# Patient Record
Sex: FEMALE | Race: WHITE | HISPANIC OR LATINO | Employment: UNEMPLOYED | URBAN - METROPOLITAN AREA
[De-identification: names, ages, dates, MRNs, and addresses within clinical notes are randomized per-mention and may not be internally consistent; named-entity substitution may affect disease eponyms.]

---

## 2017-01-31 ENCOUNTER — GENERIC CONVERSION - ENCOUNTER (OUTPATIENT)
Dept: OTHER | Facility: OTHER | Age: 47
End: 2017-01-31

## 2017-05-01 ENCOUNTER — GENERIC CONVERSION - ENCOUNTER (OUTPATIENT)
Dept: OTHER | Facility: OTHER | Age: 47
End: 2017-05-01

## 2017-10-31 ENCOUNTER — APPOINTMENT (OUTPATIENT)
Dept: RADIOLOGY | Facility: CLINIC | Age: 47
End: 2017-10-31
Payer: COMMERCIAL

## 2017-10-31 ENCOUNTER — ALLSCRIPTS OFFICE VISIT (OUTPATIENT)
Dept: OTHER | Facility: OTHER | Age: 47
End: 2017-10-31

## 2017-10-31 ENCOUNTER — GENERIC CONVERSION - ENCOUNTER (OUTPATIENT)
Dept: OTHER | Facility: OTHER | Age: 47
End: 2017-10-31

## 2017-10-31 ENCOUNTER — APPOINTMENT (OUTPATIENT)
Dept: LAB | Facility: CLINIC | Age: 47
End: 2017-10-31
Payer: COMMERCIAL

## 2017-10-31 DIAGNOSIS — R10.9 ABDOMINAL PAIN: ICD-10-CM

## 2017-10-31 DIAGNOSIS — R11.2 NAUSEA WITH VOMITING: ICD-10-CM

## 2017-10-31 DIAGNOSIS — R14.0 ABDOMINAL DISTENSION (GASEOUS): ICD-10-CM

## 2017-10-31 LAB
ALBUMIN SERPL BCP-MCNC: 3.6 G/DL (ref 3.5–5)
ALP SERPL-CCNC: 46 U/L (ref 46–116)
ALT SERPL W P-5'-P-CCNC: 17 U/L (ref 12–78)
ANION GAP SERPL CALCULATED.3IONS-SCNC: 5 MMOL/L (ref 4–13)
AST SERPL W P-5'-P-CCNC: 14 U/L (ref 5–45)
BASOPHILS # BLD AUTO: 0.04 THOUSANDS/ΜL (ref 0–0.1)
BASOPHILS NFR BLD AUTO: 1 % (ref 0–1)
BILIRUB SERPL-MCNC: 0.88 MG/DL (ref 0.2–1)
BUN SERPL-MCNC: 12 MG/DL (ref 5–25)
CALCIUM SERPL-MCNC: 8.4 MG/DL (ref 8.3–10.1)
CHLORIDE SERPL-SCNC: 105 MMOL/L (ref 100–108)
CO2 SERPL-SCNC: 29 MMOL/L (ref 21–32)
CREAT SERPL-MCNC: 0.75 MG/DL (ref 0.6–1.3)
EOSINOPHIL # BLD AUTO: 0.09 THOUSAND/ΜL (ref 0–0.61)
EOSINOPHIL NFR BLD AUTO: 1 % (ref 0–6)
ERYTHROCYTE [DISTWIDTH] IN BLOOD BY AUTOMATED COUNT: 12.7 % (ref 11.6–15.1)
ERYTHROCYTE [SEDIMENTATION RATE] IN BLOOD: 6 MM/HOUR (ref 0–20)
GFR SERPL CREATININE-BSD FRML MDRD: 96 ML/MIN/1.73SQ M
GLUCOSE P FAST SERPL-MCNC: 84 MG/DL (ref 65–99)
HCT VFR BLD AUTO: 39.1 % (ref 34.8–46.1)
HGB BLD-MCNC: 13 G/DL (ref 11.5–15.4)
LYMPHOCYTES # BLD AUTO: 1.56 THOUSANDS/ΜL (ref 0.6–4.47)
LYMPHOCYTES NFR BLD AUTO: 25 % (ref 14–44)
MCH RBC QN AUTO: 29.6 PG (ref 26.8–34.3)
MCHC RBC AUTO-ENTMCNC: 33.2 G/DL (ref 31.4–37.4)
MCV RBC AUTO: 89 FL (ref 82–98)
MONOCYTES # BLD AUTO: 0.38 THOUSAND/ΜL (ref 0.17–1.22)
MONOCYTES NFR BLD AUTO: 6 % (ref 4–12)
NEUTROPHILS # BLD AUTO: 4.2 THOUSANDS/ΜL (ref 1.85–7.62)
NEUTS SEG NFR BLD AUTO: 67 % (ref 43–75)
NRBC BLD AUTO-RTO: 0 /100 WBCS
PLATELET # BLD AUTO: 234 THOUSANDS/UL (ref 149–390)
PMV BLD AUTO: 9.9 FL (ref 8.9–12.7)
POTASSIUM SERPL-SCNC: 4.1 MMOL/L (ref 3.5–5.3)
PROT SERPL-MCNC: 7.5 G/DL (ref 6.4–8.2)
RBC # BLD AUTO: 4.39 MILLION/UL (ref 3.81–5.12)
SODIUM SERPL-SCNC: 139 MMOL/L (ref 136–145)
WBC # BLD AUTO: 6.29 THOUSAND/UL (ref 4.31–10.16)

## 2017-10-31 PROCEDURE — 85025 COMPLETE CBC W/AUTO DIFF WBC: CPT

## 2017-10-31 PROCEDURE — 80053 COMPREHEN METABOLIC PANEL: CPT

## 2017-10-31 PROCEDURE — 85652 RBC SED RATE AUTOMATED: CPT

## 2017-10-31 PROCEDURE — 36415 COLL VENOUS BLD VENIPUNCTURE: CPT

## 2017-10-31 PROCEDURE — 74000 HB X-RAY EXAM OF ABDOMEN (SINGLE ANTEROPOSTERIOR VIEW): CPT

## 2017-11-01 ENCOUNTER — ALLSCRIPTS OFFICE VISIT (OUTPATIENT)
Dept: OTHER | Facility: OTHER | Age: 47
End: 2017-11-01

## 2017-11-01 NOTE — PROGRESS NOTES
Assessment  1  Abdominal bloating (787 3) (R14 0)   2  Abdominal pain (789 00) (R10 9)   3  Nausea and vomiting (787 01) (R11 2)    Plan  Abdominal bloating, Abdominal pain    · (1) CBC/PLT/DIFF; Status:Active; Requested for:31Oct2017; Abdominal bloating, Abdominal pain, Nausea and vomiting    · (1) COMPREHENSIVE METABOLIC PANEL; Status:Active; Requested for:31Oct2017;    · (1) SED RATE; Status:Active; Requested for:31Oct2017;    · * XR ABDOMEN 1 VIEW KUB; Status:Active; Requested VIM:36DVH4072;    · 1 - Mo MALDONADO, Triny Perez (Gastroenterology) Co-Management  *  Status: Active   Requested for: 21VYT8212  Care Summary provided  : Yes  Flu vaccine need    · Fluzone Quadrivalent Intramuscular Suspension; INJECT 0 5  ML  Intramuscular; To Be Done: 51NJO6467    Discussion/Summary    BAd pain/bloating  Marcine Grow Marcine Grow N/V- Check BW including CBC, CMP, ESR  Abdominal xray  Referral to Dr Ray Dominique  Increase water  Avoid bananas which she has been eating a lot of  Eat other fruits and veggies  Chief Complaint  abd pain and bloating, n/v      History of Present Illness  HPI: For the last 2 weeks pt has been having abd bloating and pain  Pain is in the LLQ mostly, but also epigastric  The last 3 days she has had n/v when she tries to eat something  No f/c/s   took ibuprofen 800mg BID for 3 days about 2 weeks ago  She says she did take it with food  black stools or bloody stools  She has a Hx of a gastric sleeve surgery  normally has a BM every 2-3 days  She had a BM on Sat that was soft (she took almost a whole bottle of Miralax to have this BM), and hasn't had one since  No straining or pushing  She can't take Benefiber or the like due to it causing bloating  has a HX of abd issues and used to see Dr Ray Dominique years ago   Abdominal Pain (Non-Malignant) (Brief): The patient is being seen for an initial evaluation of non-malignancy related abdominal pain   Symptoms:  abdominal pain,-- constipation,-- nausea-- and-- vomiting, but-- no anorexia,-- no diarrhea-- and-- no dysphagia  Review of Systems    Constitutional: recent 17 lb weight gain, but-- no fever,-- not feeling poorly,-- no chills-- and-- not feeling tired  ENT: no ear ache, no loss of hearing, no nosebleeds or nasal discharge, no sore throat or hoarseness  Cardiovascular: no complaints of slow or fast heart rate, no chest pain, no palpitations, no leg claudication or lower extremity edema  Respiratory: no complaints of shortness of breath, no wheezing, no dyspnea on exertion, no orthopnea or PND  Gastrointestinal: abdominal pain,-- nausea,-- vomiting,-- constipation-- and-- diarrhea, but-- no blood in stools  ROS reviewed  Active Problems  1  Abdominal bloating (787 3) (R14 0)   2  Abdominal pain (789 00) (R10 9)   3  Asthma (493 90) (J45 909)   4  Chronic rhinitis (472 0) (J31 0)   5  Fall, accidental (E888 9) [facility])   6  Flu vaccine need (V04 81) (Z23)   7  Fractured coccyx (805 6) (S32 2XXA)   8  Fungal dermatitis (111 9) (B36 9)   9  Leukopenia (288 50) (D72 819)   10  Low back pain (724 2) (M54 5)   11  Malabsorption (579 9) (K90 9)   12  Nausea and vomiting (787 01) (R11 2)   13  Stress incontinence in female (625 6) (N39 3)   14  Vitamin D deficiency (268 9) (E55 9)    Past Medical History  Active Problems And Past Medical History Reviewed: The active problems and past medical history were reviewed and updated today  Surgical History  Surgical History Reviewed: The surgical history was reviewed and updated today  Social History   · Minimum alcohol consumption   · Never A Smoker   · No drug use  The social history was reviewed and updated today  Family History  Family History Reviewed: The family history was reviewed and updated today  Current Meds   1  Fluticasone Propionate 50 MCG/ACT Nasal Suspension; USE 2 SPRAYS IN EACH   NOSTRIL ONCE DAILY; Therapy: 67PSI4418 to (Last Rx:26Jan2015) Ordered   2   Ibuprofen 800 MG Oral Tablet; TAKE 1 TABLET 3 TIMES DAILY AFTER MEALS; Therapy: 78BTM6008 to (Evaluate:57Uld5944) Recorded   3  Lamisil CREA; Therapy: (Recorded:02Mar2016) to Recorded   4  Microgestin FE 1 5/30 1 5-30 MG-MCG Oral Tablet; TAKE 1 TABLET DAILY AS DIRECTED; Therapy: 12Sep2016 to (Evaluate:10Oct2016); Last Rx:12Sep2016 Ordered   5  Ventolin  (90 Base) MCG/ACT Inhalation Aerosol Solution; TAKE 2 PUFFS EVERY   4 TO 6 HOURS AS NEEDED FOR SOB; Therapy: 10YAQ8438 to (Last Rx:33Yso9583) Ordered    The medication list was reviewed and updated today  Allergies  1  Penicillins   2  Shellfish-derived Products  3  Seasonal    Vitals   Recorded: 05NIS8183 08:59AM Recorded: 41AQT2920 08:46AM   Temperature 98 1 F    Heart Rate  66   Systolic  88   Diastolic  58   Height  5 ft 5 in   Weight  163 lb 4 oz   BMI Calculated  27 17   BSA Calculated  1 81   O2 Saturation  98     Physical Exam    Constitutional   General appearance: No acute distress, well appearing and well nourished  Eyes   Conjunctiva and lids: No swelling, erythema or discharge  Pupils and irises: Equal, round and reactive to light  Ears, Nose, Mouth, and Throat   External inspection of ears and nose: Normal     Otoscopic examination: Tympanic membranes translucent with normal light reflex  Canals patent without erythema  Nasal mucosa, septum, and turbinates: Normal without edema or erythema  Oropharynx: Normal with no erythema, edema, exudate or lesions  Pulmonary   Respiratory effort: No increased work of breathing or signs of respiratory distress  Auscultation of lungs: Clear to auscultation  Cardiovascular   Auscultation of heart: Normal rate and rhythm, normal S1 and S2, without murmurs  Examination of extremities for edema and/or varicosities: Normal     Abdomen   Abdomen: Abnormal   The abdomen was flat  Bowel sounds were normal  There was mild tenderness in the left lower quadrant  The abdomen was not firm   No rebound tenderness  No guarding  Liver and spleen: No hepatomegaly or splenomegaly  Lymphatic   Palpation of lymph nodes in neck: No lymphadenopathy  Results/Data  PHQ-2 Adult Depression Screening 31Oct2017 08:42AM User, Ahs     Test Name Result Flag Reference   PHQ-2 Adult Depression Score 0     Over the last two weeks, how often have you been bothered by any of the following problems?   Little interest or pleasure in doing things: Not at all - 0  Feeling down, depressed, or hopeless: Not at all - 0   PHQ-2 Adult Depression Screening Negative         Future Appointments    Date/Time Provider Specialty Site   11/01/2017 04:00 PM Sanchez Jhaveri MD Gastroenterology Adult [address] [facility]   Electronically signed by : Basia Kruger, [facility]; Oct 31 2017  9:23AM EST                       (Author)    Electronically signed by : Efren Gilbert DO; Oct 31 2017  9:27AM EST                       (Author)

## 2017-11-02 ENCOUNTER — GENERIC CONVERSION - ENCOUNTER (OUTPATIENT)
Dept: OTHER | Facility: OTHER | Age: 47
End: 2017-11-02
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
## 2018-01-10 NOTE — RESULT NOTES
Verified Results  (1) CBC/PLT/DIFF 31Oct2017 09:18AM [facility] Order Number: [id number]     Test Name Result Flag Reference   WBC COUNT 6 29 Thousand/uL  4 31-10 16   RBC COUNT 4 39 Million/uL  3 81-5 12   HEMOGLOBIN 13 0 g/dL  11 5-15 4   HEMATOCRIT 39 1 %  34 8-46  1   MCV 89 fL  82-98   MCH 29 6 pg  26 8-34 3   MCHC 33 2 g/dL  31 4-37 4   RDW 12 7 %  11 6-15 1   MPV 9 9 fL  8 9-12 7   PLATELET COUNT 729 Thousands/uL  149-390   nRBC AUTOMATED 0 /100 WBCs     NEUTROPHILS RELATIVE PERCENT 67 %  43-75   LYMPHOCYTES RELATIVE PERCENT 25 %  14-44   MONOCYTES RELATIVE PERCENT 6 %  4-12   EOSINOPHILS RELATIVE PERCENT 1 %  0-6   BASOPHILS RELATIVE PERCENT 1 %  0-1   NEUTROPHILS ABSOLUTE COUNT 4 20 Thousands/? ??L  1 85-7 62   LYMPHOCYTES ABSOLUTE COUNT 1 56 Thousands/? ??L  0 60-4 47   MONOCYTES ABSOLUTE COUNT 0 38 Thousand/? ??L  0 17-1 22   EOSINOPHILS ABSOLUTE COUNT 0 09 Thousand/? ??L  0 00-0 61   BASOPHILS ABSOLUTE COUNT 0 04 Thousands/? ??L  0 00-0 10     (1) COMPREHENSIVE METABOLIC PANEL 28CVD1238 29:14EC [facility] Order Number: [id number]     Test Name Result Flag Reference   SODIUM 139 mmol/L  136-145   POTASSIUM 4 1 mmol/L  3 5-5 3   CHLORIDE 105 mmol/L  100-108   CARBON DIOXIDE 29 mmol/L  21-32   ANION GAP (CALC) 5 mmol/L  4-13   BLOOD UREA NITROGEN 12 mg/dL  5-25   CREATININE 0 75 mg/dL  0 60-1 30   Standardized to IDMS reference method   CALCIUM 8 4 mg/dL  8 3-10 1   BILI, TOTAL 0 88 mg/dL  0 20-1 00   ALK PHOSPHATAS 46 U/L     ALT (SGPT) 17 U/L  12-78   Specimen collection should occur prior to Sulfasalazine and/or Sulfapyridine administration due to the potential for falsely depressed results  AST(SGOT) 14 U/L  5-45   Specimen collection should occur prior to Sulfasalazine administration due to the potential for falsely depressed results     ALBUMIN 3 6 g/dL  3 5-5 0   TOTAL PROTEIN 7 5 g/dL  6 4-8 2   eGFR 96 ml/min/1 73sq gaby     [employer] Disease Education Program recommendations are as follows:  GFR calculation is accurate only with a steady state creatinine  Chronic Kidney disease less than 60 ml/min/1 73 sq  meters  Kidney failure less than 15 ml/min/1 73 sq  meters  GLUCOSE FASTING 84 mg/dL  65-99   Specimen collection should occur prior to Sulfasalazine administration due to the potential for falsely depressed results  Specimen collection should occur prior to Sulfapyridine administration due to the potential for falsely elevated results       (1) SED RATE 30LMX6097 09:18AM Harriett DORSEY Order Number: [id number]     Test Name Result Flag Reference   SED RATE 6 mm/hour  0-20     Health Maintenance Flow Sheet 40Xzx2322 09:40AM      Test Name Result Flag Reference   [facility] 10/28/2015

## 2018-01-11 NOTE — RESULT NOTES
Verified Results  (1) COMPREHENSIVE METABOLIC PANEL 76QES5259 86:17QC Jermaine Swain   TW Order Number: [id number]     Test Name Result Flag Reference   GLUCOSE,RANDM 80 mg/dL     If the patient is fasting, the ADA then defines impaired fasting glucose as > 100 mg/dL and diabetes as > or equal to 123 mg/dL  SODIUM 137 mmol/L  136-145   POTASSIUM 3 9 mmol/L  3 5-5 3   CHLORIDE 106 mmol/L  100-108   CARBON DIOXIDE 25 mmol/L  21-32   ANION GAP (CALC) 6 mmol/L  4-13   BLOOD UREA NITROGEN 16 mg/dL  5-25   CREATININE 0 74 mg/dL  0 60-1 30   Standardized to IDMS reference method   CALCIUM 8 3 mg/dL  8 3-10 1   BILI, TOTAL 1 04 mg/dL H 0 20-1 00   ALK PHOSPHATAS 33 U/L L    ALT (SGPT) 18 U/L  12-78   AST(SGOT) 11 U/L  5-45   ALBUMIN 3 6 g/dL  3 5-5 0   TOTAL PROTEIN 7 4 g/dL  6 4-8 2   eGFR Non-African American      >60 0 ml/min/1 73sq m   - Patient Instructions: This is a fasting blood test  Water,black tea or black  coffee only after 9:00pm the night before test Drink 2 glasses of water the morning of test - Patient Instructions: This bloodwork is non-fasting  Please drink two glasses of   water morning of bloodwork  National Kidney Disease Education Program recommendations are as follows:  GFR calculation is accurate only with a steady state creatinine  Chronic Kidney disease less than 60 ml/min/1 73 sq  meters  Kidney failure less than 15 ml/min/1 73 sq  meters  (1) LIPID PANEL, FASTING 65Bot0043 08:39AM Fede Sylvester   TW Order Number: [id number]     Test Name Result Flag Reference   CHOLESTEROL 147 mg/dL     HDL,DIRECT 47 mg/dL  40-60   Specimen collection should occur prior to Metamizole administration due to the potential for falsely depressed results  LDL CHOLESTEROL CALCULATED 77 mg/dL  0-100   - Patient Instructions:  This is a fasting blood test  Water,black tea or black  coffee only after 9:00pm the night before test   Drink 2 glasses of water the morning of test     - Patient Instructions: This is a fasting blood test  Water,black tea or black  coffee only after 9:00pm the night before test Drink 2 glasses of water the morning of test - Patient Instructions: This bloodwork is non-fasting  Please drink two glasses of   water morning of bloodwork  Triglyceride:         Normal              <150 mg/dl       Borderline High    150-199 mg/dl       High               200-499 mg/dl       Very High          >499 mg/dl  Cholesterol:         Desirable        <200 mg/dl      Borderline High  200-239 mg/dl      High             >239 mg/dl  HDL Cholesterol:        High    >59 mg/dL      Low     <41 mg/dL  LDL CALCULATED:    This screening LDL is a calculated result  It does not have the accuracy of the Direct Measured LDL in the monitoring of patients with hyperlipidemia and/or statin therapy  Direct Measure LDL (YIF179) must be ordered separately in these patients  TRIGLYCERIDES 113 mg/dL  <=150   Specimen collection should occur prior to N-Acetylcysteine or Metamizole administration due to the potential for falsely depressed results  (1) TSH 12Ouz9107 08:39AM [place] Lashawn Brizuela    Order Number: [id number]     Test Name Result Flag Reference   TSH 2 670 uIU/mL  0 358-3 740   - Patient Instructions: This bloodwork is non-fasting  Please drink two glasses of water morning of bloodwork  - Patient Instructions: This is a fasting blood test  Water,black tea or black  coffee only after 9:00pm the night before test Drink 2 glasses of water the morning of test - Patient Instructions: This bloodwork is non-fasting  Please drink two glasses of   water morning of bloodwork  Patients undergoing fluorescein dye angiography may retain small amounts of fluorescein in the body for 48-72 hours post procedure  Samples containing fluorescein can produce falsely depressed TSH values   If the patient had this procedure,a specimen should be resubmitted post fluorescein clearance  The recommended reference ranges for TSH during pregnancy are as follows:  First trimester 0 1 to 2 5 uIU/mL  Second trimester  0 2 to 3 0 uIU/mL  Third trimester 0 3 to 3 0 uIU/m     (1) CBC/PLT/DIFF 26Uda0337 08:39AM Felecia Primbee   TW Order Number: [id number]     Test Name Result Flag Reference   WBC COUNT 3 10 Thousand/uL L 4 31-10 16   RBC COUNT 4 07 Million/uL  3 81-5 12   HEMOGLOBIN 11 8 g/dL  11 5-15 4   HEMATOCRIT 35 9 %  34 8-46  1   MCV 88 fL  82-98   MCH 29 0 pg  26 8-34 3   MCHC 32 9 g/dL  31 4-37 4   RDW 12 6 %  11 6-15 1   MPV 11 0 fL  8 9-12 7   PLATELET COUNT 289 Thousands/uL  149-390   nRBC AUTOMATED 0 /100 WBCs     NEUTROPHILS RELATIVE PERCENT 44 %  43-75   LYMPHOCYTES RELATIVE PERCENT 45 % H 14-44   MONOCYTES RELATIVE PERCENT 7 %  4-12   EOSINOPHILS RELATIVE PERCENT 3 %  0-6   BASOPHILS RELATIVE PERCENT 1 %  0-1   NEUTROPHILS ABSOLUTE COUNT 1 36 Thousands/?L L 1 85-7 62   LYMPHOCYTES ABSOLUTE COUNT 1 38 Thousands/?L  0 60-4 47   MONOCYTES ABSOLUTE COUNT 0 22 Thousand/?L  0 17-1 22   EOSINOPHILS ABSOLUTE COUNT 0 10 Thousand/?L  0 00-0 61   BASOPHILS ABSOLUTE COUNT 0 03 Thousands/?L  0 00-0 10   - Patient Instructions: This bloodwork is non-fasting  Please drink two glasses of water morning of bloodwork  - Patient Instructions: This bloodwork is non-fasting  Please drink two glasses of water morning of bloodwork

## 2018-01-11 NOTE — RESULT NOTES
Verified Results  * XR SACRUM AND COCCYX [id number] 08:51AM [place] [facility] Order Number: [id number]     Test Name Result Flag Reference   XR SACRUM AND COCCYX (Report)     SACRUM AND COCCYX     INDICATION: Sacral/coccygeal pain  COMPARISON: Plain film dated 2/15/2016 and 5/9/2016  VIEWS: 3; 3 images      FINDINGS:     There remains slight osseous irregularity at the 1st coccygeal segment which could reflect the sequela of remote fracture  No evidence for an acute fracture  Sacral arcuate lines are maintained  The SI joints appear symmetric  Osteitis pubis  IMPRESSION:     Stable appearance of the sacrum and coccyx  Suggestion of deformity from remote coccygeal fracture         Workstation performed: [id number]     Signed by:   Marysol Fowler MD   9/27/16

## 2018-01-12 NOTE — RESULT NOTES
Verified Results  * XR ABDOMEN 1 VIEW KUB [id number] 12:27PM Sweetser Suzette Lesch    Order Number: [id number]     Test Name Result Flag Reference   XR ABDOMEN 1 VIEW KUB (Report)     ABDOMEN     INDICATION: History of indication     COMPARISON: None  VIEWS: AP supine     IMAGES: 1     FINDINGS:     There is a nonobstructive bowel gas pattern  No discernible free air on this supine study  Upright or left lateral decubitus imaging is more sensitive to detect subtle free air in the appropriate setting  No pathologic calcifications or soft tissue masses  Visualized lung bases are clear  Visualized osseous structures are unremarkable for the patient's age  IMPRESSION:     Unremarkable examination         Workstation performed: [id number]     Signed by:   Daiana Mcfarlane MD   11/2/17

## 2018-01-14 VITALS
SYSTOLIC BLOOD PRESSURE: 88 MMHG | HEIGHT: 65 IN | HEART RATE: 66 BPM | OXYGEN SATURATION: 98 % | WEIGHT: 163.25 LBS | TEMPERATURE: 98.1 F | BODY MASS INDEX: 27.2 KG/M2 | DIASTOLIC BLOOD PRESSURE: 58 MMHG

## 2018-01-14 VITALS
WEIGHT: 163.25 LBS | SYSTOLIC BLOOD PRESSURE: 102 MMHG | HEART RATE: 68 BPM | DIASTOLIC BLOOD PRESSURE: 60 MMHG | HEIGHT: 65 IN | BODY MASS INDEX: 27.2 KG/M2

## 2018-01-15 ENCOUNTER — ALLSCRIPTS OFFICE VISIT (OUTPATIENT)
Dept: OTHER | Facility: OTHER | Age: 48
End: 2018-01-15

## 2018-01-15 NOTE — MISCELLANEOUS
Message  Spoke to patient regarding her x-ray which shows a fracture of the coccyx  She is aware there is no real treatment for this fracture  She has an appt with her orthopedist tomorrow and she will inform them and will request the records be sent        Signatures   Electronically signed by : Tho Montesinos [facility]; Feb 15 2016  4:31PM EST                       (Author)

## 2018-01-22 VITALS
HEIGHT: 65 IN | HEART RATE: 72 BPM | OXYGEN SATURATION: 96 % | WEIGHT: 146 LBS | SYSTOLIC BLOOD PRESSURE: 92 MMHG | BODY MASS INDEX: 24.32 KG/M2 | DIASTOLIC BLOOD PRESSURE: 64 MMHG

## 2018-01-22 VITALS
TEMPERATURE: 98.3 F | WEIGHT: 167 LBS | DIASTOLIC BLOOD PRESSURE: 74 MMHG | HEART RATE: 76 BPM | OXYGEN SATURATION: 93 % | SYSTOLIC BLOOD PRESSURE: 106 MMHG | BODY MASS INDEX: 27.82 KG/M2 | HEIGHT: 65 IN

## 2018-02-12 RX ORDER — PRENATAL VIT 91/IRON/FOLIC/DHA 28-975-200
COMBINATION PACKAGE (EA) ORAL
Status: ON HOLD | COMMUNITY
End: 2018-04-30

## 2018-02-12 RX ORDER — TOBRAMYCIN 3 MG/ML
2 SOLUTION/ DROPS OPHTHALMIC 4 TIMES DAILY
COMMUNITY
Start: 2018-01-15 | End: 2018-02-13 | Stop reason: ALTCHOICE

## 2018-02-12 RX ORDER — FLUTICASONE PROPIONATE 50 MCG
2 SPRAY, SUSPENSION (ML) NASAL DAILY
COMMUNITY
Start: 2015-01-26 | End: 2019-03-27 | Stop reason: SDUPTHER

## 2018-02-12 RX ORDER — ALBUTEROL SULFATE 90 UG/1
AEROSOL, METERED RESPIRATORY (INHALATION)
COMMUNITY
Start: 2016-05-10 | End: 2019-03-27 | Stop reason: SDUPTHER

## 2018-02-12 RX ORDER — NORETHINDRONE ACETATE AND ETHINYL ESTRADIOL 1.5-30(21)
1 KIT ORAL DAILY
COMMUNITY
Start: 2016-09-12 | End: 2018-02-13 | Stop reason: ALTCHOICE

## 2018-02-12 RX ORDER — IBUPROFEN 800 MG/1
1 TABLET ORAL EVERY 8 HOURS
COMMUNITY
Start: 2017-10-31 | End: 2018-02-13 | Stop reason: ALTCHOICE

## 2018-02-13 ENCOUNTER — OFFICE VISIT (OUTPATIENT)
Dept: INTERNAL MEDICINE CLINIC | Facility: CLINIC | Age: 48
End: 2018-02-13
Payer: COMMERCIAL

## 2018-02-13 VITALS
DIASTOLIC BLOOD PRESSURE: 64 MMHG | OXYGEN SATURATION: 98 % | HEIGHT: 64 IN | BODY MASS INDEX: 29.6 KG/M2 | HEART RATE: 87 BPM | WEIGHT: 173.4 LBS | SYSTOLIC BLOOD PRESSURE: 92 MMHG

## 2018-02-13 DIAGNOSIS — Z00.00 HEALTH MAINTENANCE EXAMINATION: Primary | ICD-10-CM

## 2018-02-13 DIAGNOSIS — I95.0 IDIOPATHIC HYPOTENSION: Primary | ICD-10-CM

## 2018-02-13 PROCEDURE — 99396 PREV VISIT EST AGE 40-64: CPT | Performed by: INTERNAL MEDICINE

## 2018-02-13 RX ORDER — NORETHINDRONE ACETATE AND ETHINYL ESTRADIOL 1.5-30(21)
1 KIT ORAL DAILY
COMMUNITY
End: 2021-09-14 | Stop reason: ALTCHOICE

## 2018-02-13 NOTE — PROGRESS NOTES
Assessment/Plan:    No problem-specific Assessment & Plan notes found for this encounter  Diagnoses and all orders for this visit:    Health maintenance examination    Other orders  -     fluticasone (FLONASE) 50 mcg/act nasal spray; 2 sprays into each nostril daily  -     Discontinue: ibuprofen (MOTRIN) 800 mg tablet; Take 1 tablet by mouth every 8 (eight) hours  -     terbinafine (LAMISIL AT) 1 % cream; Apply topically  -     Linaclotide (LINZESS) 145 MCG CAPS; Take 1 capsule by mouth daily  -     Discontinue: norethindrone-ethinyl estradiol-iron (MICROGESTIN FE 1 5/30) 1 5-30 MG-MCG tablet; Take 1 tablet by mouth daily  -     Discontinue: tobramycin (TOBREX) 0 3 % SOLN; Apply 2 drops to eye 4 (four) times a day  -     albuterol (VENTOLIN HFA) 90 mcg/act inhaler; Inhale  -     norethindrone-ethinyl estradiol-iron (MICROGESTIN FE1 5/30) 1 5-30 MG-MCG tablet; Take 1 tablet by mouth daily          Subjective:      Patient ID: Felisha Urrutia is a 53 y o  female  Follow-up visit of a 81-year-old  Other than some allergy symptoms for which she uses Flonase, she feels well  Formerly she had persistent asthma but now she has not been symptomatic for over a year  No recent needed all for inhaler other vascular maintenance    The patient had gastric sleeve surgery 3 years ago  Her maximum weight was 210  A minimum weight was 137, in the spring of 2017  Currently 170     Has reflux symptoms  Normal EGD in 2014  Continues to follow with Gastroenterology  complaint of some right shoulder pain associated with limitation of range of motion to about 90° abduction  Chronic low back pain  Uses a cane for ambulatory support  Applying for disability          The following portions of the patient's history were reviewed and updated as appropriate: allergies, current medications, past family history, past medical history, past social history, past surgical history and problem list     Review of Systems Constitutional: Negative for activity change, appetite change and fever  HENT: Positive for postnasal drip and rhinorrhea  Negative for sinus pain, sinus pressure, sore throat and tinnitus  Eyes: Negative for pain and visual disturbance  Respiratory: Negative for cough, shortness of breath and wheezing  Cardiovascular: Negative for chest pain and palpitations  Gastrointestinal: Positive for constipation  Negative for abdominal pain, blood in stool, diarrhea, nausea and vomiting  Endocrine: Negative for cold intolerance and heat intolerance  Genitourinary: Negative for dysuria, frequency, vaginal bleeding and vaginal discharge  Musculoskeletal: Positive for arthralgias and back pain  Skin: Negative for color change and wound  Allergic/Immunologic: Negative for immunocompromised state  Neurological: Negative for dizziness, seizures, syncope and headaches  Hematological: Negative for adenopathy  Does not bruise/bleed easily  Psychiatric/Behavioral: Negative for confusion and suicidal ideas  Objective:    Vitals:    02/13/18 1148   BP: (!) 86/62   Pulse: 87   SpO2: 98%        Physical Exam   Constitutional: She is oriented to person, place, and time  She appears well-developed and well-nourished  No distress  Appears stated age     HENT:   Head: Normocephalic and atraumatic  Eyes: Conjunctivae are normal  Pupils are equal, round, and reactive to light  Neck: Normal range of motion  No thyromegaly present  Cardiovascular: Normal rate, regular rhythm and normal heart sounds  No murmur heard  Pulmonary/Chest: Effort normal  No respiratory distress  She has no wheezes  She has no rales  Abdominal: Soft  There is no tenderness  Musculoskeletal: Normal range of motion  She exhibits no deformity  Neurological: She is alert and oriented to person, place, and time  Skin: Skin is warm and dry  Psychiatric: She has a normal mood and affect   Her behavior is normal  Judgment and thought content normal

## 2018-02-13 NOTE — PROGRESS NOTES
Follow-up visit of a 72-year-old  Chronic seasonal allergies treated with Flonase  Has the sniffles right now   Mild intermittent asthma  She has not needed a rescue or maintenance inhaler for over a year   History of gastric sleeve surgery which was done in 2015  Maximum weight was 210 lb  Minimum weight was 137 lb May of 2017  Currently 170  Other than allergy symptoms, she feels well  Mammograms up-to-date  EGD was done in 2014 for reflux symptoms and was normal   Still gets symptoms and follows with Gi        No cigarettes  Minimal alcohol  No drugs

## 2018-02-14 ENCOUNTER — APPOINTMENT (OUTPATIENT)
Dept: LAB | Facility: CLINIC | Age: 48
End: 2018-02-14
Payer: COMMERCIAL

## 2018-02-14 DIAGNOSIS — I95.0 IDIOPATHIC HYPOTENSION: ICD-10-CM

## 2018-02-14 LAB
ALBUMIN SERPL BCP-MCNC: 3.9 G/DL (ref 3.5–5)
ALP SERPL-CCNC: 49 U/L (ref 46–116)
ALT SERPL W P-5'-P-CCNC: 21 U/L (ref 12–78)
ANION GAP SERPL CALCULATED.3IONS-SCNC: 10 MMOL/L (ref 4–13)
AST SERPL W P-5'-P-CCNC: 19 U/L (ref 5–45)
BACTERIA UR QL AUTO: ABNORMAL /HPF
BASOPHILS # BLD AUTO: 0.03 THOUSANDS/ΜL (ref 0–0.1)
BASOPHILS NFR BLD AUTO: 1 % (ref 0–1)
BILIRUB SERPL-MCNC: 1.83 MG/DL (ref 0.2–1)
BILIRUB UR QL STRIP: NEGATIVE
BUN SERPL-MCNC: 13 MG/DL (ref 5–25)
CALCIUM SERPL-MCNC: 8.5 MG/DL (ref 8.3–10.1)
CHLORIDE SERPL-SCNC: 104 MMOL/L (ref 100–108)
CHOLEST SERPL-MCNC: 176 MG/DL (ref 50–200)
CLARITY UR: CLEAR
CO2 SERPL-SCNC: 24 MMOL/L (ref 21–32)
COLOR UR: YELLOW
CORTIS AM PEAK SERPL-MCNC: 23.4 UG/DL (ref 4.2–22.4)
CREAT SERPL-MCNC: 0.78 MG/DL (ref 0.6–1.3)
EOSINOPHIL # BLD AUTO: 0.09 THOUSAND/ΜL (ref 0–0.61)
EOSINOPHIL NFR BLD AUTO: 2 % (ref 0–6)
ERYTHROCYTE [DISTWIDTH] IN BLOOD BY AUTOMATED COUNT: 12.3 % (ref 11.6–15.1)
GFR SERPL CREATININE-BSD FRML MDRD: 91 ML/MIN/1.73SQ M
GLUCOSE P FAST SERPL-MCNC: 84 MG/DL (ref 65–99)
GLUCOSE UR STRIP-MCNC: NEGATIVE MG/DL
HCT VFR BLD AUTO: 38.6 % (ref 34.8–46.1)
HDLC SERPL-MCNC: 48 MG/DL (ref 40–60)
HGB BLD-MCNC: 12.5 G/DL (ref 11.5–15.4)
HGB UR QL STRIP.AUTO: NEGATIVE
KETONES UR STRIP-MCNC: NEGATIVE MG/DL
LDLC SERPL CALC-MCNC: 109 MG/DL (ref 0–100)
LEUKOCYTE ESTERASE UR QL STRIP: ABNORMAL
LYMPHOCYTES # BLD AUTO: 1.56 THOUSANDS/ΜL (ref 0.6–4.47)
LYMPHOCYTES NFR BLD AUTO: 29 % (ref 14–44)
MCH RBC QN AUTO: 28.2 PG (ref 26.8–34.3)
MCHC RBC AUTO-ENTMCNC: 32.4 G/DL (ref 31.4–37.4)
MCV RBC AUTO: 87 FL (ref 82–98)
MONOCYTES # BLD AUTO: 0.29 THOUSAND/ΜL (ref 0.17–1.22)
MONOCYTES NFR BLD AUTO: 5 % (ref 4–12)
NEUTROPHILS # BLD AUTO: 3.5 THOUSANDS/ΜL (ref 1.85–7.62)
NEUTS SEG NFR BLD AUTO: 63 % (ref 43–75)
NITRITE UR QL STRIP: NEGATIVE
NON-SQ EPI CELLS URNS QL MICRO: ABNORMAL /HPF
NRBC BLD AUTO-RTO: 0 /100 WBCS
PH UR STRIP.AUTO: 7 [PH] (ref 4.5–8)
PLATELET # BLD AUTO: 237 THOUSANDS/UL (ref 149–390)
PMV BLD AUTO: 10.6 FL (ref 8.9–12.7)
POTASSIUM SERPL-SCNC: 4 MMOL/L (ref 3.5–5.3)
PROT SERPL-MCNC: 7.8 G/DL (ref 6.4–8.2)
PROT UR STRIP-MCNC: NEGATIVE MG/DL
RBC # BLD AUTO: 4.43 MILLION/UL (ref 3.81–5.12)
RBC #/AREA URNS AUTO: ABNORMAL /HPF
SODIUM SERPL-SCNC: 138 MMOL/L (ref 136–145)
SP GR UR STRIP.AUTO: 1.01 (ref 1–1.03)
TRIGL SERPL-MCNC: 96 MG/DL
TSH SERPL DL<=0.05 MIU/L-ACNC: 1.53 UIU/ML (ref 0.36–3.74)
UROBILINOGEN UR QL STRIP.AUTO: 1 E.U./DL
WBC # BLD AUTO: 5.48 THOUSAND/UL (ref 4.31–10.16)
WBC #/AREA URNS AUTO: ABNORMAL /HPF

## 2018-02-14 PROCEDURE — 85025 COMPLETE CBC W/AUTO DIFF WBC: CPT

## 2018-02-14 PROCEDURE — 80061 LIPID PANEL: CPT

## 2018-02-14 PROCEDURE — 80053 COMPREHEN METABOLIC PANEL: CPT

## 2018-02-14 PROCEDURE — 81001 URINALYSIS AUTO W/SCOPE: CPT | Performed by: INTERNAL MEDICINE

## 2018-02-14 PROCEDURE — 36415 COLL VENOUS BLD VENIPUNCTURE: CPT

## 2018-02-14 PROCEDURE — 82533 TOTAL CORTISOL: CPT

## 2018-02-14 PROCEDURE — 84443 ASSAY THYROID STIM HORMONE: CPT

## 2018-02-16 ENCOUNTER — TELEPHONE (OUTPATIENT)
Dept: INTERNAL MEDICINE CLINIC | Facility: CLINIC | Age: 48
End: 2018-02-16

## 2018-02-16 DIAGNOSIS — M25.519 ACUTE SHOULDER PAIN, UNSPECIFIED LATERALITY: Primary | ICD-10-CM

## 2018-02-16 PROBLEM — M25.511 BILATERAL SHOULDER PAIN: Status: ACTIVE | Noted: 2018-02-16

## 2018-02-16 PROBLEM — M25.512 BILATERAL SHOULDER PAIN: Status: ACTIVE | Noted: 2018-02-16

## 2018-04-18 ENCOUNTER — OFFICE VISIT (OUTPATIENT)
Dept: INTERNAL MEDICINE CLINIC | Facility: CLINIC | Age: 48
End: 2018-04-18
Payer: COMMERCIAL

## 2018-04-18 VITALS
BODY MASS INDEX: 30.56 KG/M2 | OXYGEN SATURATION: 97 % | DIASTOLIC BLOOD PRESSURE: 64 MMHG | HEART RATE: 68 BPM | WEIGHT: 179 LBS | SYSTOLIC BLOOD PRESSURE: 98 MMHG | HEIGHT: 64 IN | RESPIRATION RATE: 18 BRPM

## 2018-04-18 DIAGNOSIS — R10.13 EPIGASTRIC PAIN: Primary | ICD-10-CM

## 2018-04-18 DIAGNOSIS — M54.6 ACUTE MIDLINE THORACIC BACK PAIN: ICD-10-CM

## 2018-04-18 PROCEDURE — 99213 OFFICE O/P EST LOW 20 MIN: CPT | Performed by: PHYSICIAN ASSISTANT

## 2018-04-18 NOTE — PROGRESS NOTES
Assessment/Plan:    ABD pain that radiates to back-  Pt advised to go straight to ER  She said she was going to go last night, and decided to come here instead  She will [facility] the [facility], [facility]    No problem-specific Assessment & Plan notes found for this encounter  Diagnoses and all orders for this visit:    Epigastric pain    Acute midline thoracic back pain          Subjective:      Patient ID: Sharin Lombard is a 53 y o  female  Patient comes in complaining of a constant/ waxing and waning abdominal pain that started about 5 days ago  She says is located right in the pit of her stomach, she points to the center/epigastric area  She says the pain goes through to her back and is worse with taking deep breaths and certain positions  She has had some loose stools and saw a small amount of blood in her stool as well  She said is not associated with eating anything in particular or an empty stomach  No nausea or vomiting  No fever, chills or sweats  She also notes that she has noticed lately when she goes up flight of stairs she feels lightheaded  No chest pain, no headaches or vision changes      Abdominal Pain   This is a new problem  The current episode started in the past 7 days  The onset quality is gradual  The problem occurs constantly  The pain is located in the epigastric region  The abdominal pain radiates to the back  Associated symptoms include belching, diarrhea and hematochezia  Pertinent negatives include no constipation, nausea or vomiting  The pain is aggravated by deep breathing and certain positions  The pain is relieved by certain positions  Treatments tried: excedrin  The treatment provided no relief         The following portions of the patient's history were reviewed and updated as appropriate: allergies, current medications, past family history, past medical history, past social history, past surgical history and problem list     Review of Systems   Respiratory: Negative for cough, chest tightness and shortness of breath  Cardiovascular: Negative for chest pain and palpitations  Gastrointestinal: Positive for abdominal pain, blood in stool, diarrhea and hematochezia  Negative for constipation, nausea and vomiting  Musculoskeletal: Positive for back pain  Objective:      BP 98/64   Pulse 68   Resp 18   Ht 5' 4" (1 626 m)   Wt 81 2 kg (179 lb) Comment: per patient  SpO2 97%   BMI 30 73 kg/m²          Physical Exam   Constitutional: She appears well-developed and well-nourished  Cardiovascular: Normal rate, regular rhythm and normal heart sounds  Pulmonary/Chest: Effort normal and breath sounds normal  No respiratory distress  She has no wheezes  Abdominal: Soft  Bowel sounds are normal  There is tenderness  Musculoskeletal: She exhibits tenderness     center of back across from location of pain in epigastric area

## 2018-04-19 ENCOUNTER — TELEPHONE (OUTPATIENT)
Dept: GASTROENTEROLOGY | Facility: CLINIC | Age: 48
End: 2018-04-19

## 2018-04-19 NOTE — TELEPHONE ENCOUNTER
Agree, she can try pepcid or prilosec OTC in the meantime for her upper abdominal pain but Kirstin will likely prescribe a PPI tomorrow at the visit

## 2018-04-19 NOTE — TELEPHONE ENCOUNTER
Spoke with patient  H/O abdominal pain, bloating, N/V, GERD    Patient states she has been experiencing 10/10 upper mid abdominal pain which radiates to her back for 6 days, and an increase in pain after using medical marijuana pills last night  [facility] done (WO dye), pt  States no significant findings and was sent home to follow-up with GI  Patient is also experiencing diarrhea 1-3times daily muddy for 4 days  She experiences nausea without vomiting  Denies blood in stool, SOB, Vomitting  Advised patient to follow BRAT diet and if pain is severe go to ED  Patient would like to wait for OV tomorrow at 10am with guillermo  Any other suggestions?

## 2018-04-20 ENCOUNTER — OFFICE VISIT (OUTPATIENT)
Dept: GASTROENTEROLOGY | Facility: CLINIC | Age: 48
End: 2018-04-20
Payer: COMMERCIAL

## 2018-04-20 VITALS
SYSTOLIC BLOOD PRESSURE: 98 MMHG | WEIGHT: 179.5 LBS | HEIGHT: 64 IN | HEART RATE: 70 BPM | BODY MASS INDEX: 30.64 KG/M2 | DIASTOLIC BLOOD PRESSURE: 62 MMHG

## 2018-04-20 DIAGNOSIS — K58.1 IRRITABLE BOWEL SYNDROME WITH CONSTIPATION: ICD-10-CM

## 2018-04-20 DIAGNOSIS — K21.9 GASTROESOPHAGEAL REFLUX DISEASE WITHOUT ESOPHAGITIS: ICD-10-CM

## 2018-04-20 DIAGNOSIS — R10.9 ABDOMINAL PAIN, UNSPECIFIED ABDOMINAL LOCATION: Primary | ICD-10-CM

## 2018-04-20 PROCEDURE — 99214 OFFICE O/P EST MOD 30 MIN: CPT | Performed by: PHYSICIAN ASSISTANT

## 2018-04-20 NOTE — PROGRESS NOTES
Addendum: Error to last colonoscopy and EGD  Patient's last colonoscopy was in 2012, which was normal  Due for colonoscopy at age 49  Last EGD in 2014 was normal  This was prior to sleeve gastrectomy

## 2018-04-20 NOTE — PROGRESS NOTES
[facility]  Dustin Finn 53 y o  female MRN: [id number]       CC: Post ER visit for abdominal pain    HPI: Reed Barboza is a 53year old female with history of IBS-C, status post gastric sleeve and GERD who presents today for abdominal pain that began about a week ago  She describes it as an intermittent stabbing sensation  She is unsure if it correlates with eating  She does have the pain before going to bed at night  It is located in the epigastric area, but radiates underneath her ribs bilaterally  Patient was unsure at first if this was related to her chronic back pain for radiculopthy, which flared 3 weeks ago  She denies pyrosis or regurgitation  She denies worsening of bowel habits as she has history of IBS-C stable on Miralax  Because the pain worsened this week, she was instructed by her PCP to be seen in the emergency room for possible pancreatitis  Records not available from [facility]  She relays her CT of the abdomen without contrast was negative  Her blood works was also negative per patient  Today, the pain is mild  She is not on acid suppression medication daily  When asked about NSAID use, she does take Aleeve about 3 times weekly for back pain  She is scheduled for injection on Monday with pain management  Her last EGD was in 2016 at [facility] prior to sleeve gastrectomy  A hiatal hernia was noted, as well as erythema at the duodenal bulb  Colonoscopy performed at that time revealed internal hemorrhoids and diverticulosis  She is due for repeat in 2019 due to poor prep  Review of Systems:    CONSTITUTIONAL: Denies any fever, chills, or rigors  Positive 20 pound weight gain since last year  HEENT: No earache or tinnitus  Denies hearing loss or visual disturbances  CARDIOVASCULAR: No chest pain or palpitations  RESPIRATORY: Denies any cough, hemoptysis, shortness of breath or dyspnea on exertion  GASTROINTESTINAL: As noted in the History of Present Illness  GENITOURINARY: No problems with urination  Denies any hematuria or dysuria  NEUROLOGIC: No dizziness or vertigo, denies headaches  MUSCULOSKELETAL: Denies any muscle or joint pain  SKIN: Denies skin rashes or itching  ENDOCRINE: Denies excessive thirst  Denies intolerance to heat or cold  PSYCHOSOCIAL: Denies depression or anxiety  Denies any recent memory loss  Current Outpatient Prescriptions   Medication Sig Dispense Refill    albuterol (VENTOLIN HFA) 90 mcg/act inhaler Inhale      fluticasone (FLONASE) 50 mcg/act nasal spray 2 sprays into each nostril daily      norethindrone-ethinyl estradiol-iron (MICROGESTIN FE1 5/30) 1 5-30 MG-MCG tablet Take 1 tablet by mouth daily      Linaclotide (LINZESS) 145 MCG CAPS Take 1 capsule by mouth daily      terbinafine (LAMISIL AT) 1 % cream Apply topically       No current facility-administered medications for this visit        Past Medical History:   Diagnosis Date    Asthma     seasonal    Back pain     Obesity     Otalgia      Past Surgical History:   Procedure Laterality Date    CHOLECYSTECTOMY      GASTRECTOMY SLEEVE LAPAROSCOPIC      KNEE ARTHROSCOPY W/ MENISCAL REPAIR Left     NEUROPLASTY / TRANSPOSITION MEDIAN NERVE AT CARPAL TUNNEL BILATERAL       Social History     Social History    Marital status: /Civil Union     Spouse name: N/A    Number of children: N/A    Years of education: N/A     Social History Main Topics    Smoking status: Never Smoker    Smokeless tobacco: Never Used    Alcohol use Yes      Comment: minimum     Drug use: Yes     Types: Marijuana      Comment: medical silver haze vape and cy relax pill    Sexual activity: Yes     Partners: Male     Other Topics Concern    None     Social History Narrative    None     Family History   Problem Relation Age of Onset    Diabetes Mother     Kidney disease Mother      end stage   Timoteo Hale Diabetes Father     Other Father      gangrene    Hypertension Father    Timoteo Hale Nephrolithiasis Family             PHYSICAL EXAM:    Vitals:    04/20/18 1003   BP: 98/62   Pulse: 70   Weight: 81 4 kg (179 lb 8 oz)   Height: 5' 4" (1 626 m)     General Appearance:   Alert and oriented x 3  Cooperative, and in no respiratory distress   HEENT:   Normocephalic, atraumatic, anicteric      Neck:  Supple, symmetrical, trachea midline   Lungs:   Clear to auscultation bilaterally   Heart[de-identified]   Regular rate and rhythm; no murmur, rub, or gallop  Abdomen:   Soft, epigastric tenderness without guarding, non-distended; normal bowel sounds; no masses, no organomegaly    Genitalia:   Deferred    Rectal:   Deferred    Extremities:  No cyanosis, clubbing or edema    Pulses:  2+ and symmetric all extremities    Skin:  Skin color, texture, turgor normal, no rashes or lesions    Lymph nodes:  No palpable cervical or supraclavicular lymphadenopathy        Lab Results:     Results from last 6 Months  Lab Units 02/14/18  0856   WBC Thousand/uL 5 48   HEMOGLOBIN g/dL 12 5   HEMATOCRIT % 38 6   PLATELETS Thousands/uL 237   NEUTROS PCT % 63   LYMPHS PCT % 29   MONOS PCT % 5   EOS PCT % 2       Results from last 6 Months  Lab Units 02/14/18  0856   SODIUM mmol/L 138   POTASSIUM mmol/L 4 0   CHLORIDE mmol/L 104   CO2 mmol/L 24   BUN mg/dL 13   CREATININE mg/dL 0 78   CALCIUM mg/dL 8 5   TOTAL PROTEIN g/dL 7 8   BILIRUBIN TOTAL mg/dL 1 83*   ALK PHOS U/L 49   ALT U/L 21   AST U/L 19               ASSESSMENT and PLAN:      1) Upper abdominal pain - Patient has not been on acid suppression therapy  She is taking NSAIDs on a weekly basis  - Will plan for EGD to investigate for PUD related to NSAID use +/- H pylori, esophagitis, gastritis  - Prilosec 20 mg BID for now, further recs after EGD  - Given GERD diet to avoid ulcerogenic foods   - Avoid NSAIDs    2) IBS-C - Stable on Miralax  No lower abdominal symptoms  No melena or hematochezia  - Colon recall in 2019        Follow up in 8 weeks

## 2018-04-29 ENCOUNTER — ANESTHESIA EVENT (OUTPATIENT)
Dept: PERIOP | Facility: HOSPITAL | Age: 48
End: 2018-04-29
Payer: COMMERCIAL

## 2018-04-30 ENCOUNTER — HOSPITAL ENCOUNTER (OUTPATIENT)
Facility: HOSPITAL | Age: 48
Setting detail: OUTPATIENT SURGERY
Discharge: HOME | End: 2018-04-30
Attending: INTERNAL MEDICINE | Admitting: INTERNAL MEDICINE
Payer: COMMERCIAL

## 2018-04-30 ENCOUNTER — ANESTHESIA (OUTPATIENT)
Dept: PERIOP | Facility: HOSPITAL | Age: 48
End: 2018-04-30
Payer: COMMERCIAL

## 2018-04-30 VITALS
SYSTOLIC BLOOD PRESSURE: 94 MMHG | HEIGHT: 65 IN | OXYGEN SATURATION: 99 % | TEMPERATURE: 97.6 F | WEIGHT: 185 LBS | BODY MASS INDEX: 30.82 KG/M2 | RESPIRATION RATE: 26 BRPM | DIASTOLIC BLOOD PRESSURE: 58 MMHG | HEART RATE: 57 BPM

## 2018-04-30 DIAGNOSIS — R10.13 EPIGASTRIC PAIN: Primary | ICD-10-CM

## 2018-04-30 LAB — EXT PREGNANCY TEST URINE: NEGATIVE

## 2018-04-30 PROCEDURE — 43235 EGD DIAGNOSTIC BRUSH WASH: CPT | Performed by: INTERNAL MEDICINE

## 2018-04-30 PROCEDURE — 81025 URINE PREGNANCY TEST: CPT | Performed by: ANESTHESIOLOGY

## 2018-04-30 RX ORDER — DICYCLOMINE HCL 20 MG
20 TABLET ORAL 3 TIMES DAILY PRN
Qty: 60 TABLET | Refills: 0 | Status: SHIPPED | OUTPATIENT
Start: 2018-04-30 | End: 2019-10-29 | Stop reason: ALTCHOICE

## 2018-04-30 RX ORDER — LIDOCAINE HYDROCHLORIDE 10 MG/ML
INJECTION, SOLUTION EPIDURAL; INFILTRATION; INTRACAUDAL; PERINEURAL AS NEEDED
Status: DISCONTINUED | OUTPATIENT
Start: 2018-04-30 | End: 2018-04-30 | Stop reason: SURG

## 2018-04-30 RX ORDER — SODIUM CHLORIDE, SODIUM LACTATE, POTASSIUM CHLORIDE, CALCIUM CHLORIDE 600; 310; 30; 20 MG/100ML; MG/100ML; MG/100ML; MG/100ML
125 INJECTION, SOLUTION INTRAVENOUS CONTINUOUS
Status: DISCONTINUED | OUTPATIENT
Start: 2018-04-30 | End: 2018-04-30 | Stop reason: HOSPADM

## 2018-04-30 RX ORDER — PROPOFOL 10 MG/ML
INJECTION, EMULSION INTRAVENOUS AS NEEDED
Status: DISCONTINUED | OUTPATIENT
Start: 2018-04-30 | End: 2018-04-30 | Stop reason: SURG

## 2018-04-30 RX ADMIN — PROPOFOL 100 MG: 10 INJECTION, EMULSION INTRAVENOUS at 12:59

## 2018-04-30 RX ADMIN — SODIUM CHLORIDE, SODIUM LACTATE, POTASSIUM CHLORIDE, AND CALCIUM CHLORIDE 125 ML/HR: .6; .31; .03; .02 INJECTION, SOLUTION INTRAVENOUS at 12:03

## 2018-04-30 RX ADMIN — LIDOCAINE HYDROCHLORIDE 50 MG: 10 INJECTION, SOLUTION EPIDURAL; INFILTRATION; INTRACAUDAL; PERINEURAL at 12:58

## 2018-04-30 RX ADMIN — PROPOFOL 100 MG: 10 INJECTION, EMULSION INTRAVENOUS at 12:58

## 2018-04-30 NOTE — ANESTHESIA POSTPROCEDURE EVALUATION
Post-Op Assessment Note      CV Status:  Stable    Mental Status:  Alert    Hydration Status:  Stable    PONV Controlled:  None    Airway Patency:  Patent    Post Op Vitals Reviewed: Yes          Staff: CRNA           BP   91/54   Temp  97 7   Pulse  74   Resp   16   SpO2   96

## 2018-04-30 NOTE — DISCHARGE INSTRUCTIONS
Upper Endoscopy   WHAT YOU NEED TO KNOW:   An upper endoscopy is also called an upper gastrointestinal (GI) endoscopy, or an esophagogastroduodenoscopy (EGD)  You may feel bloated, gassy, or have some abdominal discomfort after your procedure  Your throat may be sore for 24 to 36 hours  You may burp or pass gas from air that is still inside your body  DISCHARGE INSTRUCTIONS:   Call 911 if:   · You have sudden chest pain or trouble breathing  Seek care immediately if:   · You feel dizzy or faint  · You have trouble swallowing  · You have severe throat pain  · Your bowel movements are very dark or black  · Your abdomen is hard and firm and you have severe pain  · You vomit blood  Contact your healthcare provider if:   · You feel full or bloated and cannot burp or pass gas  · You have not had a bowel movement for 3 days after your procedure  · You have neck pain  · You have a fever or chills  · You have nausea or are vomiting  · You have a rash or hives  · You have questions or concerns about your endoscopy  Relieve a sore throat:  Suck on throat lozenges or crushed ice  Gargle with a small amount of warm salt water  Mix 1 teaspoon of salt and 1 cup of warm water to make salt water  Relieve gas and discomfort from bloating:  Lie on your right side with a heating pad on your abdomen  Take short walks to help pass gas  Eat small meals until bloating is relieved  Rest after your procedure:  Do not drive or make important decisions until the day after your procedure  Return to your normal activity as directed  You can usually return to work the day after your procedure  Follow up with your healthcare provider as directed:  Write down your questions so you remember to ask them during your visits  © 2017 [employer]  Information is for End User's use only and may not be sold, redistributed or otherwise used for commercial purposes   All illustrations and images included in [employer] are the copyrighted property of A D A M , Inc  or [employer]  The above information is an  only  It is not intended as medical advice for individual conditions or treatments  Talk to your doctor, nurse or pharmacist before following any medical regimen to see if it is safe and effective for you

## 2018-04-30 NOTE — OP NOTE
**** GI/ENDOSCOPY REPORT ****     PATIENT NAME: Shelley Parent - VISIT ID:  Patient ID: [id number] YOB: 1970     INTRODUCTION: Esophagogastroduodenoscopy - A 53 female patient presents   for an outpatient Esophagogastroduodenoscopy at [address] [place] [address]  INDICATIONS: Abdominal pain  CONSENT: The benefits, risks, and alternatives to the procedure were   discussed and informed consent was obtained from the patient  PREPARATION:  EKG, pulse, pulse oximetry and blood pressure were monitored   throughout the procedure  ASA Classification: Class 2 - Patient has mild   to moderate systemic disturbance that may or may not be related to the   disorder requiring surgery  The patient was kept NPO for eight hours prior   to the procedure  MEDICATIONS: MAC anesthesia  PROCEDURE:  The endoscope was passed without difficulty through the mouth   under direct visualization and advanced to the 2nd portion of the   duodenum  The scope was withdrawn and the mucosa was carefully examined  FINDINGS:   Esophagus: LA Class A, erosive, reflux-induced esophagitis was   found in the esophagus  Stomach: The antrum, body of the stomach, cardia,   and fundus appeared to be normal  The sleeve anastamosis was noted and   looked normal   Duodenum: The duodenal bulb and 2nd portion of the   duodenum appeared to be normal      COMPLICATIONS: There were no complications  IMPRESSIONS: Esophagitis seen  Normal antrum, body of the stomach, cardia,   and fundus  The sleeve anastamosis was noted and looked normal  Normal   duodenal bulb and 2nd portion of the duodenum  RECOMMENDATIONS: Anti-reflux measures: Raise the head of the bed 4 to 6   inches  Avoid smoking  Avoid excess coffee, tea or other caffeinated   beverages  Avoid garments that fit tightly through the abdomen  Avoid   eating before bed  Follow-up on the results of the biopsy specimens in 1   week   PPI BID for 8 weeks then QAM  Dicyclomine as needed for abdominal   pain  ESTIMATED BLOOD LOSS: None  PATHOLOGY SPECIMENS: No     PROCEDURE CODES: 76054 - EGD flexible; incl brushing or washing     ICD-9 Codes: 789 00 Abdominal pain, unspecified site     ICD-10 Codes: R10 Abdominal and pelvic pain K20 9 Esophagitis, unspecified     PERFORMED BY: KAILEY Quinones  on 04/30/2018  Version 1, electronically signed by KAILEY Grayd  on   04/30/2018 at 13:06

## 2018-04-30 NOTE — ANESTHESIA PREPROCEDURE EVALUATION
Review of Systems/Medical History  Patient summary reviewed        Cardiovascular  Negative cardio ROS    Pulmonary  Asthma: well controlled/ stable ,        GI/Hepatic    GERD , Bariatric surgery,        Negative  ROS        Endo/Other  Negative endo/other ROS      GYN  Negative gynecology ROS          Hematology  Negative hematology ROS      Musculoskeletal  Negative musculoskeletal ROS        Neurology  Negative neurology ROS      Psychology   Negative psychology ROS              Physical Exam    Airway    Mallampati score: II  TM Distance: >3 FB  Neck ROM: full     Dental       Cardiovascular  Comment: Negative ROS, Cardiovascular exam normal    Pulmonary  Pulmonary exam normal     Other Findings        Anesthesia Plan  ASA Score- 2     Anesthesia Type- IV sedation with anesthesia with ASA Monitors  Additional Monitors:   Airway Plan:         Plan Factors-    Induction- intravenous  Postoperative Plan-     Informed Consent- Anesthetic plan and risks discussed with patient  I personally reviewed this patient with the CRNA  Discussed and agreed on the Anesthesia Plan with the CRNA  Myriam Schwartz

## 2018-06-08 ENCOUNTER — OFFICE VISIT (OUTPATIENT)
Dept: GASTROENTEROLOGY | Facility: CLINIC | Age: 48
End: 2018-06-08
Payer: COMMERCIAL

## 2018-06-08 ENCOUNTER — OFFICE VISIT (OUTPATIENT)
Dept: INTERNAL MEDICINE CLINIC | Facility: CLINIC | Age: 48
End: 2018-06-08
Payer: COMMERCIAL

## 2018-06-08 VITALS
DIASTOLIC BLOOD PRESSURE: 62 MMHG | BODY MASS INDEX: 31.06 KG/M2 | HEIGHT: 65 IN | SYSTOLIC BLOOD PRESSURE: 102 MMHG | WEIGHT: 186.4 LBS | HEART RATE: 79 BPM | OXYGEN SATURATION: 97 %

## 2018-06-08 VITALS
HEART RATE: 66 BPM | DIASTOLIC BLOOD PRESSURE: 66 MMHG | BODY MASS INDEX: 30.97 KG/M2 | SYSTOLIC BLOOD PRESSURE: 94 MMHG | WEIGHT: 186.13 LBS

## 2018-06-08 DIAGNOSIS — K21.00 GASTROESOPHAGEAL REFLUX DISEASE WITH ESOPHAGITIS: Primary | ICD-10-CM

## 2018-06-08 DIAGNOSIS — K58.1 IRRITABLE BOWEL SYNDROME WITH CONSTIPATION: Primary | ICD-10-CM

## 2018-06-08 DIAGNOSIS — R10.13 EPIGASTRIC PAIN: ICD-10-CM

## 2018-06-08 DIAGNOSIS — K58.1 IRRITABLE BOWEL SYNDROME WITH CONSTIPATION: ICD-10-CM

## 2018-06-08 DIAGNOSIS — R10.11 RIGHT UPPER QUADRANT ABDOMINAL PAIN: ICD-10-CM

## 2018-06-08 DIAGNOSIS — E66.09 CLASS 1 OBESITY DUE TO EXCESS CALORIES WITH SERIOUS COMORBIDITY AND BODY MASS INDEX (BMI) OF 31.0 TO 31.9 IN ADULT: ICD-10-CM

## 2018-06-08 PROBLEM — J45.909 ASTHMA: Status: ACTIVE | Noted: 2018-06-08

## 2018-06-08 PROBLEM — E66.9 OBESITY: Status: ACTIVE | Noted: 2018-06-08

## 2018-06-08 PROCEDURE — 3008F BODY MASS INDEX DOCD: CPT | Performed by: INTERNAL MEDICINE

## 2018-06-08 PROCEDURE — 99213 OFFICE O/P EST LOW 20 MIN: CPT | Performed by: INTERNAL MEDICINE

## 2018-06-08 PROCEDURE — 99214 OFFICE O/P EST MOD 30 MIN: CPT | Performed by: PHYSICIAN ASSISTANT

## 2018-06-08 PROCEDURE — 1036F TOBACCO NON-USER: CPT | Performed by: INTERNAL MEDICINE

## 2018-06-08 NOTE — PROGRESS NOTES
Assessment/Plan:       Diagnoses and all orders for this visit:    Gastroesophageal reflux disease with esophagitis    Irritable bowel syndrome with constipation    Epigastric pain    Class 1 obesity due to excess calories with serious comorbidity and body mass index (BMI) of 31 0 to 31 9 in adult              Subjective:      Patient ID: Te Resendiz is a 53 y o  female  Follow-up visit of a 6-year-old      Formerly she had persistent asthma but now she has not been symptomatic for over a year  No recent needed all for inhaler other vascular maintenance    The patient had gastric sleeve surgery 3 years ago  Her maximum weight was 210  A minimum weight was 137, in the spring of 2017  Currently 170     Has reflux symptoms  EGD done in April 2018 documented esophagitis, this is in contrast to an EGD done in 2014 which was normal   Continues to follow with gastroenterology  complaint of some right shoulder pain associated with limitation of range of motion to about 90° abduction  Chronic low back pain  Uses a cane for ambulatory support  Applying for disability  The following portions of the patient's history were reviewed and updated as appropriate:   She has a past medical history of Asthma; Back pain; and Obesity  ,   does not have any pertinent problems on file  ,   has a past surgical history that includes Cholecystectomy; GASTRECTOMY SLEEVE LAPAROSCOPIC; Knee arthroscopy w/ meniscal repair (Left); Neuroplasty / transposition median nerve at carpal tunnel bilateral; and pr esophagogastroduodenoscopy transoral diagnostic (N/A, 4/30/2018)  ,  family history includes Diabetes in her father and mother; Hypertension in her father; Kidney disease in her mother; Nephrolithiasis in her family; Other in her father  ,   reports that she has never smoked  She has never used smokeless tobacco  She reports that she drinks alcohol  She reports that she uses drugs, including Marijuana  ,  is allergic to penicillins; pollen extract; and shellfish-derived products     Current Outpatient Prescriptions   Medication Sig Dispense Refill    albuterol (VENTOLIN HFA) 90 mcg/act inhaler Inhale      dicyclomine (BENTYL) 20 mg tablet Take 1 tablet (20 mg total) by mouth 3 (three) times a day as needed (abdominal pain) 60 tablet 0    fluticasone (FLONASE) 50 mcg/act nasal spray 2 sprays into each nostril daily      NON FORMULARY       norethindrone-ethinyl estradiol-iron (MICROGESTIN FE1 5/30) 1 5-30 MG-MCG tablet Take 1 tablet by mouth daily       No current facility-administered medications for this visit  Review of Systems   Constitutional: Negative for activity change, appetite change and fever  HENT: Positive for postnasal drip and rhinorrhea  Negative for sinus pain, sinus pressure, sore throat and tinnitus  Eyes: Negative for pain and visual disturbance  Respiratory: Negative for cough, shortness of breath and wheezing  Cardiovascular: Negative for chest pain and palpitations  Gastrointestinal: Positive for abdominal pain and constipation  Negative for blood in stool, diarrhea, nausea, rectal pain and vomiting  Endocrine: Negative for cold intolerance and heat intolerance  Genitourinary: Negative for dysuria, frequency, vaginal bleeding and vaginal discharge  Musculoskeletal: Positive for arthralgias and back pain  Skin: Negative for color change and wound  Allergic/Immunologic: Negative for immunocompromised state  Neurological: Negative for dizziness, seizures, syncope and headaches  Hematological: Negative for adenopathy  Does not bruise/bleed easily  Psychiatric/Behavioral: Negative for confusion and suicidal ideas  Objective:  Vitals:    06/08/18 0811   BP: 102/62   Pulse: 79   SpO2: 97%      Physical Exam   Constitutional: She is oriented to person, place, and time  She appears well-developed and well-nourished  No distress     Significantly overweight patient who appears to be stated age  HENT:   Head: Normocephalic and atraumatic  Eyes: Conjunctivae are normal  Pupils are equal, round, and reactive to light  Neck: Normal range of motion  No thyromegaly present  Cardiovascular: Normal rate, regular rhythm and normal heart sounds  No murmur heard  Pulmonary/Chest: Effort normal  No respiratory distress  She has no wheezes  She has no rales  Abdominal: Soft  There is no tenderness  Musculoskeletal: Normal range of motion  She exhibits no deformity  Neurological: She is alert and oriented to person, place, and time  Skin: Skin is warm and dry  Psychiatric: She has a normal mood and affect   Her behavior is normal  Judgment and thought content normal

## 2018-06-08 NOTE — PROGRESS NOTES
[facility]  Nelsy Jack 53 y o  female MRN: [id number]       CC: Follow-up RUQ pain    HPI: Jane Benedict is a 53year old female with history of sleeve gastrectomy, IBS-C, lumbar radiculopathy who is also status post GB removal many years ago  She is here to follow-up post EGD for upper abdominal pain that is intermittent and stabbing in nature  Because patient was reporting acute symptoms and NSAID use, PPI was restarted and EGD was recommended  EGD revealed LA Class A esophagitis, otherwise normal exam  Her sleeve anastomosis appeared normal without ulceration or stricture  She denies pyrosis, regurgitation or bloating  Her bowel habits are normal with Miralax  Bentyl did not improve her pain  She also stopped her omeprazole  Patient believes her pain may be related to 40 lb weight gain in the past year while on medical marijuana  Since our last encounter, patient reports she now realizes it is exacerbated by movement  She is due to see pain management in July  Last colonoscopy was in 2012  She is due at age 49  Review of Systems:    CONSTITUTIONAL: Denies any fever, chills, or rigors  Good appetite, and no recent weight loss  HEENT: No earache or tinnitus  Denies hearing loss or visual disturbances  CARDIOVASCULAR: No chest pain or palpitations  RESPIRATORY: Denies any cough, hemoptysis, shortness of breath or dyspnea on exertion  GASTROINTESTINAL: As noted in the History of Present Illness  GENITOURINARY: No problems with urination  Denies any hematuria or dysuria  NEUROLOGIC: No dizziness or vertigo, denies headaches  MUSCULOSKELETAL: Denies any muscle or joint pain  SKIN: Denies skin rashes or itching  ENDOCRINE: Denies excessive thirst  Denies intolerance to heat or cold  PSYCHOSOCIAL: Denies depression or anxiety  Denies any recent memory loss         Current Outpatient Prescriptions   Medication Sig Dispense Refill    albuterol (VENTOLIN HFA) 90 mcg/act inhaler Inhale      dicyclomine (BENTYL) 20 mg tablet Take 1 tablet (20 mg total) by mouth 3 (three) times a day as needed (abdominal pain) 60 tablet 0    fluticasone (FLONASE) 50 mcg/act nasal spray 2 sprays into each nostril daily      NON FORMULARY       norethindrone-ethinyl estradiol-iron (MICROGESTIN FE1 5/30) 1 5-30 MG-MCG tablet Take 1 tablet by mouth daily       No current facility-administered medications for this visit  Past Medical History:   Diagnosis Date    Asthma     seasonal    Back pain     Obesity      Past Surgical History:   Procedure Laterality Date    CHOLECYSTECTOMY      GASTRECTOMY SLEEVE LAPAROSCOPIC      KNEE ARTHROSCOPY W/ MENISCAL REPAIR Left     NEUROPLASTY / TRANSPOSITION MEDIAN NERVE AT CARPAL TUNNEL BILATERAL      [place] ESOPHAGOGASTRODUODENOSCOPY TRANSORAL DIAGNOSTIC N/A 4/30/2018    Procedure: ESOPHAGOGASTRODUODENOSCOPY (EGD); Surgeon: Bette Alva MD;  Location: [facility]; Service: Gastroenterology     Social History     Social History    Marital status: /Civil Union     Spouse name: N/A    Number of children: N/A    Years of education: N/A     Social History Main Topics    Smoking status: Never Smoker    Smokeless tobacco: Never Used    Alcohol use Yes      Comment: minimum     Drug use: Yes     Types: Marijuana      Comment: medical silver haze vape and cy relax pill    Sexual activity: Yes     Partners: Male     Other Topics Concern    None     Social History Narrative    None     Family History   Problem Relation Age of Onset    Diabetes Mother     Kidney disease Mother      end stage   Jacy Will Diabetes Father     Other Father      gangrene    Hypertension Father     Nephrolithiasis Family             PHYSICAL EXAM:    Vitals:    06/08/18 0930   BP: 94/66   Pulse: 66   Weight: 84 4 kg (186 lb 2 oz)     General Appearance:   Alert and oriented x 3   Cooperative, and in no respiratory distress   HEENT:   Normocephalic, atraumatic, anicteric    Neck:  Supple, symmetrical, trachea midline   Lungs:   Clear to auscultation bilaterally    Heart[de-identified]   Regular rate and rhythm   Abdomen:   Soft, non-tender, non-distended; normal bowel sounds; no masses, no organomegaly    Genitalia:   Deferred    Rectal:   Deferred    Extremities:  No cyanosis, clubbing or edema    Pulses:  2+ and symmetric all extremities    Skin:  Skin color, texture, turgor normal, no rashes or lesions    Lymph nodes:  No palpable cervical or supraclavicular lymphadenopathy        Lab Results:     Results from last 6 Months  Lab Units 02/14/18  0856   WBC Thousand/uL 5 48   HEMOGLOBIN g/dL 12 5   HEMATOCRIT % 38 6   PLATELETS Thousands/uL 237   NEUTROS PCT % 63   LYMPHS PCT % 29   MONOS PCT % 5   EOS PCT % 2       Results from last 6 Months  Lab Units 02/14/18  0856   SODIUM mmol/L 138   POTASSIUM mmol/L 4 0   CHLORIDE mmol/L 104   CO2 mmol/L 24   BUN mg/dL 13   CREATININE mg/dL 0 78   CALCIUM mg/dL 8 5   TOTAL PROTEIN g/dL 7 8   BILIRUBIN TOTAL mg/dL 1 83*   ALK PHOS U/L 49   ALT U/L 21   AST U/L 19                 ASSESSMENT and PLAN:      1) RUQ pain - Pain is localized to this area  Associated with movement  EGD ruled out PUD or damage to her anastomosis  Unlikely adhesion or scar tissue given acute onset of symptoms    - She will follow-up with pain management in July for treatment; patient may benefit from short course of muscle relaxer, massage or trigger point injection   - She will follow-up with us after her pain management appointment in July is her symptoms are not resolved       2) IBS-C - Stable on Miralax  Colonoscopy at age 49  Follow up PRN

## 2018-06-11 NOTE — PATIENT INSTRUCTIONS
Continued reflux symptoms  Needs to consult with GI again  Otherwise stable  Follow up with me for next birthday

## 2018-12-14 ENCOUNTER — OFFICE VISIT (OUTPATIENT)
Dept: INTERNAL MEDICINE CLINIC | Facility: CLINIC | Age: 48
End: 2018-12-14
Payer: COMMERCIAL

## 2018-12-14 VITALS
SYSTOLIC BLOOD PRESSURE: 110 MMHG | OXYGEN SATURATION: 99 % | HEIGHT: 64 IN | BODY MASS INDEX: 33.46 KG/M2 | HEART RATE: 78 BPM | WEIGHT: 196 LBS | DIASTOLIC BLOOD PRESSURE: 78 MMHG

## 2018-12-14 DIAGNOSIS — E78.5 HYPERLIPIDEMIA, UNSPECIFIED HYPERLIPIDEMIA TYPE: ICD-10-CM

## 2018-12-14 DIAGNOSIS — R09.89 ABSENT FOOT PULSE: ICD-10-CM

## 2018-12-14 DIAGNOSIS — R25.2 NOCTURNAL FOOT CRAMPS: Primary | ICD-10-CM

## 2018-12-14 DIAGNOSIS — Z13.0 SCREENING FOR DEFICIENCY ANEMIA: ICD-10-CM

## 2018-12-14 PROCEDURE — 99214 OFFICE O/P EST MOD 30 MIN: CPT | Performed by: PHYSICIAN ASSISTANT

## 2018-12-14 PROCEDURE — 1036F TOBACCO NON-USER: CPT | Performed by: PHYSICIAN ASSISTANT

## 2018-12-14 PROCEDURE — 3008F BODY MASS INDEX DOCD: CPT | Performed by: PHYSICIAN ASSISTANT

## 2018-12-14 RX ORDER — CETIRIZINE HYDROCHLORIDE 10 MG/1
10 TABLET, CHEWABLE ORAL DAILY
COMMUNITY
End: 2019-03-27 | Stop reason: SDUPTHER

## 2018-12-14 RX ORDER — ACETAMINOPHEN, ASPIRIN AND CAFFEINE 250; 250; 65 MG/1; MG/1; MG/1
1 TABLET, FILM COATED ORAL EVERY 6 HOURS PRN
COMMUNITY

## 2018-12-14 NOTE — PROGRESS NOTES
Assessment/Plan:    Foot cramps/pain-check metabolic panel, CK  Try tonic water/quinine  Given poor pedal pulses, we will check an arterial Doppler of the legs    Numbness/tingling in feet-this is likely related to the patient's previous history of lumbar radiculopathy since this is not a new symptom  Check some blood work with fasting sugar  Screening blood work orders are given to the patient  Follow-up in a few weeks for recheck  No problem-specific Assessment & Plan notes found for this encounter  Diagnoses and all orders for this visit:    Nocturnal foot cramps  -     Comprehensive metabolic panel; Future  -     CK; Future    Absent foot pulse  -     VAS lower limb arterial duplex, complete bilateral; Future    Hyperlipidemia, unspecified hyperlipidemia type  -     Lipid panel; Future    Screening for deficiency anemia  -     CBC and differential; Future    Other orders  -     cetirizine (ZyrTEC) 10 MG chewable tablet; Chew 10 mg daily  -     aspirin-acetaminophen-caffeine (EXCEDRIN MIGRAINE) 571-491-53 MG per tablet; Take 1 tablet by mouth every 6 (six) hours as needed for headaches          Subjective:      Patient ID: Grazyna De La Cruz is a 53 y o  female  Patient comes in complaining of cramping in her feet  She says that mostly at night but sometimes during the day she gets cramps in both of her feet  She said she is used to having charley horses in her calves from time to time, but she noticed the cramping a few months ago in both of her feet  She said it has been getting worse lately  She also reports some numbness and tingling in both of her feet as well  She does have a problem with lumbar radiculopathy and the symptom is not new for her  She says she drinks plenty of fluid, at least 4-5 812 oz bottles of water a day    She also reports eating a banana every day     She is doing an exercise regimen, MMA/boxing and using an elliptical   She is also doing physical therapy for her knee and pain in her left leg which she states sometimes she gets her feet cramps during these exercises  She sees an orthopedist at [place] [address]  She also sees pain management, Dr Zaki Heath for her lumbar radiculopathy  She reports that sometimes her feet feel very cold at night and she needs to wear socks  She denies any change in skin color of either of her feet  The following portions of the patient's history were reviewed and updated as appropriate: allergies, current medications, past medical history, past social history and problem list     Review of Systems   Constitutional: Negative for chills, fatigue and fever  Respiratory: Negative for cough and shortness of breath  Cardiovascular: Negative for chest pain and palpitations  Gastrointestinal: Negative for abdominal pain, diarrhea and nausea  Musculoskeletal: Positive for myalgias  CLEO horses in her calves, cramping in both of her feet   Skin: Negative for color change  Neurological: Positive for numbness  Objective:      /78   Pulse 78   Ht 5' 3 75" (1 619 m)   Wt 88 9 kg (196 lb)   SpO2 99%   BMI 33 91 kg/m²          Physical Exam   Constitutional: She appears well-developed and well-nourished  HENT:   Head: Normocephalic and atraumatic  Cardiovascular: Normal rate, regular rhythm and normal heart sounds  Exam reveals decreased pulses  Pulses:       Dorsalis pedis pulses are 2+ on the right side, and 0 on the left side  Posterior tibial pulses are 2+ on the right side, and 0 on the left side  Pulmonary/Chest: Effort normal and breath sounds normal  No respiratory distress  Abdominal: Soft  Bowel sounds are normal  There is no tenderness  Skin: Skin is warm  No cyanosis  Nails show no clubbing     Skin color of both feet are normal,

## 2018-12-15 ENCOUNTER — APPOINTMENT (OUTPATIENT)
Dept: LAB | Facility: CLINIC | Age: 48
End: 2018-12-15
Payer: COMMERCIAL

## 2018-12-15 DIAGNOSIS — M79.10 MYALGIA: ICD-10-CM

## 2018-12-15 DIAGNOSIS — M79.10 MYALGIA: Primary | ICD-10-CM

## 2018-12-15 DIAGNOSIS — Z13.0 SCREENING FOR DEFICIENCY ANEMIA: ICD-10-CM

## 2018-12-15 DIAGNOSIS — Z82.69 FAMILY HISTORY OF SYSTEMIC LUPUS ERYTHEMATOSUS: ICD-10-CM

## 2018-12-15 DIAGNOSIS — E78.5 HYPERLIPIDEMIA, UNSPECIFIED HYPERLIPIDEMIA TYPE: ICD-10-CM

## 2018-12-15 DIAGNOSIS — R25.2 NOCTURNAL FOOT CRAMPS: ICD-10-CM

## 2018-12-15 LAB
ALBUMIN SERPL BCP-MCNC: 3.6 G/DL (ref 3.5–5)
ALP SERPL-CCNC: 53 U/L (ref 46–116)
ALT SERPL W P-5'-P-CCNC: 44 U/L (ref 12–78)
ANION GAP SERPL CALCULATED.3IONS-SCNC: 6 MMOL/L (ref 4–13)
AST SERPL W P-5'-P-CCNC: 31 U/L (ref 5–45)
BASOPHILS # BLD AUTO: 0.05 THOUSANDS/ΜL (ref 0–0.1)
BASOPHILS NFR BLD AUTO: 1 % (ref 0–1)
BILIRUB SERPL-MCNC: 0.79 MG/DL (ref 0.2–1)
BUN SERPL-MCNC: 15 MG/DL (ref 5–25)
CALCIUM SERPL-MCNC: 9.3 MG/DL (ref 8.3–10.1)
CHLORIDE SERPL-SCNC: 108 MMOL/L (ref 100–108)
CHOLEST SERPL-MCNC: 141 MG/DL (ref 50–200)
CK MB SERPL-MCNC: 1.5 NG/ML (ref 0–5)
CK MB SERPL-MCNC: <1 % (ref 0–2.5)
CK SERPL-CCNC: 421 U/L (ref 26–192)
CO2 SERPL-SCNC: 25 MMOL/L (ref 21–32)
CREAT SERPL-MCNC: 0.93 MG/DL (ref 0.6–1.3)
CRP SERPL QL: <3 MG/L
EOSINOPHIL # BLD AUTO: 0.08 THOUSAND/ΜL (ref 0–0.61)
EOSINOPHIL NFR BLD AUTO: 2 % (ref 0–6)
ERYTHROCYTE [DISTWIDTH] IN BLOOD BY AUTOMATED COUNT: 13.4 % (ref 11.6–15.1)
ERYTHROCYTE [SEDIMENTATION RATE] IN BLOOD: 7 MM/HOUR (ref 0–20)
GFR SERPL CREATININE-BSD FRML MDRD: 73 ML/MIN/1.73SQ M
GLUCOSE P FAST SERPL-MCNC: 79 MG/DL (ref 65–99)
HCT VFR BLD AUTO: 41.8 % (ref 34.8–46.1)
HDLC SERPL-MCNC: 46 MG/DL (ref 40–60)
HGB BLD-MCNC: 12.9 G/DL (ref 11.5–15.4)
IMM GRANULOCYTES # BLD AUTO: 0.02 THOUSAND/UL (ref 0–0.2)
IMM GRANULOCYTES NFR BLD AUTO: 1 % (ref 0–2)
LDLC SERPL CALC-MCNC: 71 MG/DL (ref 0–100)
LYMPHOCYTES # BLD AUTO: 1.31 THOUSANDS/ΜL (ref 0.6–4.47)
LYMPHOCYTES NFR BLD AUTO: 33 % (ref 14–44)
MCH RBC QN AUTO: 28.4 PG (ref 26.8–34.3)
MCHC RBC AUTO-ENTMCNC: 30.9 G/DL (ref 31.4–37.4)
MCV RBC AUTO: 92 FL (ref 82–98)
MONOCYTES # BLD AUTO: 0.27 THOUSAND/ΜL (ref 0.17–1.22)
MONOCYTES NFR BLD AUTO: 7 % (ref 4–12)
NEUTROPHILS # BLD AUTO: 2.29 THOUSANDS/ΜL (ref 1.85–7.62)
NEUTS SEG NFR BLD AUTO: 56 % (ref 43–75)
NONHDLC SERPL-MCNC: 95 MG/DL
NRBC BLD AUTO-RTO: 0 /100 WBCS
PLATELET # BLD AUTO: 263 THOUSANDS/UL (ref 149–390)
PMV BLD AUTO: 10.7 FL (ref 8.9–12.7)
POTASSIUM SERPL-SCNC: 4.2 MMOL/L (ref 3.5–5.3)
PROT SERPL-MCNC: 7.6 G/DL (ref 6.4–8.2)
RBC # BLD AUTO: 4.54 MILLION/UL (ref 3.81–5.12)
SODIUM SERPL-SCNC: 139 MMOL/L (ref 136–145)
TRIGL SERPL-MCNC: 122 MG/DL
WBC # BLD AUTO: 4.02 THOUSAND/UL (ref 4.31–10.16)

## 2018-12-15 PROCEDURE — 86200 CCP ANTIBODY: CPT

## 2018-12-15 PROCEDURE — 80053 COMPREHEN METABOLIC PANEL: CPT

## 2018-12-15 PROCEDURE — 82550 ASSAY OF CK (CPK): CPT

## 2018-12-15 PROCEDURE — 85025 COMPLETE CBC W/AUTO DIFF WBC: CPT

## 2018-12-15 PROCEDURE — 86038 ANTINUCLEAR ANTIBODIES: CPT

## 2018-12-15 PROCEDURE — 86431 RHEUMATOID FACTOR QUANT: CPT

## 2018-12-15 PROCEDURE — 80061 LIPID PANEL: CPT

## 2018-12-15 PROCEDURE — 82553 CREATINE MB FRACTION: CPT

## 2018-12-15 PROCEDURE — 85652 RBC SED RATE AUTOMATED: CPT

## 2018-12-15 PROCEDURE — 86430 RHEUMATOID FACTOR TEST QUAL: CPT

## 2018-12-15 PROCEDURE — 36415 COLL VENOUS BLD VENIPUNCTURE: CPT

## 2018-12-15 PROCEDURE — 86140 C-REACTIVE PROTEIN: CPT

## 2018-12-17 ENCOUNTER — TELEPHONE (OUTPATIENT)
Dept: INTERNAL MEDICINE CLINIC | Facility: CLINIC | Age: 48
End: 2018-12-17

## 2018-12-17 LAB
CRYOGLOB RF SER-ACNC: ABNORMAL [IU]/ML
RHEUMATOID FACT SER QL LA: POSITIVE
RYE IGE QN: NEGATIVE

## 2018-12-19 ENCOUNTER — OFFICE VISIT (OUTPATIENT)
Dept: INTERNAL MEDICINE CLINIC | Facility: CLINIC | Age: 48
End: 2018-12-19
Payer: COMMERCIAL

## 2018-12-19 ENCOUNTER — APPOINTMENT (OUTPATIENT)
Dept: LAB | Facility: CLINIC | Age: 48
End: 2018-12-19
Payer: COMMERCIAL

## 2018-12-19 VITALS
BODY MASS INDEX: 33.57 KG/M2 | SYSTOLIC BLOOD PRESSURE: 100 MMHG | WEIGHT: 196.6 LBS | RESPIRATION RATE: 12 BRPM | HEART RATE: 89 BPM | HEIGHT: 64 IN | DIASTOLIC BLOOD PRESSURE: 78 MMHG

## 2018-12-19 DIAGNOSIS — R74.8 ELEVATED CK: Primary | ICD-10-CM

## 2018-12-19 DIAGNOSIS — M25.50 MULTIPLE JOINT PAIN: ICD-10-CM

## 2018-12-19 DIAGNOSIS — R74.8 ELEVATED CK: ICD-10-CM

## 2018-12-19 LAB
CCP IGA+IGG SERPL IA-ACNC: 10 UNITS (ref 0–19)
CK MB SERPL-MCNC: 1.8 NG/ML (ref 0–5)
CK MB SERPL-MCNC: <1 % (ref 0–2.5)
CK SERPL-CCNC: 260 U/L (ref 26–192)

## 2018-12-19 PROCEDURE — 3008F BODY MASS INDEX DOCD: CPT | Performed by: PHYSICIAN ASSISTANT

## 2018-12-19 PROCEDURE — 1036F TOBACCO NON-USER: CPT | Performed by: PHYSICIAN ASSISTANT

## 2018-12-19 PROCEDURE — 99213 OFFICE O/P EST LOW 20 MIN: CPT | Performed by: PHYSICIAN ASSISTANT

## 2018-12-19 PROCEDURE — 82550 ASSAY OF CK (CPK): CPT

## 2018-12-19 PROCEDURE — 36415 COLL VENOUS BLD VENIPUNCTURE: CPT

## 2018-12-19 PROCEDURE — 82085 ASSAY OF ALDOLASE: CPT

## 2018-12-19 PROCEDURE — 82553 CREATINE MB FRACTION: CPT

## 2018-12-20 LAB — ALDOLASE SERPL-CCNC: 5.5 U/L (ref 3.3–10.3)

## 2018-12-21 ENCOUNTER — TELEPHONE (OUTPATIENT)
Dept: INTERNAL MEDICINE CLINIC | Facility: CLINIC | Age: 48
End: 2018-12-21

## 2018-12-21 NOTE — TELEPHONE ENCOUNTER
The CK went down from 400 to 200  This is good  Continue to drink plenty of fluids   The aldolase is normal

## 2018-12-21 NOTE — TELEPHONE ENCOUNTER
Patient called- she was in to see Anamaria Alonzo the other day and Dr Juan Antonio Ventura came and told her he would call her Friday with the results of her labs- she's just calling to remind him        #[phone]

## 2019-01-16 ENCOUNTER — TELEPHONE (OUTPATIENT)
Dept: INTERNAL MEDICINE CLINIC | Facility: CLINIC | Age: 49
End: 2019-01-16

## 2019-01-16 ENCOUNTER — HOSPITAL ENCOUNTER (OUTPATIENT)
Dept: NON INVASIVE DIAGNOSTICS | Facility: CLINIC | Age: 49
Discharge: HOME | End: 2019-01-16
Payer: COMMERCIAL

## 2019-01-16 DIAGNOSIS — R09.89 ABSENT FOOT PULSE: ICD-10-CM

## 2019-01-16 PROCEDURE — 93925 LOWER EXTREMITY STUDY: CPT

## 2019-01-16 PROCEDURE — 93922 UPR/L XTREMITY ART 2 LEVELS: CPT | Performed by: SURGERY

## 2019-01-16 PROCEDURE — 93925 LOWER EXTREMITY STUDY: CPT | Performed by: SURGERY

## 2019-01-16 PROCEDURE — 93923 UPR/LXTR ART STDY 3+ LVLS: CPT

## 2019-01-16 NOTE — TELEPHONE ENCOUNTER
----- [facility] 1[facility]16/2019  5:57 PM EST -----  Please call the patient, the arterial sonogram of her legs is normal

## 2019-03-27 ENCOUNTER — OFFICE VISIT (OUTPATIENT)
Dept: INTERNAL MEDICINE CLINIC | Facility: CLINIC | Age: 49
End: 2019-03-27
Payer: COMMERCIAL

## 2019-03-27 VITALS
DIASTOLIC BLOOD PRESSURE: 78 MMHG | WEIGHT: 197.8 LBS | BODY MASS INDEX: 35.05 KG/M2 | SYSTOLIC BLOOD PRESSURE: 98 MMHG | HEIGHT: 63 IN | HEART RATE: 83 BPM | OXYGEN SATURATION: 97 % | TEMPERATURE: 98.6 F

## 2019-03-27 DIAGNOSIS — J45.20 MILD INTERMITTENT ASTHMA WITHOUT COMPLICATION: ICD-10-CM

## 2019-03-27 DIAGNOSIS — J30.9 ALLERGIC RHINITIS, UNSPECIFIED SEASONALITY, UNSPECIFIED TRIGGER: ICD-10-CM

## 2019-03-27 DIAGNOSIS — G43.809 OTHER MIGRAINE WITHOUT STATUS MIGRAINOSUS, NOT INTRACTABLE: Primary | ICD-10-CM

## 2019-03-27 PROCEDURE — 1036F TOBACCO NON-USER: CPT | Performed by: PHYSICIAN ASSISTANT

## 2019-03-27 PROCEDURE — 99214 OFFICE O/P EST MOD 30 MIN: CPT | Performed by: PHYSICIAN ASSISTANT

## 2019-03-27 PROCEDURE — 3008F BODY MASS INDEX DOCD: CPT | Performed by: PHYSICIAN ASSISTANT

## 2019-03-27 RX ORDER — ALBUTEROL SULFATE 90 UG/1
2 AEROSOL, METERED RESPIRATORY (INHALATION) EVERY 6 HOURS PRN
Qty: 1 INHALER | Refills: 3 | Status: SHIPPED | OUTPATIENT
Start: 2019-03-27 | End: 2021-10-28

## 2019-03-27 RX ORDER — SUMATRIPTAN 25 MG/1
25 TABLET, FILM COATED ORAL ONCE AS NEEDED
Qty: 30 TABLET | Refills: 0 | Status: SHIPPED | OUTPATIENT
Start: 2019-03-27 | End: 2019-10-29 | Stop reason: ALTCHOICE

## 2019-03-27 RX ORDER — CETIRIZINE HYDROCHLORIDE 10 MG/1
10 TABLET, CHEWABLE ORAL DAILY
Qty: 30 TABLET | Refills: 5 | Status: SHIPPED | OUTPATIENT
Start: 2019-03-27 | End: 2019-10-29 | Stop reason: SDUPTHER

## 2019-03-27 RX ORDER — FLUTICASONE PROPIONATE 50 MCG
2 SPRAY, SUSPENSION (ML) NASAL DAILY
Qty: 1 BOTTLE | Refills: 5 | Status: SHIPPED | OUTPATIENT
Start: 2019-03-27 | End: 2019-10-29 | Stop reason: SDUPTHER

## 2019-03-27 NOTE — PATIENT INSTRUCTIONS
Imitrex--- maximum daily dose is 200mg/24 hours  If after the first dose, you want to take 2 tablets, you can    Just don't exceed 200mg in 24 hours

## 2019-03-27 NOTE — PROGRESS NOTES
Assessment/Plan:    Headaches with some characteristics consistent with migraines  We will give the patient Imitrex and have her try this medication  If she does not have any relief, she will notify the office at which time we will refer her to Neurology and/or order a CT scan  No problem-specific Assessment & Plan notes found for this encounter  Diagnoses and all orders for this visit:    Other migraine without status migrainosus, not intractable  -     SUMAtriptan (IMITREX) 25 mg tablet; Take 1 tablet (25 mg total) by mouth once as needed for migraine for up to 1 dose May repeat dose after 2 hours    Mild intermittent asthma without complication  -     albuterol (VENTOLIN HFA) 90 mcg/act inhaler; Inhale 2 puffs every 6 (six) hours as needed for wheezing    Allergic rhinitis, unspecified seasonality, unspecified trigger  -     cetirizine (ZyrTEC) 10 MG chewable tablet; Chew 1 tablet (10 mg total) daily  -     fluticasone (FLONASE) 50 mcg/act nasal spray; 2 sprays into each nostril daily          Subjective:      Patient ID: Grazyna De La Cruz is a 51 y o  female  Patient comes in describing headaches that she has been getting for the last 3-4 months  She describes getting them about 2-3 times a week and she usually wakes up with them  They start in the back of her head and radiate up over to the top  No recent upper respiratory symptoms such as sinus pressure, congestion or nasal discharge  More recently her headaches have been associated with nausea  No vision changes or vomiting  She does have some slight light sensitivity  There is no pattern to the headache and they are not associated with her menstrual cycle or missing meals  They are somewhat random  Patient sees pain management and has an appointment coming up and plans to mention it to her doctor  She also reports some cousins with a history of aneurysms      The patient will take Excedrin and it seems to relieve the headache but she still will get another headache during the week, about 2-3 headaches a week      The following portions of the patient's history were reviewed and updated as appropriate: allergies, current medications, past medical history, past social history and problem list     Review of Systems   Constitutional: Negative for chills, fatigue and fever  HENT: Positive for postnasal drip  Negative for congestion, ear pain, rhinorrhea, sinus pressure, sinus pain and sore throat  Eyes: Negative for visual disturbance  Respiratory: Negative for cough and shortness of breath  Cardiovascular: Negative for chest pain and palpitations  Gastrointestinal: Positive for nausea  Negative for abdominal pain, diarrhea and vomiting  Neurological: Positive for headaches  Negative for dizziness and light-headedness  Objective:      BP 98/78 (BP Location: Left arm, Patient Position: Sitting)   Pulse 83   Temp 98 6 °F (37 °C)   Ht 5' 3" (1 6 m)   Wt 89 7 kg (197 lb 12 8 oz)   SpO2 97%   BMI 35 04 kg/m²          Physical Exam   Constitutional: She appears well-developed and well-nourished  HENT:   Head: Normocephalic and atraumatic  Right Ear: Tympanic membrane and ear canal normal    Left Ear: Tympanic membrane and ear canal normal    Mouth/Throat: Oropharynx is clear and moist  No oropharyngeal exudate  Eyes: Pupils are equal, round, and reactive to light  EOM are normal    Neck: Normal range of motion  Cardiovascular: Normal rate, regular rhythm and normal heart sounds  Pulmonary/Chest: Effort normal and breath sounds normal  No respiratory distress  Abdominal: Soft  Bowel sounds are normal  There is no tenderness  Lymphadenopathy:     She has no cervical adenopathy  Neurological: She is alert  No cranial nerve deficit  Vitals reviewed

## 2019-04-17 ENCOUNTER — APPOINTMENT (OUTPATIENT)
Dept: RADIOLOGY | Facility: CLINIC | Age: 49
End: 2019-04-17
Payer: COMMERCIAL

## 2019-04-17 ENCOUNTER — TRANSCRIBE ORDERS (OUTPATIENT)
Dept: RADIOLOGY | Facility: CLINIC | Age: 49
End: 2019-04-17

## 2019-04-17 DIAGNOSIS — M17.0 OSTEOARTHRITIS OF BOTH KNEES, UNSPECIFIED OSTEOARTHRITIS TYPE: ICD-10-CM

## 2019-04-17 DIAGNOSIS — M17.0 OSTEOARTHRITIS OF BOTH KNEES, UNSPECIFIED OSTEOARTHRITIS TYPE: Primary | ICD-10-CM

## 2019-04-17 PROCEDURE — 73560 X-RAY EXAM OF KNEE 1 OR 2: CPT

## 2019-05-24 NOTE — PROGRESS NOTES
Assessment   1  Conjunctivitis (372 30) (H10 9)    Plan   Conjunctivitis    · Tobramycin 0 3 % Ophthalmic Solution; INSTILL 2 DROP 4 times daily FOR 1    WEEK    Discussion/Summary      Left conjunctivitis-- tobramycin eyedrops, wash hands thoroughly and frequently, wash items that have come into contact with your face and continue to do so until You no longer had any eye discharge  Chief Complaint   left eye redness      History of Present Illness   HPI: patient states that few days ago she had excess crusting in her left eye  She washed away with warm water but then every day after that for the last few days she has woken up with sticking and discharge from the eye  This morning she woke up and the eye was red  It is not particularly itchy  There is no fevers, chills or sweats  No other URI symptoms  Conjunctivitis (Brief): The patient is being seen for an initial evaluation of conjunctivitis  Symptoms:  eye discharge,-- lid crusting,-- eye irritation-- and-- eye redness, but-- no eye itching,-- no eye pain,-- no blurred vision-- and-- no photophobia  Associated symptoms:  no runny nose,-- no cough,-- no fever,-- no lid lump-- and-- no lid swelling  Review of Systems        Constitutional: no fever-- and-- no chills  ENT: no ear ache, no loss of hearing, no nosebleeds or nasal discharge, no sore throat or hoarseness  Cardiovascular: no complaints of slow or fast heart rate, no chest pain, no palpitations, no leg claudication or lower extremity edema  Respiratory: no complaints of shortness of breath, no wheezing, no dyspnea on exertion, no orthopnea or PND  Gastrointestinal: no complaints of abdominal pain, no constipation, no nausea or diarrhea, no vomiting, no bloody stools  ROS reviewed  Active Problems   1  Abdominal bloating (787 3) (R14 0)   2  Abdominal pain (789 00) (R10 9)   3  Asthma (493 90) (J45 909)   4  Chronic rhinitis (472 0) (J31 0)   5   Conjunctivitis Brought in by parents complaining of weakness for a few days, patient appearing weak and lethargic. not eating/drinking (372 30) (H10 9)   6  Fall, accidental (E888 9) (W19 XXXA)   7  Flu vaccine need (V04 81) (Z23)   8  Fractured coccyx (805 6) (S32 2XXA)   9  Fungal dermatitis (111 9) (B36 9)   10  GERD without esophagitis (530 81) (K21 9)   11  Leukopenia (288 50) (D72 819)   12  Low back pain (724 2) (M54 5)   13  Malabsorption (579 9) (K90 9)   14  Nausea and vomiting (787 01) (R11 2)   15  Stress incontinence in female (625 6) (N39 3)   16  Vitamin D deficiency (268 9) (E55 9)    Past Medical History   Active Problems And Past Medical History Reviewed: The active problems and past medical history were reviewed and updated today  Surgical History   Surgical History Reviewed: The surgical history was reviewed and updated today  Social History    · Minimum alcohol consumption   · Never A Smoker   · No drug use  The social history was reviewed and updated today  Family History   Family History Reviewed: The family history was reviewed and updated today  Current Meds    1  Fluticasone Propionate 50 MCG/ACT Nasal Suspension; USE 2 SPRAYS IN EACH     NOSTRIL ONCE DAILY; Therapy: [id number] to (Last Rx:26Jan2015) Ordered   2  Ibuprofen 800 MG Oral Tablet; TAKE 1 TABLET 3 TIMES DAILY AFTER MEALS; Therapy: 53LRB2751 to (Evaluate:95Ewi7211) Recorded   3  Lamisil CREA; Therapy: (Recorded:02Mar2016) to Recorded   4  Linzess 145 MCG Oral Capsule; TAKE ONE CAPSULE BY MOUTH EVERY DAY; Therapy: 76OFB2452 to (Evaluate:00Wcs7528)  Requested for: [id number]; Last     Rx:01Nov2017 Ordered   5  Microgestin FE 1 5/30 1 5-30 MG-MCG Oral Tablet; TAKE 1 TABLET DAILY AS DIRECTED; Therapy: 69Ney5229 to (Evaluate:10Oct2016); Last Rx:[id number] Ordered   6  Ventolin  (90 Base) MCG/ACT Inhalation Aerosol Solution; TAKE 2 PUFFS EVERY     4 TO 6 HOURS AS NEEDED FOR SOB; Therapy: 04UAQ1480 to (Last Rx:10May2016) Ordered     The medication list was reviewed and updated today  Allergies   1  Penicillins   2  Shellfish-derived Products  3  Seasonal    Vitals    Recorded: [id number] 04:34PM   Temperature 98 3 F   Heart Rate 76   Systolic 539   Diastolic 74   Height 5 ft 5 in   Weight 167 lb    BMI Calculated 27 79   BSA Calculated 1 83   O2 Saturation 93     Physical Exam        Constitutional      General appearance: No acute distress, well appearing and well nourished  Eyes      Conjunctiva and lids: Abnormal   Conjunctiva Findings: left hyperemia, but-- no hyperemia on the right,-- no purulent discharge on the right-- and-- no purulent discharge on the left  Ears, Nose, Mouth, and Throat      External inspection of ears and nose: Normal        Otoscopic examination: Tympanic membranes translucent with normal light reflex  Canals patent without erythema  Nasal mucosa, septum, and turbinates: Normal without edema or erythema  Oropharynx: Normal with no erythema, edema, exudate or lesions  Pulmonary      Auscultation of lungs: Clear to auscultation  Cardiovascular      Auscultation of heart: Normal rate and rhythm, normal S1 and S2, without murmurs  Examination of extremities for edema and/or varicosities: Normal        Abdomen      Abdomen: Non-tender, no masses            Signatures    Electronically signed by : OLIVIA Churchill; Carlos 15 2018  5:05PM EST                       (Author)     Electronically signed by : Inga Baxter DO; Carlos 15 2018  5:20PM EST                       (Author)

## 2019-10-29 ENCOUNTER — OFFICE VISIT (OUTPATIENT)
Dept: INTERNAL MEDICINE CLINIC | Facility: CLINIC | Age: 49
End: 2019-10-29
Payer: COMMERCIAL

## 2019-10-29 VITALS
BODY MASS INDEX: 35.05 KG/M2 | HEART RATE: 76 BPM | DIASTOLIC BLOOD PRESSURE: 66 MMHG | HEIGHT: 63 IN | SYSTOLIC BLOOD PRESSURE: 100 MMHG | WEIGHT: 197.8 LBS | TEMPERATURE: 98.1 F

## 2019-10-29 DIAGNOSIS — K21.00 GASTROESOPHAGEAL REFLUX DISEASE WITH ESOPHAGITIS: Primary | ICD-10-CM

## 2019-10-29 DIAGNOSIS — E55.9 VITAMIN D DEFICIENCY: ICD-10-CM

## 2019-10-29 DIAGNOSIS — M25.541 ARTHRALGIA OF BOTH HANDS: ICD-10-CM

## 2019-10-29 DIAGNOSIS — R25.2 LEG CRAMPING: ICD-10-CM

## 2019-10-29 DIAGNOSIS — J30.9 ALLERGIC RHINITIS, UNSPECIFIED SEASONALITY, UNSPECIFIED TRIGGER: ICD-10-CM

## 2019-10-29 DIAGNOSIS — Z98.84 S/P LAPAROSCOPIC SLEEVE GASTRECTOMY: ICD-10-CM

## 2019-10-29 DIAGNOSIS — J45.20 MILD INTERMITTENT ASTHMA WITHOUT COMPLICATION: ICD-10-CM

## 2019-10-29 DIAGNOSIS — K58.1 IRRITABLE BOWEL SYNDROME WITH CONSTIPATION: ICD-10-CM

## 2019-10-29 DIAGNOSIS — N39.3 STRESS INCONTINENCE IN FEMALE: ICD-10-CM

## 2019-10-29 DIAGNOSIS — Z00.00 HEALTH MAINTENANCE EXAMINATION: ICD-10-CM

## 2019-10-29 DIAGNOSIS — M25.542 ARTHRALGIA OF BOTH HANDS: ICD-10-CM

## 2019-10-29 DIAGNOSIS — J31.0 CHRONIC RHINITIS: ICD-10-CM

## 2019-10-29 PROCEDURE — 99214 OFFICE O/P EST MOD 30 MIN: CPT | Performed by: NURSE PRACTITIONER

## 2019-10-29 RX ORDER — CETIRIZINE HYDROCHLORIDE 10 MG/1
10 TABLET, CHEWABLE ORAL DAILY
Qty: 30 TABLET | Refills: 5 | Status: SHIPPED | OUTPATIENT
Start: 2019-10-29 | End: 2020-12-09 | Stop reason: SDUPTHER

## 2019-10-29 RX ORDER — FLUTICASONE PROPIONATE 50 MCG
2 SPRAY, SUSPENSION (ML) NASAL DAILY
Qty: 1 BOTTLE | Refills: 5 | Status: SHIPPED | OUTPATIENT
Start: 2019-10-29 | End: 2020-12-09 | Stop reason: SDUPTHER

## 2019-10-29 NOTE — PROGRESS NOTES
INTERNAL MEDICINE FOLLOW-UP OFFICE VISIT  [facility] [address] [place] [address]    NAME: Bert Ramirez  AGE: 51 y o  SEX: female    DATE OF ENCOUNTER: 10/29/2019   Assessment and Plan:     Problem List Items Addressed This Visit        Digestive    Gastroesophageal reflux disease with esophagitis - Primary       Patient was taking Nexium for her GERD symptoms which has since been recalled  She is now taking Pepcid with relief of her GERD symptoms  RESOLVED: Irritable bowel syndrome with constipation       Respiratory    Asthma       Patient using Ventolin inhaler with control of asthma symptoms  Chronic rhinitis       Patient taking Zyrtec and using Flonase for  chronic rhinitis symptoms  Other    Health maintenance examination    Relevant Orders    Hemoglobin A1C    TSH, 3rd generation with Free T4 reflex    Lipid Panel with Direct LDL reflex    Comprehensive metabolic panel    CBC    S/P laparoscopic sleeve gastrectomy       Patient had a gastric sleeve performed in 2015 at [facility]   Initial weight prior to surgery was 218 lb  Lowest weight recorded was 135 lb  Today in the office the patient weighs 197 lb  Information given to to patient regarding diet  Relevant Orders    Hemoglobin A1C    TSH, 3rd generation with Free T4 reflex    Lipid Panel with Direct LDL reflex    Comprehensive metabolic panel    CBC    Vitamin D 25 hydroxy    Ambulatory referral to Weight Management    Vitamin D deficiency       Patient with a past history of vitamin-D deficiency  Vitamin-D level ordered due to patient's multiple complaints of fatigue, leg cramping and joint pain           Relevant Orders    Vitamin D 25 hydroxy    RESOLVED: Stress incontinence in female      Other Visit Diagnoses     Arthralgia of both hands        Relevant Orders    RF Screen w/ Reflex to Titer    CK (with reflex to MB)    Vitamin B12    Leg cramping        Relevant Orders RF Screen w/ Reflex to Titer    CK (with reflex to MB)    Vitamin B12    Allergic rhinitis, unspecified seasonality, unspecified trigger        Relevant Medications    cetirizine (ZyrTEC) 10 MG chewable tablet    fluticasone (FLONASE) 50 mcg/act nasal spray          Return in about 1 year (around 10/29/2020) for yearly physical exam      Counseling:     · Medication Side Effects - Adverse side effects of medications were reviewed with the patient/guardian today: Yes  · Counseling was given regarding: Intructions for management and Importance of tx compliance  · Barriers to treatment include: No identified barriers      Chief Complaint:     Chief Complaint   Patient presents with    Physical Exam        History of Present Illness:       Fe Paige presents to the office today for yearly exam   She is a 61-year-old female with a history of gastric sleeve surgery, GERD, vitamin-D deficiency, myalgias, arthralgias, asthma and back pain  The patient states she had a gastric sleeve performed in 2015 at which time her weight was 218 lb  She states that her lowest weight post surgery was 135 and today in the office her weight was 197  There has been no change according to the patient in her diet or exercise regimen to account for a 60 lb weight gain  The patient does have a history of back pain and was taking pain medications in the past   She states she no longer takes the pain medication but has been using medical marijuana via elevating pain at night to alleviate her symptoms  Patient goes to the gym 5 times a week but states she is limited on what she can do due to her back issues  She states that her surgery was performed at [facility] and they do not have any type of follow-up program for patient to have had gastric sleeve surgery  A referral was placed to [facility] weight loss management for consultation so that the patient can obtain information regarding healthy diet and exercise  The patient has a continuing complaint of joint and muscle and back pain  A complete workup was performed last year including ESR, rheumatoid factor and CK  At that time the patient was having an increase in leg cramping as well as joint muscle pain  The patient states she has had minimal improvement at that time and request further workup  Additional lab work was ordered at today's visit  Additionally the patient has a history of migraine headaches and is receiving Botox injections for this  She states she still occasionally gets headaches and is overdue for appointment  The patient is up-to-date on mammograms and her last Pap smear was performed in 2017  She will be due for repeat Pap in 2020  Patient declined both the flu and tetanus vaccine today in the office  The patient was advised that we will contact her with the results of her blood work and make her aware of any additional testing or referrals that may be needed  The following portions of the patient's history were reviewed and updated as appropriate: allergies, current medications, past family history, past medical history, past social history, past surgical history and problem list      Review of Systems:     Review of Systems   Constitutional: Positive for fatigue and unexpected weight change (weight gain after gastric sleeve 2015)  HENT: Positive for postnasal drip  Negative for congestion, rhinorrhea, sinus pressure, sinus pain and sore throat  Eyes: Negative  Respiratory: Negative  Cardiovascular: Negative  Gastrointestinal: Negative  Endocrine: Positive for polyuria  Genitourinary: Positive for menstrual problem  Musculoskeletal: Positive for back pain and joint swelling  Skin: Negative  Neurological: Positive for headaches (migraine headaches)  Psychiatric/Behavioral: Negative           Problem List:     Patient Active Problem List   Diagnosis    Health maintenance examination    Bilateral shoulder pain    Abdominal pain    Gastroesophageal reflux disease with esophagitis    Asthma    Obesity    Chronic rhinitis    S/P laparoscopic sleeve gastrectomy    Vitamin D deficiency        Objective:     /66   Pulse 76   Temp 98 1 °F (36 7 °C)   Ht 5' 3" (1 6 m)   Wt 89 7 kg (197 lb 12 8 oz)   BMI 35 04 kg/m²     Physical Exam   Constitutional: She is oriented to person, place, and time  She appears well-developed and well-nourished  No distress  HENT:   Head: Normocephalic and atraumatic  Mouth/Throat: No oropharyngeal exudate  Eyes: Pupils are equal, round, and reactive to light  Conjunctivae and EOM are normal  Right eye exhibits no discharge  Left eye exhibits no discharge  Neck: Normal range of motion  No tracheal deviation present  No thyromegaly present  Cardiovascular: Normal rate, regular rhythm, normal heart sounds and intact distal pulses  No murmur heard  Pulmonary/Chest: Effort normal and breath sounds normal  No respiratory distress  Abdominal: Soft  Bowel sounds are normal    Musculoskeletal: She exhibits tenderness (tenderness in bilateral hand joints)  She exhibits no edema  Lymphadenopathy:     She has no cervical adenopathy  Neurological: She is alert and oriented to person, place, and time  Skin: Skin is warm and dry  Psychiatric: She has a normal mood and affect   Her behavior is normal  Judgment and thought content normal        Pertinent Laboratory/Diagnostic Studies:     Current Medications:     Current Outpatient Medications   Medication Sig Dispense Refill    albuterol (VENTOLIN HFA) 90 mcg/act inhaler Inhale 2 puffs every 6 (six) hours as needed for wheezing 1 Inhaler 3    aspirin-acetaminophen-caffeine (EXCEDRIN MIGRAINE) 250-250-65 MG per tablet Take 1 tablet by mouth every 6 (six) hours as needed for headaches      cetirizine (ZyrTEC) 10 MG chewable tablet Chew 1 tablet (10 mg total) daily 30 tablet 5    fluticasone (FLONASE) 50 mcg/act nasal spray 2 sprays into each nostril daily 1 Bottle 5    KETOTIFEN FUMARATE OP Apply 1 drop to eye 2 (two) times a day      NON FORMULARY       norethindrone-ethinyl estradiol-iron (MICROGESTIN FE1 5/30) 1 5-30 MG-MCG tablet Take 1 tablet by mouth daily       No current facility-administered medications for this visit  BMI Counseling: Body mass index is 35 04 kg/m²  The BMI is above normal  Nutrition recommendations include reducing portion sizes, consuming healthier snacks, moderation in carbohydrate intake, increasing intake of lean protein and reducing intake of saturated fat and trans fat  Exercise recommendations include exercising 3-5 times per week  Patient referred to weight management due to patient being obese  Patient Instructions           Good fat  Good fats come mainly from vegetables, nuts, seeds, and fish  They differ from saturated fats by having fewer hydrogen atoms bonded to their carbon chains  Healthy fats are liquid at room temperature, not solid  There are two broad categories of beneficial fats: monounsaturated and polyunsaturated fats  Monounsaturated fats  When you dip your bread in olive oil at an [place], you're getting mostly monounsaturated fat  Monounsaturated fats have a single carbon-to-carbon double bond  The result is that it has two fewer hydrogen atoms than a saturated fat and a bend at the double bond  This structure keeps monounsaturated fats liquid at room temperature  Good sources of monounsaturated fats are olive oil, peanut oil, canola oil, avocados, and most nuts, as well as high-oleic safflower and sunflower oils  The discovery that monounsaturated fat could be healthful came from the [place] Study during the 1960s  It revealed that people in [place] and other parts of the [facility] region enjoyed a low rate of heart disease despite a high-fat diet   The main fat in their diet, though, was not the saturated animal fat common in countries with higher rates of heart disease  It was olive oil, which contains mainly monounsaturated fat  This finding produced a surge of interest in olive oil and the "Mediterranean diet," a style of eating regarded as a healthful choice today  Although there's no recommended daily intake of monounsaturated fats, the [place] [employer] recommends using them as much as possible along with polyunsaturated fats to replace saturated and trans fats  Polyunsaturated fats  When you pour liquid cooking oil into a pan, there's a good chance you're using polyunsaturated fat  Corn oil, sunflower oil, and safflower oil are common examples  Polyunsaturated fats are essential fats  That means they're required for normal body functions but your body can't make them  So you must get them from food  Polyunsaturated fats are used to build cell membranes and the covering of nerves  They are needed for blood clotting, muscle movement, and inflammation  A polyunsaturated fat has two or more double bonds in its carbon chain  There are two main types of polyunsaturated fats: omega-3 fatty acids and omega-6 fatty acids  The numbers refer to the distance between the beginning of the carbon chain and the first double bond  Both types offer health benefits  Eating polyunsaturated fats in place of saturated fats or highly refined carbohydrates reduces harmful LDL cholesterol and improves the cholesterol profile  It also lowers triglycerides  Good sources of omega-3 fatty acids include fatty fish such as salmon, mackerel, and sardines, flaxseeds, walnuts, canola oil, and unhydrogenated soybean oil  Omega-3 fatty acids may help prevent and even treat heart disease and stroke  In addition to reducing blood pressure, raising HDL, and lowering triglycerides, polyunsaturated fats may help prevent lethal heart rhythms from arising   Evidence also suggests they may help reduce the need for corticosteroid medications in people with rheumatoid arthritis  Studies linking omega-3s to a wide range of other health improvements, including reducing risk of dementia, are inconclusive, and some of them have major flaws, according to a systematic review of the evidence by the Agency for Healthcare Research and Quality  Omega-6 fatty acids have also been linked to protection against heart disease  Foods rich in linoleic acid and other omega-6 fatty acids include vegetable oils such as safflower, soybean, sunflower, walnut, and corn oils  DASH Eating Plan   WHAT YOU NEED TO KNOW:   The DASH (Dietary Approaches to Stop Hypertension) Eating Plan is designed to help prevent or lower high blood pressure  It can also help to lower LDL (bad) cholesterol and decrease your risk of heart disease  The plan is low in sodium, sugar, unhealthy fats, and total fat  It is high in potassium, calcium, magnesium, and fiber  These nutrients are added when you eat more fruits, vegetables, and whole grains  DISCHARGE INSTRUCTIONS:   Your sodium limit each day: Your dietitian will tell you how much sodium is safe for you to have each day  People with high blood pressure should have no more than 1,500 to 2,300 mg of sodium in a day  A teaspoon (tsp) of salt has 2,300 mg of sodium  This may seem like a difficult goal, but small changes to the foods you eat can make a big difference  Your healthcare provider or dietitian can help you create a meal plan that follows your sodium limit  How to limit sodium:   · Read food labels  Food labels can help you choose foods that are low in sodium  The amount of sodium is listed in milligrams (mg)  The % Daily Value (DV) column tells you how much of your daily needs are met by 1 serving of the food for each nutrient listed  Choose foods that have less than 5% of the DV of sodium  These foods are considered low in sodium  Foods that have 20% or more of the DV of sodium are considered high in sodium   Avoid foods that have more than 300 mg of sodium in each serving  Choose foods that say low-sodium, reduced-sodium, or no salt added on the food label  · Avoid salt  Do not salt food at the table, and add very little salt to foods during cooking  Use herbs and spices, such as onions, garlic, and salt-free seasonings to add flavor to foods  Try lemon or lime juice or vinegar to give foods a tart flavor  Use hot peppers or a small amount of hot pepper sauce to add a spicy flavor to foods  · Ask about salt substitutes  Ask your healthcare provider if you may use salt substitutes  Some salt substitutes have ingredients that can be harmful if you have certain health conditions  · Choose foods carefully at restaurants  Meals from restaurants, especially fast food restaurants, are often high in sodium  Some restaurants have nutrition information that tells you the amount of sodium in their foods  Ask to have your food prepared with less, or no salt  What you need to know about fats:   · Include healthy fats  Examples are unsaturated fats and omega-3 fatty acids  Unsaturated fats are found in soybean, canola, olive, or sunflower oil, and liquid and soft tub margarines  Omega-3 fatty acids are found in fatty fish, such as salmon, tuna, mackerel, and sardines  It is also found in flaxseed oil and ground flaxseed  · Avoid unhealthy fats  Do not eat unhealthy fats, such as saturated fats and trans fats  Saturated fats are found in foods that contain fat from animals  Examples are fatty meats, whole milk, butter, cream, and other dairy foods  It is also found in shortening, stick margarine, palm oil, and coconut oil  Trans fats are found in fried foods, crackers, chips, and baked goods made with margarine or shortening  Foods to include: With the DASH eating plan, you need to eat a certain number of servings from each food group  This will help you get enough of certain nutrients and limit others   The amount of servings you should eat depends on how many calories you need  Your dietitian can tell you how many calories you need  The number of servings listed next to the food groups below are for people who need about 2,000 calories each day    · Grains:  6 to 8 servings (3 of these servings should be whole-grain foods)    ¨ 1 slice of whole-grain bread     ¨ 1 ounce of dry cereal    ¨ ½ cup of cooked cereal, pasta, or brown rice    · Vegetables and fruits:  4 to 5 servings of fruits and 4 to 5 servings of vegetables    ¨ 1 medium fruit    ¨ ½ cup of frozen, canned (no added salt), or chopped fresh vegetables     ¨ ½ cup of fresh, frozen, dried, or canned fruit (canned in light syrup or fruit juice)    ¨ ½ cup of vegetable or fruit juice    · Dairy:  2 to 3 servings    ¨ 1 cup of nonfat (skim) or 1% milk    ¨ 1½ ounces of fat-free or low-fat cheese    ¨ 6 ounces of nonfat or low-fat yogurt    · Lean meat, poultry, and fish:  6 ounces or less    [employer] (chicken, turkey) with no skin    ¨ Fish (especially fatty fish, such as salmon, fresh tuna, or mackerel)    ¨ Lean beef and pork (loin, round, extra lean hamburger)    ¨ Egg whites and egg substitutes    · Nuts, seeds, and legumes:  4 to 5 servings each week    ¨ ½ cup of cooked beans and peas    ¨ 1½ ounces of unsalted nuts    ¨ 2 tablespoons of peanut butter or seeds    · Sweets and added sugars:  5 or less each week    ¨ 1 tablespoon of sugar, jelly, or jam    ¨ ½ cup of sorbet or gelatin    ¨ 1 cup of lemonade    · Fats:  2 to 3 servings each week    ¨ 1 teaspoon of soft margarine or vegetable oil    ¨ 1 tablespoon of mayonnaise    ¨ 2 tablespoons of salad dressing  Foods to avoid:   · Grains:      [employer], such as doughnuts, pastries, cookies, and biscuits (high in fat and sugar)    ¨ Mixes for cornbread and biscuits, packaged foods, such as bread stuffing, rice and pasta mixes, macaroni and cheese, and instant cereals (high in sodium)    · Fruits and vegetables:      ¨ Regular, canned vegetables (high in sodium)    ¨ Sauerkraut, pickled vegetables, and other foods prepared in brine (high in sodium)    ¨ Fried vegetables or vegetables in butter or high-fat sauces    ¨ Fruit in cream or butter sauce (high in fat)    · Dairy:      ¨ Whole milk, 2% milk, and cream (high in fat)    ¨ Regular cheese and processed cheese (high in fat and sodium)    · Meats and protein foods:      ¨ Smoked or cured meat, such as corned beef, hernandez, ham, hot dogs, and sausage (high in fat and sodium)    ¨ Canned beans and canned meats or spreads, such as potted meats, sardines, anchovies, and imitation seafood (high in sodium)    ¨ Deli or lunch meats, such as bologna, ham, turkey, and roast beef (high in sodium)    ¨ High-fat meat (T-bone steak, regular hamburger, and ribs)    ¨ Whole eggs and egg yolks (high in fat)    · Other:      ¨ Seasonings made with salt, such as garlic salt, celery salt, onion salt, seasoned salt, meat tenderizers, and monosodium glutamate (MSG)    ¨ Miso soup and canned or dried soup mixes (high in sodium)    ¨ Regular soy sauce, barbecue sauce, teriyaki sauce, steak sauce, Worcestershire sauce, and most flavored vinegars (high in sodium)    ¨ Regular condiments, such as mustard, ketchup, and salad dressings (high in sodium)    ¨ Gravy and sauces, such as Mac[place] or cheese sauces (high in sodium and fat)    ¨ Drinks high in sugar, such as soda or fruit drinks    [employer] foods, such as salted chips, popcorn, pretzels, pork rinds, salted crackers, and salted nuts    ¨ Frozen foods, such as dinners, entrees, vegetables with sauces, and breaded meats (high in sodium)  Other guidelines to follow:   · Maintain a healthy weight  Your risk for heart disease is higher if you are overweight  Your healthcare provider may suggest that you lose weight if you are overweight  You can lose weight by eating fewer calories and foods that have added sugars and fat  The DASH meal plan can help you do this  Decrease calories by eating smaller portions at each meal and fewer snacks  Ask your healthcare provider for more information about how to lose weight  · Exercise regularly  Regular exercise can help you reach or maintain a healthy weight  Regular exercise can also help decrease your blood pressure and improve your cholesterol levels  Get 30 minutes or more of moderate exercise each day of the week  To lose weight, get at least 60 minutes of exercise  Talk to your healthcare provider about the best exercise program for you  · Limit alcohol  Women should limit alcohol to 1 drink a day  Men should limit alcohol to 2 drinks a day  A drink of alcohol is 12 ounces of beer, 5 ounces of wine, or 1½ ounces of liquor  © 2017 [facility] Information is for End User's use only and may not be sold, redistributed or otherwise used for commercial purposes  All illustrations and images included in CareNotes® are the copyrighted property of A D A M , Inc  or Roc Fitzpatrick  The above information is an  only  It is not intended as medical advice for individual conditions or treatments  Talk to your doctor, nurse or pharmacist before following any medical regimen to see if it is safe and effective for you            MAYCO Urrutia  [facility]

## 2019-10-29 NOTE — PATIENT INSTRUCTIONS
Good fat  Good fats come mainly from vegetables, nuts, seeds, and fish  They differ from saturated fats by having fewer hydrogen atoms bonded to their carbon chains  Healthy fats are liquid at room temperature, not solid  There are two broad categories of beneficial fats: monounsaturated and polyunsaturated fats  Monounsaturated fats  When you dip your bread in olive oil at an [place], you're getting mostly monounsaturated fat  Monounsaturated fats have a single carbon-to-carbon double bond  The result is that it has two fewer hydrogen atoms than a saturated fat and a bend at the double bond  This structure keeps monounsaturated fats liquid at room temperature  Good sources of monounsaturated fats are olive oil, peanut oil, canola oil, avocados, and most nuts, as well as high-oleic safflower and sunflower oils  The discovery that monounsaturated fat could be healthful came from the [place] Study during the 1960s  It revealed that people in [place] and other parts of the [place] enjoyed a low rate of heart disease despite a high-fat diet  The main fat in their diet, though, was not the saturated animal fat common in countries with higher rates of heart disease  It was olive oil, which contains mainly monounsaturated fat  This finding produced a surge of interest in olive oil and the "Mediterranean diet," a style of eating regarded as a healthful choice today  Although there's no recommended daily intake of monounsaturated fats, the [place] [employer] [facility] recommends using them as much as possible along with polyunsaturated fats to replace saturated and trans fats  Polyunsaturated fats  When you pour liquid cooking oil into a pan, there's a good chance you're using polyunsaturated fat  Corn oil, sunflower oil, and safflower oil are common examples  Polyunsaturated fats are essential fats   That means they're required for normal body functions but your body can't make them  So you must get them from food  Polyunsaturated fats are used to build cell membranes and the covering of nerves  They are needed for blood clotting, muscle movement, and inflammation  A polyunsaturated fat has two or more double bonds in its carbon chain  There are two main types of polyunsaturated fats: omega-3 fatty acids and omega-6 fatty acids  The numbers refer to the distance between the beginning of the carbon chain and the first double bond  Both types offer health benefits  Eating polyunsaturated fats in place of saturated fats or highly refined carbohydrates reduces harmful LDL cholesterol and improves the cholesterol profile  It also lowers triglycerides  Good sources of omega-3 fatty acids include fatty fish such as salmon, mackerel, and sardines, flaxseeds, walnuts, canola oil, and unhydrogenated soybean oil  Omega-3 fatty acids may help prevent and even treat heart disease and stroke  In addition to reducing blood pressure, raising HDL, and lowering triglycerides, polyunsaturated fats may help prevent lethal heart rhythms from arising  Evidence also suggests they may help reduce the need for corticosteroid medications in people with rheumatoid arthritis  Studies linking omega-3s to a wide range of other health improvements, including reducing risk of dementia, are inconclusive, and some of them have major flaws, according to a systematic review of the evidence by the Agency for Healthcare Research and Quality  Omega-6 fatty acids have also been linked to protection against heart disease  Foods rich in linoleic acid and other omega-6 fatty acids include vegetable oils such as safflower, soybean, sunflower, walnut, and corn oils  DASH Eating Plan   WHAT YOU NEED TO KNOW:   The DASH (Dietary Approaches to Stop Hypertension) Eating Plan is designed to help prevent or lower high blood pressure  It can also help to lower LDL (bad) cholesterol and decrease your risk of heart disease  The plan is low in sodium, sugar, unhealthy fats, and total fat  It is high in potassium, calcium, magnesium, and fiber  These nutrients are added when you eat more fruits, vegetables, and whole grains  DISCHARGE INSTRUCTIONS:   Your sodium limit each day: Your dietitian will tell you how much sodium is safe for you to have each day  People with high blood pressure should have no more than 1,500 to 2,300 mg of sodium in a day  A teaspoon (tsp) of salt has 2,300 mg of sodium  This may seem like a difficult goal, but small changes to the foods you eat can make a big difference  Your healthcare provider or dietitian can help you create a meal plan that follows your sodium limit  How to limit sodium:   · Read food labels  Food labels can help you choose foods that are low in sodium  The amount of sodium is listed in milligrams (mg)  The % Daily Value (DV) column tells you how much of your daily needs are met by 1 serving of the food for each nutrient listed  Choose foods that have less than 5% of the DV of sodium  These foods are considered low in sodium  Foods that have 20% or more of the DV of sodium are considered high in sodium  Avoid foods that have more than 300 mg of sodium in each serving  Choose foods that say low-sodium, reduced-sodium, or no salt added on the food label  · Avoid salt  Do not salt food at the table, and add very little salt to foods during cooking  Use herbs and spices, such as onions, garlic, and salt-free seasonings to add flavor to foods  Try lemon or lime juice or vinegar to give foods a tart flavor  Use hot peppers or a small amount of hot pepper sauce to add a spicy flavor to foods  · Ask about salt substitutes  Ask your healthcare provider if you may use salt substitutes  Some salt substitutes have ingredients that can be harmful if you have certain health conditions  · Choose foods carefully at restaurants    Meals from restaurants, especially fast food restaurants, are often high in sodium  Some restaurants have nutrition information that tells you the amount of sodium in their foods  Ask to have your food prepared with less, or no salt  What you need to know about fats:   · Include healthy fats  Examples are unsaturated fats and omega-3 fatty acids  Unsaturated fats are found in soybean, canola, olive, or sunflower oil, and liquid and soft tub margarines  Omega-3 fatty acids are found in fatty fish, such as salmon, tuna, mackerel, and sardines  It is also found in flaxseed oil and ground flaxseed  · Avoid unhealthy fats  Do not eat unhealthy fats, such as saturated fats and trans fats  Saturated fats are found in foods that contain fat from animals  Examples are fatty meats, whole milk, butter, cream, and other dairy foods  It is also found in shortening, stick margarine, palm oil, and coconut oil  Trans fats are found in fried foods, crackers, chips, and baked goods made with margarine or shortening  Foods to include: With the DASH eating plan, you need to eat a certain number of servings from each food group  This will help you get enough of certain nutrients and limit others  The amount of servings you should eat depends on how many calories you need  Your dietitian can tell you how many calories you need  The number of servings listed next to the food groups below are for people who need about 2,000 calories each day    · Grains:  6 to 8 servings (3 of these servings should be whole-grain foods)    ¨ 1 slice of whole-grain bread     ¨ 1 ounce of dry cereal    ¨ ½ cup of cooked cereal, pasta, or brown rice    · Vegetables and fruits:  4 to 5 servings of fruits and 4 to 5 servings of vegetables    ¨ 1 medium fruit    ¨ ½ cup of frozen, canned (no added salt), or chopped fresh vegetables     ¨ ½ cup of fresh, frozen, dried, or canned fruit (canned in light syrup or fruit juice)    ¨ ½ cup of vegetable or fruit juice    · Dairy:  2 to 3 servings    ¨ 1 cup of nonfat (skim) or 1% milk    ¨ 1½ ounces of fat-free or low-fat cheese    ¨ 6 ounces of nonfat or low-fat yogurt    · Lean meat, poultry, and fish:  6 ounces or less    [employer] (chicken, turkey) with no skin    ¨ Fish (especially fatty fish, such as salmon, fresh tuna, or mackerel)    ¨ Lean beef and pork (loin, round, extra lean hamburger)    ¨ Egg whites and egg substitutes    · Nuts, seeds, and legumes:  4 to 5 servings each week    ¨ ½ cup of cooked beans and peas    ¨ 1½ ounces of unsalted nuts    ¨ 2 tablespoons of peanut butter or seeds    · Sweets and added sugars:  5 or less each week    ¨ 1 tablespoon of sugar, jelly, or jam    ¨ ½ cup of sorbet or gelatin    ¨ 1 cup of lemonade    · Fats:  2 to 3 servings each week    ¨ 1 teaspoon of soft margarine or vegetable oil    ¨ 1 tablespoon of mayonnaise    ¨ 2 tablespoons of salad dressing  Foods to avoid:   · Grains:      [employer], such as doughnuts, pastries, cookies, and biscuits (high in fat and sugar)    ¨ Mixes for cornbread and biscuits, packaged foods, such as bread stuffing, rice and pasta mixes, macaroni and cheese, and instant cereals (high in sodium)    · Fruits and vegetables:      ¨ Regular, canned vegetables (high in sodium)    ¨ Sauerkraut, pickled vegetables, and other foods prepared in brine (high in sodium)    ¨ Fried vegetables or vegetables in butter or high-fat sauces    ¨ Fruit in cream or butter sauce (high in fat)    · Dairy:      ¨ Whole milk, 2% milk, and cream (high in fat)    ¨ Regular cheese and processed cheese (high in fat and sodium)    · Meats and protein foods:      ¨ Smoked or cured meat, such as corned beef, hernandez, ham, hot dogs, and sausage (high in fat and sodium)    ¨ Canned beans and canned meats or spreads, such as potted meats, sardines, anchovies, and imitation seafood (high in sodium)    ¨ Deli or lunch meats, such as bologna, ham, turkey, and roast beef (high in sodium)    ¨ High-fat meat (T-bone steak, regular hamburger, and ribs)    ¨ Whole eggs and egg yolks (high in fat)    · Other:      ¨ Seasonings made with salt, such as garlic salt, celery salt, onion salt, seasoned salt, meat tenderizers, and monosodium glutamate (MSG)    ¨ Miso soup and canned or dried soup mixes (high in sodium)    ¨ Regular soy sauce, barbecue sauce, teriyaki sauce, steak sauce, Worcestershire sauce, and most flavored vinegars (high in sodium)    ¨ Regular condiments, such as mustard, ketchup, and salad dressings (high in sodium)    ¨ Gravy and sauces, such as Maciel or cheese sauces (high in sodium and fat)    ¨ Drinks high in sugar, such as soda or fruit drinks    [employer] foods, such as salted chips, popcorn, pretzels, pork rinds, salted crackers, and salted nuts    ¨ Frozen foods, such as dinners, entrees, vegetables with sauces, and breaded meats (high in sodium)  Other guidelines to follow:   · Maintain a healthy weight  Your risk for heart disease is higher if you are overweight  Your healthcare provider may suggest that you lose weight if you are overweight  You can lose weight by eating fewer calories and foods that have added sugars and fat  The DASH meal plan can help you do this  Decrease calories by eating smaller portions at each meal and fewer snacks  Ask your healthcare provider for more information about how to lose weight  · Exercise regularly  Regular exercise can help you reach or maintain a healthy weight  Regular exercise can also help decrease your blood pressure and improve your cholesterol levels  Get 30 minutes or more of moderate exercise each day of the week  To lose weight, get at least 60 minutes of exercise  Talk to your healthcare provider about the best exercise program for you  · Limit alcohol  Women should limit alcohol to 1 drink a day  Men should limit alcohol to 2 drinks a day  A drink of alcohol is 12 ounces of beer, 5 ounces of wine, or 1½ ounces of liquor    © 2017 [facility] is for End User's use only and may not be sold, redistributed or otherwise used for commercial purposes  All illustrations and images included in CareNotes® are the copyrighted property of A D A M , Inc  or Roc Fitzpatrick  The above information is an  only  It is not intended as medical advice for individual conditions or treatments  Talk to your doctor, nurse or pharmacist before following any medical regimen to see if it is safe and effective for you

## 2019-10-29 NOTE — ASSESSMENT & PLAN NOTE
Patient was taking Nexium for her GERD symptoms which has since been recalled  She is now taking Pepcid with relief of her GERD symptoms

## 2019-10-29 NOTE — ASSESSMENT & PLAN NOTE
Patient had a gastric sleeve performed in 2015 at [facility]   Initial weight prior to surgery was 218 lb  Lowest weight recorded was 135 lb  Today in the office the patient weighs 197 lb  Information given to to patient regarding diet

## 2019-10-29 NOTE — ASSESSMENT & PLAN NOTE
Patient with a past history of vitamin-D deficiency  Vitamin-D level ordered due to patient's multiple complaints of fatigue, leg cramping and joint pain

## 2019-10-30 ENCOUNTER — APPOINTMENT (OUTPATIENT)
Dept: LAB | Facility: CLINIC | Age: 49
End: 2019-10-30
Payer: COMMERCIAL

## 2019-10-30 DIAGNOSIS — M25.542 ARTHRALGIA OF BOTH HANDS: ICD-10-CM

## 2019-10-30 DIAGNOSIS — Z98.84 S/P LAPAROSCOPIC SLEEVE GASTRECTOMY: ICD-10-CM

## 2019-10-30 DIAGNOSIS — E55.9 VITAMIN D DEFICIENCY: ICD-10-CM

## 2019-10-30 DIAGNOSIS — R25.2 LEG CRAMPING: ICD-10-CM

## 2019-10-30 DIAGNOSIS — M25.541 ARTHRALGIA OF BOTH HANDS: ICD-10-CM

## 2019-10-30 DIAGNOSIS — Z00.00 HEALTH MAINTENANCE EXAMINATION: ICD-10-CM

## 2019-10-30 LAB
25(OH)D3 SERPL-MCNC: 17.4 NG/ML (ref 30–100)
ALBUMIN SERPL BCP-MCNC: 3.5 G/DL (ref 3.5–5)
ALP SERPL-CCNC: 53 U/L (ref 46–116)
ALT SERPL W P-5'-P-CCNC: 20 U/L (ref 12–78)
ANION GAP SERPL CALCULATED.3IONS-SCNC: 6 MMOL/L (ref 4–13)
AST SERPL W P-5'-P-CCNC: 14 U/L (ref 5–45)
BILIRUB SERPL-MCNC: 0.88 MG/DL (ref 0.2–1)
BUN SERPL-MCNC: 15 MG/DL (ref 5–25)
CALCIUM SERPL-MCNC: 8.2 MG/DL (ref 8.3–10.1)
CHLORIDE SERPL-SCNC: 108 MMOL/L (ref 100–108)
CHOLEST SERPL-MCNC: 142 MG/DL (ref 50–200)
CK SERPL-CCNC: 117 U/L (ref 26–192)
CO2 SERPL-SCNC: 24 MMOL/L (ref 21–32)
CREAT SERPL-MCNC: 0.81 MG/DL (ref 0.6–1.3)
ERYTHROCYTE [DISTWIDTH] IN BLOOD BY AUTOMATED COUNT: 12.7 % (ref 11.6–15.1)
EST. AVERAGE GLUCOSE BLD GHB EST-MCNC: 103 MG/DL
GFR SERPL CREATININE-BSD FRML MDRD: 86 ML/MIN/1.73SQ M
GLUCOSE P FAST SERPL-MCNC: 88 MG/DL (ref 65–99)
HBA1C MFR BLD: 5.2 % (ref 4.2–6.3)
HCT VFR BLD AUTO: 38 % (ref 34.8–46.1)
HDLC SERPL-MCNC: 39 MG/DL
HGB BLD-MCNC: 12 G/DL (ref 11.5–15.4)
LDLC SERPL CALC-MCNC: 72 MG/DL (ref 0–100)
MCH RBC QN AUTO: 28.4 PG (ref 26.8–34.3)
MCHC RBC AUTO-ENTMCNC: 31.6 G/DL (ref 31.4–37.4)
MCV RBC AUTO: 90 FL (ref 82–98)
PLATELET # BLD AUTO: 200 THOUSANDS/UL (ref 149–390)
PMV BLD AUTO: 10.5 FL (ref 8.9–12.7)
POTASSIUM SERPL-SCNC: 4 MMOL/L (ref 3.5–5.3)
PROT SERPL-MCNC: 7.2 G/DL (ref 6.4–8.2)
RBC # BLD AUTO: 4.23 MILLION/UL (ref 3.81–5.12)
SODIUM SERPL-SCNC: 138 MMOL/L (ref 136–145)
TRIGL SERPL-MCNC: 157 MG/DL
TSH SERPL DL<=0.05 MIU/L-ACNC: 3.02 UIU/ML (ref 0.36–3.74)
VIT B12 SERPL-MCNC: 185 PG/ML (ref 100–900)
WBC # BLD AUTO: 4.28 THOUSAND/UL (ref 4.31–10.16)

## 2019-10-30 PROCEDURE — 82607 VITAMIN B-12: CPT

## 2019-10-30 PROCEDURE — 36415 COLL VENOUS BLD VENIPUNCTURE: CPT

## 2019-10-30 PROCEDURE — 80061 LIPID PANEL: CPT

## 2019-10-30 PROCEDURE — 83036 HEMOGLOBIN GLYCOSYLATED A1C: CPT

## 2019-10-30 PROCEDURE — 85027 COMPLETE CBC AUTOMATED: CPT

## 2019-10-30 PROCEDURE — 82306 VITAMIN D 25 HYDROXY: CPT

## 2019-10-30 PROCEDURE — 84443 ASSAY THYROID STIM HORMONE: CPT

## 2019-10-30 PROCEDURE — 80053 COMPREHEN METABOLIC PANEL: CPT

## 2019-10-30 PROCEDURE — 86430 RHEUMATOID FACTOR TEST QUAL: CPT

## 2019-10-30 PROCEDURE — 82550 ASSAY OF CK (CPK): CPT

## 2019-10-31 DIAGNOSIS — E55.9 VITAMIN D DEFICIENCY: Primary | ICD-10-CM

## 2019-10-31 LAB — RHEUMATOID FACT SER QL LA: NEGATIVE

## 2019-10-31 RX ORDER — MELATONIN
1000 DAILY
COMMUNITY
End: 2019-10-31

## 2019-10-31 RX ORDER — MELATONIN
1000 DAILY
Qty: 90 TABLET | Refills: 2 | Status: SHIPPED | OUTPATIENT
Start: 2019-10-31 | End: 2021-02-25

## 2019-10-31 NOTE — TELEPHONE ENCOUNTER
----- Message from Mirella Collins, [address]  sent at 10/31/2019  3:16 PM EDT -----  Please call the patient and make her aware that her vitamin-D level is 17 4  She should start taking vitamin-D 1000 units daily

## 2019-11-04 ENCOUNTER — OFFICE VISIT (OUTPATIENT)
Dept: INTERNAL MEDICINE CLINIC | Facility: CLINIC | Age: 49
End: 2019-11-04
Payer: COMMERCIAL

## 2019-11-04 VITALS
BODY MASS INDEX: 35.44 KG/M2 | WEIGHT: 200 LBS | DIASTOLIC BLOOD PRESSURE: 70 MMHG | HEART RATE: 86 BPM | SYSTOLIC BLOOD PRESSURE: 110 MMHG | OXYGEN SATURATION: 98 % | HEIGHT: 63 IN

## 2019-11-04 DIAGNOSIS — M19.90 ARTHRITIS: ICD-10-CM

## 2019-11-04 DIAGNOSIS — E55.9 VITAMIN D DEFICIENCY: Primary | ICD-10-CM

## 2019-11-04 DIAGNOSIS — Z23 NEED FOR VACCINATION WITH COMBINED DIPHTHERIA-TETANUS-PERTUSSIS (DTAP): Primary | ICD-10-CM

## 2019-11-04 PROBLEM — J45.909 ASTHMA: Status: RESOLVED | Noted: 2018-06-08 | Resolved: 2019-11-04

## 2019-11-04 PROCEDURE — 99213 OFFICE O/P EST LOW 20 MIN: CPT | Performed by: INTERNAL MEDICINE

## 2019-11-04 PROCEDURE — 3008F BODY MASS INDEX DOCD: CPT | Performed by: INTERNAL MEDICINE

## 2019-11-04 PROCEDURE — 90471 IMMUNIZATION ADMIN: CPT | Performed by: INTERNAL MEDICINE

## 2019-11-04 PROCEDURE — 90715 TDAP VACCINE 7 YRS/> IM: CPT | Performed by: INTERNAL MEDICINE

## 2019-11-04 NOTE — PROGRESS NOTES
Assessment/Plan:       Diagnoses and all orders for this visit:    Vitamin D deficiency  -     DXA bone density spine hip and pelvis; Future    Arthritis                Subjective:      Patient ID: Hernan Romano is a 51 y o  female  Follow-up visit of a 51year-old      Formerly she had persistent asthma but now she has not been symptomatic for over a year  No recent needed all for inhaler other vascular maintenance    The patient had gastric sleeve surgery 5 years ago  Her maximum weight was 210  A minimum weight was 137, in the spring of 2017  However she is gaining the weight back and is up to 200    Has reflux symptoms  EGD done in April 2018 documented esophagitis, this is in contrast to an EGD done in 2014 which was normal   Continues to follow with gastroenterology  complaint of some right shoulder pain associated with limitation of range of motion to about 90° abduction  Chronic low back pain  Uses a cane for ambulatory support  Applying for disability  The following portions of the patient's history were reviewed and updated as appropriate:   She has a past medical history of Asthma, Back pain, and Obesity  ,  does not have any pertinent problems on file  ,   has a past surgical history that includes Cholecystectomy; GASTRECTOMY SLEEVE LAPAROSCOPIC; Knee arthroscopy w/ meniscal repair (Left); Neuroplasty / transposition median nerve at carpal tunnel bilateral; and pr esophagogastroduodenoscopy transoral diagnostic (N/A, 4/30/2018)  ,  family history includes Diabetes in her father and mother; Hypertension in her father; Kidney disease in her mother; Nephrolithiasis in her family; Other in her father  ,   reports that she has never smoked  She has never used smokeless tobacco  She reports that she drinks alcohol  She reports that she has current or past drug history  ,  is allergic to penicillins; pollen extract; and shellfish-derived products     Current Outpatient Medications Medication Sig Dispense Refill    albuterol (VENTOLIN HFA) 90 mcg/act inhaler Inhale 2 puffs every 6 (six) hours as needed for wheezing 1 Inhaler 3    aspirin-acetaminophen-caffeine (EXCEDRIN MIGRAINE) 250-250-65 MG per tablet Take 1 tablet by mouth every 6 (six) hours as needed for headaches      cetirizine (ZyrTEC) 10 MG chewable tablet Chew 1 tablet (10 mg total) daily 30 tablet 5    cholecalciferol (VITAMIN D3) 1,000 units tablet Take 1 tablet (1,000 Units total) by mouth daily 90 tablet 2    fluticasone (FLONASE) 50 mcg/act nasal spray 2 sprays into each nostril daily 1 Bottle 5    KETOTIFEN FUMARATE OP Apply 1 drop to eye 2 (two) times a day      NON FORMULARY       norethindrone-ethinyl estradiol-iron (MICROGESTIN FE1 5/30) 1 5-30 MG-MCG tablet Take 1 tablet by mouth daily       No current facility-administered medications for this visit  Review of Systems   Constitutional: Positive for fatigue and unexpected weight change (weight gain after gastric sleeve 2015)  HENT: Positive for postnasal drip  Negative for congestion, rhinorrhea, sinus pressure, sinus pain and sore throat  Eyes: Negative  Respiratory: Negative  Cardiovascular: Negative  Gastrointestinal: Negative  Endocrine: Positive for polyuria  Genitourinary: Positive for menstrual problem  Musculoskeletal: Positive for back pain and joint swelling  Skin: Negative  Neurological: Positive for headaches (migraine headaches)  Psychiatric/Behavioral: Negative  Objective:  Vitals:    11/04/19 1132   BP: 110/70   Pulse: 86   SpO2: 98%      Physical Exam   Constitutional: She is oriented to person, place, and time  An overweight female patient who appears to be stated age   Cardiovascular: Normal rate  Pulmonary/Chest: Effort normal    Musculoskeletal: She exhibits deformity      Very very early  [place] nodes noted in particular right pinky finger   Neurological: She is alert and oriented to person, place, and time  Skin: Skin is warm and dry  Patient Instructions   A patient with a history of sleeve gastrectomy with consistent weight gain who is A1c is nevertheless remain normal  At this point her only physical complaint is joint pains and she points to her fingers  Her fingers have Jessica's and hematin nose but there are no nichole rheumatoid deformities; a slightly elevated rheumatoid factor year ago was not reproducible  I think she has osteoarthritis and recommend symptomatic care with Aleve Advil etc   She does have vitamin-D deficiency and vitamin-D was quite low  Recommend supplementation with 1000 units twice daily or twice at the same time  I think it is a good idea to do a bone density test given this    The surgeon was in the [facility] system and she ought to make a follow-up visit with that group to review what might be done for the persistent weight gain; she has gained back almost all the weight she lost after the sleeve  We can follow up over the phone with the results and see me back here again in January

## 2019-11-04 NOTE — PATIENT INSTRUCTIONS
A patient with a history of sleeve gastrectomy with consistent weight gain who is A1c is nevertheless remain normal  At this point her only physical complaint is joint pains and she points to her fingers  Her fingers have Jessica's and hematin nose but there are no nichole rheumatoid deformities; a slightly elevated rheumatoid factor year ago was not reproducible  I think she has osteoarthritis and recommend symptomatic care with Aleve Advil etc   She does have vitamin-D deficiency and vitamin-D was quite low  Recommend supplementation with 1000 units twice daily or twice at the same time  I think it is a good idea to do a bone density test given this    The surgeon was in the [facility] system and she ought to make a follow-up visit with that group to review what might be done for the persistent weight gain; she has gained back almost all the weight she lost after the sleeve  We can follow up over the phone with the results and see me back here again in January

## 2019-11-15 ENCOUNTER — OFFICE VISIT (OUTPATIENT)
Dept: INTERNAL MEDICINE CLINIC | Facility: CLINIC | Age: 49
End: 2019-11-15
Payer: COMMERCIAL

## 2019-11-15 VITALS
HEIGHT: 63 IN | OXYGEN SATURATION: 98 % | SYSTOLIC BLOOD PRESSURE: 120 MMHG | TEMPERATURE: 98 F | WEIGHT: 200 LBS | DIASTOLIC BLOOD PRESSURE: 70 MMHG | HEART RATE: 78 BPM | BODY MASS INDEX: 35.44 KG/M2

## 2019-11-15 DIAGNOSIS — H92.02 LEFT EAR PAIN: ICD-10-CM

## 2019-11-15 DIAGNOSIS — H92.02 LEFT EAR PAIN: Primary | ICD-10-CM

## 2019-11-15 PROCEDURE — 1036F TOBACCO NON-USER: CPT | Performed by: INTERNAL MEDICINE

## 2019-11-15 PROCEDURE — 99213 OFFICE O/P EST LOW 20 MIN: CPT | Performed by: INTERNAL MEDICINE

## 2019-11-15 RX ORDER — LEVOFLOXACIN 500 MG/1
500 TABLET, FILM COATED ORAL EVERY 24 HOURS
Qty: 7 TABLET | Refills: 0 | Status: SHIPPED | OUTPATIENT
Start: 2019-11-15 | End: 2019-11-22

## 2019-11-15 RX ORDER — LEVOFLOXACIN 500 MG/1
500 TABLET, FILM COATED ORAL EVERY 24 HOURS
Qty: 7 TABLET | Refills: 0 | Status: SHIPPED | OUTPATIENT
Start: 2019-11-15 | End: 2019-11-15 | Stop reason: SDUPTHER

## 2019-11-15 NOTE — PROGRESS NOTES
Assessment/Plan:       Diagnoses and all orders for this visit:    Left ear pain  -     levofloxacin (LEVAQUIN) 500 mg tablet; Take 1 tablet (500 mg total) by mouth every 24 hours for 7 days  -     neomycin-polymyxin-hydrocortisone (CORTISPORIN) otic solution; Administer 3 drops into the left ear every 6 (six) hours                Subjective:      Patient ID: Angel Smith is a 51 y o  female  Left ear pain x1 day   Associated sensation of facial congestion and frontal headache  No fever or chills or sweats  No sore throat  The following portions of the patient's history were reviewed and updated as appropriate:   She has a past medical history of Asthma, Back pain, and Obesity  ,  does not have any pertinent problems on file  ,   has a past surgical history that includes Cholecystectomy; GASTRECTOMY SLEEVE LAPAROSCOPIC; Knee arthroscopy w/ meniscal repair (Left); Neuroplasty / transposition median nerve at carpal tunnel bilateral; and pr esophagogastroduodenoscopy transoral diagnostic (N/A, 4/30/2018)  ,  family history includes Diabetes in her father and mother; Hypertension in her father; Kidney disease in her mother; Nephrolithiasis in her family; Other in her father  ,   reports that she has never smoked  She has never used smokeless tobacco  She reports that she drinks alcohol  She reports that she has current or past drug history  ,  is allergic to penicillins; pollen extract; and shellfish-derived products     Current Outpatient Medications   Medication Sig Dispense Refill    albuterol (VENTOLIN HFA) 90 mcg/act inhaler Inhale 2 puffs every 6 (six) hours as needed for wheezing 1 Inhaler 3    aspirin-acetaminophen-caffeine (EXCEDRIN MIGRAINE) 250-250-65 MG per tablet Take 1 tablet by mouth every 6 (six) hours as needed for headaches      cetirizine (ZyrTEC) 10 MG chewable tablet Chew 1 tablet (10 mg total) daily 30 tablet 5    cholecalciferol (VITAMIN D3) 1,000 units tablet Take 1 tablet (1,000 Units total) by mouth daily 90 tablet 2    fluticasone (FLONASE) 50 mcg/act nasal spray 2 sprays into each nostril daily 1 Bottle 5    KETOTIFEN FUMARATE OP Apply 1 drop to eye 2 (two) times a day      NON FORMULARY       norethindrone-ethinyl estradiol-iron (MICROGESTIN FE1 5/30) 1 5-30 MG-MCG tablet Take 1 tablet by mouth daily      levofloxacin (LEVAQUIN) 500 mg tablet Take 1 tablet (500 mg total) by mouth every 24 hours for 7 days 7 tablet 0    neomycin-polymyxin-hydrocortisone (CORTISPORIN) otic solution Administer 3 drops into the left ear every 6 (six) hours 10 mL 1     No current facility-administered medications for this visit  Review of Systems   Constitutional: Negative for chills and fever  HENT: Positive for ear pain, postnasal drip, sinus pressure and sinus pain  Negative for sore throat  Respiratory: Negative for cough, shortness of breath and wheezing  Cardiovascular: Negative for chest pain  Objective:  Vitals:    11/15/19 1045   BP: 120/70   Pulse: 78   Temp: 98 °F (36 7 °C)   SpO2: 98%      Physical Exam   Constitutional: She appears well-developed and well-nourished  No distress  HENT:   Right Ear: Tympanic membrane and ear canal normal    Left Ear: Tympanic membrane normal  There is swelling  Edema and slight rubor of the skin of the left external ear canal    Cardiovascular: Normal rate  Pulmonary/Chest: Effort normal and breath sounds normal  She has no rales  Patient Instructions     Symptoms and signs suggesting left external otitis  Associated facial pressure suggesting sinusitis  Treatment program will be Levaquin 500 mg daily x7 days plus Cortisporin ear drops  Call if no relief

## 2019-11-15 NOTE — PATIENT INSTRUCTIONS
Symptoms and signs suggesting left external otitis  Associated facial pressure suggesting sinusitis  Treatment program will be Levaquin 500 mg daily x7 days plus Cortisporin ear drops  Call if no relief

## 2019-11-15 NOTE — TELEPHONE ENCOUNTER
PT  SAW   AKERS  TODAY   SENT  HER  RX  TO  WRONG  PHARM    SHE  WANTED IT  TO GO   [facility] 1  AND  EAR  DROPS     ANY  QUESTIONS  CALL  PT         [phone]

## 2019-12-12 ENCOUNTER — HOSPITAL ENCOUNTER (OUTPATIENT)
Dept: MAMMOGRAPHY | Facility: CLINIC | Age: 49
Discharge: HOME | End: 2019-12-12
Payer: COMMERCIAL

## 2019-12-12 DIAGNOSIS — E55.9 VITAMIN D DEFICIENCY: ICD-10-CM

## 2019-12-12 PROCEDURE — 77080 DXA BONE DENSITY AXIAL: CPT

## 2020-01-07 ENCOUNTER — OFFICE VISIT (OUTPATIENT)
Dept: INTERNAL MEDICINE CLINIC | Facility: CLINIC | Age: 50
End: 2020-01-07
Payer: COMMERCIAL

## 2020-01-07 VITALS
HEIGHT: 63 IN | BODY MASS INDEX: 35.61 KG/M2 | WEIGHT: 201 LBS | HEART RATE: 89 BPM | SYSTOLIC BLOOD PRESSURE: 120 MMHG | DIASTOLIC BLOOD PRESSURE: 70 MMHG | OXYGEN SATURATION: 98 %

## 2020-01-07 DIAGNOSIS — Z11.4 ENCOUNTER FOR SCREENING FOR HIV: ICD-10-CM

## 2020-01-07 DIAGNOSIS — M25.542 ARTHRALGIA OF BOTH HANDS: Primary | ICD-10-CM

## 2020-01-07 DIAGNOSIS — M25.541 ARTHRALGIA OF BOTH HANDS: Primary | ICD-10-CM

## 2020-01-07 PROCEDURE — 99213 OFFICE O/P EST LOW 20 MIN: CPT | Performed by: INTERNAL MEDICINE

## 2020-01-07 PROCEDURE — 3008F BODY MASS INDEX DOCD: CPT | Performed by: INTERNAL MEDICINE

## 2020-01-07 PROCEDURE — 1036F TOBACCO NON-USER: CPT | Performed by: INTERNAL MEDICINE

## 2020-01-07 RX ORDER — HYDROXYCHLOROQUINE SULFATE 200 MG/1
200 TABLET, FILM COATED ORAL 2 TIMES DAILY WITH MEALS
Qty: 120 TABLET | Refills: 1 | Status: SHIPPED | OUTPATIENT
Start: 2020-01-07 | End: 2020-03-03 | Stop reason: SDUPTHER

## 2020-01-07 NOTE — PROGRESS NOTES
BMI Counseling: Body mass index is 35 61 kg/m²  The BMI is above normal  Patient referred to weight management due to patient being overweight

## 2020-01-07 NOTE — PROGRESS NOTES
Assessment/Plan:       Diagnoses and all orders for this visit:    Arthralgia of both hands  -     Basic metabolic panel; Future  -     Sedimentation rate, automated; Future  -     Lyme Antibody Profile with reflex to WB; Future  -     hydroxychloroquine (PLAQUENIL) 200 mg tablet; Take 1 tablet (200 mg total) by mouth 2 (two) times a day with meals                Subjective:      Patient ID: Maximino Badillo is a 53 y o  female  Follow-up visit of a 53year-old   her major subjective complaint is pain in both hands  The pain involves the small joints  Is becoming more severe and interfering with her activities  She uses Tylenol; occasionally she will take Advil but the relief is not great  The appearance is that of osteoarthritis and some inflammatory markers were look that and were negative  Formerly she had persistent asthma but now she has not been symptomatic for over a year  No recent needed all for inhaler other vascular maintenance    The patient had gastric sleeve surgery 5 years ago  Her maximum weight was 210  A minimum weight was 137, in the spring of 2017  However she is gaining the weight back and is up to 200    Has reflux symptoms  EGD done in April 2018 documented esophagitis, this is in contrast to an EGD done in 2014 which was normal   Continues to follow with gastroenterology  This is a recent to avoid NSAIDs      complaint of some right shoulder pain associated with limitation of range of motion to about 90° abduction  Chronic low back pain  Uses a cane for ambulatory support  Applying for disability  The following portions of the patient's history were reviewed and updated as appropriate:   She has a past medical history of Asthma, Back pain, and Obesity  ,  does not have any pertinent problems on file  ,   has a past surgical history that includes Cholecystectomy; GASTRECTOMY SLEEVE LAPAROSCOPIC; Knee arthroscopy w/ meniscal repair (Left);  Dorothe All / transposition median nerve at carpal tunnel bilateral; and pr esophagogastroduodenoscopy transoral diagnostic (N/A, 4/30/2018)  ,  family history includes Diabetes in her father and mother; Hypertension in her father; Kidney disease in her mother; Nephrolithiasis in her family; Other in her father  ,   reports that she has never smoked  She has never used smokeless tobacco  She reports that she drinks alcohol  She reports that she has current or past drug history  ,  is allergic to penicillins; pollen extract; and shellfish-derived products     Current Outpatient Medications   Medication Sig Dispense Refill    albuterol (VENTOLIN HFA) 90 mcg/act inhaler Inhale 2 puffs every 6 (six) hours as needed for wheezing 1 Inhaler 3    aspirin-acetaminophen-caffeine (EXCEDRIN MIGRAINE) 250-250-65 MG per tablet Take 1 tablet by mouth every 6 (six) hours as needed for headaches      cetirizine (ZyrTEC) 10 MG chewable tablet Chew 1 tablet (10 mg total) daily 30 tablet 5    cholecalciferol (VITAMIN D3) 1,000 units tablet Take 1 tablet (1,000 Units total) by mouth daily 90 tablet 2    fluticasone (FLONASE) 50 mcg/act nasal spray 2 sprays into each nostril daily 1 Bottle 5    KETOTIFEN FUMARATE OP Apply 1 drop to eye 2 (two) times a day      neomycin-polymyxin-hydrocortisone (CORTISPORIN) otic solution Administer 3 drops into the left ear every 6 (six) hours 10 mL 1    NON FORMULARY       norethindrone-ethinyl estradiol-iron (MICROGESTIN FE1 5/30) 1 5-30 MG-MCG tablet Take 1 tablet by mouth daily      hydroxychloroquine (PLAQUENIL) 200 mg tablet Take 1 tablet (200 mg total) by mouth 2 (two) times a day with meals 120 tablet 1     No current facility-administered medications for this visit  Review of Systems   Constitutional: Positive for fatigue and unexpected weight change (weight gain after gastric sleeve 2015)  HENT: Positive for postnasal drip   Negative for congestion, rhinorrhea, sinus pressure, sinus pain and sore throat  Eyes: Negative  Respiratory: Negative  Cardiovascular: Negative  Gastrointestinal: Negative  Endocrine: Positive for polyuria  Genitourinary: Positive for menstrual problem  Musculoskeletal: Positive for back pain and joint swelling  Skin: Negative  Neurological: Positive for headaches (migraine headaches)  Psychiatric/Behavioral: Negative  Objective:  Vitals:    01/07/20 1334   BP: 120/70   Pulse: 89   SpO2: 98%      Physical Exam   Constitutional: She is oriented to person, place, and time  Alert female patient who appears to be the stated age   Cardiovascular: Normal rate  Pulmonary/Chest: Effort normal    Musculoskeletal: She exhibits tenderness and deformity  Very modest osteoarthrosis deformities of the small joints of both fingers; right is a bit worse than left  The most prominent finding is that of a lateral [place] node of the right 3rd distal interphalangeal joint   Neurological: She is alert and oriented to person, place, and time  Psychiatric: She has a normal mood and affect  Patient Instructions   A patient with worsening arthritis of the small joints of fingers  It looks like osteoarthritis  Both the patient and I reluctant to however use high-dose NSAIDs for a long period of time   I think it is worth a therapeutic trial of Plaquenil for at least 6 weeks  Patient has been informed that she will need to see an eye doctor if she stays on this drug but we will start out with 6 weeks  Vitamin-D deficiency is noted; the patient should use vitamin-D over-the-counter 1000 units twice a day or 2 daily; does not matter which pattern      Revisit in about 6 weeks

## 2020-01-07 NOTE — PATIENT INSTRUCTIONS
A patient with worsening arthritis of the small joints of fingers  It looks like osteoarthritis  Both the patient and I reluctant to however use high-dose NSAIDs for a long period of time   I think it is worth a therapeutic trial of Plaquenil for at least 6 weeks  Patient has been informed that she will need to see an eye doctor if she stays on this drug but we will start out with 6 weeks  Vitamin-D deficiency is noted; the patient should use vitamin-D over-the-counter 1000 units twice a day or 2 daily; does not matter which pattern      Revisit in about 6 weeks

## 2020-01-13 ENCOUNTER — APPOINTMENT (OUTPATIENT)
Dept: LAB | Facility: CLINIC | Age: 50
End: 2020-01-13
Payer: COMMERCIAL

## 2020-01-13 DIAGNOSIS — M25.541 ARTHRALGIA OF BOTH HANDS: ICD-10-CM

## 2020-01-13 DIAGNOSIS — M25.542 ARTHRALGIA OF BOTH HANDS: ICD-10-CM

## 2020-01-13 DIAGNOSIS — Z11.4 ENCOUNTER FOR SCREENING FOR HIV: ICD-10-CM

## 2020-01-13 LAB
ANION GAP SERPL CALCULATED.3IONS-SCNC: 1 MMOL/L (ref 4–13)
BUN SERPL-MCNC: 15 MG/DL (ref 5–25)
CALCIUM SERPL-MCNC: 8.7 MG/DL (ref 8.3–10.1)
CHLORIDE SERPL-SCNC: 106 MMOL/L (ref 100–108)
CO2 SERPL-SCNC: 28 MMOL/L (ref 21–32)
CREAT SERPL-MCNC: 0.76 MG/DL (ref 0.6–1.3)
ERYTHROCYTE [SEDIMENTATION RATE] IN BLOOD: 11 MM/HOUR (ref 0–20)
GFR SERPL CREATININE-BSD FRML MDRD: 92 ML/MIN/1.73SQ M
GLUCOSE P FAST SERPL-MCNC: 87 MG/DL (ref 65–99)
POTASSIUM SERPL-SCNC: 3.7 MMOL/L (ref 3.5–5.3)
SODIUM SERPL-SCNC: 135 MMOL/L (ref 136–145)

## 2020-01-13 PROCEDURE — 80048 BASIC METABOLIC PNL TOTAL CA: CPT

## 2020-01-13 PROCEDURE — 85652 RBC SED RATE AUTOMATED: CPT

## 2020-01-13 PROCEDURE — 87389 HIV-1 AG W/HIV-1&-2 AB AG IA: CPT

## 2020-01-13 PROCEDURE — 36415 COLL VENOUS BLD VENIPUNCTURE: CPT

## 2020-01-13 PROCEDURE — 86618 LYME DISEASE ANTIBODY: CPT

## 2020-01-14 LAB
B BURGDOR IGG+IGM SER-ACNC: <0.91 ISR (ref 0–0.9)
HIV 1+2 AB+HIV1 P24 AG SERPL QL IA: NORMAL

## 2020-02-14 ENCOUNTER — TELEPHONE (OUTPATIENT)
Dept: INTERNAL MEDICINE CLINIC | Facility: CLINIC | Age: 50
End: 2020-02-14

## 2020-02-14 NOTE — TELEPHONE ENCOUNTER
800 milligrams 3 times a day    Take with food and also take over-the-counter Pepcid 20 milligrams daily to reduce the risk of stomach ulcer

## 2020-02-14 NOTE — TELEPHONE ENCOUNTER
She has a very bad tooth ache and needs to know how many mg's of  ibuprofen she can take in a day    She can't see dentist tilll Monday or Tuesday    She asked the dentist and was told to call her

## 2020-03-03 DIAGNOSIS — M25.542 ARTHRALGIA OF BOTH HANDS: ICD-10-CM

## 2020-03-03 DIAGNOSIS — M25.541 ARTHRALGIA OF BOTH HANDS: ICD-10-CM

## 2020-03-03 NOTE — TELEPHONE ENCOUNTER
Canceled her appt for 3/4 with Dr Sandeep Dia  Her pharmacy never received the rx for hydroxychloroquine 200 mg  Can we resend this to Prisc[facility] Cristina She will reschedule her appt after she has been on the med for 6 weeks

## 2020-03-04 RX ORDER — HYDROXYCHLOROQUINE SULFATE 200 MG/1
200 TABLET, FILM COATED ORAL 2 TIMES DAILY WITH MEALS
Qty: 120 TABLET | Refills: 1 | Status: SHIPPED | OUTPATIENT
Start: 2020-03-04 | End: 2020-06-05 | Stop reason: CLARIF

## 2020-06-04 ENCOUNTER — TELEPHONE (OUTPATIENT)
Dept: INTERNAL MEDICINE CLINIC | Facility: CLINIC | Age: 50
End: 2020-06-04

## 2020-06-05 ENCOUNTER — OFFICE VISIT (OUTPATIENT)
Dept: INTERNAL MEDICINE CLINIC | Facility: CLINIC | Age: 50
End: 2020-06-05
Payer: COMMERCIAL

## 2020-06-05 ENCOUNTER — APPOINTMENT (OUTPATIENT)
Dept: LAB | Facility: CLINIC | Age: 50
End: 2020-06-05
Payer: COMMERCIAL

## 2020-06-05 VITALS
TEMPERATURE: 98.8 F | OXYGEN SATURATION: 98 % | DIASTOLIC BLOOD PRESSURE: 72 MMHG | SYSTOLIC BLOOD PRESSURE: 112 MMHG | BODY MASS INDEX: 34.83 KG/M2 | HEART RATE: 65 BPM | WEIGHT: 204 LBS | HEIGHT: 64 IN

## 2020-06-05 DIAGNOSIS — E66.09 CLASS 1 OBESITY DUE TO EXCESS CALORIES WITH SERIOUS COMORBIDITY AND BODY MASS INDEX (BMI) OF 31.0 TO 31.9 IN ADULT: ICD-10-CM

## 2020-06-05 DIAGNOSIS — R00.2 PALPITATION: ICD-10-CM

## 2020-06-05 DIAGNOSIS — M25.512 BILATERAL SHOULDER PAIN, UNSPECIFIED CHRONICITY: ICD-10-CM

## 2020-06-05 DIAGNOSIS — M25.511 BILATERAL SHOULDER PAIN, UNSPECIFIED CHRONICITY: ICD-10-CM

## 2020-06-05 DIAGNOSIS — R10.11 RIGHT UPPER QUADRANT ABDOMINAL PAIN: ICD-10-CM

## 2020-06-05 DIAGNOSIS — K21.00 GASTROESOPHAGEAL REFLUX DISEASE WITH ESOPHAGITIS: ICD-10-CM

## 2020-06-05 DIAGNOSIS — R00.2 PALPITATION: Primary | ICD-10-CM

## 2020-06-05 LAB
CRP SERPL QL: <3 MG/L
TSH SERPL DL<=0.05 MIU/L-ACNC: 4.87 UIU/ML (ref 0.36–3.74)

## 2020-06-05 PROCEDURE — 84443 ASSAY THYROID STIM HORMONE: CPT

## 2020-06-05 PROCEDURE — 1036F TOBACCO NON-USER: CPT | Performed by: PHYSICIAN ASSISTANT

## 2020-06-05 PROCEDURE — 99214 OFFICE O/P EST MOD 30 MIN: CPT | Performed by: PHYSICIAN ASSISTANT

## 2020-06-05 PROCEDURE — 3008F BODY MASS INDEX DOCD: CPT | Performed by: PHYSICIAN ASSISTANT

## 2020-06-05 PROCEDURE — 36415 COLL VENOUS BLD VENIPUNCTURE: CPT

## 2020-06-05 PROCEDURE — 86140 C-REACTIVE PROTEIN: CPT

## 2020-06-08 ENCOUNTER — TELEPHONE (OUTPATIENT)
Dept: INTERNAL MEDICINE CLINIC | Facility: CLINIC | Age: 50
End: 2020-06-08

## 2020-06-08 ENCOUNTER — APPOINTMENT (OUTPATIENT)
Dept: LAB | Facility: CLINIC | Age: 50
End: 2020-06-08
Payer: COMMERCIAL

## 2020-06-08 DIAGNOSIS — M25.512 BILATERAL SHOULDER PAIN, UNSPECIFIED CHRONICITY: ICD-10-CM

## 2020-06-08 DIAGNOSIS — M25.511 BILATERAL SHOULDER PAIN, UNSPECIFIED CHRONICITY: ICD-10-CM

## 2020-06-08 DIAGNOSIS — R00.2 PALPITATION: ICD-10-CM

## 2020-06-08 PROCEDURE — 83497 ASSAY OF 5-HIAA: CPT

## 2020-06-12 ENCOUNTER — HOSPITAL ENCOUNTER (OUTPATIENT)
Dept: NON INVASIVE DIAGNOSTICS | Facility: CLINIC | Age: 50
Discharge: HOME | End: 2020-06-12
Payer: COMMERCIAL

## 2020-06-12 DIAGNOSIS — R00.2 PALPITATION: ICD-10-CM

## 2020-06-12 DIAGNOSIS — M25.511 BILATERAL SHOULDER PAIN, UNSPECIFIED CHRONICITY: ICD-10-CM

## 2020-06-12 DIAGNOSIS — M25.512 BILATERAL SHOULDER PAIN, UNSPECIFIED CHRONICITY: ICD-10-CM

## 2020-06-12 LAB
5OH-INDOLEACETATE 24H UR-MCNC: 1.5 MG/L
5OH-INDOLEACETATE 24H UR-MRATE: 3.3 MG/24 HR (ref 0–14.9)

## 2020-06-12 PROCEDURE — 93225 XTRNL ECG REC<48 HRS REC: CPT

## 2020-06-12 PROCEDURE — 93226 XTRNL ECG REC<48 HR SCAN A/R: CPT

## 2020-06-16 PROCEDURE — 93227 XTRNL ECG REC<48 HR R&I: CPT | Performed by: INTERNAL MEDICINE

## 2020-07-21 ENCOUNTER — APPOINTMENT (OUTPATIENT)
Dept: LAB | Facility: CLINIC | Age: 50
End: 2020-07-21
Payer: COMMERCIAL

## 2020-07-21 ENCOUNTER — TRANSCRIBE ORDERS (OUTPATIENT)
Dept: LAB | Facility: CLINIC | Age: 50
End: 2020-07-21

## 2020-07-21 DIAGNOSIS — M15.0 PRIMARY GENERALIZED (OSTEO)ARTHRITIS: Primary | ICD-10-CM

## 2020-07-21 DIAGNOSIS — M15.0 PRIMARY GENERALIZED (OSTEO)ARTHRITIS: ICD-10-CM

## 2020-07-21 LAB
C3 SERPL-MCNC: 120 MG/DL (ref 90–180)
C4 SERPL-MCNC: 26 MG/DL (ref 10–40)

## 2020-07-21 PROCEDURE — 86225 DNA ANTIBODY NATIVE: CPT

## 2020-07-21 PROCEDURE — 86235 NUCLEAR ANTIGEN ANTIBODY: CPT

## 2020-07-21 PROCEDURE — 36415 COLL VENOUS BLD VENIPUNCTURE: CPT | Performed by: INTERNAL MEDICINE

## 2020-07-21 PROCEDURE — 81374 HLA I TYPING 1 ANTIGEN LR: CPT | Performed by: INTERNAL MEDICINE

## 2020-07-21 PROCEDURE — 86200 CCP ANTIBODY: CPT

## 2020-07-21 PROCEDURE — 86038 ANTINUCLEAR ANTIBODIES: CPT

## 2020-07-21 PROCEDURE — 86430 RHEUMATOID FACTOR TEST QUAL: CPT

## 2020-07-21 PROCEDURE — 86160 COMPLEMENT ANTIGEN: CPT

## 2020-07-22 LAB
CCP IGA+IGG SERPL IA-ACNC: 4 UNITS (ref 0–19)
DSDNA AB SER-ACNC: 1 IU/ML (ref 0–9)
ENA RNP AB SER-ACNC: <0.2 AI (ref 0–0.9)
ENA SM AB SER-ACNC: <0.2 AI (ref 0–0.9)
ENA SS-A AB SER-ACNC: <0.2 AI (ref 0–0.9)
ENA SS-B AB SER-ACNC: <0.2 AI (ref 0–0.9)
RHEUMATOID FACT SER QL LA: NEGATIVE
RYE IGE QN: NEGATIVE

## 2020-07-27 LAB — HLA-B27 QL NAA+PROBE: NEGATIVE

## 2020-08-19 ENCOUNTER — OFFICE VISIT (OUTPATIENT)
Dept: INTERNAL MEDICINE CLINIC | Facility: CLINIC | Age: 50
End: 2020-08-19
Payer: COMMERCIAL

## 2020-08-19 ENCOUNTER — APPOINTMENT (OUTPATIENT)
Dept: LAB | Facility: CLINIC | Age: 50
End: 2020-08-19
Payer: COMMERCIAL

## 2020-08-19 VITALS
WEIGHT: 197.6 LBS | HEIGHT: 64 IN | SYSTOLIC BLOOD PRESSURE: 104 MMHG | HEART RATE: 74 BPM | BODY MASS INDEX: 33.73 KG/M2 | TEMPERATURE: 97.5 F | DIASTOLIC BLOOD PRESSURE: 72 MMHG | RESPIRATION RATE: 14 BRPM

## 2020-08-19 DIAGNOSIS — L03.316 CELLULITIS OF UMBILICUS: Primary | ICD-10-CM

## 2020-08-19 DIAGNOSIS — K21.00 GASTROESOPHAGEAL REFLUX DISEASE WITH ESOPHAGITIS: ICD-10-CM

## 2020-08-19 DIAGNOSIS — L03.316 CELLULITIS OF UMBILICUS: ICD-10-CM

## 2020-08-19 DIAGNOSIS — E66.09 CLASS 1 OBESITY DUE TO EXCESS CALORIES WITH SERIOUS COMORBIDITY AND BODY MASS INDEX (BMI) OF 31.0 TO 31.9 IN ADULT: ICD-10-CM

## 2020-08-19 PROCEDURE — 87070 CULTURE OTHR SPECIMN AEROBIC: CPT

## 2020-08-19 PROCEDURE — 99214 OFFICE O/P EST MOD 30 MIN: CPT | Performed by: PHYSICIAN ASSISTANT

## 2020-08-19 PROCEDURE — 1036F TOBACCO NON-USER: CPT | Performed by: PHYSICIAN ASSISTANT

## 2020-08-19 PROCEDURE — 87205 SMEAR GRAM STAIN: CPT

## 2020-08-19 PROCEDURE — 3008F BODY MASS INDEX DOCD: CPT | Performed by: PHYSICIAN ASSISTANT

## 2020-08-19 RX ORDER — DOXYCYCLINE HYCLATE 100 MG/1
100 CAPSULE ORAL EVERY 12 HOURS SCHEDULED
Qty: 20 CAPSULE | Refills: 0 | Status: SHIPPED | OUTPATIENT
Start: 2020-08-19 | End: 2020-08-29

## 2020-08-19 RX ORDER — CLOTRIMAZOLE AND BETAMETHASONE DIPROPIONATE 10; .64 MG/G; MG/G
CREAM TOPICAL 2 TIMES DAILY
Qty: 30 G | Refills: 0 | Status: SHIPPED | OUTPATIENT
Start: 2020-08-19 | End: 2021-02-25

## 2020-08-19 NOTE — PROGRESS NOTES
Assessment/Plan: treating for cellulitis and fungal infection of the belly button  A culture was sent physical exam unremarkable otherwise       Diagnoses and all orders for this visit:    Cellulitis of umbilicus  -     doxycycline hyclate (VIBRAMYCIN) 100 mg capsule; Take 1 capsule (100 mg total) by mouth every 12 (twelve) hours for 10 days  -     clotrimazole-betamethasone (LOTRISONE) 1-0 05 % cream; Apply topically 2 (two) times a day  -     Wound culture and Gram stain; Future    Class 1 obesity due to excess calories with serious comorbidity and body mass index (BMI) of 31 0 to 31 9 in adult    Gastroesophageal reflux disease with esophagitis        No problem-specific Assessment & Plan notes found for this encounter  Subjective:      Patient ID: Steffi Navarro is a 53 y o  female  She has noted irritation redness and some sharp burning pain deep in her umbilicus for the past 4 days  Occasionally itching no drainage  It constantly feels irritated inside her navel  No abdominal pain no abdominal masses  She does a lot of exercising and sweating  She feels very well otherwise  History of mild asthma well controlled with meds  The following portions of the patient's history were reviewed and updated as appropriate:   She has a past medical history of Asthma, Back pain, and Obesity  ,  does not have any pertinent problems on file  ,   has a past surgical history that includes Cholecystectomy; GASTRECTOMY SLEEVE LAPAROSCOPIC; Knee arthroscopy w/ meniscal repair (Left); Neuroplasty / transposition median nerve at carpal tunnel bilateral; and pr esophagogastroduodenoscopy transoral diagnostic (N/A, 4/30/2018)  ,  family history includes Diabetes in her father and mother; Hypertension in her father; Kidney disease in her mother; Nephrolithiasis in her family; Other in her father  ,   reports that she has never smoked  She has never used smokeless tobacco  She reports current alcohol use   She reports previous drug use ,  is allergic to penicillins; pollen extract; and shellfish-derived products     Current Outpatient Medications   Medication Sig Dispense Refill    albuterol (VENTOLIN HFA) 90 mcg/act inhaler Inhale 2 puffs every 6 (six) hours as needed for wheezing 1 Inhaler 3    aspirin-acetaminophen-caffeine (EXCEDRIN MIGRAINE) 250-250-65 MG per tablet Take 1 tablet by mouth every 6 (six) hours as needed for headaches      cetirizine (ZyrTEC) 10 MG chewable tablet Chew 1 tablet (10 mg total) daily 30 tablet 5    cholecalciferol (VITAMIN D3) 1,000 units tablet Take 1 tablet (1,000 Units total) by mouth daily 90 tablet 2    fluticasone (FLONASE) 50 mcg/act nasal spray 2 sprays into each nostril daily 1 Bottle 5    KETOTIFEN FUMARATE OP Apply 1 drop to eye 2 (two) times a day      NON FORMULARY       norethindrone-ethinyl estradiol-iron (MICROGESTIN FE1 5/30) 1 5-30 MG-MCG tablet Take 1 tablet by mouth daily      clotrimazole-betamethasone (LOTRISONE) 1-0 05 % cream Apply topically 2 (two) times a day 30 g 0    doxycycline hyclate (VIBRAMYCIN) 100 mg capsule Take 1 capsule (100 mg total) by mouth every 12 (twelve) hours for 10 days 20 capsule 0     No current facility-administered medications for this visit  Review of Systems   Constitutional: Negative for activity change, appetite change, chills, diaphoresis, fatigue, fever and unexpected weight change  HENT: Negative for congestion, dental problem, drooling, ear discharge, ear pain, facial swelling, hearing loss, nosebleeds, postnasal drip, rhinorrhea, sinus pressure, sinus pain, sneezing, sore throat, tinnitus, trouble swallowing and voice change  Eyes: Negative for photophobia, pain, discharge, redness, itching and visual disturbance  Respiratory: Negative for apnea, cough, choking, chest tightness, shortness of breath, wheezing and stridor  Cardiovascular: Negative for chest pain, palpitations and leg swelling  Gastrointestinal: Negative for abdominal distention, abdominal pain, anal bleeding, blood in stool, constipation, diarrhea, nausea, rectal pain and vomiting  Endocrine: Negative for cold intolerance, heat intolerance, polydipsia, polyphagia and polyuria  Genitourinary: Negative for decreased urine volume, difficulty urinating, dysuria, enuresis, flank pain, frequency, genital sores, hematuria and urgency  Musculoskeletal: Negative for arthralgias, back pain, gait problem, joint swelling, myalgias, neck pain and neck stiffness  Skin: Positive for rash  Negative for color change, pallor and wound  Neurological: Negative for dizziness, tremors, seizures, syncope, facial asymmetry, speech difficulty, weakness, light-headedness, numbness and headaches  Hematological: Negative for adenopathy  Does not bruise/bleed easily  Psychiatric/Behavioral: Negative for agitation, behavioral problems, confusion, decreased concentration, dysphoric mood, hallucinations, self-injury, sleep disturbance and suicidal ideas  The patient is not nervous/anxious and is not hyperactive  Objective:  Vitals:    08/19/20 0915   BP: 104/72   BP Location: Left arm   Patient Position: Sitting   Cuff Size: Standard   Pulse: 74   Resp: 14   Temp: 97 5 °F (36 4 °C)   TempSrc: Temporal   Weight: 89 6 kg (197 lb 9 6 oz)   Height: 5' 4" (1 626 m)     Body mass index is 33 92 kg/m²  Physical Exam  Vitals signs reviewed  Constitutional:       Appearance: She is well-developed  She is obese  HENT:      Head: Normocephalic  Right Ear: Tympanic membrane and external ear normal       Left Ear: Tympanic membrane and external ear normal       Nose: Nose normal       Mouth/Throat:      Mouth: Mucous membranes are moist       Pharynx: Oropharynx is clear  Eyes:      Conjunctiva/sclera: Conjunctivae normal       Pupils: Pupils are equal, round, and reactive to light     Neck:      Musculoskeletal: Normal range of motion and neck supple  Thyroid: No thyromegaly  Cardiovascular:      Rate and Rhythm: Normal rate and regular rhythm  Pulses: Normal pulses  Heart sounds: Normal heart sounds  Pulmonary:      Effort: Pulmonary effort is normal  No respiratory distress  Breath sounds: Normal breath sounds  Abdominal:      General: Abdomen is flat  Bowel sounds are normal       Palpations: Abdomen is soft  Musculoskeletal: Normal range of motion  Skin:     General: Skin is warm and dry  Comments:   Redness mild cellulitis and some drainage deep within the belly button seen with the otoscope   Neurological:      General: No focal deficit present  Mental Status: She is alert and oriented to person, place, and time  Deep Tendon Reflexes: Reflexes are normal and symmetric  Psychiatric:         Mood and Affect: Mood normal          Thought Content:  Thought content normal          Judgment: Judgment normal  WDL

## 2020-08-21 ENCOUNTER — TELEPHONE (OUTPATIENT)
Dept: INTERNAL MEDICINE CLINIC | Facility: CLINIC | Age: 50
End: 2020-08-21

## 2020-08-21 LAB
BACTERIA WND AEROBE CULT: NORMAL
GRAM STN SPEC: NORMAL

## 2020-12-09 DIAGNOSIS — J30.9 ALLERGIC RHINITIS, UNSPECIFIED SEASONALITY, UNSPECIFIED TRIGGER: ICD-10-CM

## 2020-12-09 RX ORDER — FLUTICASONE PROPIONATE 50 MCG
2 SPRAY, SUSPENSION (ML) NASAL DAILY
Qty: 1 BOTTLE | Refills: 1 | Status: SHIPPED | OUTPATIENT
Start: 2020-12-09 | End: 2021-10-28

## 2020-12-09 RX ORDER — CETIRIZINE HYDROCHLORIDE 10 MG/1
10 TABLET, CHEWABLE ORAL DAILY
Qty: 30 TABLET | Refills: 1 | Status: CANCELLED | OUTPATIENT
Start: 2020-12-09

## 2020-12-09 RX ORDER — CETIRIZINE HYDROCHLORIDE 10 MG/1
10 TABLET, CHEWABLE ORAL DAILY
Qty: 90 TABLET | Refills: 3 | Status: SHIPPED | OUTPATIENT
Start: 2020-12-09 | End: 2020-12-21 | Stop reason: SDUPTHER

## 2020-12-14 DIAGNOSIS — M19.90 ARTHRITIS: Primary | ICD-10-CM

## 2020-12-14 RX ORDER — CETIRIZINE HYDROCHLORIDE 10 MG/1
10 TABLET ORAL DAILY
COMMUNITY
End: 2020-12-21 | Stop reason: SDUPTHER

## 2020-12-15 RX ORDER — CETIRIZINE HYDROCHLORIDE 10 MG/1
10 TABLET ORAL DAILY
Qty: 90 TABLET | Refills: 3 | OUTPATIENT
Start: 2020-12-15

## 2020-12-17 RX ORDER — CETIRIZINE HYDROCHLORIDE 10 MG/1
10 TABLET ORAL DAILY
Qty: 30 TABLET | Refills: 0 | OUTPATIENT
Start: 2020-12-17 | End: 2021-01-16

## 2020-12-21 ENCOUNTER — APPOINTMENT (OUTPATIENT)
Dept: LAB | Facility: CLINIC | Age: 50
End: 2020-12-21
Payer: COMMERCIAL

## 2020-12-21 ENCOUNTER — OFFICE VISIT (OUTPATIENT)
Dept: INTERNAL MEDICINE CLINIC | Facility: CLINIC | Age: 50
End: 2020-12-21
Payer: COMMERCIAL

## 2020-12-21 VITALS
RESPIRATION RATE: 16 BRPM | DIASTOLIC BLOOD PRESSURE: 76 MMHG | SYSTOLIC BLOOD PRESSURE: 112 MMHG | HEIGHT: 64 IN | TEMPERATURE: 97.7 F | WEIGHT: 198.4 LBS | HEART RATE: 94 BPM | BODY MASS INDEX: 33.87 KG/M2

## 2020-12-21 DIAGNOSIS — R79.89 ELEVATED TSH: ICD-10-CM

## 2020-12-21 DIAGNOSIS — J31.0 CHRONIC RHINITIS: ICD-10-CM

## 2020-12-21 DIAGNOSIS — R07.89 OTHER CHEST PAIN: Primary | ICD-10-CM

## 2020-12-21 DIAGNOSIS — R07.89 OTHER CHEST PAIN: ICD-10-CM

## 2020-12-21 LAB
ALBUMIN SERPL BCP-MCNC: 3.7 G/DL (ref 3.5–5)
ALP SERPL-CCNC: 63 U/L (ref 46–116)
ALT SERPL W P-5'-P-CCNC: 22 U/L (ref 12–78)
ANION GAP SERPL CALCULATED.3IONS-SCNC: 8 MMOL/L (ref 4–13)
AST SERPL W P-5'-P-CCNC: 14 U/L (ref 5–45)
BASOPHILS # BLD AUTO: 0.04 THOUSANDS/ΜL (ref 0–0.1)
BASOPHILS NFR BLD AUTO: 1 % (ref 0–1)
BILIRUB SERPL-MCNC: 0.88 MG/DL (ref 0.2–1)
BUN SERPL-MCNC: 12 MG/DL (ref 5–25)
CALCIUM SERPL-MCNC: 8.9 MG/DL (ref 8.3–10.1)
CHLORIDE SERPL-SCNC: 108 MMOL/L (ref 100–108)
CO2 SERPL-SCNC: 25 MMOL/L (ref 21–32)
CREAT SERPL-MCNC: 0.93 MG/DL (ref 0.6–1.3)
EOSINOPHIL # BLD AUTO: 0.11 THOUSAND/ΜL (ref 0–0.61)
EOSINOPHIL NFR BLD AUTO: 2 % (ref 0–6)
ERYTHROCYTE [DISTWIDTH] IN BLOOD BY AUTOMATED COUNT: 12.4 % (ref 11.6–15.1)
GFR SERPL CREATININE-BSD FRML MDRD: 72 ML/MIN/1.73SQ M
GLUCOSE SERPL-MCNC: 153 MG/DL (ref 65–140)
HCT VFR BLD AUTO: 41.3 % (ref 34.8–46.1)
HGB BLD-MCNC: 12.9 G/DL (ref 11.5–15.4)
IMM GRANULOCYTES # BLD AUTO: 0.02 THOUSAND/UL (ref 0–0.2)
IMM GRANULOCYTES NFR BLD AUTO: 0 % (ref 0–2)
LYMPHOCYTES # BLD AUTO: 1.88 THOUSANDS/ΜL (ref 0.6–4.47)
LYMPHOCYTES NFR BLD AUTO: 33 % (ref 14–44)
MCH RBC QN AUTO: 28.2 PG (ref 26.8–34.3)
MCHC RBC AUTO-ENTMCNC: 31.2 G/DL (ref 31.4–37.4)
MCV RBC AUTO: 90 FL (ref 82–98)
MONOCYTES # BLD AUTO: 0.4 THOUSAND/ΜL (ref 0.17–1.22)
MONOCYTES NFR BLD AUTO: 7 % (ref 4–12)
NEUTROPHILS # BLD AUTO: 3.18 THOUSANDS/ΜL (ref 1.85–7.62)
NEUTS SEG NFR BLD AUTO: 57 % (ref 43–75)
NRBC BLD AUTO-RTO: 0 /100 WBCS
PLATELET # BLD AUTO: 247 THOUSANDS/UL (ref 149–390)
PMV BLD AUTO: 11 FL (ref 8.9–12.7)
POTASSIUM SERPL-SCNC: 3.7 MMOL/L (ref 3.5–5.3)
PROT SERPL-MCNC: 7.8 G/DL (ref 6.4–8.2)
RBC # BLD AUTO: 4.58 MILLION/UL (ref 3.81–5.12)
SODIUM SERPL-SCNC: 141 MMOL/L (ref 136–145)
TSH SERPL DL<=0.05 MIU/L-ACNC: 2.74 UIU/ML (ref 0.36–3.74)
WBC # BLD AUTO: 5.63 THOUSAND/UL (ref 4.31–10.16)

## 2020-12-21 PROCEDURE — 3008F BODY MASS INDEX DOCD: CPT | Performed by: NURSE PRACTITIONER

## 2020-12-21 PROCEDURE — 1036F TOBACCO NON-USER: CPT | Performed by: NURSE PRACTITIONER

## 2020-12-21 PROCEDURE — 80053 COMPREHEN METABOLIC PANEL: CPT

## 2020-12-21 PROCEDURE — 3725F SCREEN DEPRESSION PERFORMED: CPT | Performed by: NURSE PRACTITIONER

## 2020-12-21 PROCEDURE — 93000 ELECTROCARDIOGRAM COMPLETE: CPT | Performed by: NURSE PRACTITIONER

## 2020-12-21 PROCEDURE — 85025 COMPLETE CBC W/AUTO DIFF WBC: CPT

## 2020-12-21 PROCEDURE — 99214 OFFICE O/P EST MOD 30 MIN: CPT | Performed by: NURSE PRACTITIONER

## 2020-12-21 PROCEDURE — 84443 ASSAY THYROID STIM HORMONE: CPT

## 2020-12-21 PROCEDURE — 36415 COLL VENOUS BLD VENIPUNCTURE: CPT

## 2020-12-21 RX ORDER — CETIRIZINE HYDROCHLORIDE 10 MG/1
10 TABLET ORAL DAILY
Qty: 30 TABLET | Refills: 3 | Status: SHIPPED | OUTPATIENT
Start: 2020-12-21 | End: 2021-06-17 | Stop reason: SDUPTHER

## 2021-01-29 ENCOUNTER — TELEPHONE (OUTPATIENT)
Dept: INTERNAL MEDICINE CLINIC | Facility: CLINIC | Age: 51
End: 2021-01-29

## 2021-02-05 ENCOUNTER — TELEPHONE (OUTPATIENT)
Dept: INTERNAL MEDICINE CLINIC | Facility: CLINIC | Age: 51
End: 2021-02-05

## 2021-02-05 DIAGNOSIS — Z23 ENCOUNTER FOR IMMUNIZATION: Primary | ICD-10-CM

## 2021-02-05 RX ORDER — ZOSTER VACCINE RECOMBINANT, ADJUVANTED 50 MCG/0.5
0.5 KIT INTRAMUSCULAR ONCE
Qty: 1 EACH | Refills: 1 | Status: SHIPPED | OUTPATIENT
Start: 2021-02-05 | End: 2021-02-05

## 2021-02-05 NOTE — TELEPHONE ENCOUNTER
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

## 2021-02-25 ENCOUNTER — APPOINTMENT (OUTPATIENT)
Dept: LAB | Facility: CLINIC | Age: 51
End: 2021-02-25
Payer: COMMERCIAL

## 2021-02-25 ENCOUNTER — OFFICE VISIT (OUTPATIENT)
Dept: INTERNAL MEDICINE CLINIC | Facility: CLINIC | Age: 51
End: 2021-02-25
Payer: COMMERCIAL

## 2021-02-25 VITALS
SYSTOLIC BLOOD PRESSURE: 98 MMHG | TEMPERATURE: 98.8 F | HEIGHT: 64 IN | BODY MASS INDEX: 34.21 KG/M2 | WEIGHT: 200.4 LBS | HEART RATE: 82 BPM | OXYGEN SATURATION: 96 % | DIASTOLIC BLOOD PRESSURE: 62 MMHG

## 2021-02-25 DIAGNOSIS — J30.9 ALLERGIC RHINITIS, UNSPECIFIED SEASONALITY, UNSPECIFIED TRIGGER: ICD-10-CM

## 2021-02-25 DIAGNOSIS — E66.09 CLASS 1 OBESITY DUE TO EXCESS CALORIES WITH SERIOUS COMORBIDITY AND BODY MASS INDEX (BMI) OF 31.0 TO 31.9 IN ADULT: ICD-10-CM

## 2021-02-25 DIAGNOSIS — E66.09 CLASS 1 OBESITY DUE TO EXCESS CALORIES WITH SERIOUS COMORBIDITY AND BODY MASS INDEX (BMI) OF 31.0 TO 31.9 IN ADULT: Primary | ICD-10-CM

## 2021-02-25 LAB
BILIRUB UR QL STRIP: NEGATIVE
CHOLEST SERPL-MCNC: 163 MG/DL (ref 50–200)
CLARITY UR: CLEAR
COLOR UR: YELLOW
EST. AVERAGE GLUCOSE BLD GHB EST-MCNC: 103 MG/DL
GLUCOSE UR STRIP-MCNC: NEGATIVE MG/DL
HBA1C MFR BLD: 5.2 %
HDLC SERPL-MCNC: 39 MG/DL
HGB UR QL STRIP.AUTO: NEGATIVE
KETONES UR STRIP-MCNC: NEGATIVE MG/DL
LDLC SERPL CALC-MCNC: 98 MG/DL (ref 0–100)
LEUKOCYTE ESTERASE UR QL STRIP: NEGATIVE
NITRITE UR QL STRIP: NEGATIVE
NONHDLC SERPL-MCNC: 124 MG/DL
PH UR STRIP.AUTO: 6 [PH]
PROT UR STRIP-MCNC: NEGATIVE MG/DL
SP GR UR STRIP.AUTO: 1.01 (ref 1–1.03)
TRIGL SERPL-MCNC: 131 MG/DL
UROBILINOGEN UR QL STRIP.AUTO: 0.2 E.U./DL

## 2021-02-25 PROCEDURE — 80061 LIPID PANEL: CPT

## 2021-02-25 PROCEDURE — 3725F SCREEN DEPRESSION PERFORMED: CPT | Performed by: PHYSICIAN ASSISTANT

## 2021-02-25 PROCEDURE — 81003 URINALYSIS AUTO W/O SCOPE: CPT | Performed by: PHYSICIAN ASSISTANT

## 2021-02-25 PROCEDURE — 83036 HEMOGLOBIN GLYCOSYLATED A1C: CPT

## 2021-02-25 PROCEDURE — 99214 OFFICE O/P EST MOD 30 MIN: CPT | Performed by: PHYSICIAN ASSISTANT

## 2021-02-25 PROCEDURE — 36415 COLL VENOUS BLD VENIPUNCTURE: CPT

## 2021-02-25 NOTE — PROGRESS NOTES
Assessment/Plan: feeling well physical exam unremarkable  She is following up with weight management  Reviewed her labs further labs below to fill in the blanks       Diagnoses and all orders for this visit:    Class 1 obesity due to excess calories with serious comorbidity and body mass index (BMI) of 31 0 to 31 9 in adult  -     UA w Reflex to Microscopic w Reflex to Culture  -     Lipid panel; Future  -     Hemoglobin A1C; Future    Allergic rhinitis, unspecified seasonality, unspecified trigger  -     UA w Reflex to Microscopic w Reflex to Culture  -     Lipid panel; Future  -     Hemoglobin A1C; Future        No problem-specific Assessment & Plan notes found for this encounter  BMI Counseling: Body mass index is 34 13 kg/m²  The BMI is above normal  Nutrition recommendations include decreasing portion sizes  Exercise recommendations include exercising 3-5 times per week  Subjective:      Patient ID: Kate Andre is a 49 y o  female  She is here for periodic follow-up she is active ambulatory feeling well  Struggling to lose weight  Some knee pains in November which have improved  Had a inflammatory arthritis workup which was negative  She has had bariatric surgery and subsequent weight gain      The following portions of the patient's history were reviewed and updated as appropriate:   She has a past medical history of Asthma, Back pain, and Obesity  ,  does not have any pertinent problems on file  ,   has a past surgical history that includes Cholecystectomy; GASTRECTOMY SLEEVE LAPAROSCOPIC; Knee arthroscopy w/ meniscal repair (Left); Neuroplasty / transposition median nerve at carpal tunnel bilateral; and pr esophagogastroduodenoscopy transoral diagnostic (N/A, 4/30/2018)  ,  family history includes Diabetes in her father and mother; Hypertension in her father; Kidney disease in her mother; Nephrolithiasis in her family; Other in her father  ,   reports that she has never smoked   She has never used smokeless tobacco  She reports current alcohol use  She reports previous drug use ,  is allergic to other; penicillins; pollen extract; and shellfish-derived products     Current Outpatient Medications   Medication Sig Dispense Refill    albuterol (VENTOLIN HFA) 90 mcg/act inhaler Inhale 2 puffs every 6 (six) hours as needed for wheezing 1 Inhaler 3    aspirin-acetaminophen-caffeine (EXCEDRIN MIGRAINE) 250-250-65 MG per tablet Take 1 tablet by mouth every 6 (six) hours as needed for headaches      cetirizine (ZyrTEC) 10 mg tablet Take 1 tablet (10 mg total) by mouth daily 30 tablet 3    fluticasone (FLONASE) 50 mcg/act nasal spray 2 sprays into each nostril daily 1 Bottle 1    NON FORMULARY       norethindrone-ethinyl estradiol-iron (MICROGESTIN FE1 5/30) 1 5-30 MG-MCG tablet Take 1 tablet by mouth daily      KETOTIFEN FUMARATE OP Apply 1 drop to eye 2 (two) times a day       No current facility-administered medications for this visit  Review of Systems   Constitutional: Negative for chills and fever  HENT: Positive for congestion and rhinorrhea  Negative for ear pain and sore throat  Eyes: Negative for pain and visual disturbance  Respiratory: Negative for cough and shortness of breath  Cardiovascular: Negative for chest pain and palpitations  Gastrointestinal: Negative for abdominal pain and vomiting  Genitourinary: Negative for dysuria and hematuria  Musculoskeletal: Negative for arthralgias and back pain  Skin: Negative for color change and rash  Neurological: Negative for seizures and syncope  All other systems reviewed and are negative  Objective:  Vitals:    02/25/21 1343   BP: 98/62   Pulse: 82   Temp: 98 8 °F (37 1 °C)   TempSrc: Tympanic   SpO2: 96%   Weight: 90 9 kg (200 lb 6 4 oz)   Height: 5' 4 25" (1 632 m)     Body mass index is 34 13 kg/m²  Physical Exam  Vitals signs reviewed  Constitutional:       Appearance: She is well-developed  HENT:      Head: Normocephalic  Right Ear: Tympanic membrane and external ear normal       Left Ear: Tympanic membrane and external ear normal       Nose: Nose normal       Mouth/Throat:      Mouth: Mucous membranes are moist    Eyes:      Extraocular Movements: Extraocular movements intact  Conjunctiva/sclera: Conjunctivae normal       Pupils: Pupils are equal, round, and reactive to light  Neck:      Musculoskeletal: Normal range of motion and neck supple  Thyroid: No thyromegaly  Cardiovascular:      Rate and Rhythm: Normal rate and regular rhythm  Pulses: Normal pulses  Heart sounds: Normal heart sounds  No murmur  Pulmonary:      Effort: Pulmonary effort is normal  No respiratory distress  Breath sounds: Normal breath sounds  No wheezing or rales  Abdominal:      General: Abdomen is flat  Bowel sounds are normal       Palpations: Abdomen is soft  Musculoskeletal: Normal range of motion  General: No swelling  Skin:     General: Skin is warm and dry  Neurological:      General: No focal deficit present  Mental Status: She is alert and oriented to person, place, and time  Cranial Nerves: No cranial nerve deficit  Motor: No weakness  Gait: Gait normal       Deep Tendon Reflexes: Reflexes are normal and symmetric  Psychiatric:         Mood and Affect: Mood normal          Thought Content:  Thought content normal          Judgment: Judgment normal

## 2021-03-04 ENCOUNTER — TELEPHONE (OUTPATIENT)
Dept: BARIATRICS | Facility: CLINIC | Age: 51
End: 2021-03-04

## 2021-03-04 DIAGNOSIS — Z98.84 STATUS POST BARIATRIC SURGERY: Primary | ICD-10-CM

## 2021-03-04 NOTE — TELEPHONE ENCOUNTER
SPOKE WITH PT ,GAVE HER CENTRAL SCHEDULING PHONE # TO CALL AND SCHEDULE HER UGI  TOLD HER TO CALL US AFTER SHE HAS SCHEDULED HER UGI AND WE WILL SCHEDULE HER WITH DR MERA

## 2021-03-13 ENCOUNTER — NURSE TRIAGE (OUTPATIENT)
Dept: OTHER | Facility: OTHER | Age: 51
End: 2021-03-13

## 2021-03-13 DIAGNOSIS — Z20.828 SARS-ASSOCIATED CORONAVIRUS EXPOSURE: Primary | ICD-10-CM

## 2021-03-13 NOTE — TELEPHONE ENCOUNTER
Regardin/2 family members Covid test direct exposure   ----- Message from Ligia Landaverde sent at 3/13/2021  6:17 PM EST -----  1/2 family members " I have had direct exposure to a covid positive case and I would like to be tested as soon as possible  As of right now I've had what i'd describe as allergy symptoms just a runny nose and sneezing but I would feel better to be tested anyway"

## 2021-03-15 DIAGNOSIS — Z20.828 SARS-ASSOCIATED CORONAVIRUS EXPOSURE: ICD-10-CM

## 2021-03-15 PROCEDURE — U0003 INFECTIOUS AGENT DETECTION BY NUCLEIC ACID (DNA OR RNA); SEVERE ACUTE RESPIRATORY SYNDROME CORONAVIRUS 2 (SARS-COV-2) (CORONAVIRUS DISEASE [COVID-19]), AMPLIFIED PROBE TECHNIQUE, MAKING USE OF HIGH THROUGHPUT TECHNOLOGIES AS DESCRIBED BY CMS-2020-01-R: HCPCS | Performed by: INTERNAL MEDICINE

## 2021-03-15 PROCEDURE — U0005 INFEC AGEN DETEC AMPLI PROBE: HCPCS | Performed by: INTERNAL MEDICINE

## 2021-03-16 LAB — SARS-COV-2 RNA RESP QL NAA+PROBE: NEGATIVE

## 2021-03-17 ENCOUNTER — TELEMEDICINE (OUTPATIENT)
Dept: INTERNAL MEDICINE CLINIC | Facility: CLINIC | Age: 51
End: 2021-03-17
Payer: COMMERCIAL

## 2021-03-17 DIAGNOSIS — E66.09 CLASS 1 OBESITY DUE TO EXCESS CALORIES WITH SERIOUS COMORBIDITY AND BODY MASS INDEX (BMI) OF 31.0 TO 31.9 IN ADULT: Primary | ICD-10-CM

## 2021-03-17 DIAGNOSIS — Z20.822 EXPOSURE TO COVID-19 VIRUS: ICD-10-CM

## 2021-03-17 PROCEDURE — 99213 OFFICE O/P EST LOW 20 MIN: CPT | Performed by: PHYSICIAN ASSISTANT

## 2021-03-17 PROCEDURE — 1036F TOBACCO NON-USER: CPT | Performed by: PHYSICIAN ASSISTANT

## 2021-03-17 NOTE — PROGRESS NOTES
COVID-19 Virtual Visit     Assessment/Plan:    Problem List Items Addressed This Visit        Other    Obesity - Primary      Other Visit Diagnoses     Exposure to COVID-19 virus        Relevant Orders    Novel Coronavirus (Covid-19),PCR [facility] - Collected in Office         Disposition:     I recommended self-quarantine for 14 days and to call back for worsening symptoms or development of shortness of breath  I recommended the patient to come to our office to perform PCR testing for COVID-19  I have spent 15 minutes directly with the patient  Greater than 50% of this time was spent in counseling/coordination of care regarding: impressions  Encounter provider Jessie Leventhal, PA-C    Provider located at [address] [place] [address]    Recent Visits  No visits were found meeting these conditions  Showing recent visits within past 7 days and meeting all other requirements     Today's Visits  Date Type Provider Dept   03/17/21 Telemedicine Jessie Leventhal, [address] [facility]  today's visits and meeting all other requirements     Future Appointments  No visits were found meeting these conditions  Showing future appointments within next 150 days and meeting all other requirements      This virtual check-in was done via [employer] and patient was informed that this is not a secure, HIPAA-compliant platform  She agrees to proceed  Patient agrees to participate in a virtual check in via telephone or video visit instead of presenting to the office to address urgent/immediate medical needs  Patient is aware this is a billable service  After connecting through [facility], the patient was identified by name and date of birth  Martine Colon was informed that this was a telemedicine visit and that the exam was being conducted confidentially over secure lines  My office door was closed  No one else was in the room  Freida Rios acknowledged consent and understanding of privacy and security of the telemedicine visit  I informed the patient that I have reviewed her record in Epic and presented the opportunity for her to ask any questions regarding the visit today  The patient agreed to participate  Subjective:   Freida Rios is a 49 y o  female who is concerned about COVID-19  Patient is currently asymptomatic  Patient denies fever, chills, fatigue, malaise, congestion, rhinorrhea, sore throat, anosmia, loss of taste, cough, shortness of breath, chest tightness, abdominal pain, nausea, vomiting, diarrhea, myalgias and headaches  Date of exposure: 3/15/2021    Exposure:   Contact with a person who is under investigation (PUI) for or who is positive for COVID-19 within the last 14 days?: Yes    Hospitalized recently for fever and/or lower respiratory symptoms?: No      Currently a healthcare worker that is involved in direct patient care?: No      Works in a special setting where the risk of COVID-19 transmission may be high? (this may include long-term care, correctional and penitentiary facilities; homeless shelters; assisted-living facilities and group homes ): No      Resident in a special setting where the risk of COVID-19 transmission may be high? (this may include long-term care, correctional and penitentiary facilities; homeless shelters; assisted-living facilities and group homes ): No      Lab Results   Component Value Date    SARSCOV2 Negative 03/15/2021     Past Medical History:   Diagnosis Date    Asthma     seasonal    Back pain     Obesity      Past Surgical History:   Procedure Laterality Date    CHOLECYSTECTOMY      GASTRECTOMY SLEEVE LAPAROSCOPIC      KNEE ARTHROSCOPY W/ MENISCAL REPAIR Left     NEUROPLASTY / TRANSPOSITION MEDIAN NERVE AT CARPAL TUNNEL BILATERAL      [place] ESOPHAGOGASTRODUODENOSCOPY TRANSORAL DIAGNOSTIC N/A 4/30/2018    Procedure: ESOPHAGOGASTRODUODENOSCOPY (EGD);   Surgeon: Crystal Louise Migel Rios MD;  Location: [facility]; Service: Gastroenterology     Current Outpatient Medications   Medication Sig Dispense Refill    albuterol (VENTOLIN HFA) 90 mcg/act inhaler Inhale 2 puffs every 6 (six) hours as needed for wheezing 1 Inhaler 3    aspirin-acetaminophen-caffeine (EXCEDRIN MIGRAINE) 250-250-65 MG per tablet Take 1 tablet by mouth every 6 (six) hours as needed for headaches      cetirizine (ZyrTEC) 10 mg tablet Take 1 tablet (10 mg total) by mouth daily 30 tablet 3    fluticasone (FLONASE) 50 mcg/act nasal spray 2 sprays into each nostril daily 1 Bottle 1    KETOTIFEN FUMARATE OP Apply 1 drop to eye 2 (two) times a day      NON FORMULARY       norethindrone-ethinyl estradiol-iron (MICROGESTIN FE1 5/30) 1 5-30 MG-MCG tablet Take 1 tablet by mouth daily       No current facility-administered medications for this visit  Allergies   Allergen Reactions    Other Hives    Penicillins Other (See Comments)     Passed out    Pollen Extract     Shellfish-Derived Products Hives       Review of Systems   Constitutional: Negative for chills, fatigue and fever  HENT: Negative for congestion, rhinorrhea and sore throat  Respiratory: Negative for cough, chest tightness and shortness of breath  Gastrointestinal: Negative for abdominal pain, diarrhea, nausea and vomiting  Musculoskeletal: Negative for myalgias  Neurological: Negative for headaches  Objective: There were no vitals filed for this visit  Physical Exam  HENT:      Head: Normocephalic  Eyes:      Extraocular Movements: Extraocular movements intact  Neck:      Musculoskeletal: Normal range of motion  Pulmonary:      Effort: Pulmonary effort is normal  No respiratory distress  Neurological:      Mental Status: She is alert and oriented to person, place, and time  Psychiatric:         Mood and Affect: Mood normal          Thought Content:  Thought content normal          Judgment: Judgment normal  VIRTUAL VISIT DISCLAIMER    Van Stewart acknowledges that she has consented to an online visit or consultation  She understands that the online visit is based solely on information provided by her, and that, in the absence of a face-to-face physical evaluation by the physician, the diagnosis she receives is both limited and provisional in terms of accuracy and completeness  This is not intended to replace a full medical face-to-face evaluation by the physician  Van Stewart understands and accepts these terms

## 2021-03-18 ENCOUNTER — TELEPHONE (OUTPATIENT)
Dept: BARIATRICS | Facility: CLINIC | Age: 51
End: 2021-03-18

## 2021-03-18 DIAGNOSIS — Z98.84 S/P LAPAROSCOPIC SLEEVE GASTRECTOMY: Primary | ICD-10-CM

## 2021-03-18 NOTE — TELEPHONE ENCOUNTER
Disregard the below note  Pt  Called back and was able to successfully reschedule her UGI without needing a new order  The rep from yesterday was apparently mistaken  Pt had to cancel her UGI again because she had an indirect exposure to covid, and didn't want to risk a positive result  However, when she tried to reschedule, she was told that since she's had numerous cancellations a new order must be entered  Would you please re-enter the UGI order for her so she can try to schedule again

## 2021-03-19 PROCEDURE — U0003 INFECTIOUS AGENT DETECTION BY NUCLEIC ACID (DNA OR RNA); SEVERE ACUTE RESPIRATORY SYNDROME CORONAVIRUS 2 (SARS-COV-2) (CORONAVIRUS DISEASE [COVID-19]), AMPLIFIED PROBE TECHNIQUE, MAKING USE OF HIGH THROUGHPUT TECHNOLOGIES AS DESCRIBED BY CMS-2020-01-R: HCPCS | Performed by: PHYSICIAN ASSISTANT

## 2021-03-19 PROCEDURE — U0005 INFEC AGEN DETEC AMPLI PROBE: HCPCS | Performed by: PHYSICIAN ASSISTANT

## 2021-03-20 LAB — SARS-COV-2 RNA RESP QL NAA+PROBE: NEGATIVE

## 2021-03-22 ENCOUNTER — TELEPHONE (OUTPATIENT)
Dept: INTERNAL MEDICINE CLINIC | Facility: CLINIC | Age: 51
End: 2021-03-22

## 2021-03-22 NOTE — TELEPHONE ENCOUNTER
----- Message from Anjum Buckner PA-C sent at 3/22/2021  8:08 AM EDT -----  Please let her know her COVID is negative

## 2021-03-30 ENCOUNTER — HOSPITAL ENCOUNTER (OUTPATIENT)
Dept: RADIOLOGY | Facility: HOSPITAL | Age: 51
Discharge: HOME | End: 2021-03-30
Attending: SURGERY
Payer: COMMERCIAL

## 2021-03-30 DIAGNOSIS — Z98.84 STATUS POST BARIATRIC SURGERY: ICD-10-CM

## 2021-03-30 DIAGNOSIS — Z23 ENCOUNTER FOR IMMUNIZATION: ICD-10-CM

## 2021-03-30 PROCEDURE — 74240 X-RAY XM UPR GI TRC 1CNTRST: CPT

## 2021-03-31 ENCOUNTER — TELEPHONE (OUTPATIENT)
Dept: INTERNAL MEDICINE CLINIC | Facility: CLINIC | Age: 51
End: 2021-03-31

## 2021-03-31 ENCOUNTER — HOSPITAL ENCOUNTER (EMERGENCY)
Facility: HOSPITAL | Age: 51
Discharge: HOME | End: 2021-03-31
Attending: EMERGENCY MEDICINE
Payer: COMMERCIAL

## 2021-03-31 ENCOUNTER — TELEMEDICINE (OUTPATIENT)
Dept: INTERNAL MEDICINE CLINIC | Facility: CLINIC | Age: 51
End: 2021-03-31
Payer: COMMERCIAL

## 2021-03-31 ENCOUNTER — TELEPHONE (OUTPATIENT)
Dept: UROLOGY | Facility: MEDICAL CENTER | Age: 51
End: 2021-03-31

## 2021-03-31 ENCOUNTER — APPOINTMENT (EMERGENCY)
Dept: CT IMAGING | Facility: HOSPITAL | Age: 51
End: 2021-03-31
Payer: COMMERCIAL

## 2021-03-31 VITALS
HEART RATE: 78 BPM | TEMPERATURE: 98.3 F | RESPIRATION RATE: 18 BRPM | DIASTOLIC BLOOD PRESSURE: 56 MMHG | SYSTOLIC BLOOD PRESSURE: 102 MMHG | OXYGEN SATURATION: 98 %

## 2021-03-31 DIAGNOSIS — N20.0 NEPHROLITHIASIS: Primary | ICD-10-CM

## 2021-03-31 DIAGNOSIS — R10.11 RIGHT UPPER QUADRANT ABDOMINAL PAIN: ICD-10-CM

## 2021-03-31 LAB
ALBUMIN SERPL BCP-MCNC: 3.5 G/DL (ref 3.5–5)
ALP SERPL-CCNC: 56 U/L (ref 46–116)
ALT SERPL W P-5'-P-CCNC: 44 U/L (ref 12–78)
ANION GAP SERPL CALCULATED.3IONS-SCNC: 11 MMOL/L (ref 4–13)
AST SERPL W P-5'-P-CCNC: 31 U/L (ref 5–45)
BACTERIA UR QL AUTO: NORMAL /HPF
BASOPHILS # BLD AUTO: 0.01 THOUSANDS/ΜL (ref 0–0.1)
BASOPHILS NFR BLD AUTO: 0 % (ref 0–1)
BILIRUB SERPL-MCNC: 0.49 MG/DL (ref 0.2–1)
BILIRUB UR QL STRIP: NEGATIVE
BUN SERPL-MCNC: 9 MG/DL (ref 5–25)
CALCIUM SERPL-MCNC: 7.8 MG/DL (ref 8.3–10.1)
CHLORIDE SERPL-SCNC: 101 MMOL/L (ref 100–108)
CLARITY UR: CLEAR
CO2 SERPL-SCNC: 24 MMOL/L (ref 21–32)
COLOR UR: YELLOW
CREAT SERPL-MCNC: 0.97 MG/DL (ref 0.6–1.3)
EOSINOPHIL # BLD AUTO: 0 THOUSAND/ΜL (ref 0–0.61)
EOSINOPHIL NFR BLD AUTO: 0 % (ref 0–6)
ERYTHROCYTE [DISTWIDTH] IN BLOOD BY AUTOMATED COUNT: 12.8 % (ref 11.6–15.1)
GFR SERPL CREATININE-BSD FRML MDRD: 68 ML/MIN/1.73SQ M
GLUCOSE SERPL-MCNC: 97 MG/DL (ref 65–140)
GLUCOSE UR STRIP-MCNC: NEGATIVE MG/DL
HCG SERPL QL: NEGATIVE
HCT VFR BLD AUTO: 39.1 % (ref 34.8–46.1)
HGB BLD-MCNC: 12.9 G/DL (ref 11.5–15.4)
HGB UR QL STRIP.AUTO: ABNORMAL
IMM GRANULOCYTES # BLD AUTO: 0.01 THOUSAND/UL (ref 0–0.2)
IMM GRANULOCYTES NFR BLD AUTO: 0 % (ref 0–2)
KETONES UR STRIP-MCNC: ABNORMAL MG/DL
LACTATE SERPL-SCNC: 0.7 MMOL/L (ref 0.5–2)
LEUKOCYTE ESTERASE UR QL STRIP: NEGATIVE
LYMPHOCYTES # BLD AUTO: 0.78 THOUSANDS/ΜL (ref 0.6–4.47)
LYMPHOCYTES NFR BLD AUTO: 21 % (ref 14–44)
MCH RBC QN AUTO: 28.4 PG (ref 26.8–34.3)
MCHC RBC AUTO-ENTMCNC: 33 G/DL (ref 31.4–37.4)
MCV RBC AUTO: 86 FL (ref 82–98)
MONOCYTES # BLD AUTO: 0.19 THOUSAND/ΜL (ref 0.17–1.22)
MONOCYTES NFR BLD AUTO: 5 % (ref 4–12)
NEUTROPHILS # BLD AUTO: 2.8 THOUSANDS/ΜL (ref 1.85–7.62)
NEUTS SEG NFR BLD AUTO: 74 % (ref 43–75)
NITRITE UR QL STRIP: NEGATIVE
NON-SQ EPI CELLS URNS QL MICRO: NORMAL /HPF
NRBC BLD AUTO-RTO: 0 /100 WBCS
PH UR STRIP.AUTO: 6 [PH]
PLATELET # BLD AUTO: 127 THOUSANDS/UL (ref 149–390)
PMV BLD AUTO: 11.3 FL (ref 8.9–12.7)
POTASSIUM SERPL-SCNC: 3.7 MMOL/L (ref 3.5–5.3)
PROT SERPL-MCNC: 7.6 G/DL (ref 6.4–8.2)
PROT UR STRIP-MCNC: NEGATIVE MG/DL
RBC # BLD AUTO: 4.55 MILLION/UL (ref 3.81–5.12)
RBC #/AREA URNS AUTO: NORMAL /HPF
SODIUM SERPL-SCNC: 136 MMOL/L (ref 136–145)
SP GR UR STRIP.AUTO: 1.01 (ref 1–1.03)
UROBILINOGEN UR QL STRIP.AUTO: 0.2 E.U./DL
WBC # BLD AUTO: 3.79 THOUSAND/UL (ref 4.31–10.16)
WBC #/AREA URNS AUTO: NORMAL /HPF

## 2021-03-31 PROCEDURE — 84703 CHORIONIC GONADOTROPIN ASSAY: CPT | Performed by: PHYSICIAN ASSISTANT

## 2021-03-31 PROCEDURE — 85025 COMPLETE CBC W/AUTO DIFF WBC: CPT | Performed by: PHYSICIAN ASSISTANT

## 2021-03-31 PROCEDURE — 96374 THER/PROPH/DIAG INJ IV PUSH: CPT

## 2021-03-31 PROCEDURE — 96375 TX/PRO/DX INJ NEW DRUG ADDON: CPT

## 2021-03-31 PROCEDURE — 96361 HYDRATE IV INFUSION ADD-ON: CPT

## 2021-03-31 PROCEDURE — 99285 EMERGENCY DEPT VISIT HI MDM: CPT | Performed by: PHYSICIAN ASSISTANT

## 2021-03-31 PROCEDURE — 81001 URINALYSIS AUTO W/SCOPE: CPT | Performed by: PHYSICIAN ASSISTANT

## 2021-03-31 PROCEDURE — 99284 EMERGENCY DEPT VISIT MOD MDM: CPT

## 2021-03-31 PROCEDURE — 83605 ASSAY OF LACTIC ACID: CPT | Performed by: PHYSICIAN ASSISTANT

## 2021-03-31 PROCEDURE — 80053 COMPREHEN METABOLIC PANEL: CPT | Performed by: PHYSICIAN ASSISTANT

## 2021-03-31 PROCEDURE — 74177 CT ABD & PELVIS W/CONTRAST: CPT

## 2021-03-31 PROCEDURE — 36415 COLL VENOUS BLD VENIPUNCTURE: CPT | Performed by: PHYSICIAN ASSISTANT

## 2021-03-31 PROCEDURE — 99213 OFFICE O/P EST LOW 20 MIN: CPT | Performed by: PHYSICIAN ASSISTANT

## 2021-03-31 RX ORDER — KETOROLAC TROMETHAMINE 30 MG/ML
15 INJECTION, SOLUTION INTRAMUSCULAR; INTRAVENOUS ONCE
Status: COMPLETED | OUTPATIENT
Start: 2021-03-31 | End: 2021-03-31

## 2021-03-31 RX ORDER — TAMSULOSIN HYDROCHLORIDE 0.4 MG/1
0.4 CAPSULE ORAL ONCE
Status: COMPLETED | OUTPATIENT
Start: 2021-03-31 | End: 2021-03-31

## 2021-03-31 RX ORDER — TAMSULOSIN HYDROCHLORIDE 0.4 MG/1
0.4 CAPSULE ORAL
Qty: 8 CAPSULE | Refills: 0 | Status: SHIPPED | OUTPATIENT
Start: 2021-03-31 | End: 2021-04-02 | Stop reason: SDUPTHER

## 2021-03-31 RX ORDER — DIPHENHYDRAMINE HYDROCHLORIDE 50 MG/ML
25 INJECTION INTRAMUSCULAR; INTRAVENOUS ONCE
Status: COMPLETED | OUTPATIENT
Start: 2021-03-31 | End: 2021-03-31

## 2021-03-31 RX ORDER — OXYCODONE HYDROCHLORIDE AND ACETAMINOPHEN 5; 325 MG/1; MG/1
1 TABLET ORAL EVERY 4 HOURS PRN
Qty: 12 TABLET | Refills: 0 | Status: SHIPPED | OUTPATIENT
Start: 2021-03-31 | End: 2021-04-10

## 2021-03-31 RX ORDER — HYDROMORPHONE HCL/PF 1 MG/ML
1 SYRINGE (ML) INJECTION ONCE
Status: DISCONTINUED | OUTPATIENT
Start: 2021-03-31 | End: 2021-03-31 | Stop reason: HOSPADM

## 2021-03-31 RX ADMIN — SODIUM CHLORIDE 1000 ML: 0.9 INJECTION, SOLUTION INTRAVENOUS at 04:57

## 2021-03-31 RX ADMIN — DIPHENHYDRAMINE HYDROCHLORIDE 25 MG: 50 INJECTION, SOLUTION INTRAMUSCULAR; INTRAVENOUS at 03:14

## 2021-03-31 RX ADMIN — TAMSULOSIN HYDROCHLORIDE 0.4 MG: 0.4 CAPSULE ORAL at 04:45

## 2021-03-31 RX ADMIN — MORPHINE SULFATE 2 MG: 2 INJECTION, SOLUTION INTRAMUSCULAR; INTRAVENOUS at 04:56

## 2021-03-31 RX ADMIN — SODIUM CHLORIDE 1000 ML: 0.9 INJECTION, SOLUTION INTRAVENOUS at 02:18

## 2021-03-31 RX ADMIN — IOHEXOL 100 ML: 350 INJECTION, SOLUTION INTRAVENOUS at 03:41

## 2021-03-31 RX ADMIN — KETOROLAC TROMETHAMINE 15 MG: 30 INJECTION, SOLUTION INTRAMUSCULAR at 03:16

## 2021-03-31 NOTE — PROGRESS NOTES
Virtual Regular Visit      Assessment/Plan:Still in pain documented kidney stone on narcotics and Flomax waiting for to past reviewed her hospital records call Back depending on her symptom follow-up with Urology    Problem List Items Addressed This Visit        Genitourinary    Nephrolithiasis - Primary       Other    Abdominal pain               Reason for visit is   Chief Complaint   Patient presents with    Virtual Regular Visit        Encounter provider Sarita Garcia PA-C    Provider located at [address] [place] [address]      Recent Visits  No visits were found meeting these conditions  Showing recent visits within past 7 days and meeting all other requirements     Today's Visits  Date Type Provider Dept   03/31/21 Telephone Angelo Robertfranny, [address] today's visits and meeting all other requirements     Future Appointments  No visits were found meeting these conditions  Showing future appointments within next 150 days and meeting all other requirements        The patient was identified by name and date of birth  Rupert Richter was informed that this is a telemedicine visit and that the visit is being conducted through [facility] and patient was informed that this is not a secure, HIPAA-compliant platform  She agrees to proceed     My office door was closed  No one else was in the room  She acknowledged consent and understanding of privacy and security of the video platform  The patient has agreed to participate and understands they can discontinue the visit at any time  Patient is aware this is a billable service  Subjective  Rupert Richter is a 49 y o  female    Seen in the ER last night because of bilateral flank pain going on for a day  Found to have kidney stone as a source of her symptoms no sign of infection her urine was negative    No fever chills no nausea vomiting now home on Flomax and narcotic still some pain directed to have follow-up with Urology  Previously feeling well       Past Medical History:   Diagnosis Date    Asthma     seasonal    Back pain     Kidney stone     Obesity        Past Surgical History:   Procedure Laterality Date    CHOLECYSTECTOMY      GASTRECTOMY SLEEVE LAPAROSCOPIC      KNEE ARTHROSCOPY W/ MENISCAL REPAIR Left     NEUROPLASTY / TRANSPOSITION MEDIAN NERVE AT CARPAL TUNNEL BILATERAL      [place] ESOPHAGOGASTRODUODENOSCOPY TRANSORAL DIAGNOSTIC N/A 4/30/2018    Procedure: ESOPHAGOGASTRODUODENOSCOPY (EGD); Surgeon: Andrés Sanchez MD;  Location: [facility]; Service: Gastroenterology       Current Outpatient Medications   Medication Sig Dispense Refill    albuterol (VENTOLIN HFA) 90 mcg/act inhaler Inhale 2 puffs every 6 (six) hours as needed for wheezing 1 Inhaler 3    aspirin-acetaminophen-caffeine (EXCEDRIN MIGRAINE) 250-250-65 MG per tablet Take 1 tablet by mouth every 6 (six) hours as needed for headaches      cetirizine (ZyrTEC) 10 mg tablet Take 1 tablet (10 mg total) by mouth daily 30 tablet 3    fluticasone (FLONASE) 50 mcg/act nasal spray 2 sprays into each nostril daily 1 Bottle 1    KETOTIFEN FUMARATE OP Apply 1 drop to eye 2 (two) times a day      NON FORMULARY       norethindrone-ethinyl estradiol-iron (MICROGESTIN FE1 5/30) 1 5-30 MG-MCG tablet Take 1 tablet by mouth daily      oxyCODONE-acetaminophen (PERCOCET) 5-325 mg per tablet Take 1 tablet by mouth every 4 (four) hours as needed for moderate pain for up to 10 daysMax Daily Amount: 6 tablets 12 tablet 0    tamsulosin (FLOMAX) 0 4 mg Take 1 capsule (0 4 mg total) by mouth daily with dinner 8 capsule 0     No current facility-administered medications for this visit           Allergies   Allergen Reactions    Other Hives    Penicillins Other (See Comments)     Passed out    Pollen Extract Sneezing and Nasal Congestion    Shellfish-Derived Products - Food Allergy Hives Review of Systems    Video Exam    There were no vitals filed for this visit  Physical Exam  HENT:      Head: Normocephalic  Eyes:      Extraocular Movements: Extraocular movements intact  Neck:      Musculoskeletal: Normal range of motion  Pulmonary:      Effort: Pulmonary effort is normal  No respiratory distress  Neurological:      Mental Status: She is alert and oriented to person, place, and time  I spent  15 minutes directly with the patient during this visit      Oswald Vivar acknowledges that she has consented to an online visit or consultation  She understands that the online visit is based solely on information provided by her, and that, in the absence of a face-to-face physical evaluation by the physician, the diagnosis she receives is both limited and provisional in terms of accuracy and completeness  This is not intended to replace a full medical face-to-face evaluation by the physician  Luis Felipe Hurtado understands and accepts these terms

## 2021-03-31 NOTE — TELEPHONE ENCOUNTER
What is the reason for the patients appointment? Er Follow up kidney stones patient is in a lot of pain  Please advise  Do we accept the patient's insurance or is the patient Self-Pay?     [facility]    Has the patient had any previous urologist(s)? no      Have patient records been requested? no       Has the patient had any outside testing done?     No   What is the patients location preference for an office visit? [place]        Does the patient have a personal history of cancer?     No

## 2021-03-31 NOTE — ED PROVIDER NOTES
History  Chief Complaint   Patient presents with    Flank Pain     Patient reports L & R sided flank pain since last night at 3am  Patient reports nausea no vomiting  Patient states "I have chills that run up my spine "     Patient is a 79-year-old female presents emergency department complaints of bilateral flank pain, right greater than left that began yesterday  Patient states pain got progressively worse throughout the day  She states that she did feel episodes of feeling warm  She denies any nausea, vomiting, diarrhea, bloody stool  Prior to Admission Medications   Prescriptions Last Dose Informant Patient Reported? Taking? KETOTIFEN FUMARATE OP Not Taking at Unknown time Self Yes No   Sig: Apply 1 drop to eye 2 (two) times a day   NON FORMULARY  Self Yes Yes   albuterol (VENTOLIN HFA) 90 mcg/act inhaler  Self No Yes   Sig: Inhale 2 puffs every 6 (six) hours as needed for wheezing   aspirin-acetaminophen-caffeine (EXCEDRIN MIGRAINE) 250-250-65 MG per tablet  Self Yes Yes   Sig: Take 1 tablet by mouth every 6 (six) hours as needed for headaches   cetirizine (ZyrTEC) 10 mg tablet  Self No Yes   Sig: Take 1 tablet (10 mg total) by mouth daily   fluticasone (FLONASE) 50 mcg/act nasal spray  Self No Yes   Si sprays into each nostril daily   norethindrone-ethinyl estradiol-iron (MICROGESTIN FE1 ) 1 5-30 MG-MCG tablet  Self Yes Yes   Sig: Take 1 tablet by mouth daily      Facility-Administered Medications: None       Past Medical History:   Diagnosis Date    Asthma     seasonal    Back pain     Obesity        Past Surgical History:   Procedure Laterality Date    CHOLECYSTECTOMY      GASTRECTOMY SLEEVE LAPAROSCOPIC      KNEE ARTHROSCOPY W/ MENISCAL REPAIR Left     NEUROPLASTY / TRANSPOSITION MEDIAN NERVE AT CARPAL TUNNEL BILATERAL      AL ESOPHAGOGASTRODUODENOSCOPY TRANSORAL DIAGNOSTIC N/A 2018    Procedure: ESOPHAGOGASTRODUODENOSCOPY (EGD);   Surgeon: Staci Cunningham MD; Location: [facility]; Service: Gastroenterology       Family History   Problem Relation Age of Onset    Diabetes Mother     Kidney disease Mother         end stage   Akilah Mare Diabetes Father     Other Father         gangrene    Hypertension Father     Nephrolithiasis Family      I have reviewed and agree with the history as documented  E-Cigarette/Vaping    E-Cigarette Use Never User      E-Cigarette/Vaping Substances    Nicotine No     THC No     CBD No     Flavoring No     Other No     Unknown No      Social History     Tobacco Use    Smoking status: Never Smoker    Smokeless tobacco: Never Used   Substance Use Topics    Alcohol use: Yes     Frequency: Never     Drinks per session: 1 or 2     Binge frequency: Never     Comment: minimum     Drug use: Not Currently     Comment: medical silver haze vape and cy relax pill       Review of Systems   Constitutional: Negative for fever  Respiratory: Negative for shortness of breath  Cardiovascular: Negative for chest pain  Gastrointestinal: Positive for abdominal pain  Negative for blood in stool, nausea and vomiting  Genitourinary: Positive for flank pain  Negative for dysuria  Neurological: Negative for weakness  All other systems reviewed and are negative  Physical Exam  Physical Exam  Vitals signs reviewed  Constitutional:       Appearance: Normal appearance  HENT:      Head: Normocephalic and atraumatic  Right Ear: External ear normal       Left Ear: External ear normal       Nose: Nose normal       Mouth/Throat:      Mouth: Mucous membranes are moist    Eyes:      Extraocular Movements: Extraocular movements intact  Conjunctiva/sclera: Conjunctivae normal       Pupils: Pupils are equal, round, and reactive to light  Neck:      Musculoskeletal: Normal range of motion  Cardiovascular:      Rate and Rhythm: Normal rate and regular rhythm  Pulses: Normal pulses  Heart sounds: Normal heart sounds     Pulmonary: Effort: Pulmonary effort is normal       Breath sounds: Normal breath sounds  Abdominal:      General: Bowel sounds are normal       Palpations: Abdomen is soft  Tenderness: There is abdominal tenderness in the right lower quadrant  There is right CVA tenderness  There is no guarding  Skin:     General: Skin is warm  Capillary Refill: Capillary refill takes less than 2 seconds  Neurological:      Mental Status: She is alert  Psychiatric:         Mood and Affect: Mood normal          Behavior: Behavior normal          Thought Content:  Thought content normal          Judgment: Judgment normal          Vital Signs  ED Triage Vitals   Temperature Pulse Respirations Blood Pressure SpO2   03/31/21 0038 03/31/21 0038 03/31/21 0038 03/31/21 0038 03/31/21 0038   98 3 °F (36 8 °C) 90 18 131/72 97 %      Temp Source Heart Rate Source Patient Position - Orthostatic VS BP Location FiO2 (%)   03/31/21 0038 03/31/21 0038 03/31/21 0038 03/31/21 0038 --   Tympanic Monitor Lying Left arm       Pain Score       03/31/21 0316       Worst Possible Pain           Vitals:    03/31/21 0038 03/31/21 0300   BP: 131/72 96/54   Pulse: 90 72   Patient Position - Orthostatic VS: Lying Lying         Visual Acuity      ED Medications  Medications   sodium chloride 0 9 % bolus 1,000 mL (0 mL Intravenous Stopped 3/31/21 0418)   ketorolac (TORADOL) injection 15 mg (15 mg Intravenous Given 3/31/21 0316)   diphenhydrAMINE (BENADRYL) injection 25 mg (25 mg Intravenous Given 3/31/21 0314)   iohexol (OMNIPAQUE) 350 MG/ML injection (SINGLE-DOSE) 100 mL (100 mL Intravenous Given 3/31/21 0341)   tamsulosin (FLOMAX) capsule 0 4 mg (0 4 mg Oral Given 3/31/21 0445)   HYDROmorphone (DILAUDID) injection 1 mg (1 mg Intravenous Given 3/31/21 0445)       Diagnostic Studies  Results Reviewed     Procedure Component Value Units Date/Time    Urine Microscopic [[id number]]  (Normal) Collected: 03/31/21 0329    Lab Status: Final result Specimen: Urine, Clean Catch Updated: 03/31/21 0357     RBC, UA 1-2 /hpf      WBC, UA None Seen /hpf      Epithelial Cells Occasional /hpf      Bacteria, UA Occasional /hpf     UA w Reflex to Microscopic w Reflex to Culture [[id number]]  (Abnormal) Collected: 03/31/21 0329    Lab Status: Final result Specimen: Urine, Clean Catch Updated: 03/31/21 0337     Color, UA Yellow     Clarity, UA Clear     Specific Gravity, UA 1 010     pH, UA 6 0     Leukocytes, UA Negative     Nitrite, UA Negative     Protein, UA Negative mg/dl      Glucose, UA Negative mg/dl      Ketones, UA 40 (2+) mg/dl      Urobilinogen, UA 0 2 E U /dl      Bilirubin, UA Negative     Blood, UA Trace-Intact    CBC and differential [[id number]]  (Abnormal) Collected: 03/31/21 0218    Lab Status: Final result Specimen: Blood from Arm, Right Updated: 03/31/21 0304     WBC 3 79 Thousand/uL      RBC 4 55 Million/uL      Hemoglobin 12 9 g/dL      Hematocrit 39 1 %      MCV 86 fL      MCH 28 4 pg      MCHC 33 0 g/dL      RDW 12 8 %      MPV 11 3 fL      Platelets 314 Thousands/uL      nRBC 0 /100 WBCs      Neutrophils Relative 74 %      Immat GRANS % 0 %      Lymphocytes Relative 21 %      Monocytes Relative 5 %      Eosinophils Relative 0 %      Basophils Relative 0 %      Neutrophils Absolute 2 80 Thousands/µL      Immature Grans Absolute 0 01 Thousand/uL      Lymphocytes Absolute 0 78 Thousands/µL      Monocytes Absolute 0 19 Thousand/µL      Eosinophils Absolute 0 00 Thousand/µL      Basophils Absolute 0 01 Thousands/µL     Comprehensive metabolic panel [[id number]]  (Abnormal) Collected: 03/31/21 0218    Lab Status: Final result Specimen: Blood from Arm, Right Updated: 03/31/21 0258     Sodium 136 mmol/L      Potassium 3 7 mmol/L      Chloride 101 mmol/L      CO2 24 mmol/L      ANION GAP 11 mmol/L      BUN 9 mg/dL      Creatinine 0 97 mg/dL      Glucose 97 mg/dL      Calcium 7 8 mg/dL      AST 31 U/L      ALT 44 U/L      Alkaline Phosphatase 56 U/L      Total Protein 7 6 g/dL      Albumin 3 5 g/dL      Total Bilirubin 0 49 mg/dL      eGFR 68 ml/min/1 73sq m     Narrative:      [place] guidelines for Chronic Kidney Disease (CKD):     Stage 1 with normal or high GFR (GFR > 90 mL/min/1 73 square meters)    Stage 2 Mild CKD (GFR = 60-89 mL/min/1 73 square meters)    Stage 3A Moderate CKD (GFR = 45-59 mL/min/1 73 square meters)    Stage 3B Moderate CKD (GFR = 30-44 mL/min/1 73 square meters)    Stage 4 Severe CKD (GFR = 15-29 mL/min/1 73 square meters)    Stage 5 End Stage CKD (GFR <15 mL/min/1 73 square meters)  Note: GFR calculation is accurate only with a steady state creatinine    hCG, qualitative pregnancy [[id number]]  (Normal) Collected: 03/31/21 0218    Lab Status: Final result Specimen: Blood from Arm, Right Updated: 03/31/21 0258     Preg, Serum Negative    Lactic acid, plasma [[id number]]  (Normal) Collected: 03/31/21 0218    Lab Status: Final result Specimen: Blood from Arm, Right Updated: 03/31/21 0251     LACTIC ACID 0 7 mmol/L     Narrative:      Result may be elevated if tourniquet was used during collection  CT abdomen pelvis w contrast   Final Result by  (03/31 0447)   Addendum 1 of 1 by Shani Briggs MD (03/31 0437)   ADDENDUM:      4 mm stone is seen in the mid RIGHT ureter (2:61)  There is heterogeneity    of the right nephrogram and minimal right-sided hydronephrosis  Appearance    of the kidney may be secondary to a delayed nephrogram secondary to    obstruction however superimposed    pyelonephritis cannot be excluded by imaging  Recommend clinical    correlation  Final                 Procedures  Procedures         ED Course                             SBIRT 22yo+      Most Recent Value   SBIRT (24 yo +)   In order to provide better care to our patients, we are screening all of our patients for alcohol and drug use  Would it be okay to ask you these screening questions?   Yes Filed at: 03/31/2021 0232   Initial Alcohol Screen: US AUDIT-C    1  How often do you have a drink containing alcohol? 2 Filed at: 03/31/2021 0232   2  How many drinks containing alcohol do you have on a typical day you are drinking? 1 Filed at: 03/31/2021 0232   3b  FEMALE Any Age, or MALE 65+: How often do you have 4 or more drinks on one occassion? 0 Filed at: 03/31/2021 0232   Audit-C Score  3 Filed at: 03/31/2021 8484   MARYAM: How many times in the past year have you    Used an illegal drug or used a prescription medication for non-medical reasons? Never Filed at: 03/31/2021 7939                    MDM  Number of Diagnoses or Management Options  Nephrolithiasis:   Diagnosis management comments: Patient is a 27-year-old female presents emergency department complaints of bilateral flank pain, right greater than left that began yesterday  Patient states pain got progressively worse throughout the day  She states that she did feel episodes of feeling warm  She denies any nausea, vomiting, diarrhea, bloody stool  On examination, patient has tenderness to palpation in the right lower quadrant  She also has right CVA tenderness  Lab evaluation was performed and is unremarkable  Urinalysis does not demonstrate any evidence of infectious process  CT of the abdomen pelvis was obtained and does show evidence of a 4 mm kidney stone in the right ureter  There is mild right hydronephrosis  Patient was given a dose of Flomax to help pass the stone  Pain control was obtained  Prescription for both Flomax and Percocet were given  She is follow up with urologist   Return parameters were discussed  Patient is stable for discharge         Amount and/or Complexity of Data Reviewed  Clinical lab tests: ordered and reviewed  Tests in the radiology section of CPT®: reviewed and ordered    Risk of Complications, Morbidity, and/or Mortality  Presenting problems: moderate  Diagnostic procedures: moderate  Management options: moderate    Patient Progress  Patient progress: stable      Disposition  Final diagnoses:   Nephrolithiasis     Time reflects when diagnosis was documented in both MDM as applicable and the Disposition within this note     Time User Action Codes Description Comment    3/31/2021  4:41 AM Renetta Bautista Add [N20 0] Nephrolithiasis       ED Disposition     ED Disposition Condition Date/Time Comment    Discharge Good Wed Mar 31, 2021 Oswald Rod 124 discharge to home/self care  Follow-up Information     Follow up With Specialties Details Why Contact Info Additional [address] [facility] [place] [facility] Urology Schedule an appointment as soon as possible for a visit   [address]  [address] [address]  701  [facility], [address] [facility], [address], [place], [place], [address] [facility] Emergency Department Emergency Medicine  If symptoms worsen [facility] [address] [facility] Emergency Department, [address] [place], [place], [address]          Patient's Medications   Discharge Prescriptions    OXYCODONE-ACETAMINOPHEN (PERCOCET) 5-325 MG PER TABLET    Take 1 tablet by mouth every 4 (four) hours as needed for moderate pain for up to 10 daysMax Daily Amount: 6 tablets       Start Date: 3/31/2021 End Date: 4/10/2021       Order Dose: 1 tablet       Quantity: 12 tablet    Refills: 0    TAMSULOSIN (FLOMAX) 0 4 MG    Take 1 capsule (0 4 mg total) by mouth daily with dinner       Start Date: 3/31/2021 End Date: --       Order Dose: 0 4 mg       Quantity: 8 capsule    Refills: 0     No discharge procedures on file      PDMP Review     None          ED Provider  Electronically Signed by           Avril Mccullough PA-C  03/31/21 9164

## 2021-03-31 NOTE — ED NOTES
Patient transported to [facility], [address] [place] [address]  03/31/21 2886

## 2021-03-31 NOTE — TELEPHONE ENCOUNTER
Was able to schedule patient for tomorrow 4/1 @ 2:30 in [facility] office  Limited availability otherwise and patient refuses to travel  Patient has been called and notified

## 2021-04-01 ENCOUNTER — OFFICE VISIT (OUTPATIENT)
Dept: UROLOGY | Facility: CLINIC | Age: 51
End: 2021-04-01
Payer: COMMERCIAL

## 2021-04-01 VITALS
HEART RATE: 91 BPM | DIASTOLIC BLOOD PRESSURE: 66 MMHG | HEIGHT: 64 IN | WEIGHT: 200.8 LBS | SYSTOLIC BLOOD PRESSURE: 144 MMHG | BODY MASS INDEX: 34.28 KG/M2

## 2021-04-01 DIAGNOSIS — N20.0 NEPHROLITHIASIS: Primary | ICD-10-CM

## 2021-04-01 LAB
SL AMB  POCT GLUCOSE, UA: NORMAL
SL AMB LEUKOCYTE ESTERASE,UA: NORMAL
SL AMB POCT BILIRUBIN,UA: NORMAL
SL AMB POCT BLOOD,UA: NORMAL
SL AMB POCT CLARITY,UA: CLEAR
SL AMB POCT COLOR,UA: YELLOW
SL AMB POCT KETONES,UA: NORMAL
SL AMB POCT NITRITE,UA: NORMAL
SL AMB POCT PH,UA: 6
SL AMB POCT SPECIFIC GRAVITY,UA: 1.01
SL AMB POCT URINE PROTEIN: NORMAL
SL AMB POCT UROBILINOGEN: 0.2

## 2021-04-01 PROCEDURE — 81002 URINALYSIS NONAUTO W/O SCOPE: CPT | Performed by: PHYSICIAN ASSISTANT

## 2021-04-01 PROCEDURE — 3008F BODY MASS INDEX DOCD: CPT | Performed by: PHYSICIAN ASSISTANT

## 2021-04-01 PROCEDURE — 99203 OFFICE O/P NEW LOW 30 MIN: CPT | Performed by: PHYSICIAN ASSISTANT

## 2021-04-01 RX ORDER — KETOROLAC TROMETHAMINE 10 MG/1
10 TABLET, FILM COATED ORAL EVERY 6 HOURS PRN
Qty: 20 TABLET | Refills: 0 | Status: SHIPPED | OUTPATIENT
Start: 2021-04-01 | End: 2021-05-04

## 2021-04-01 RX ORDER — ONDANSETRON 4 MG/1
4 TABLET, ORALLY DISINTEGRATING ORAL EVERY 6 HOURS PRN
Qty: 20 TABLET | Refills: 0 | Status: SHIPPED | OUTPATIENT
Start: 2021-04-01 | End: 2021-08-02 | Stop reason: ALTCHOICE

## 2021-04-01 NOTE — TELEPHONE ENCOUNTER
Patient called in asking she needs more tamsulosin because she only received 8 pills  Patient stated she is concerned she may not pass the stone and will need more prior her US and appt at the end of the month   Patient can be reached at [phone]

## 2021-04-01 NOTE — PROGRESS NOTES
1  Nephrolithiasis  POCT urine dip       Assessment and plan:       1   4 mm right ureteral stone  2  Mild right hydronephrosis     patient was counseled on continued medical expulsive therapy as it is only been 24 hours at this point  She will continue with tamsulosin, Toradol, and Percocet  Zofran as needed  Follow-up in 2 weeks with KUB and ultrasound prior to visit  Patient was provided with strainers for her urine  Patient is aware that should she not pass the stone she may require ureteroscopic removal   ER precautions provided in the meantime  All questions answered  Nain Fay PA-C      Chief Complaint       Nephrolithiasis    History of Present Illness     Luz Orr is a 49 y o  female presenting today as a new patient for ER follow up of nephrolithiasis  Patient in ER 3/31/2020 with flank pain  CT revealing 4mm right ureteral stone with mild right hydronephrosis  Patient was discharged with tamsulosin and Percocet  patient states that she has been dealing with this over the past day  She has had some mild symptom improvement however ongoing right flank pain radiating to her groin with some intermittent nausea  Denies any fevers or chills  Denies any previous history of nephrolithiasis or  surgical manipulation  Laboratory     Lab Results   Component Value Date    CREATININE 0 97 03/31/2021       Review of Systems     Review of Systems   Constitutional: Negative for activity change, appetite change, chills, diaphoresis, fatigue, fever and unexpected weight change  Respiratory: Negative for chest tightness and shortness of breath  Cardiovascular: Negative for chest pain, palpitations and leg swelling  Gastrointestinal: Negative for abdominal distention, abdominal pain, constipation, diarrhea, nausea and vomiting  Genitourinary: Positive for flank pain   Negative for decreased urine volume, difficulty urinating, dysuria, enuresis, frequency, genital sores, hematuria and urgency  Musculoskeletal: Negative for back pain, gait problem and myalgias  Skin: Negative for color change, pallor, rash and wound  Psychiatric/Behavioral: Negative for behavioral problems  The patient is not nervous/anxious  Allergies     Allergies   Allergen Reactions    Other Hives    Penicillins Other (See Comments)     Passed out    Pollen Extract Sneezing and Nasal Congestion    Shellfish-Derived Products - Food Allergy Hives       Physical Exam     Physical Exam  Constitutional:       General: She is not in acute distress  Appearance: Normal appearance  She is normal weight  She is not ill-appearing, toxic-appearing or diaphoretic  HENT:      Head: Normocephalic and atraumatic  Eyes:      General: No scleral icterus  Right eye: No discharge  Left eye: No discharge  Conjunctiva/sclera: Conjunctivae normal    Pulmonary:      Effort: Pulmonary effort is normal  No respiratory distress  Skin:     General: Skin is warm and dry  Coloration: Skin is not jaundiced or pale  Neurological:      General: No focal deficit present  Mental Status: She is alert and oriented to person, place, and time  Psychiatric:         Mood and Affect: Mood normal          Behavior: Behavior normal          Thought Content: Thought content normal          Judgment: Judgment normal          Vital Signs     There were no vitals filed for this visit        Current Medications       Current Outpatient Medications:     albuterol (VENTOLIN HFA) 90 mcg/act inhaler, Inhale 2 puffs every 6 (six) hours as needed for wheezing, Disp: 1 Inhaler, Rfl: 3    aspirin-acetaminophen-caffeine (EXCEDRIN MIGRAINE) 250-250-65 MG per tablet, Take 1 tablet by mouth every 6 (six) hours as needed for headaches, Disp: , Rfl:     cetirizine (ZyrTEC) 10 mg tablet, Take 1 tablet (10 mg total) by mouth daily, Disp: 30 tablet, Rfl: 3    fluticasone (FLONASE) 50 mcg/act nasal spray, 2 sprays into each nostril daily, Disp: 1 Bottle, Rfl: 1    KETOTIFEN FUMARATE OP, Apply 1 drop to eye 2 (two) times a day, Disp: , Rfl:     NON FORMULARY, , Disp: , Rfl:     norethindrone-ethinyl estradiol-iron (MICROGESTIN FE1 5/30) 1 5-30 MG-MCG tablet, Take 1 tablet by mouth daily, Disp: , Rfl:     oxyCODONE-acetaminophen (PERCOCET) 5-325 mg per tablet, Take 1 tablet by mouth every 4 (four) hours as needed for moderate pain for up to 10 daysMax Daily Amount: 6 tablets, Disp: 12 tablet, Rfl: 0    tamsulosin (FLOMAX) 0 4 mg, Take 1 capsule (0 4 mg total) by mouth daily with dinner, Disp: 8 capsule, Rfl: 0      Active Problems     Patient Active Problem List   Diagnosis    Health maintenance examination    Bilateral shoulder pain    Abdominal pain    Gastroesophageal reflux disease with esophagitis    Obesity    Chronic rhinitis    S/P laparoscopic sleeve gastrectomy    Vitamin D deficiency    Arthritis    Left ear pain    Other chest pain    Elevated TSH    Nephrolithiasis         Past Medical History     Past Medical History:   Diagnosis Date    Asthma     seasonal    Back pain     Kidney stone     Obesity          Surgical History     Past Surgical History:   Procedure Laterality Date    CHOLECYSTECTOMY      GASTRECTOMY SLEEVE LAPAROSCOPIC      KNEE ARTHROSCOPY W/ MENISCAL REPAIR Left     NEUROPLASTY / TRANSPOSITION MEDIAN NERVE AT CARPAL TUNNEL BILATERAL      OR ESOPHAGOGASTRODUODENOSCOPY TRANSORAL DIAGNOSTIC N/A 4/30/2018    Procedure: ESOPHAGOGASTRODUODENOSCOPY (EGD); Surgeon: Rogelio Pearl MD;  Location: [facility];   Service: Gastroenterology         Family History     Family History   Problem Relation Age of Onset    Diabetes Mother     Kidney disease Mother         end stage    Diabetes Father     Other Father         gangrene    Hypertension Father     Nephrolithiasis Family          Social History     Social History       Radiology

## 2021-04-02 DIAGNOSIS — N20.0 NEPHROLITHIASIS: ICD-10-CM

## 2021-04-02 RX ORDER — TAMSULOSIN HYDROCHLORIDE 0.4 MG/1
0.4 CAPSULE ORAL
Qty: 14 CAPSULE | Refills: 0 | Status: SHIPPED | OUTPATIENT
Start: 2021-04-02 | End: 2021-04-22 | Stop reason: SDUPTHER

## 2021-04-02 NOTE — TELEPHONE ENCOUNTER
Call placed to patient, left message to advise that refill of tamsulosin was sent to pharmacy at her request  Office number provided on voicemail for any questions

## 2021-04-06 ENCOUNTER — TELEPHONE (OUTPATIENT)
Dept: INTERNAL MEDICINE CLINIC | Facility: CLINIC | Age: 51
End: 2021-04-06

## 2021-04-06 NOTE — TELEPHONE ENCOUNTER
Pt called she is having breathing pain and chest feels tight  She used her asthma pump that did not seem to help  She stated she does have kidney stones and maybe that why but not sure  The patient stated she is worried and hope she doesn't get pneumonia  Spoke to [employer] pt will be going to the ER

## 2021-04-15 ENCOUNTER — HOSPITAL ENCOUNTER (OUTPATIENT)
Dept: RADIOLOGY | Facility: HOSPITAL | Age: 51
Discharge: HOME | End: 2021-04-15
Payer: COMMERCIAL

## 2021-04-15 ENCOUNTER — HOSPITAL ENCOUNTER (OUTPATIENT)
Dept: ULTRASOUND IMAGING | Facility: HOSPITAL | Age: 51
Discharge: HOME | End: 2021-04-15
Payer: COMMERCIAL

## 2021-04-15 DIAGNOSIS — N20.0 NEPHROLITHIASIS: ICD-10-CM

## 2021-04-15 PROCEDURE — 74018 RADEX ABDOMEN 1 VIEW: CPT

## 2021-04-15 PROCEDURE — 76770 US EXAM ABDO BACK WALL COMP: CPT

## 2021-04-20 ENCOUNTER — TELEPHONE (OUTPATIENT)
Dept: UROLOGY | Facility: MEDICAL CENTER | Age: 51
End: 2021-04-20

## 2021-04-20 NOTE — TELEPHONE ENCOUNTER
Called patient and left message that renal US no longer shows stone or swelling, kub still pending  Called and left a message for patient that renal US does not demonstrate any residual stone or swelling, kub still pending  Questioning if she is still having stone symptoms  Will follow up tomorrow

## 2021-04-20 NOTE — TELEPHONE ENCOUNTER
Patient seen b;y Dejah Abdul at [facility]    Patient called in regards to U/s results

## 2021-04-20 NOTE — TELEPHONE ENCOUNTER
Patient with h/o kideny stones, last seen 4/1/21 for 4 mm right ureteral stone with mild hydro  Plan was for MET with FU imaging  Imaging done on 4/15 and scans still have not been read yet  Called the reading room to hopefully have exam read and resulted today  Replied via my chart encounter on 4/16 to patient that we are still waiting and will be in touch once results are available

## 2021-04-21 DIAGNOSIS — N20.0 NEPHROLITHIASIS: ICD-10-CM

## 2021-04-21 RX ORDER — TAMSULOSIN HYDROCHLORIDE 0.4 MG/1
0.4 CAPSULE ORAL
Qty: 14 CAPSULE | Refills: 0 | Status: CANCELLED | OUTPATIENT
Start: 2021-04-21

## 2021-04-21 NOTE — TELEPHONE ENCOUNTER
If patient  Is having ongoing issues should be coordinated for right ureteroscopy if the stone has not passed at this time  I am happy to place the case request should patient be amenable  If not, follow up as scheduled for further discussion

## 2021-04-21 NOTE — TELEPHONE ENCOUNTER
KUB final and shows likely 3 mm ureteral stone  US did not show this  Called and left message for patient to discuss her symptoms, reviewed that next steps will likely be based on whether or not she is having symptoms  Awaiting a call back

## 2021-04-21 NOTE — TELEPHONE ENCOUNTER
Patient with h/o kideny stones, last seen 4/1/21 for 4 mm right ureteral stone with mild hydro  Plan was for MET with FU imaging  FU US normal, no stones or hydro seen but fu kub shows possible stone still present  Patient returned the call  Reviewed both kub and renal us results  She reports pain near groin on the right side with a full bladder that last for a while after voiding then goes away  At times it can be 8/10  She has chronic back pain and has gotten semi used to this  She has been straining her urine and has not noticed that she passed the stone yet  She also had urinary urgency, thirst and occasional nausea  She only has 1 toradol and 1 tamsulosin left  Will review with provider for plan

## 2021-04-21 NOTE — TELEPHONE ENCOUNTER
Called and spoke with patient and reviewed provider's recommendation  At this time she is not ready to start the process of scheduling surgical intervention as her symptoms are not that bothersome  She will FU as scheduled to discuss further  If she changes her mind in the meantime she will call back

## 2021-04-21 NOTE — TELEPHONE ENCOUNTER
Patient is returning your call  Patient can be reached at [phone]  Patient thinks her x-ray results are in as well

## 2021-04-22 ENCOUNTER — OFFICE VISIT (OUTPATIENT)
Dept: UROLOGY | Facility: CLINIC | Age: 51
End: 2021-04-22
Payer: COMMERCIAL

## 2021-04-22 VITALS
HEIGHT: 64 IN | DIASTOLIC BLOOD PRESSURE: 72 MMHG | SYSTOLIC BLOOD PRESSURE: 120 MMHG | HEART RATE: 75 BPM | WEIGHT: 199 LBS | BODY MASS INDEX: 33.97 KG/M2

## 2021-04-22 DIAGNOSIS — N20.0 NEPHROLITHIASIS: Primary | ICD-10-CM

## 2021-04-22 LAB
BACTERIA UR QL AUTO: ABNORMAL /HPF
BILIRUB UR QL STRIP: NEGATIVE
CLARITY UR: CLEAR
COLOR UR: YELLOW
GLUCOSE UR STRIP-MCNC: NEGATIVE MG/DL
HGB UR QL STRIP.AUTO: NEGATIVE
KETONES UR STRIP-MCNC: NEGATIVE MG/DL
LEUKOCYTE ESTERASE UR QL STRIP: ABNORMAL
NITRITE UR QL STRIP: NEGATIVE
NON-SQ EPI CELLS URNS QL MICRO: ABNORMAL /HPF
PH UR STRIP.AUTO: 6 [PH]
POST-VOID RESIDUAL VOLUME, ML POC: 16 ML
PROT UR STRIP-MCNC: NEGATIVE MG/DL
RBC #/AREA URNS AUTO: ABNORMAL /HPF
SL AMB  POCT GLUCOSE, UA: NORMAL
SL AMB LEUKOCYTE ESTERASE,UA: NORMAL
SL AMB POCT BILIRUBIN,UA: NORMAL
SL AMB POCT BLOOD,UA: NORMAL
SL AMB POCT CLARITY,UA: CLEAR
SL AMB POCT COLOR,UA: YELLOW
SL AMB POCT KETONES,UA: NORMAL
SL AMB POCT NITRITE,UA: NORMAL
SL AMB POCT PH,UA: 1.01
SL AMB POCT SPECIFIC GRAVITY,UA: 6
SL AMB POCT URINE PROTEIN: NORMAL
SL AMB POCT UROBILINOGEN: 5
SP GR UR STRIP.AUTO: 1.01 (ref 1–1.03)
UROBILINOGEN UR QL STRIP.AUTO: 1 E.U./DL
WBC #/AREA URNS AUTO: ABNORMAL /HPF

## 2021-04-22 PROCEDURE — 51798 US URINE CAPACITY MEASURE: CPT | Performed by: PHYSICIAN ASSISTANT

## 2021-04-22 PROCEDURE — 81001 URINALYSIS AUTO W/SCOPE: CPT | Performed by: PHYSICIAN ASSISTANT

## 2021-04-22 PROCEDURE — 99213 OFFICE O/P EST LOW 20 MIN: CPT | Performed by: PHYSICIAN ASSISTANT

## 2021-04-22 PROCEDURE — 87086 URINE CULTURE/COLONY COUNT: CPT | Performed by: PHYSICIAN ASSISTANT

## 2021-04-22 PROCEDURE — 81002 URINALYSIS NONAUTO W/O SCOPE: CPT | Performed by: PHYSICIAN ASSISTANT

## 2021-04-22 RX ORDER — KETOROLAC TROMETHAMINE 10 MG/1
10 TABLET, FILM COATED ORAL EVERY 6 HOURS PRN
Qty: 20 TABLET | Refills: 0 | OUTPATIENT
Start: 2021-04-22

## 2021-04-22 RX ORDER — IBUPROFEN 800 MG/1
800 TABLET ORAL EVERY 8 HOURS PRN
Qty: 30 TABLET | Refills: 0 | Status: SHIPPED | OUTPATIENT
Start: 2021-04-22 | End: 2021-09-14 | Stop reason: ALTCHOICE

## 2021-04-22 RX ORDER — TAMSULOSIN HYDROCHLORIDE 0.4 MG/1
0.4 CAPSULE ORAL
Qty: 14 CAPSULE | Refills: 0 | Status: SHIPPED | OUTPATIENT
Start: 2021-04-22 | End: 2021-05-07 | Stop reason: ALTCHOICE

## 2021-04-22 NOTE — PROGRESS NOTES
4/22/2021      Chief Complaint   Patient presents with    Nephrolithiasis     Assessment and Plan    49 y o  female     1  Ureterolithiasis   -   Patient initially presented to ED on 03/31/2021 with flank pain  CT revealed 4 mm right ureteral stone with mild right hydronephrosis  - Patient was seen in office following ED visit and was advised to proceed with continued medical expulsive therapy with tamsulosin, Toradol, and Percocet as well as Zofran as needed  - Follow-up KUB from 04/15/2021 revealed 3 mm mid right ureteral calculus  - Follow-up ultrasound from 04/15/20-21 does not reveal any hydronephrosis or shadowing calculi  Bilateral ureteral jets detected  - Patient continues to have right lower abdomen pain and right lower back pain  -  Patient reports she is constipated  Have advised potentially this could be cause of her abdominal pain  I would recommend MiraLax, stool softeners, and prune as well as continue adequate hydration   - Pain and symptoms persist consider ureteroscopy  Patient would like to avoid surgical options  -  Discussed ER precautions with patient that she should go to the ER for severe pain or fever  - Urine sent out for microscopic evaluation and culture  PVR=16 mL  - Refill on Flomax and IBU have been prescribed  - Pt will call office to schedule surgery if pain persists  History of Present Illness  Armando Romero is a 49 y o  female here for follow up evaluation of    Ureterolithiasis  Patient was initially evaluated in the ER on 03/31/2020 with flank pain  CT at that time revealed 4 mm right ureteral stone with mild right hydronephrosis  Patient was subsequently seen in office and advised to proceed with medical expulsive therapy with tamsulosin, Toradol, Percocet, and Zofran  She presents today with continued pain and for follow-up review of imaging  Recently completed a KUB which revealed 3 mm mid right ureteral calculus    In addition completed ultrasound which did not reveal any hydronephrosis or shadowing calculi and bilateral jets were detected  Patient denies any prior history of nephrolithiasis or  surgical manipulation  Patient reports she has had pain to right lower back and right flank since yesterday  She also notes associated nausea  Denies fever, chills, or urinary symptoms  Review of Systems   Constitutional: Negative for chills and fever  Respiratory: Negative for shortness of breath  Cardiovascular: Negative for chest pain  Gastrointestinal: Positive for abdominal pain, constipation and nausea  Negative for abdominal distention, diarrhea and vomiting  Genitourinary: Positive for flank pain  Negative for difficulty urinating, dysuria, frequency, hematuria and urgency  Musculoskeletal: Positive for back pain  Neurological: Negative for dizziness  Past Medical History  Past Medical History:   Diagnosis Date    Asthma     seasonal    Back pain     Kidney stone     Obesity        Past Social History  Past Surgical History:   Procedure Laterality Date    CHOLECYSTECTOMY      GASTRECTOMY SLEEVE LAPAROSCOPIC      KNEE ARTHROSCOPY W/ MENISCAL REPAIR Left     NEUROPLASTY / TRANSPOSITION MEDIAN NERVE AT CARPAL TUNNEL BILATERAL      MN ESOPHAGOGASTRODUODENOSCOPY TRANSORAL DIAGNOSTIC N/A 4/30/2018    Procedure: ESOPHAGOGASTRODUODENOSCOPY (EGD); Surgeon: Emily Wise MD;  Location: [facility];   Service: Gastroenterology     Social History     Tobacco Use   Smoking Status Never Smoker   Smokeless Tobacco Never Used       Past Family History  Family History   Problem Relation Age of Onset    Diabetes Mother     Kidney disease Mother         end stage   Alexei Daubs Diabetes Father     Other Father         gangrene    Hypertension Father     Nephrolithiasis Family        Past Social history  Social History     Socioeconomic History    Marital status: /Civil Union     Spouse name: Not on file    Number of children: Not on file    Years of education: Not on file    Highest education level: Not on file   Occupational History    Not on file   Social Needs    Financial resource strain: Not on file    Food insecurity     Worry: Not on file     Inability: Not on file    Transportation needs     Medical: Not on file     Non-medical: Not on file   Tobacco Use    Smoking status: Never Smoker    Smokeless tobacco: Never Used   Substance and Sexual Activity    Alcohol use: Yes     Frequency: Never     Drinks per session: 1 or 2     Binge frequency: Never     Comment: minimum     Drug use: Yes     Types: Marijuana     Comment: medical silver haze vape and cy relax pill    Sexual activity: Yes     Partners: Male   Lifestyle    Physical activity     Days per week: 0 days     Minutes per session: 0 min    Stress: Not at all   Relationships    Social connections     Talks on phone: Not on file     Gets together: Not on file     Attends [employer] service: Not on file     Active member of club or organization: Not on file     Attends meetings of clubs or organizations: Not on file     Relationship status: Not on file    Intimate partner violence     Fear of current or ex partner: Not on file     Emotionally abused: Not on file     Physically abused: Not on file     Forced sexual activity: Not on file   Other Topics Concern    Not on file   Social History Narrative    Not on file       Current Medications  Current Outpatient Medications   Medication Sig Dispense Refill    albuterol (VENTOLIN HFA) 90 mcg/act inhaler Inhale 2 puffs every 6 (six) hours as needed for wheezing 1 Inhaler 3    aspirin-acetaminophen-caffeine (EXCEDRIN MIGRAINE) 250-250-65 MG per tablet Take 1 tablet by mouth every 6 (six) hours as needed for headaches      cetirizine (ZyrTEC) 10 mg tablet Take 1 tablet (10 mg total) by mouth daily 30 tablet 3    fluticasone (FLONASE) 50 mcg/act nasal spray 2 sprays into each nostril daily 1 Bottle 1    NON FORMULARY       norethindrone-ethinyl estradiol-iron (MICROGESTIN FE1 5/30) 1 5-30 MG-MCG tablet Take 1 tablet by mouth daily      ondansetron (ZOFRAN-ODT) 4 mg disintegrating tablet Take 1 tablet (4 mg total) by mouth every 6 (six) hours as needed for nausea or vomiting 20 tablet 0    tamsulosin (FLOMAX) 0 4 mg Take 1 capsule (0 4 mg total) by mouth daily with dinner 14 capsule 0    ibuprofen (MOTRIN) 800 mg tablet Take 1 tablet (800 mg total) by mouth every 8 (eight) hours as needed for mild pain 30 tablet 0    ketorolac (TORADOL) 10 mg tablet Take 1 tablet (10 mg total) by mouth every 6 (six) hours as needed for moderate pain (Patient not taking: Reported on 4/22/2021) 20 tablet 0    KETOTIFEN FUMARATE OP Apply 1 drop to eye 2 (two) times a day       No current facility-administered medications for this visit  Allergies  Allergies   Allergen Reactions    Other Hives    Penicillins Other (See Comments)     Passed out    Pollen Extract Sneezing and Nasal Congestion    Shellfish-Derived Products - Food Allergy Hives    Adhesive [Medical Tape] Rash         The following portions of the patient's history were reviewed and updated as appropriate: allergies, current medications, past medical history, past social history, past surgical history and problem list       Vitals  Vitals:    04/22/21 1351   BP: 120/72   BP Location: Left arm   Patient Position: Other (Comment)   Cuff Size: Large   Pulse: 75   Weight: 90 3 kg (199 lb)   Height: 5' 4" (1 626 m)           Physical Exam  Physical Exam  Constitutional:       Appearance: Normal appearance  She is obese  HENT:      Head: Normocephalic and atraumatic  Right Ear: External ear normal       Left Ear: External ear normal    Eyes:      General: No scleral icterus  Conjunctiva/sclera: Conjunctivae normal    Neck:      Musculoskeletal: Normal range of motion     Pulmonary:      Effort: Pulmonary effort is normal    Abdominal:      General: Abdomen is flat  Bowel sounds are normal  There is no distension  Palpations: Abdomen is soft  There is no mass  Tenderness: There is abdominal tenderness (RLQ, diffuse lower abdomen)  There is right CVA tenderness  There is no left CVA tenderness, guarding or rebound  Hernia: No hernia is present  Musculoskeletal: Normal range of motion  Skin:     General: Skin is warm and dry  Neurological:      General: No focal deficit present  Mental Status: She is alert and oriented to person, place, and time  Psychiatric:         Mood and Affect: Mood normal          Behavior: Behavior normal          Thought Content:  Thought content normal          Judgment: Judgment normal            Results  Recent Results (from the past 1 hour(s))   POCT urine dip    Collection Time: 04/22/21  1:52 PM   Result Value Ref Range    LEUKOCYTE ESTERASE,UA -     NITRITE,UA -     SL AMB POCT UROBILINOGEN 5     POCT URINE PROTEIN -      PH,UA 1 015     BLOOD,UA trace     SPECIFIC GRAVITY,UA 6 0     KETONES,UA 30+     BILIRUBIN,UA +     GLUCOSE, UA -      COLOR,UA yellow     CLARITY,UA clear    POCT Measure PVR    Collection Time: 04/22/21  1:54 PM   Result Value Ref Range    POST-VOID RESIDUAL VOLUME, ML POC 16 mL   ]  No results found for: PSA  Lab Results   Component Value Date    GLUCOSE 76 12/15/2015    CALCIUM 7 8 (L) 03/31/2021     12/15/2015    K 3 7 03/31/2021    CO2 24 03/31/2021     03/31/2021    BUN 9 03/31/2021    CREATININE 0 97 03/31/2021     Lab Results   Component Value Date    WBC 3 79 (L) 03/31/2021    HGB 12 9 03/31/2021    HCT 39 1 03/31/2021    MCV 86 03/31/2021     (L) 03/31/2021         Orders  Orders Placed This Encounter   Procedures    Urine culture     Order Specific Question:   [facility]     Answer:   Immediate    Urinalysis with microscopic    POCT Measure PVR    POCT urine dip       Reina Ghosh PA-C

## 2021-04-22 NOTE — TELEPHONE ENCOUNTER
Call returned to patient, advised per provider that we could place surgery order for URS otherwise patient should be seen in ER if experiencing severe pain  Patient would like to see provider prior to booking surgery  Imaging are complete so patient scheduled to see AP later today

## 2021-04-22 NOTE — TELEPHONE ENCOUNTER
Patient called stating she is having a lot pain   Level of pain is 9 and 10  She does not have any pain medication left  She would like to know if a refill can be sent      She can be reached at [phone]

## 2021-04-23 ENCOUNTER — TELEPHONE (OUTPATIENT)
Dept: UROLOGY | Facility: CLINIC | Age: 51
End: 2021-04-23

## 2021-04-23 LAB — BACTERIA UR CULT: NORMAL

## 2021-04-23 NOTE — TELEPHONE ENCOUNTER
----- Message from Blade Guevara PA-C sent at 4/23/2021  2:31 PM EDT -----  Please inform patient urine culture is negative

## 2021-04-26 ENCOUNTER — HOSPITAL ENCOUNTER (EMERGENCY)
Facility: HOSPITAL | Age: 51
Discharge: HOME | End: 2021-04-26
Attending: EMERGENCY MEDICINE
Payer: COMMERCIAL

## 2021-04-26 ENCOUNTER — APPOINTMENT (EMERGENCY)
Dept: CT IMAGING | Facility: HOSPITAL | Age: 51
End: 2021-04-26
Payer: COMMERCIAL

## 2021-04-26 VITALS
WEIGHT: 201 LBS | TEMPERATURE: 97.2 F | RESPIRATION RATE: 17 BRPM | BODY MASS INDEX: 34.5 KG/M2 | DIASTOLIC BLOOD PRESSURE: 63 MMHG | SYSTOLIC BLOOD PRESSURE: 112 MMHG | OXYGEN SATURATION: 98 % | HEART RATE: 75 BPM

## 2021-04-26 DIAGNOSIS — R10.31 RLQ ABDOMINAL PAIN: Primary | ICD-10-CM

## 2021-04-26 LAB
ALBUMIN SERPL BCP-MCNC: 3.7 G/DL (ref 3.5–5)
ALP SERPL-CCNC: 51 U/L (ref 46–116)
ALT SERPL W P-5'-P-CCNC: 30 U/L (ref 12–78)
ANION GAP SERPL CALCULATED.3IONS-SCNC: 9 MMOL/L (ref 4–13)
AST SERPL W P-5'-P-CCNC: 18 U/L (ref 5–45)
BACTERIA UR QL AUTO: ABNORMAL /HPF
BASOPHILS # BLD AUTO: 0.03 THOUSANDS/ΜL (ref 0–0.1)
BASOPHILS NFR BLD AUTO: 1 % (ref 0–1)
BILIRUB SERPL-MCNC: 0.68 MG/DL (ref 0.2–1)
BILIRUB UR QL STRIP: NEGATIVE
BUN SERPL-MCNC: 10 MG/DL (ref 5–25)
CALCIUM SERPL-MCNC: 8.1 MG/DL (ref 8.3–10.1)
CHLORIDE SERPL-SCNC: 103 MMOL/L (ref 100–108)
CLARITY UR: ABNORMAL
CO2 SERPL-SCNC: 24 MMOL/L (ref 21–32)
COLOR UR: ABNORMAL
CREAT SERPL-MCNC: 0.88 MG/DL (ref 0.6–1.3)
EOSINOPHIL # BLD AUTO: 0.11 THOUSAND/ΜL (ref 0–0.61)
EOSINOPHIL NFR BLD AUTO: 2 % (ref 0–6)
ERYTHROCYTE [DISTWIDTH] IN BLOOD BY AUTOMATED COUNT: 13.6 % (ref 11.6–15.1)
EXT PREG TEST URINE: NEGATIVE
EXT. CONTROL ED NAV: NORMAL
GFR SERPL CREATININE-BSD FRML MDRD: 77 ML/MIN/1.73SQ M
GLUCOSE SERPL-MCNC: 87 MG/DL (ref 65–140)
GLUCOSE UR STRIP-MCNC: NEGATIVE MG/DL
HCG SERPL QL: NEGATIVE
HCT VFR BLD AUTO: 37.4 % (ref 34.8–46.1)
HGB BLD-MCNC: 12 G/DL (ref 11.5–15.4)
HGB UR QL STRIP.AUTO: ABNORMAL
IMM GRANULOCYTES # BLD AUTO: 0.01 THOUSAND/UL (ref 0–0.2)
IMM GRANULOCYTES NFR BLD AUTO: 0 % (ref 0–2)
KETONES UR STRIP-MCNC: NEGATIVE MG/DL
LEUKOCYTE ESTERASE UR QL STRIP: ABNORMAL
LIPASE SERPL-CCNC: 90 U/L (ref 73–393)
LYMPHOCYTES # BLD AUTO: 1.5 THOUSANDS/ΜL (ref 0.6–4.47)
LYMPHOCYTES NFR BLD AUTO: 29 % (ref 14–44)
MCH RBC QN AUTO: 28.2 PG (ref 26.8–34.3)
MCHC RBC AUTO-ENTMCNC: 32.1 G/DL (ref 31.4–37.4)
MCV RBC AUTO: 88 FL (ref 82–98)
MONOCYTES # BLD AUTO: 0.36 THOUSAND/ΜL (ref 0.17–1.22)
MONOCYTES NFR BLD AUTO: 7 % (ref 4–12)
NEUTROPHILS # BLD AUTO: 3.1 THOUSANDS/ΜL (ref 1.85–7.62)
NEUTS SEG NFR BLD AUTO: 61 % (ref 43–75)
NITRITE UR QL STRIP: NEGATIVE
NON-SQ EPI CELLS URNS QL MICRO: ABNORMAL /HPF
NRBC BLD AUTO-RTO: 0 /100 WBCS
PH UR STRIP.AUTO: 6 [PH]
PLATELET # BLD AUTO: 195 THOUSANDS/UL (ref 149–390)
PMV BLD AUTO: 10.9 FL (ref 8.9–12.7)
POTASSIUM SERPL-SCNC: 4.2 MMOL/L (ref 3.5–5.3)
PROT SERPL-MCNC: 7.7 G/DL (ref 6.4–8.2)
PROT UR STRIP-MCNC: NEGATIVE MG/DL
RBC # BLD AUTO: 4.26 MILLION/UL (ref 3.81–5.12)
RBC #/AREA URNS AUTO: ABNORMAL /HPF
SODIUM SERPL-SCNC: 136 MMOL/L (ref 136–145)
SP GR UR STRIP.AUTO: 1.01 (ref 1–1.03)
UROBILINOGEN UR QL STRIP.AUTO: 0.2 E.U./DL
WBC # BLD AUTO: 5.11 THOUSAND/UL (ref 4.31–10.16)
WBC #/AREA URNS AUTO: ABNORMAL /HPF

## 2021-04-26 PROCEDURE — 99284 EMERGENCY DEPT VISIT MOD MDM: CPT

## 2021-04-26 PROCEDURE — 84703 CHORIONIC GONADOTROPIN ASSAY: CPT | Performed by: EMERGENCY MEDICINE

## 2021-04-26 PROCEDURE — 80053 COMPREHEN METABOLIC PANEL: CPT | Performed by: EMERGENCY MEDICINE

## 2021-04-26 PROCEDURE — 81025 URINE PREGNANCY TEST: CPT | Performed by: EMERGENCY MEDICINE

## 2021-04-26 PROCEDURE — 99285 EMERGENCY DEPT VISIT HI MDM: CPT | Performed by: EMERGENCY MEDICINE

## 2021-04-26 PROCEDURE — 36415 COLL VENOUS BLD VENIPUNCTURE: CPT | Performed by: EMERGENCY MEDICINE

## 2021-04-26 PROCEDURE — 85025 COMPLETE CBC W/AUTO DIFF WBC: CPT | Performed by: EMERGENCY MEDICINE

## 2021-04-26 PROCEDURE — 81001 URINALYSIS AUTO W/SCOPE: CPT | Performed by: EMERGENCY MEDICINE

## 2021-04-26 PROCEDURE — 96374 THER/PROPH/DIAG INJ IV PUSH: CPT

## 2021-04-26 PROCEDURE — 83690 ASSAY OF LIPASE: CPT | Performed by: EMERGENCY MEDICINE

## 2021-04-26 PROCEDURE — 96361 HYDRATE IV INFUSION ADD-ON: CPT

## 2021-04-26 PROCEDURE — G1004 CDSM NDSC: HCPCS

## 2021-04-26 PROCEDURE — 96375 TX/PRO/DX INJ NEW DRUG ADDON: CPT

## 2021-04-26 PROCEDURE — 74177 CT ABD & PELVIS W/CONTRAST: CPT

## 2021-04-26 RX ORDER — MORPHINE SULFATE 4 MG/ML
4 INJECTION, SOLUTION INTRAMUSCULAR; INTRAVENOUS ONCE
Status: COMPLETED | OUTPATIENT
Start: 2021-04-26 | End: 2021-04-26

## 2021-04-26 RX ORDER — ONDANSETRON 2 MG/ML
4 INJECTION INTRAMUSCULAR; INTRAVENOUS ONCE
Status: COMPLETED | OUTPATIENT
Start: 2021-04-26 | End: 2021-04-26

## 2021-04-26 RX ADMIN — IOHEXOL 100 ML: 350 INJECTION, SOLUTION INTRAVENOUS at 15:08

## 2021-04-26 RX ADMIN — ONDANSETRON 4 MG: 2 INJECTION INTRAMUSCULAR; INTRAVENOUS at 14:13

## 2021-04-26 RX ADMIN — MORPHINE SULFATE 4 MG: 4 INJECTION INTRAVENOUS at 15:20

## 2021-04-26 RX ADMIN — SODIUM CHLORIDE 1000 ML: 0.9 INJECTION, SOLUTION INTRAVENOUS at 14:12

## 2021-04-26 NOTE — ED PROVIDER NOTES
History  Chief Complaint   Patient presents with    Abdominal Pain     pt sent by pcp for kidney stone pain, pt reports R sided abdominal pain worsening over last few days, reports constipation      48 y/o female, hx of DM, CKD, and HTN, presents to the ED for RLQ abd pain  States that 1 month ago she was diagnosed with R kidney stone  States that she followed with her urologist and was told that the kidney stone had progressed down the ureter but did not pass yet  She states that over the last few days pain has been worsening  States that she is nauseous but denies vomiting  States that she had a fever to 100 7 today  Last took ibuprofen about 30 mins ago  Denies any cp, sob, urianry symptoms, or d/c  No other complaints  Does having hx of kidney stones/ procedures in the past        History provided by:  Patient  Abdominal Pain  Pain location:  RLQ  Pain quality: sharp and stabbing    Pain radiates to:  Does not radiate  Pain severity:  Moderate  Onset quality:  Sudden  Timing:  Constant  Progression:  Worsening  Chronicity:  New  Context: previous surgery    Context: not alcohol use and not sick contacts    Relieved by:  None tried  Worsened by:  Nothing  Ineffective treatments:  None tried  Associated symptoms: fever and nausea    Associated symptoms: no chest pain, no chills, no cough, no diarrhea, no dysuria, no hematuria, no shortness of breath, no sore throat and no vomiting        Prior to Admission Medications   Prescriptions Last Dose Informant Patient Reported? Taking?    KETOTIFEN FUMARATE OP  Self Yes No   Sig: Apply 1 drop to eye 2 (two) times a day   NON FORMULARY  Self Yes No   albuterol (VENTOLIN HFA) 90 mcg/act inhaler  Self No No   Sig: Inhale 2 puffs every 6 (six) hours as needed for wheezing   aspirin-acetaminophen-caffeine (EXCEDRIN MIGRAINE) 250-250-65 MG per tablet  Self Yes No   Sig: Take 1 tablet by mouth every 6 (six) hours as needed for headaches   cetirizine (ZyrTEC) 10 mg tablet  Self No No   Sig: Take 1 tablet (10 mg total) by mouth daily   fluticasone (FLONASE) 50 mcg/act nasal spray  Self No No   Si sprays into each nostril daily   ibuprofen (MOTRIN) 800 mg tablet   No No   Sig: Take 1 tablet (800 mg total) by mouth every 8 (eight) hours as needed for mild pain   ketorolac (TORADOL) 10 mg tablet  Self No No   Sig: Take 1 tablet (10 mg total) by mouth every 6 (six) hours as needed for moderate pain   Patient not taking: Reported on 2021   norethindrone-ethinyl estradiol-iron (MICROGESTIN FE1 ) 1 5-30 MG-MCG tablet  Self Yes No   Sig: Take 1 tablet by mouth daily   ondansetron (ZOFRAN-ODT) 4 mg disintegrating tablet  Self No No   Sig: Take 1 tablet (4 mg total) by mouth every 6 (six) hours as needed for nausea or vomiting   tamsulosin (FLOMAX) 0 4 mg   No No   Sig: Take 1 capsule (0 4 mg total) by mouth daily with dinner      Facility-Administered Medications: None       Past Medical History:   Diagnosis Date    Asthma     seasonal    Back pain     Kidney stone     Obesity        Past Surgical History:   Procedure Laterality Date    CHOLECYSTECTOMY      GASTRECTOMY SLEEVE LAPAROSCOPIC      KNEE ARTHROSCOPY W/ MENISCAL REPAIR Left     NEUROPLASTY / TRANSPOSITION MEDIAN NERVE AT CARPAL TUNNEL BILATERAL      [place] ESOPHAGOGASTRODUODENOSCOPY TRANSORAL DIAGNOSTIC N/A 2018    Procedure: ESOPHAGOGASTRODUODENOSCOPY (EGD); Surgeon: Alejandra Wiley MD;  Location: [facility]; Service: Gastroenterology       Family History   Problem Relation Age of Onset    Diabetes Mother     Kidney disease Mother         end stage   [facility] Diabetes Father     Other Father         gangrene    Hypertension Father     Nephrolithiasis Family      I have reviewed and agree with the history as documented      E-Cigarette/Vaping    E-Cigarette Use Never User      E-Cigarette/Vaping Substances    Nicotine No     THC No     CBD No     Flavoring No     Other No     Unknown No      Social History Tobacco Use    Smoking status: Never Smoker    Smokeless tobacco: Never Used   Substance Use Topics    Alcohol use: Yes     Frequency: Never     Drinks per session: 1 or 2     Binge frequency: Never     Comment: minimum     Drug use: Yes     Types: Marijuana     Comment: medical silver haze vape and cy relax pill       Review of Systems   Constitutional: Positive for fever  Negative for chills  HENT: Negative for congestion, ear pain and sore throat  Eyes: Negative for pain and visual disturbance  Respiratory: Negative for cough, shortness of breath and wheezing  Cardiovascular: Negative for chest pain and leg swelling  Gastrointestinal: Positive for abdominal pain and nausea  Negative for diarrhea and vomiting  Genitourinary: Negative for dysuria, frequency, hematuria and urgency  Musculoskeletal: Negative for neck pain and neck stiffness  Skin: Negative for rash and wound  Neurological: Negative for weakness, numbness and headaches  Psychiatric/Behavioral: Negative for agitation and confusion  All other systems reviewed and are negative  Physical Exam  Physical Exam  Vitals signs and nursing note reviewed  Constitutional:       Appearance: She is well-developed  HENT:      Head: Normocephalic and atraumatic  Eyes:      Pupils: Pupils are equal, round, and reactive to light  Neck:      Musculoskeletal: Normal range of motion and neck supple  Cardiovascular:      Rate and Rhythm: Normal rate and regular rhythm  Pulmonary:      Effort: Pulmonary effort is normal       Breath sounds: Normal breath sounds  Abdominal:      General: Bowel sounds are normal       Palpations: Abdomen is soft  Tenderness: There is abdominal tenderness in the right lower quadrant  There is no guarding or rebound  Musculoskeletal: Normal range of motion  Skin:     General: Skin is warm and dry  Neurological:      General: No focal deficit present        Mental Status: She is alert and oriented to person, place, and time        Comments: No focal deficits         Vital Signs  ED Triage Vitals [04/26/21 1144]   Temperature Pulse Respirations Blood Pressure SpO2   (!) 97 2 °F (36 2 °C) 78 18 102/59 97 %      Temp Source Heart Rate Source Patient Position - Orthostatic VS BP Location FiO2 (%)   Oral Monitor Sitting Left arm --      Pain Score       9           Vitals:    04/26/21 1144   BP: 102/59   Pulse: 78   Patient Position - Orthostatic VS: Sitting         Visual Acuity      ED Medications  Medications   sodium chloride 0 9 % bolus 1,000 mL (0 mL Intravenous Stopped 4/26/21 1719)   ondansetron (ZOFRAN) injection 4 mg (4 mg Intravenous Given 4/26/21 1413)   iohexol (OMNIPAQUE) 350 MG/ML injection (SINGLE-DOSE) 100 mL (100 mL Intravenous Given 4/26/21 1508)   morphine (PF) 4 mg/mL injection 4 mg (4 mg Intravenous Given 4/26/21 1520)       Diagnostic Studies  Results Reviewed     Procedure Component Value Units Date/Time    Urine Microscopic [[id number]]  (Abnormal) Collected: 04/26/21 1421    Lab Status: Final result Specimen: Urine, Clean Catch Updated: 04/26/21 1458     RBC, UA 1-2 /hpf      WBC, UA 4-10 /hpf      Epithelial Cells Occasional /hpf      Bacteria, UA Occasional /hpf     Lipase [[id number]]  (Normal) Collected: 04/26/21 1413    Lab Status: Final result Specimen: Blood from Arm, Right Updated: 04/26/21 1444     Lipase 90 u/L     hCG, qualitative pregnancy [[id number]]  (Normal) Collected: 04/26/21 1413    Lab Status: Final result Specimen: Blood from Arm, Right Updated: 04/26/21 1444     Preg, Serum Negative    Comprehensive metabolic panel [[id number]]  (Abnormal) Collected: 04/26/21 1413    Lab Status: Final result Specimen: Blood from Arm, Right Updated: 04/26/21 1437     Sodium 136 mmol/L      Potassium 4 2 mmol/L      Chloride 103 mmol/L      CO2 24 mmol/L      ANION GAP 9 mmol/L      BUN 10 mg/dL      Creatinine 0 88 mg/dL      Glucose 87 mg/dL      Calcium 8 1 mg/dL      AST 18 U/L      ALT 30 U/L      Alkaline Phosphatase 51 U/L      Total Protein 7 7 g/dL      Albumin 3 7 g/dL      Total Bilirubin 0 68 mg/dL      eGFR 77 ml/min/1 73sq m     Narrative:      [place] guidelines for Chronic Kidney Disease (CKD):     Stage 1 with normal or high GFR (GFR > 90 mL/min/1 73 square meters)    Stage 2 Mild CKD (GFR = 60-89 mL/min/1 73 square meters)    Stage 3A Moderate CKD (GFR = 45-59 mL/min/1 73 square meters)    Stage 3B Moderate CKD (GFR = 30-44 mL/min/1 73 square meters)    Stage 4 Severe CKD (GFR = 15-29 mL/min/1 73 square meters)    Stage 5 End Stage CKD (GFR <15 mL/min/1 73 square meters)  Note: GFR calculation is accurate only with a steady state creatinine    UA w Reflex to Microscopic w Reflex to Culture [[id number]]  (Abnormal) Collected: 04/26/21 1421    Lab Status: Final result Specimen: Urine, Clean Catch Updated: 04/26/21 1436     Color, UA Light Yellow     Clarity, UA Slightly Cloudy     Specific [place], UA 1 010     pH, UA 6 0     Leukocytes, UA Moderate     Nitrite, UA Negative     Protein, UA Negative mg/dl      Glucose, UA Negative mg/dl      Ketones, UA Negative mg/dl      Urobilinogen, UA 0 2 E U /dl      Bilirubin, UA Negative     Blood, UA Trace-Intact    POCT pregnancy, urine [[id number]]  (Normal) Resulted: 04/26/21 1433    Lab Status: Final result Specimen: Urine Updated: 04/26/21 1433     EXT PREG TEST UR (Ref: Negative) negative     Control valid    CBC and differential [[id number]] Collected: 04/26/21 1413    Lab Status: Final result Specimen: Blood from Arm, Right Updated: 04/26/21 1419     WBC 5 11 Thousand/uL      RBC 4 26 Million/uL      Hemoglobin 12 0 g/dL      Hematocrit 37 4 %      MCV 88 fL      MCH 28 2 pg      MCHC 32 1 g/dL      RDW 13 6 %      MPV 10 9 fL      Platelets 035 Thousands/uL      nRBC 0 /100 WBCs      Neutrophils Relative 61 %      Immat GRANS % 0 %      Lymphocytes Relative 29 %      Monocytes Relative 7 % Eosinophils Relative 2 %      Basophils Relative 1 %      Neutrophils Absolute 3 10 Thousands/µL      Immature Grans Absolute 0 01 Thousand/uL      Lymphocytes Absolute 1 50 Thousands/µL      Monocytes Absolute 0 36 Thousand/µL      Eosinophils Absolute 0 11 Thousand/µL      Basophils Absolute 0 03 Thousands/µL                  CT abdomen pelvis with contrast   Final Result by  (04/26 1725)   Addendum 1 of 1 by Walter Nicholson DO (04/26 1643)   ADDENDUM:      If clinically warranted, CT urogram can be performed when clinically    warranted to better delineate the course of the ureter, as it appears to    be in close approximation and crosses over the right ovarian vein, making    discrete identification somewhat    difficult  I personally discussed this study with [facility] on 4/26/2021 at    4:43 PM                Final                 Procedures  Procedures         ED Course  ED Course as of Apr 26 1725   Mon Apr 26, 2021   1629 Spoke with urology- Patricia Barrow - recommends talking to gyn - she will contact the attending oncall and get back to me       31 21 86 with Patricia Barrow states that Dr Jonathan Danielle from urology looked at the imaging and does not feel it is a kidney stone and would not require intervention  Patient is ok for discharge from a urology standpoint      [id number] with radiology - states that it appears to be a calcified vessel- no dilation of the vessel so appears to have flow past the area  States that he thinks that it is an incidental findings and can have outpatient follow up  I spoke with Dr Collins Park from [facility] [place]- states that calcification of the ovarian vein requires no follow up  Patient can be discharged home,  SBIRT 20yo+      Most Recent Value   SBIRT (24 yo +)   In order to provide better care to our patients, we are screening all of our patients for alcohol and drug use   Would it be okay to ask you these screening questions? Yes Filed at: 04/26/2021 1428   Initial Alcohol Screen: US AUDIT-C    1  How often do you have a drink containing alcohol? 2 Filed at: 04/26/2021 1428   2  How many drinks containing alcohol do you have on a typical day you are drinking? 0 Filed at: 04/26/2021 1428   3a  Male UNDER 65: How often do you have five or more drinks on one occasion? 0 Filed at: 04/26/2021 1428   3b  FEMALE Any Age, or MALE 65+: How often do you have 4 or more drinks on one occassion? 0 Filed at: 04/26/2021 1428   Audit-C Score  2 Filed at: 04/26/2021 1428   MARYAM: How many times in the past year have you    Used an illegal drug or used a prescription medication for non-medical reasons? Daily or Almost Daily Filed at: 04/26/2021 1428   DAST-10: In the past 12 months      1  Have you used drugs other than those required for medical reasons? 0 Filed at: 04/26/2021 1428   2  Do you use more than one drug at a time? 0 Filed at: 04/26/2021 1428   3  Have you had medical problems as a result of your drug use (e g , memory loss, hepatitis, convulsions, bleeding, etc )? 0 Filed at: 04/26/2021 1428   4  Have you had "blackouts" or "flashbacks" as a result of drug use? YesNo  0 Filed at: 04/26/2021 1428   5  Do you ever feel bad or guilty about your drug use? 0 Filed at: 04/26/2021 1428   6  Does your spouse (or parent) ever complain about your involvement with drugs? 0 Filed at: 04/26/2021 1428   7  Have you neglected your family because of your use of drugs? 0 Filed at: 04/26/2021 1428   8  Have you engaged in illegal activities in order to obtain drugs? 0 Filed at: 04/26/2021 1428   9  Have you ever experienced withdrawal symptoms (felt sick) when you stopped taking drugs? 0 Filed at: 04/26/2021 1428   10   Are you always able to stop using drugs when you want to?  0 Filed at: 04/26/2021 1428   DAST-10 Score  0 Filed at: 04/26/2021 1428                    [facility]  Number of Diagnoses or Management Options  RLQ abdominal pain: new and requires workup  Diagnosis management comments: Patient with RLQ abd pain- will get labs, UA, ct abd/pel and give pain meds and zofran for symptom relief  Patient reevaluated and feels improved  Patient updated on results of tests  Discharge instructions given including follow-up, and return precautions  Patient demonstrates verbal understanding and agrees with plan  Amount and/or Complexity of Data Reviewed  Clinical lab tests: ordered and reviewed  Tests in the radiology section of CPT®: ordered and reviewed  Tests in the medicine section of CPT®: ordered and reviewed  Discussion of test results with the performing providers: yes  Decide to obtain previous medical records or to obtain history from someone other than the patient: yes  Obtain history from someone other than the patient: yes  Review and summarize past medical records: yes  Discuss the patient with other providers: yes  Independent visualization of images, tracings, or specimens: yes    Patient Progress  Patient progress: improved      Disposition  Final diagnoses:   RLQ abdominal pain     Time reflects when diagnosis was documented in both MDM as applicable and the Disposition within this note     Time User Action Codes Description Comment    4/26/2021  5:16 PM Yaneth BRUCE Add [R10 31] RLQ abdominal pain       ED Disposition     ED Disposition Condition Date/Time Comment    Discharge Stable Mon Apr 26, 2021  5:16 PM Sofia Pro discharge to home/self care  Follow-up Information     Follow up With Specialties Details Why Contact Info Additional Information    Lilliana Jones MD Internal Medicine Call in 1 day for follow up within 2-3 days [address] [address] Emergency Department Emergency Medicine Go to  immediately for any new or worsening symptoms   [address] [facility] Emergency Department, [address] [place], [place], [address]          Patient's Medications   Discharge Prescriptions    No medications on file     No discharge procedures on file      PDMP Review     None          ED Provider  Electronically Signed by           Mynor Tierney DO  04/26/21 7081

## 2021-04-26 NOTE — TELEPHONE ENCOUNTER
Patient called in stating she was in a lot of discomfort over the weekend and nothing seemed to help   Patient can be reached at [phone]

## 2021-04-26 NOTE — TELEPHONE ENCOUNTER
Patient seen 4/22 with h/o kidney stone  kub 4/15/21 showed persistent stone still in right ureter  Recent urine culture negative  Patient appears to be in the emergency room currently   Will FU on d/c

## 2021-04-27 ENCOUNTER — OFFICE VISIT (OUTPATIENT)
Dept: BARIATRICS | Facility: CLINIC | Age: 51
End: 2021-04-27
Payer: COMMERCIAL

## 2021-04-27 ENCOUNTER — PREP FOR PROCEDURE (OUTPATIENT)
Dept: BARIATRICS | Facility: CLINIC | Age: 51
End: 2021-04-27

## 2021-04-27 VITALS
SYSTOLIC BLOOD PRESSURE: 110 MMHG | HEIGHT: 64 IN | HEART RATE: 80 BPM | BODY MASS INDEX: 33.63 KG/M2 | WEIGHT: 197 LBS | TEMPERATURE: 98.1 F | DIASTOLIC BLOOD PRESSURE: 68 MMHG

## 2021-04-27 DIAGNOSIS — Z98.84 S/P LAPAROSCOPIC SLEEVE GASTRECTOMY: Primary | ICD-10-CM

## 2021-04-27 DIAGNOSIS — E66.01 MORBID OBESITY (HCC): Primary | ICD-10-CM

## 2021-04-27 DIAGNOSIS — K21.00 GASTROESOPHAGEAL REFLUX DISEASE WITH ESOPHAGITIS: ICD-10-CM

## 2021-04-27 PROCEDURE — 99204 OFFICE O/P NEW MOD 45 MIN: CPT | Performed by: SURGERY

## 2021-04-27 NOTE — LETTER
April 27, 2021     Sundeep Blake MD  [address] [facility] [address]    Patient: Chris Nunes   YOB: 1970   Date of Visit: 4/27/2021       Dear Dr Nelsy Andersen: Thank you for referring Robin Lane to me for evaluation  Below are my notes for this consultation  She needs an screening EGD by myself to evaluate the anatomy of her GI tract for Martinez's esophagus since she is 6 years out  I explained that there is an 18% risk of BE after sleeve gastrectomy at 5 years      She will also be referred to medical weight management to assist with her weight loss goals  If you have questions, please do not hesitate to call me on my cell phone at [phone]  I look forward to following your patient along with you  Sincerely,    Demarcus Barbosa MD, Clarence Ko, [facility]  Metabolic & Bariatric Surgery Director  [facility] - [facility]   4/27/2021  1:44 PM          CC: No Recipients  Saskia Phoenix MD  4/27/2021  1:42 PM  Sign when Signing Visit      BARIATRIC INITIAL CONSULT - [address] [facility] 49 y o  female MRN: [id number]  Unit/Bed#:  Encounter: [id number]      HPI:  Chris Nunes is a very pleasant 49 y o  female who presents with a longstanding history of morbid obesity s/p LSG in 2015 with Dr Adam Bird at [address] [facility]  Initial 215# with a ted of 135#  Current weight is 197#  She has a goal of 165-170# and would like to pursue medical weight management  She believes that her weight regain occurred when she was started on medical marijuana for her back pain and her hunger increased significantly  She does have a history of GERD with esophagitis on previous EGD in 2018 with Dr Sebastián Long  She has been following a healthy diet with her  who is a   She has lots 4# recently with this  Here today to discuss weight loss options  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 worked at [employer] prior to a back injury     Body mass index is 34 08 kg/m²  ++Suffers from asthma, sciatica, GERD with esophagitis  S/p knee sx, CTR b/l, lap maria isabel, LSG  **occasionally vapes medical MJ and also takes it in pill form    Visit type: consultation     Symptoms: excess weight, weight increase, inability to loss weight and fatigue      Review of Systems   Constitutional: Negative  Respiratory: Negative  Cardiovascular: Negative  Gastrointestinal: Negative  Musculoskeletal: Negative  Neurological: Negative  All other systems reviewed and are negative  Historical Information   Past Medical History:   Diagnosis Date    Asthma     seasonal    Back pain     Kidney stone     Obesity     Postgastrectomy malabsorption      Past Surgical History:   Procedure Laterality Date    CHOLECYSTECTOMY      GASTRECTOMY SLEEVE LAPAROSCOPIC      KNEE ARTHROSCOPY W/ MENISCAL REPAIR Left     NEUROPLASTY / TRANSPOSITION MEDIAN NERVE AT CARPAL TUNNEL BILATERAL      [place] ESOPHAGOGASTRODUODENOSCOPY TRANSORAL DIAGNOSTIC N/A 4/30/2018    Procedure: ESOPHAGOGASTRODUODENOSCOPY (EGD); Surgeon: Radha Warner MD;  Location: [facility];   Service: Gastroenterology     Social History   Social History     Substance and Sexual Activity   Alcohol Use Yes    Frequency: Never    Drinks per session: 1 or 2    Binge frequency: Never    Comment: only on birthday     Social History     Substance and Sexual Activity   Drug Use Yes    Types: Marijuana    Comment: uses pill or vape (medical marijuInvesticare card)     Social History     Tobacco Use   Smoking Status Never Smoker   Smokeless Tobacco Never Used     Family History: non-contributory    Meds/Allergies   all medications and allergies reviewed  Allergies   Allergen Reactions    Other Hives    Penicillins Other (See Comments)     Passed out    Pollen Extract Sneezing and Nasal Congestion    Shellfish-Derived Products - Food Allergy Hives    Adhesive [Medical Tape] Rash       Objective       Current Vitals:   /68 (BP Location: Left arm, Patient Position: Sitting, Cuff Size: Standard)   Pulse 80   Temp 98 1 °F (36 7 °C) (Temporal)   Ht 5' 3 75" (1 619 m)   Wt 89 4 kg (197 lb)   BMI 34 08 kg/m²       Physical Exam  Constitutional:       Appearance: She is well-developed  HENT:      Head: Normocephalic  Eyes:      Extraocular Movements: Extraocular movements intact  Neck:      Musculoskeletal: Normal range of motion  Cardiovascular:      Rate and Rhythm: Normal rate  Pulmonary:      Effort: Pulmonary effort is normal    Abdominal:      General: There is no distension  Musculoskeletal: Normal range of motion  Neurological:      Mental Status: She is alert and oriented to person, place, and time  Psychiatric:         Mood and Affect: Mood normal          Behavior: Behavior normal          Thought Content: Thought content normal          Judgment: Judgment normal          Lab Results: I have personally reviewed pertinent lab results  Imaging: I have personally reviewed pertinent reports  EKG, Pathology, and Other Studies: I have personally reviewed pertinent reports  Assessment/PLAN:    49 y o  yo female who presents with a longstanding history of morbid obesity s/p LSG in 2015 with Dr Marisol Stephen at [address] [facility]  Initial 215# with a ted of 135#  Current weight is 197#  She has a goal of 165-170# and would like to pursue medical weight management  She believes that her weight regain occurred when she was started on medical marijuana for her back pain and her hunger increased significantly  She does have a history of GERD with esophagitis on previous EGD in 2018 with Dr Nathan Lema  She has been following a healthy diet with her  who is a   She has lots 4# recently with this     ++Suffers from asthma, sciatica, GERD with esophagitis  S/p knee sx, CTR b/l, lap maria isabel, LSG  **occasionally vapes medical MJ and also takes it in pill form    She needs an EGD to evaluate the anatomy of her GI tract for Martinez's esophagus since she is 6 years out  I explained that there is an 18% risk of BE after sleeve gastrectomy at 5 years  She will also be referred to medical weight management  I have spent over 45 minutes with her face to face in the office today discussing her options  Over 50% of this was coordinating care  She was given the opportunity to ask questions and I have answered all of them  I have discussed and educated the patient with regards to the components of our multidisciplinary program and the importance of compliance and follow up in the post operative period  The patient was also instructed with regards to the importance of behavior modification, nutritional counseling, support meeting attendance and lifestyle changes that are important to ensure success  Although there is a great statistical chance of improvement or even resolution of most of her associated comorbidities, the results vary from patient to patient and they largely depend on her commitment and compliance         Maddy Reinoso MD, FACS, [facility]  4/27/2021  1:11 PM

## 2021-04-27 NOTE — PROGRESS NOTES
BARIATRIC INITIAL CONSULT - BARIATRIC SURGERY    Steffi Navarro 49 y o  female MRN: [id number]  Unit/Bed#:  Encounter: [id number]      HPI:  Steffi Navarro is a very pleasant 49 y o  female who presents with a longstanding history of morbid obesity s/p LSG in 2015 with Dr Samantha Rodriguez at [address] [facility]  Initial 215# with a ted of 135#  Current weight is 197#  She has a goal of 165-170# and would like to pursue medical weight management  She believes that her weight regain occurred when she was started on medical marijuana for her back pain and her hunger increased significantly  She does have a history of GERD with esophagitis on previous EGD in 2018 with Dr Coretta Paz  She has been following a healthy diet with her  who is a   She has lots 4# recently with this  Here today to discuss weight loss options  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 worked at [employer] prior to a back injury  Body mass index is 34 08 kg/m²  ++Suffers from asthma, sciatica, GERD with esophagitis  S/p knee sx, CTR b/l, lap maria isabel, LSG  **occasionally vapes medical MJ and also takes it in pill form    Visit type: consultation     Symptoms: excess weight, weight increase, inability to loss weight and fatigue      Review of Systems   Constitutional: Negative  Respiratory: Negative  Cardiovascular: Negative  Gastrointestinal: Negative  Musculoskeletal: Negative  Neurological: Negative  All other systems reviewed and are negative        Historical Information   Past Medical History:   Diagnosis Date    Asthma     seasonal    Back pain     Kidney stone     Obesity     Postgastrectomy malabsorption      Past Surgical History:   Procedure Laterality Date    CHOLECYSTECTOMY      GASTRECTOMY SLEEVE LAPAROSCOPIC      KNEE ARTHROSCOPY W/ MENISCAL REPAIR Left     NEUROPLASTY / TRANSPOSITION MEDIAN NERVE AT CARPAL TUNNEL BILATERAL      [place] ESOPHAGOGASTRODUODENOSCOPY TRANSORAL DIAGNOSTIC N/A 4/30/2018    Procedure: ESOPHAGOGASTRODUODENOSCOPY (EGD); Surgeon: Bernardo Islas MD;  Location: [facility]; Service: Gastroenterology     Social History   Social History     Substance and Sexual Activity   Alcohol Use Yes    Frequency: Never    Drinks per session: 1 or 2    Binge frequency: Never    Comment: only on birthday     Social History     Substance and Sexual Activity   Drug Use Yes    Types: Marijuana    Comment: uses pill or vape (medical marijuania card)     Social History     Tobacco Use   Smoking Status Never Smoker   Smokeless Tobacco Never Used     Family History: non-contributory    Meds/Allergies   all medications and allergies reviewed  Allergies   Allergen Reactions    Other Hives    Penicillins Other (See Comments)     Passed out    Pollen Extract Sneezing and Nasal Congestion    Shellfish-Derived Products - Food Allergy Hives    Adhesive [Medical Tape] Rash       Objective       Current Vitals:   /68 (BP Location: Left arm, Patient Position: Sitting, Cuff Size: Standard)   Pulse 80   Temp 98 1 °F (36 7 °C) (Temporal)   Ht 5' 3 75" (1 619 m)   Wt 89 4 kg (197 lb)   BMI 34 08 kg/m²       Physical Exam  Constitutional:       Appearance: She is well-developed  HENT:      Head: Normocephalic  Eyes:      Extraocular Movements: Extraocular movements intact  Neck:      Musculoskeletal: Normal range of motion  Cardiovascular:      Rate and Rhythm: Normal rate  Pulmonary:      Effort: Pulmonary effort is normal    Abdominal:      General: There is no distension  Musculoskeletal: Normal range of motion  Neurological:      Mental Status: She is alert and oriented to person, place, and time  Psychiatric:         Mood and Affect: Mood normal          Behavior: Behavior normal          Thought Content: Thought content normal          Judgment: Judgment normal          Lab Results: I have personally reviewed pertinent lab results  Imaging: I have personally reviewed pertinent reports  EKG, Pathology, and Other Studies: I have personally reviewed pertinent reports  Assessment/PLAN:    49 y o  yo female who presents with a longstanding history of morbid obesity s/p LSG in 2015 with Dr Megan Ovalle at [address] [facility] 215# with a ted of 135#  Current weight is 197#  She has a goal of 165-170# and would like to pursue medical weight management  She believes that her weight regain occurred when she was started on medical marijuana for her back pain and her hunger increased significantly  She does have a history of GERD with esophagitis on previous EGD in 2018 with Dr Estephania Platt  She has been following a healthy diet with her  who is a   She has lots 4# recently with this  ++Suffers from asthma, sciatica, GERD with esophagitis  S/p knee sx, CTR b/l, lap maria isabel, LSG  **occasionally vapes medical MJ and also takes it in pill form    She needs an EGD to evaluate the anatomy of her GI tract for Martinez's esophagus since she is 6 years out  I explained that there is an 18% risk of BE after sleeve gastrectomy at 5 years  She will also be referred to medical weight management  I have spent over 45 minutes with her face to face in the office today discussing her options  Over 50% of this was coordinating care  She was given the opportunity to ask questions and I have answered all of them  I have discussed and educated the patient with regards to the components of our multidisciplinary program and the importance of compliance and follow up in the post operative period  The patient was also instructed with regards to the importance of behavior modification, nutritional counseling, support meeting attendance and lifestyle changes that are important to ensure success       Although there is a great statistical chance of improvement or even resolution of most of her associated comorbidities, the results vary from patient to patient and they largely depend on her commitment and compliance         Tanner Ding MD, FACS, [facility]  4/27/2021  1:11 PM

## 2021-04-27 NOTE — TELEPHONE ENCOUNTER
Patient seen in the ED yesterday for worsening RLQ abd pain and low grade fever  New CT scan demonstrates no hydro and questionable calcification in right ovarian vein rather than ureter  According to ED note images were reviewed by Dr Petros Bowens and they do not feel this represents stone and would not require urological surgical intervention  Called patient this morning to FU after ER visit and check on her symptoms  Left a detailed message that finding on CT scan does not appear to be a kidney stone and thus pain likely not urological in nature  If still in pain FU with pcp  Call the office back with any further questions or concerns

## 2021-04-28 ENCOUNTER — APPOINTMENT (OUTPATIENT)
Dept: LAB | Facility: CLINIC | Age: 51
End: 2021-04-28
Payer: COMMERCIAL

## 2021-04-28 ENCOUNTER — OFFICE VISIT (OUTPATIENT)
Dept: INTERNAL MEDICINE CLINIC | Facility: CLINIC | Age: 51
End: 2021-04-28
Payer: COMMERCIAL

## 2021-04-28 VITALS
SYSTOLIC BLOOD PRESSURE: 98 MMHG | WEIGHT: 195 LBS | DIASTOLIC BLOOD PRESSURE: 68 MMHG | OXYGEN SATURATION: 98 % | BODY MASS INDEX: 34.55 KG/M2 | HEART RATE: 84 BPM | HEIGHT: 63 IN | TEMPERATURE: 97 F

## 2021-04-28 DIAGNOSIS — N20.0 NEPHROLITHIASIS: ICD-10-CM

## 2021-04-28 DIAGNOSIS — Z12.12 SCREENING FOR COLORECTAL CANCER: Primary | ICD-10-CM

## 2021-04-28 DIAGNOSIS — Z12.11 SCREENING FOR COLORECTAL CANCER: Primary | ICD-10-CM

## 2021-04-28 LAB
BILIRUB UR QL STRIP: NEGATIVE
CLARITY UR: ABNORMAL
COLOR UR: ABNORMAL
GLUCOSE UR STRIP-MCNC: NEGATIVE MG/DL
HGB UR QL STRIP.AUTO: NEGATIVE
KETONES UR STRIP-MCNC: NEGATIVE MG/DL
LEUKOCYTE ESTERASE UR QL STRIP: ABNORMAL
NITRITE UR QL STRIP: NEGATIVE
PH UR STRIP.AUTO: 6 [PH]
PROT UR STRIP-MCNC: NEGATIVE MG/DL
SP GR UR STRIP.AUTO: 1.02 (ref 1–1.03)
UROBILINOGEN UR QL STRIP.AUTO: 0.2 E.U./DL

## 2021-04-28 PROCEDURE — 99214 OFFICE O/P EST MOD 30 MIN: CPT | Performed by: INTERNAL MEDICINE

## 2021-04-28 PROCEDURE — 1036F TOBACCO NON-USER: CPT | Performed by: INTERNAL MEDICINE

## 2021-04-28 PROCEDURE — 87086 URINE CULTURE/COLONY COUNT: CPT

## 2021-04-28 PROCEDURE — 3008F BODY MASS INDEX DOCD: CPT | Performed by: INTERNAL MEDICINE

## 2021-04-28 PROCEDURE — 81001 URINALYSIS AUTO W/SCOPE: CPT | Performed by: INTERNAL MEDICINE

## 2021-04-28 RX ORDER — SULFAMETHOXAZOLE AND TRIMETHOPRIM 800; 160 MG/1; MG/1
1 TABLET ORAL EVERY 12 HOURS SCHEDULED
Qty: 14 TABLET | Refills: 0 | Status: SHIPPED | OUTPATIENT
Start: 2021-04-28 | End: 2021-05-05

## 2021-04-28 NOTE — PROGRESS NOTES
Assessment/Plan:       Diagnoses and all orders for this visit:    Screening for colorectal cancer  -     Ambulatory referral to Gastroenterology; Future    Nephrolithiasis  -     UA (URINE) with reflex to Scope  -     Urine culture; Future  -     sulfamethoxazole-trimethoprim (BACTRIM DS) 800-160 mg per tablet; Take 1 tablet by mouth every 12 (twelve) hours for 7 days                Subjective:      Patient ID: Caridad Heath is a 49 y o  female  Renal colic new you on [id number] with right renal colic the past weeks  Pain felt in the right costovertebral angle and the right lower quadrant of the abdomen  Pain is intermittent waxing and waning  She has had ultrasound which was really nondiagnostic  Then, CT scan was done documenting stone in the right ureter  A repeat CT scan was done in this 1, somewhat in probably states that this stone may be in the right ovarian vein  There was an addendum from the radiologist suggesting a CT urogram better delineate the course the ureter  I think the much more likely diagnosis here is an impacted stone  The patient continues to pain  She has urinary frequency  Most recent urinalysis showed  Pyuria and the patient is tender to percussion in the right CVA  Concern about super imposed infection  Will repeat her UA, do a culture, start her on,   Will have her get a CT urogram tomorrow  The following portions of the patient's history were reviewed and updated as appropriate:   She has a past medical history of Asthma, Back pain, Kidney stone, Obesity, and Postgastrectomy malabsorption  ,  does not have any pertinent problems on file  ,   has a past surgical history that includes Cholecystectomy; GASTRECTOMY SLEEVE LAPAROSCOPIC; Knee arthroscopy w/ meniscal repair (Left); Neuroplasty / transposition median nerve at carpal tunnel bilateral; and pr esophagogastroduodenoscopy transoral diagnostic (N/A, 4/30/2018)  ,  family history includes Diabetes in her father and mother; Hypertension in her father; Kidney disease in her mother; Nephrolithiasis in her family; Other in her father  ,   reports that she has never smoked  She has never used smokeless tobacco  She reports current alcohol use  She reports current drug use  Drug: Marijuana  ,  is allergic to other; penicillins; pollen extract; shellfish-derived products - food allergy; and adhesive [medical tape]     Current Outpatient Medications   Medication Sig Dispense Refill    albuterol (VENTOLIN HFA) 90 mcg/act inhaler Inhale 2 puffs every 6 (six) hours as needed for wheezing 1 Inhaler 3    aspirin-acetaminophen-caffeine (EXCEDRIN MIGRAINE) 250-250-65 MG per tablet Take 1 tablet by mouth every 6 (six) hours as needed for headaches      cetirizine (ZyrTEC) 10 mg tablet Take 1 tablet (10 mg total) by mouth daily 30 tablet 3    fluticasone (FLONASE) 50 mcg/act nasal spray 2 sprays into each nostril daily 1 Bottle 1    ibuprofen (MOTRIN) 800 mg tablet Take 1 tablet (800 mg total) by mouth every 8 (eight) hours as needed for mild pain 30 tablet 0    NON FORMULARY       norethindrone-ethinyl estradiol-iron (MICROGESTIN FE1 5/30) 1 5-30 MG-MCG tablet Take 1 tablet by mouth daily      ondansetron (ZOFRAN-ODT) 4 mg disintegrating tablet Take 1 tablet (4 mg total) by mouth every 6 (six) hours as needed for nausea or vomiting 20 tablet 0    tamsulosin (FLOMAX) 0 4 mg Take 1 capsule (0 4 mg total) by mouth daily with dinner 14 capsule 0    ketorolac (TORADOL) 10 mg tablet Take 1 tablet (10 mg total) by mouth every 6 (six) hours as needed for moderate pain (Patient not taking: Reported on 4/22/2021) 20 tablet 0    KETOTIFEN FUMARATE OP Apply 1 drop to eye 2 (two) times a day      sulfamethoxazole-trimethoprim (BACTRIM DS) 800-160 mg per tablet Take 1 tablet by mouth every 12 (twelve) hours for 7 days 14 tablet 0     No current facility-administered medications for this visit          Review of Systems Gastrointestinal: Positive for abdominal pain  Genitourinary: Positive for difficulty urinating and flank pain  All other systems reviewed and are negative  Objective:  Vitals:    04/28/21 1616   BP: 98/68   Pulse: 84   Temp: (!) 97 °F (36 1 °C)   SpO2: 98%      Physical Exam  Constitutional:       Comments:   Alert female patient who appears to be stated age  She is not toxic at all but does verbalize complaint of pain and is in a little bit of distress   Pulmonary:      Effort: Pulmonary effort is normal    Abdominal:      General: Abdomen is flat  Palpations: Abdomen is soft  Tenderness: There is abdominal tenderness in the right lower quadrant  There is right CVA tenderness  There is no left CVA tenderness or guarding  Negative signs include More's sign and McBurney's sign  Neurological:      Mental Status: She is alert  Patient Instructions   Records reviewed and patient examined  I think the best explanation is that she has a stone stuck in the right ureter  Concerned about infection because she does have tenderness to percussion of the right costovertebral angle and she does have leukocytes in the urine  The possibility that this stone is in some fashion a right ovarian vein is almost impossible      Today: Urinalysis, urine culture, start Bactrim  Tomorrow CT urogram emergently to better delineate course of ureter and the location of the stone with possible emergent referral to urology or even back to the hospital

## 2021-04-28 NOTE — PATIENT INSTRUCTIONS
Records reviewed and patient examined  I think the best explanation is that she has a stone stuck in the right ureter  Concerned about infection because she does have tenderness to percussion of the right costovertebral angle and she does have leukocytes in the urine  The possibility that this stone is in some fashion a right ovarian vein is almost impossible      Today: Urinalysis, urine culture, start Bactrim  Tomorrow CT urogram emergently to better delineate course of ureter and the location of the stone with possible emergent referral to urology or even back to the hospital

## 2021-04-29 ENCOUNTER — TELEPHONE (OUTPATIENT)
Dept: INTERNAL MEDICINE CLINIC | Facility: CLINIC | Age: 51
End: 2021-04-29

## 2021-04-29 ENCOUNTER — HOSPITAL ENCOUNTER (OUTPATIENT)
Dept: CT IMAGING | Facility: HOSPITAL | Age: 51
Discharge: HOME | End: 2021-04-29
Attending: INTERNAL MEDICINE
Payer: COMMERCIAL

## 2021-04-29 DIAGNOSIS — N20.0 NEPHROLITHIASIS: ICD-10-CM

## 2021-04-29 DIAGNOSIS — N20.0 NEPHROLITHIASIS: Primary | ICD-10-CM

## 2021-04-29 LAB
BACTERIA UR CULT: NORMAL
BACTERIA UR QL AUTO: ABNORMAL /HPF
NON-SQ EPI CELLS URNS QL MICRO: ABNORMAL /HPF
RBC #/AREA URNS AUTO: ABNORMAL /HPF
WBC #/AREA URNS AUTO: ABNORMAL /HPF

## 2021-04-29 PROCEDURE — 74178 CT ABD&PLV WO CNTR FLWD CNTR: CPT

## 2021-04-29 PROCEDURE — G1004 CDSM NDSC: HCPCS

## 2021-04-29 RX ADMIN — IOHEXOL 100 ML: 350 INJECTION, SOLUTION INTRAVENOUS at 11:30

## 2021-04-29 NOTE — TELEPHONE ENCOUNTER
Call patient regarding your conversation with her about her condition following the ED visit      # [phone]

## 2021-04-29 NOTE — TELEPHONE ENCOUNTER
We are trying to set up a stat CT urogram to take another look at the location of that stone  Keep herself available to go at a moments notice  No food  Clear liquids only

## 2021-05-04 ENCOUNTER — OFFICE VISIT (OUTPATIENT)
Dept: INTERNAL MEDICINE CLINIC | Facility: CLINIC | Age: 51
End: 2021-05-04
Payer: COMMERCIAL

## 2021-05-04 ENCOUNTER — CONSULT (OUTPATIENT)
Dept: SURGERY | Facility: CLINIC | Age: 51
End: 2021-05-04
Payer: COMMERCIAL

## 2021-05-04 VITALS
SYSTOLIC BLOOD PRESSURE: 92 MMHG | OXYGEN SATURATION: 97 % | HEART RATE: 83 BPM | DIASTOLIC BLOOD PRESSURE: 68 MMHG | HEIGHT: 63 IN | BODY MASS INDEX: 33.91 KG/M2 | WEIGHT: 191.4 LBS | TEMPERATURE: 97.2 F

## 2021-05-04 VITALS
HEART RATE: 83 BPM | WEIGHT: 191 LBS | BODY MASS INDEX: 33.84 KG/M2 | DIASTOLIC BLOOD PRESSURE: 68 MMHG | HEIGHT: 63 IN | TEMPERATURE: 97.2 F | SYSTOLIC BLOOD PRESSURE: 92 MMHG

## 2021-05-04 DIAGNOSIS — R10.31 CHRONIC RIGHT LOWER QUADRANT PAIN: Primary | ICD-10-CM

## 2021-05-04 DIAGNOSIS — M54.9 CHRONIC BACK PAIN, UNSPECIFIED BACK LOCATION, UNSPECIFIED BACK PAIN LATERALITY: ICD-10-CM

## 2021-05-04 DIAGNOSIS — N20.0 NEPHROLITHIASIS: ICD-10-CM

## 2021-05-04 DIAGNOSIS — G89.29 CHRONIC RIGHT LOWER QUADRANT PAIN: Primary | ICD-10-CM

## 2021-05-04 DIAGNOSIS — G89.29 CHRONIC BACK PAIN, UNSPECIFIED BACK LOCATION, UNSPECIFIED BACK PAIN LATERALITY: ICD-10-CM

## 2021-05-04 PROCEDURE — 3725F SCREEN DEPRESSION PERFORMED: CPT | Performed by: INTERNAL MEDICINE

## 2021-05-04 PROCEDURE — 99213 OFFICE O/P EST LOW 20 MIN: CPT | Performed by: INTERNAL MEDICINE

## 2021-05-04 PROCEDURE — 99244 OFF/OP CNSLTJ NEW/EST MOD 40: CPT | Performed by: SURGERY

## 2021-05-04 NOTE — PROGRESS NOTES
Assessment/Plan:     1  Chronic right lower quadrant pain  -     Ambulatory referral to General Surgery    2  Nephrolithiasis    3  Chronic back pain, unspecified back location, unspecified back pain laterality      I discussed her case with Dr Clint Talavera  She has questions about the lung findings as well as the ovarian vein  I explained that I feel we can rule out appendicitis  I am not aware of the clinical significance (or insignificance) of the calculus within the ovarian vein  Dr Gale Marie did request to my TT that this is a rare finding and rarely a source of pain  In severe cases there may be a role for vascular to treat  Could consider ovarian vein syndrome though I admittedly do not have experience with this diagnosis, possibly treated by vascular surgery if severe  Subjective: RLQ pain     Patient ID: Thalia Nyhan is a 49 y o  female  Triage Notes:    Ms Sonali Estrada is seen in consultation at the request of her PCP Dr Clint Talavera for RLQ pain  She has had multiple symptoms since March  She went to the ED on 3/31 with a day of flank pain and was diagnosed with a right kidney stone  She believes she likely passed this stone  She did see urology the next day  This pain progressed to the right side of her back/flank and to the right lower quadrant  She underwent KUB and followed up with urology two weeks later  She was also treated for constipation but despite that she still has this pain and went back to the ED on 4/26 and underwent another CT scan that did not show any additional renal stones  Findings included a 3mm ovarian vein calculi and some ground glass opacities of unclear significance  She is without pulmonary symptoms  The following portions of the patient's history were reviewed and updated as appropriate:   She  has a past medical history of Asthma, Back pain, Kidney stone, Obesity, and Postgastrectomy malabsorption    She   Patient Active Problem List    Diagnosis Date Noted    Chronic right lower quadrant pain 05/04/2021    Nephrolithiasis 03/31/2021    Other chest pain 12/21/2020    Elevated TSH 12/21/2020    Left ear pain 11/15/2019    Arthritis 11/04/2019    Obesity 06/08/2018    Abdominal pain 04/20/2018    Gastroesophageal reflux disease with esophagitis 04/20/2018    S/P laparoscopic sleeve gastrectomy 02/27/2018    Bilateral shoulder pain 02/16/2018    Health maintenance examination 02/13/2018    Vitamin D deficiency 07/22/2015    Chronic rhinitis 01/26/2015     She  has a past surgical history that includes Cholecystectomy; GASTRECTOMY SLEEVE LAPAROSCOPIC; Knee arthroscopy w/ meniscal repair (Left); Neuroplasty / transposition median nerve at carpal tunnel bilateral; and pr esophagogastroduodenoscopy transoral diagnostic (N/A, 4/30/2018)  Her family history includes Diabetes in her father and mother; Hypertension in her father; Kidney disease in her mother; Nephrolithiasis in her family; Other in her father  She  reports that she has never smoked  She has never used smokeless tobacco  She reports current alcohol use  She reports current drug use  Drug: Marijuana    Current Outpatient Medications on File Prior to Visit   Medication Sig    albuterol (VENTOLIN HFA) 90 mcg/act inhaler Inhale 2 puffs every 6 (six) hours as needed for wheezing    aspirin-acetaminophen-caffeine (EXCEDRIN MIGRAINE) 250-250-65 MG per tablet Take 1 tablet by mouth every 6 (six) hours as needed for headaches    cetirizine (ZyrTEC) 10 mg tablet Take 1 tablet (10 mg total) by mouth daily    fluticasone (FLONASE) 50 mcg/act nasal spray 2 sprays into each nostril daily    ibuprofen (MOTRIN) 800 mg tablet Take 1 tablet (800 mg total) by mouth every 8 (eight) hours as needed for mild pain    NON FORMULARY     norethindrone-ethinyl estradiol-iron (MICROGESTIN FE1 5/30) 1 5-30 MG-MCG tablet Take 1 tablet by mouth daily    ondansetron (ZOFRAN-ODT) 4 mg disintegrating tablet Take 1 tablet (4 mg total) by mouth every 6 (six) hours as needed for nausea or vomiting    [] sulfamethoxazole-trimethoprim (BACTRIM DS) 800-160 mg per tablet Take 1 tablet by mouth every 12 (twelve) hours for 7 days    tamsulosin (FLOMAX) 0 4 mg Take 1 capsule (0 4 mg total) by mouth daily with dinner    KETOTIFEN FUMARATE OP Apply 1 drop to eye 2 (two) times a day     No current facility-administered medications on file prior to visit  She is allergic to other; penicillins; pollen extract; shellfish-derived products - food allergy; and adhesive [medical tape]       Review of Systems      Objective:      BP 92/68   Pulse 83   Temp (!) 97 2 °F (36 2 °C) (Tympanic)   Ht 5' 3" (1 6 m)   Wt 86 6 kg (191 lb)   Breastfeeding No   BMI 33 83 kg/m²     Below is the patient's most recent value for Albumin, ALT, AST, BUN, Calcium, Chloride, Cholesterol, CO2, Creatinine, GFR, Glucose, HDL, Hematocrit, Hemoglobin, Hemoglobin A1C, LDL, Magnesium, Phosphorus, Platelets, Potassium, PSA, Sodium, Triglycerides, and WBC  Lab Results   Component Value Date    ALT 30 2021    AST 18 2021    BUN 10 2021    CALCIUM 8 1 (L) 2021     2021    CHOL 166 12/15/2015    CO2 24 2021    CREATININE 0 88 2021    HDL 39 (L) 2021    HCT 37 4 2021    HGB 12 0 2021    HGBA1C 5 2 2021     2021    K 4 2 2021     12/15/2015    TRIG 131 2021    WBC 5 11 2021     Note: for a comprehensive list of the patient's lab results, access the Results Review activity  Physical Exam  HENT:      Mouth/Throat:      Mouth: Mucous membranes are moist    Eyes:      General: No scleral icterus  Cardiovascular:      Rate and Rhythm: Normal rate  Pulmonary:      Effort: Pulmonary effort is normal  No respiratory distress  Abdominal:      General: Abdomen is flat  Palpations: Abdomen is soft  Tenderness: There is abdominal tenderness (RLQ)        Comments: +RLQ tenderness  Negative rovsing, negative obturator, negative psoas signs  Musculoskeletal:         General: No deformity  Skin:     General: Skin is warm and dry  Neurological:      Mental Status: She is oriented to person, place, and time  Psychiatric:         Mood and Affect: Mood normal          Behavior: Behavior normal          Thought Content:  Thought content normal          Judgment: Judgment normal              Procedures

## 2021-05-04 NOTE — PATIENT INSTRUCTIONS
A patient with persistent right lower quadrant pain  None diagnostic imaging   Unsuccessful therapeutic trial of antibiotic     Request general surgical consultation

## 2021-05-04 NOTE — PROGRESS NOTES
Assessment/Plan:       Diagnoses and all orders for this visit:    Chronic right lower quadrant pain  -     Ambulatory referral to General Surgery; Future    Other orders  -     Cancel: Ambulatory referral to Gastroenterology; Future                Subjective:      Patient ID: Alannah Lopez is a 49 y o  female  Persistent right lower quadrant pain   A 49year-old with persistent right lower quadrant pain which although waxing and waning has never really disappeared since March 31st     The exam is consistent with appendicitis but no imaging study has really been diagnostic  She has had a couple of ER trips  Initial CT scanning appears to have documented right renal calculus with some renal changes that went along with this but subsequent studies have not demonstrated this  In fact, recent renal ultrasound and then recent renal CT scan were perfectly normal   Appendix is noted to be unchanged  Urinalysis was positive for pyuria but culture was nondiagnostic with 50,000 contaminant per high-power field, and a week of Cipro has made no difference     CT scanning also documented possible basilar pulmonary infiltrates but there are absolutely no symptoms referable to this at all  Symptoms persist   No fever no chills no sweats but again right lower quadrant pain with tenderness to palpation and percussion suggesting peritoneal irritation      The following portions of the patient's history were reviewed and updated as appropriate:   She has a past medical history of Asthma, Back pain, Kidney stone, Obesity, and Postgastrectomy malabsorption  ,  does not have any pertinent problems on file  ,   has a past surgical history that includes Cholecystectomy; GASTRECTOMY SLEEVE LAPAROSCOPIC; Knee arthroscopy w/ meniscal repair (Left); Neuroplasty / transposition median nerve at carpal tunnel bilateral; and pr esophagogastroduodenoscopy transoral diagnostic (N/A, 4/30/2018)  ,  family history includes Diabetes in her father and mother; Hypertension in her father; Kidney disease in her mother; Nephrolithiasis in her family; Other in her father  ,   reports that she has never smoked  She has never used smokeless tobacco  She reports current alcohol use  She reports current drug use  Drug: Marijuana  ,  is allergic to other; penicillins; pollen extract; shellfish-derived products - food allergy; and adhesive [medical tape]     Current Outpatient Medications   Medication Sig Dispense Refill    albuterol (VENTOLIN HFA) 90 mcg/act inhaler Inhale 2 puffs every 6 (six) hours as needed for wheezing 1 Inhaler 3    aspirin-acetaminophen-caffeine (EXCEDRIN MIGRAINE) 250-250-65 MG per tablet Take 1 tablet by mouth every 6 (six) hours as needed for headaches      cetirizine (ZyrTEC) 10 mg tablet Take 1 tablet (10 mg total) by mouth daily 30 tablet 3    fluticasone (FLONASE) 50 mcg/act nasal spray 2 sprays into each nostril daily 1 Bottle 1    ibuprofen (MOTRIN) 800 mg tablet Take 1 tablet (800 mg total) by mouth every 8 (eight) hours as needed for mild pain 30 tablet 0    norethindrone-ethinyl estradiol-iron (MICROGESTIN FE1 5/30) 1 5-30 MG-MCG tablet Take 1 tablet by mouth daily      ondansetron (ZOFRAN-ODT) 4 mg disintegrating tablet Take 1 tablet (4 mg total) by mouth every 6 (six) hours as needed for nausea or vomiting 20 tablet 0    sulfamethoxazole-trimethoprim (BACTRIM DS) 800-160 mg per tablet Take 1 tablet by mouth every 12 (twelve) hours for 7 days 14 tablet 0    tamsulosin (FLOMAX) 0 4 mg Take 1 capsule (0 4 mg total) by mouth daily with dinner 14 capsule 0    KETOTIFEN FUMARATE OP Apply 1 drop to eye 2 (two) times a day      NON FORMULARY        No current facility-administered medications for this visit  Review of Systems   Gastrointestinal: Positive for abdominal pain  All other systems reviewed and are negative          Objective:  Vitals:    05/04/21 1026   BP: 92/68   Pulse: 83   Temp: (!) 97 2 °F (36 2 °C)   SpO2: 97%      Physical Exam  Constitutional:       Appearance: Normal appearance  She is not ill-appearing or toxic-appearing  Neck:      Musculoskeletal: Normal range of motion  Abdominal:      General: Abdomen is flat  Tenderness: There is abdominal tenderness in the right lower quadrant  There is no right CVA tenderness, left CVA tenderness or guarding  Neurological:      Mental Status: She is alert  Patient Instructions    A patient with persistent right lower quadrant pain  None diagnostic imaging   Unsuccessful therapeutic trial of antibiotic     Request general surgical consultation

## 2021-05-07 ENCOUNTER — HOSPITAL ENCOUNTER (OUTPATIENT)
Dept: RADIOLOGY | Facility: HOSPITAL | Age: 51
Discharge: HOME | End: 2021-05-07
Payer: COMMERCIAL

## 2021-05-07 ENCOUNTER — OFFICE VISIT (OUTPATIENT)
Dept: INTERNAL MEDICINE CLINIC | Facility: CLINIC | Age: 51
End: 2021-05-07
Payer: COMMERCIAL

## 2021-05-07 ENCOUNTER — APPOINTMENT (OUTPATIENT)
Dept: LAB | Facility: CLINIC | Age: 51
End: 2021-05-07
Payer: COMMERCIAL

## 2021-05-07 VITALS
BODY MASS INDEX: 34.09 KG/M2 | HEART RATE: 82 BPM | DIASTOLIC BLOOD PRESSURE: 72 MMHG | WEIGHT: 192.4 LBS | SYSTOLIC BLOOD PRESSURE: 110 MMHG | OXYGEN SATURATION: 98 % | HEIGHT: 63 IN | TEMPERATURE: 97.7 F

## 2021-05-07 DIAGNOSIS — R91.8 GROUND GLASS OPACITY PRESENT ON IMAGING OF LUNG: ICD-10-CM

## 2021-05-07 DIAGNOSIS — G89.29 CHRONIC RIGHT LOWER QUADRANT PAIN: Primary | ICD-10-CM

## 2021-05-07 DIAGNOSIS — R10.2 PELVIC PRESSURE IN FEMALE: ICD-10-CM

## 2021-05-07 DIAGNOSIS — R10.31 CHRONIC RIGHT LOWER QUADRANT PAIN: Primary | ICD-10-CM

## 2021-05-07 LAB
BILIRUB UR QL STRIP: NEGATIVE
CLARITY UR: CLEAR
COLOR UR: YELLOW
GLUCOSE UR STRIP-MCNC: NEGATIVE MG/DL
HGB UR QL STRIP.AUTO: NEGATIVE
KETONES UR STRIP-MCNC: NEGATIVE MG/DL
LEUKOCYTE ESTERASE UR QL STRIP: NEGATIVE
NITRITE UR QL STRIP: NEGATIVE
PH UR STRIP.AUTO: 6 [PH]
PROT UR STRIP-MCNC: NEGATIVE MG/DL
SP GR UR STRIP.AUTO: 1.01 (ref 1–1.03)
UROBILINOGEN UR QL STRIP.AUTO: 1 E.U./DL

## 2021-05-07 PROCEDURE — 87086 URINE CULTURE/COLONY COUNT: CPT

## 2021-05-07 PROCEDURE — 99214 OFFICE O/P EST MOD 30 MIN: CPT | Performed by: NURSE PRACTITIONER

## 2021-05-07 PROCEDURE — 81003 URINALYSIS AUTO W/O SCOPE: CPT | Performed by: NURSE PRACTITIONER

## 2021-05-07 PROCEDURE — 71046 X-RAY EXAM CHEST 2 VIEWS: CPT

## 2021-05-07 NOTE — ASSESSMENT & PLAN NOTE
Patient recently had a CT urogram performed which she has subtle ground-glass opacities in both lungs  The patient does have shortness of breath on occasion  She does not have a cough  She does not have a fever  Chest x-ray has been ordered  IMPRESSION:     1  No urinary tract calculi or CT findings to account for hematuria        2  Subtle groundglass opacities are seen in both lungs  Correlate for viral pneumonia      3  Subcentimeter left renal hypodensity too small to characterize

## 2021-05-07 NOTE — ASSESSMENT & PLAN NOTE
CT the abdomen and pelvis was done which showed a questionable calculus within the right ovarian vein  CT urogram was recommended which again shows the similar finding  Patient was evaluated by general surgery who is questioning whether the patient would need a vascular consultation  The patient believes Dr Joshua Eng did speak to other providers and I do not have that information today  Currently I will not order any further testing  I will await until Dr Joshua Eng comes back to the office Wednesday to discuss the case with him  IMPRESSION:     No acute abnormality identified      0 3 cm calculus anterior to the right psoas musculature appears to be within the right ovarian vein rather than the ureter  There is no right-sided hydronephrosis  Continued clinical follow-up recommended      ADDENDUM:     If clinically warranted, CT urogram can be performed when clinically warranted to better delineate the course of the ureter, as it appears to be in close approximation and crosses over the right ovarian vein, making discrete identification somewhat   difficult

## 2021-05-07 NOTE — PROGRESS NOTES
Venu    NAME: Armando Romero  AGE: 49 y o  SEX: female  : 1970     DATE: 2021     Assessment and Plan:     Problem List Items Addressed This Visit        Other    Chronic right lower quadrant pain - Primary       CT the abdomen and pelvis was done which showed a questionable calculus within the right ovarian vein  CT urogram was recommended which again shows the similar finding  Patient was evaluated by general surgery who is questioning whether the patient would need a vascular consultation  The patient believes Dr Denae Weber did speak to other providers and I do not have that information today  Currently I will not order any further testing  I will await until Dr Denae Weber comes back to the office Wednesday to discuss the case with him  IMPRESSION:     No acute abnormality identified      0 3 cm calculus anterior to the right psoas musculature appears to be within the right ovarian vein rather than the ureter  There is no right-sided hydronephrosis  Continued clinical follow-up recommended      ADDENDUM:     If clinically warranted, CT urogram can be performed when clinically warranted to better delineate the course of the ureter, as it appears to be in close approximation and crosses over the right ovarian vein, making discrete identification somewhat   difficult  Ground glass opacity present on imaging of lung       Patient recently had a CT urogram performed which she has subtle ground-glass opacities in both lungs  The patient does have shortness of breath on occasion  She does not have a cough  She does not have a fever  Chest x-ray has been ordered  IMPRESSION:     1  No urinary tract calculi or CT findings to account for hematuria        2  Subtle groundglass opacities are seen in both lungs  Correlate for viral pneumonia      3  Subcentimeter left renal hypodensity too small to characterize           Relevant Orders    XR chest pa & lateral      Other Visit Diagnoses     Pelvic pressure in female        Relevant Orders    UA (URINE) with reflex to Scope    Urine culture              No follow-ups on file  Chief Complaint:     Chief Complaint   Patient presents with    Follow-up     needs answers     Pain     on the right lower quad        History of Present Illness:     Suzanne Jhaveri to the office today to discuss her recent CT of the abdomen pelvis as well as her CT urogram findings  Patient did see her general surgeon who does not believe there is any intervention that can be performed at this time  Dr Katie Garcia did speak with other providers regarding these x-rays according to the patient and I do not have that information  He is out of the office until next Wednesday  I will order a chest x-ray due to the ground-glass appearance of her lungs on her CT urogram   A urinalysis and culture were ordered today as the patient is having pelvic pressure  I will speak to Dr Katie Garcia next week and he will contact the patient directly to discuss any further recommendations  Review of Systems:     Review of Systems   Constitutional: Negative for activity change, fatigue and fever  HENT: Negative for congestion, hearing loss, rhinorrhea, trouble swallowing and voice change  Eyes: Negative for photophobia, pain, discharge and visual disturbance  Respiratory: Positive for shortness of breath (with exertion)  Negative for cough and chest tightness  Cardiovascular: Negative for chest pain, palpitations and leg swelling  Gastrointestinal: Positive for abdominal pain (occasional right lower quadrant)  Negative for blood in stool, constipation, nausea and vomiting  Endocrine: Negative for cold intolerance and heat intolerance  Genitourinary: Negative for difficulty urinating, frequency, hematuria, urgency, vaginal bleeding and vaginal discharge  Musculoskeletal: Negative for arthralgias and myalgias  Skin: Negative  Neurological: Negative for dizziness, weakness, numbness and headaches  Psychiatric/Behavioral: Negative for decreased concentration  The patient is not nervous/anxious  Problem List:     Patient Active Problem List   Diagnosis    Health maintenance examination    Bilateral shoulder pain    Abdominal pain    Gastroesophageal reflux disease with esophagitis    Obesity    Chronic rhinitis    S/P laparoscopic sleeve gastrectomy    Vitamin D deficiency    Arthritis    Left ear pain    Other chest pain    Elevated TSH    Nephrolithiasis    Chronic right lower quadrant pain        Objective:     /72 (BP Location: Left arm, Patient Position: Sitting, Cuff Size: Standard)   Pulse 82   Temp 97 7 °F (36 5 °C) (Temporal) Comment: no nsaids  Ht 5' 3" (1 6 m)   Wt 87 3 kg (192 lb 6 4 oz)   SpO2 98%   BMI 34 08 kg/m²     Physical Exam  Vitals signs reviewed  Constitutional:       Appearance: Normal appearance  She is obese  HENT:      Head: Normocephalic  Nose: Nose normal       Mouth/Throat:      Mouth: Mucous membranes are moist       Pharynx: Oropharynx is clear  Eyes:      Extraocular Movements: Extraocular movements intact  Pupils: Pupils are equal, round, and reactive to light  Cardiovascular:      Rate and Rhythm: Normal rate and regular rhythm  Pulmonary:      Effort: Pulmonary effort is normal       Breath sounds: Normal breath sounds  Abdominal:      General: There is no distension  Palpations: There is no mass  Tenderness: There is no abdominal tenderness  There is no guarding or rebound  Hernia: No hernia is present  Musculoskeletal: Normal range of motion  Skin:     General: Skin is warm and dry  Neurological:      General: No focal deficit present  Mental Status: She is alert and oriented to person, place, and time     Psychiatric:         Mood and Affect: Mood normal          Behavior: Behavior normal  Thought Content:  Thought content normal          Judgment: Judgment normal          Peyton Cerrato, [address] [place] [address]

## 2021-05-08 LAB — BACTERIA UR CULT: NORMAL

## 2021-05-10 ENCOUNTER — TELEPHONE (OUTPATIENT)
Dept: SURGERY | Facility: CLINIC | Age: 51
End: 2021-05-10

## 2021-05-11 ENCOUNTER — TELEPHONE (OUTPATIENT)
Dept: INTERNAL MEDICINE CLINIC | Facility: CLINIC | Age: 51
End: 2021-05-11

## 2021-05-11 NOTE — TELEPHONE ENCOUNTER
----- Message from Aurora Smith, [address] sent at 5/11/2021  8:55 AM EDT -----  Please let the patient know that her chest x-ray and urine culture were both normal

## 2021-05-13 ENCOUNTER — ANESTHESIA EVENT (OUTPATIENT)
Dept: GASTROENTEROLOGY | Facility: HOSPITAL | Age: 51
End: 2021-05-13

## 2021-05-13 NOTE — ANESTHESIA PREPROCEDURE EVALUATION
Procedure:  EGD    Relevant Problems   No relevant active problems      Obesity    Back pain    Asthma seasonal   Kidney stone    Postgastrectomy malabsorption      Health maintenance examination   Bilateral shoulder pain   Abdominal pain   Gastroesophageal reflux disease with esophagitis   Obesity   Chronic rhinitis   S/P laparoscopic sleeve gastrectomy   Vitamin D deficiency   Arthritis   Left ear pain   Other chest pain   Elevated TSH   Nephrolithiasis   Chronic right lower quadrant pain   Ground glass opacity present on imaging of lung       Physical Exam    Airway    Mallampati score: II  TM Distance: >3 FB  Neck ROM: full     Dental       Cardiovascular  Cardiovascular exam normal    Pulmonary  Pulmonary exam normal     Other Findings        Anesthesia Plan  ASA Score- 2     Anesthesia Type- IV sedation with anesthesia with ASA Monitors  Additional Monitors:   Airway Plan:           Plan Factors-Exercise tolerance (METS): >4 METS  Chart reviewed  EKG reviewed  Imaging results reviewed  Existing labs reviewed  Patient summary reviewed  Induction- intravenous  Postoperative Plan-     Informed Consent- Anesthetic plan and risks discussed with patient  I personally reviewed this patient with the CRNA  Discussed and agreed on the Anesthesia Plan with the CRNA  Evans Bryan

## 2021-05-14 ENCOUNTER — ANESTHESIA (OUTPATIENT)
Dept: GASTROENTEROLOGY | Facility: HOSPITAL | Age: 51
End: 2021-05-14

## 2021-05-14 ENCOUNTER — HOSPITAL ENCOUNTER (OUTPATIENT)
Dept: GASTROENTEROLOGY | Facility: HOSPITAL | Age: 51
Setting detail: OUTPATIENT SURGERY
Discharge: HOME | End: 2021-05-14
Attending: SURGERY
Payer: COMMERCIAL

## 2021-05-14 VITALS
HEART RATE: 64 BPM | BODY MASS INDEX: 31.7 KG/M2 | HEIGHT: 65 IN | WEIGHT: 190.26 LBS | SYSTOLIC BLOOD PRESSURE: 106 MMHG | TEMPERATURE: 97.9 F | OXYGEN SATURATION: 99 % | DIASTOLIC BLOOD PRESSURE: 64 MMHG | RESPIRATION RATE: 16 BRPM

## 2021-05-14 DIAGNOSIS — E66.01 MORBID OBESITY (HCC): ICD-10-CM

## 2021-05-14 PROCEDURE — 43239 EGD BIOPSY SINGLE/MULTIPLE: CPT | Performed by: SURGERY

## 2021-05-14 PROCEDURE — 88305 TISSUE EXAM BY PATHOLOGIST: CPT | Performed by: PATHOLOGY

## 2021-05-14 RX ORDER — SODIUM CHLORIDE, SODIUM LACTATE, POTASSIUM CHLORIDE, CALCIUM CHLORIDE 600; 310; 30; 20 MG/100ML; MG/100ML; MG/100ML; MG/100ML
125 INJECTION, SOLUTION INTRAVENOUS CONTINUOUS
Status: DISCONTINUED | OUTPATIENT
Start: 2021-05-14 | End: 2021-05-18 | Stop reason: HOSPADM

## 2021-05-14 RX ORDER — LIDOCAINE HYDROCHLORIDE 20 MG/ML
INJECTION, SOLUTION EPIDURAL; INFILTRATION; INTRACAUDAL; PERINEURAL AS NEEDED
Status: DISCONTINUED | OUTPATIENT
Start: 2021-05-14 | End: 2021-05-14

## 2021-05-14 RX ORDER — PROPOFOL 10 MG/ML
INJECTION, EMULSION INTRAVENOUS AS NEEDED
Status: DISCONTINUED | OUTPATIENT
Start: 2021-05-14 | End: 2021-05-14

## 2021-05-14 RX ADMIN — PROPOFOL 150 MG: 10 INJECTION, EMULSION INTRAVENOUS at 08:22

## 2021-05-14 RX ADMIN — PROPOFOL 20 MG: 10 INJECTION, EMULSION INTRAVENOUS at 08:28

## 2021-05-14 RX ADMIN — PROPOFOL 30 MG: 10 INJECTION, EMULSION INTRAVENOUS at 08:24

## 2021-05-14 RX ADMIN — PROPOFOL 20 MG: 10 INJECTION, EMULSION INTRAVENOUS at 08:26

## 2021-05-14 RX ADMIN — SODIUM CHLORIDE, SODIUM LACTATE, POTASSIUM CHLORIDE, AND CALCIUM CHLORIDE: .6; .31; .03; .02 INJECTION, SOLUTION INTRAVENOUS at 08:15

## 2021-05-14 RX ADMIN — LIDOCAINE HYDROCHLORIDE 100 MG: 20 INJECTION, SOLUTION EPIDURAL; INFILTRATION; INTRACAUDAL; PERINEURAL at 08:21

## 2021-05-14 NOTE — H&P
This is a 49 y o  female with a history of morbid obesity s/p sleeve gastrectomy and Body mass index is 31 66 kg/m²  Here for an EGD to evaluate the anatomy of the GI tract and to rule out the presence of H  pylori  Physical Exam    /58   Pulse 63   Temp (!) 97 2 °F (36 2 °C) (Temporal)   Resp (!) 24   Ht 5' 5" (1 651 m)   Wt 86 3 kg (190 lb 4 1 oz)   LMP 05/14/2019   SpO2 98%   BMI 31 66 kg/m²    AAOx3  RRR  CTA B  Abdomen benign  A/P:    This is a 49 y o  female with a history of morbid obesity s/p laparoscopic sleeve gastrectomy and Body mass index is 31 66 kg/m²       Will proceed with the EGD and biopsies        Antoinette Bond MD  5/14/2021  7:54 AM

## 2021-05-14 NOTE — ANESTHESIA POSTPROCEDURE EVALUATION
Post-Op Assessment Note    CV Status:  Stable  Pain Score: 0    Pain management: adequate     Mental Status:  Sleepy   Hydration Status:  Euvolemic   PONV Controlled:  Controlled   Airway Patency:  Patent      Post Op Vitals Reviewed: Yes      Staff: CRNA         No complications documented      BP   91/53   Temp     Pulse 68   Resp 18   SpO2 98% 3L FM

## 2021-05-17 ENCOUNTER — OFFICE VISIT (OUTPATIENT)
Dept: GASTROENTEROLOGY | Facility: CLINIC | Age: 51
End: 2021-05-17
Payer: COMMERCIAL

## 2021-05-17 VITALS
DIASTOLIC BLOOD PRESSURE: 72 MMHG | HEART RATE: 77 BPM | WEIGHT: 190.8 LBS | BODY MASS INDEX: 31.79 KG/M2 | HEIGHT: 65 IN | SYSTOLIC BLOOD PRESSURE: 110 MMHG

## 2021-05-17 DIAGNOSIS — Z12.12 SCREENING FOR COLORECTAL CANCER: ICD-10-CM

## 2021-05-17 DIAGNOSIS — R10.9 RIGHT SIDED ABDOMINAL PAIN: Primary | ICD-10-CM

## 2021-05-17 DIAGNOSIS — Z12.11 SCREENING FOR COLORECTAL CANCER: ICD-10-CM

## 2021-05-17 PROCEDURE — 99214 OFFICE O/P EST MOD 30 MIN: CPT | Performed by: PHYSICIAN ASSISTANT

## 2021-05-17 RX ORDER — DICYCLOMINE HCL 20 MG
20 TABLET ORAL EVERY 6 HOURS PRN
Qty: 60 TABLET | Refills: 2 | Status: SHIPPED | OUTPATIENT
Start: 2021-05-17 | End: 2021-08-11 | Stop reason: SDUPTHER

## 2021-05-17 NOTE — PROGRESS NOTES
[facility]  Curt Guilherme 49 y o  female MRN: [id number]       CC: Abdominal bloating, right lower quadrant pain and chronic constipation    HPI:  Demetrio Dunn is a 64-year-old female with history of obesity status post sleeve gastrectomy 5 years ago, irritable bowel syndrome, lumbar radiculopathy and recent nephrolithiasis  She also  Is status post cholecystectomy  Patient presents to the office today for persistent abdominal bloating and constipation  She reports that she has right lower quadrant pain even after she passed kidney stones earlier this year  She reports that without any laxative she will have bowel movement once to twice weekly  She has been using over-the-counter laxative, and will go every 2 days  Her last colonoscopy was in 2012  She was due for recall at age 49  Patient's last EGD was performed by bariatric surgery this month who noted gastritis  Biopsies are pending  Review of Systems:    CONSTITUTIONAL: Denies any fever, chills, or rigors  Good appetite, and no recent weight loss  HEENT: No earache or tinnitus  Denies hearing loss or visual disturbances  CARDIOVASCULAR: No chest pain or palpitations  RESPIRATORY: Denies any cough, hemoptysis, shortness of breath or dyspnea on exertion  GASTROINTESTINAL: As noted in the History of Present Illness  GENITOURINARY: No problems with urination  Denies any hematuria or dysuria  NEUROLOGIC: No dizziness or vertigo, denies headaches  MUSCULOSKELETAL: Denies any muscle or joint pain  SKIN: Denies skin rashes or itching  ENDOCRINE: Denies excessive thirst  Denies intolerance to heat or cold  PSYCHOSOCIAL: Denies depression or anxiety  Denies any recent memory loss         Current Outpatient Medications   Medication Sig Dispense Refill    albuterol (VENTOLIN HFA) 90 mcg/act inhaler Inhale 2 puffs every 6 (six) hours as needed for wheezing (Patient taking differently: Inhale 2 puffs as needed for wheezing ) 1 Inhaler 3    aspirin-acetaminophen-caffeine (EXCEDRIN MIGRAINE) 250-250-65 MG per tablet Take 1 tablet by mouth every 6 (six) hours as needed for headaches      cetirizine (ZyrTEC) 10 mg tablet Take 1 tablet (10 mg total) by mouth daily 30 tablet 3    fluticasone (FLONASE) 50 mcg/act nasal spray 2 sprays into each nostril daily (Patient taking differently: 2 sprays into each nostril as needed ) 1 Bottle 1    ibuprofen (MOTRIN) 800 mg tablet Take 1 tablet (800 mg total) by mouth every 8 (eight) hours as needed for mild pain 30 tablet 0    NON FORMULARY       norethindrone-ethinyl estradiol-iron (MICROGESTIN FE1 5/30) 1 5-30 MG-MCG tablet Take 1 tablet by mouth daily      dicyclomine (BENTYL) 20 mg tablet Take 1 tablet (20 mg total) by mouth every 6 (six) hours as needed (abdominal pain) 60 tablet 2    ondansetron (ZOFRAN-ODT) 4 mg disintegrating tablet Take 1 tablet (4 mg total) by mouth every 6 (six) hours as needed for nausea or vomiting (Patient not taking: Reported on 5/17/2021) 20 tablet 0     No current facility-administered medications for this visit  Facility-Administered Medications Ordered in Other Visits   Medication Dose Route Frequency Provider Last Rate Last Admin    lactated ringers infusion  125 mL/hr Intravenous Continuous Vanesa Callejas MD   New Bag at 05/14/21 0815     Past Medical History:   Diagnosis Date    Asthma     seasonal    Back pain     Kidney stone     Obesity     Postgastrectomy malabsorption      Past Surgical History:   Procedure Laterality Date    CHOLECYSTECTOMY      GASTRECTOMY SLEEVE LAPAROSCOPIC      KNEE ARTHROSCOPY W/ MENISCAL REPAIR Left     NEUROPLASTY / TRANSPOSITION MEDIAN NERVE AT CARPAL TUNNEL BILATERAL      NM ESOPHAGOGASTRODUODENOSCOPY TRANSORAL DIAGNOSTIC N/A 4/30/2018    Procedure: ESOPHAGOGASTRODUODENOSCOPY (EGD); Surgeon: Woody Durán MD;  Location: [facility];   Service: Gastroenterology     Social History     Socioeconomic History    Marital status: /Civil Union     Spouse name: None    Number of children: None    Years of education: None    Highest education level: None   Occupational History    None   Social Needs    Financial resource strain: None    Food insecurity     Worry: None     Inability: None    Transportation needs     Medical: None     Non-medical: None   Tobacco Use    Smoking status: Never Smoker    Smokeless tobacco: Never Used   Substance and Sexual Activity    Alcohol use: Yes     Frequency: Never     Drinks per session: 1 or 2     Binge frequency: Never     Comment: only on birthday    Drug use: Yes     Types: Marijuana     Comment: uses pill or vape (medical marijuania card)    Sexual activity: Yes     Partners: Male   Lifestyle    Physical activity     Days per week: 0 days     Minutes per session: 0 min    Stress: Not at all   Relationships    Social connections     Talks on phone: None     Gets together: None     Attends [employer] service: None     Active member of club or organization: None     Attends meetings of clubs or organizations: None     Relationship status: None    Intimate partner violence     Fear of current or ex partner: None     Emotionally abused: None     Physically abused: None     Forced sexual activity: None   Other Topics Concern    None   Social History Narrative    None     Family History   Problem Relation Age of Onset    Diabetes Mother     Kidney disease Mother         end stage    Diabetes Father     Other Father         gangrene    Hypertension Father     Nephrolithiasis Family             PHYSICAL EXAM:    Vitals:    05/17/21 1305   BP: 110/72   Pulse: 77   Weight: 86 5 kg (190 lb 12 8 oz)   Height: 5' 5" (1 651 m)     General Appearance:   Alert and oriented x 3   Cooperative, and in no respiratory distress   HEENT:   Normocephalic, atraumatic, anicteric      Neck:  Supple, symmetrical, trachea midline   Lungs:   Clear to auscultation bilaterally  Heart[de-identified]   Regular rate and rhythm   Abdomen:   Soft, non-tender, non-distended; normal bowel sounds; no masses, no organomegaly    Genitalia:   Deferred    Rectal:   Deferred    Extremities:  No cyanosis, clubbing or edema    Pulses:  2+ and symmetric all extremities    Skin:  Skin color, texture, turgor normal, no rashes or lesions    Lymph nodes:  No palpable cervical or supraclavicular lymphadenopathy        Lab Results:   Results from last 6 Months   Lab Units 04/26/21  1413   WBC Thousand/uL 5 11   HEMOGLOBIN g/dL 12 0   HEMATOCRIT % 37 4   PLATELETS Thousands/uL 195   NEUTROS PCT % 61   LYMPHS PCT % 29   MONOS PCT % 7   EOS PCT % 2     Results from last 6 Months   Lab Units 04/26/21  1413   POTASSIUM mmol/L 4 2   CHLORIDE mmol/L 103   CO2 mmol/L 24   BUN mg/dL 10   CREATININE mg/dL 0 88   CALCIUM mg/dL 8 1*   ALK PHOS U/L 51   ALT U/L 30   AST U/L 18         Results from last 6 Months   Lab Units 04/26/21  1413   LIPASE u/L 90       Imaging Studies: I have personally reviewed pertinent imaging studies  No results found  ASSESSMENT and PLAN:      1)   Right lower quadrant pain, abdominal bloating and constipation -   Patient is due for colonoscopy this year  She may have spasming secondary to constipation and irritable bowel history  She is also following up with gynecology for calculi seen within the ovarian vein  Dr Paloma Hardy was able to contact Dr Noé Rowe who stated that it is a rare finding and a rare source of pain  -  Colonoscopy to investigate  -  Will try Linzess 72 micro g daily  -  Will also trial Bentyl    2) Gastritis -  Biopsies pending from EGD performed by bariatric surgery  Follow up after colonoscopy

## 2021-05-24 NOTE — PROGRESS NOTES
Bariatric Behavioral Health Evaluation    Presenting Problem: 49year old female ( 1970) here for behavioral health evaluation to transfer into our program  Patient is s/p LSG in 2015 with Dr Lorraine Linder at [facility]  Patient had initial consult with Dr Jean Marie Garcia 2021  Patient is wanting to get back on track and prevent future health problems  Realizes Post- Op Requirements? Yes, but would benefit from more education  Pre-morbid level of function and history of present illness: prior to surgery patient reports struggling with her weight since she started having children 30 years ago  Patient reports that after surgery she started gaining weight again in the last 2 years due to [place]  Psychiatric/Psychological Treatment Diagnosis: Patient denies any mental health diagnosis or treatment  Patient denies any substance use disorder  Patient currently prescribed medical marijuana for back pain  Patient educated on the benefits of outpatient therapy to the bariatric patient while going through the process of surgery, patient will reach out for support if needed  Outpatient Counselor No     Psychiatrist No     Have you had Inpatient Treatment? No    Domestic Violence No    Abuse History:  Patient denies this  Additional comments/stressors related to family/relationships/peer support: Patient identifies her , her mother in law, and her kids as her support  Patient identifies finances and her weight as current stressors  Physical/Psychological Assessment:     Appearance: appropriate  Sociability: average  Affect: appropriate  Mood: calm  Thought Process: coherent  Speech: normal  Content: no impairment  Orientation: person  Yes , place  Yes , time  Yes , normal attention span  Yes , normal memory  Yes   and normal judgement  Yes   Insight: emotional  good    Risk Assessment:     none    Recommendations: Patient referred to [facility]      Risk of Harm to Self or Others: Patient denies SI or HI Observation:     Interviews: This interview only  Access to weapons no     Based on the previous information, the client presents the following risk of harm to self or others: low     Note: Patient denies any mental health diagnosis or treatment  Patient denies any substance use disorder  Patient currently prescribed medical marijuana for back pain  Patient educated on the benefits of outpatient therapy to the bariatric patient while going through the process of surgery, patient will reach out for support if needed  Patient has a medical marijuana card  Educated patient on the risks of vaping, encouraged alternatives  Patient reports that she takes it in pill form  Denies tobacco use  Alcohol use 1x per year  Patient educated on the impact of nicotine and alcohol on the post bariatric patient  Patient will follow up with DALTON as well as NEHA/RADHA

## 2021-05-24 NOTE — PROGRESS NOTES
Bariatric Nutrition Assessment Note    Transfer care - for [facility]    Type of surgery  Vertical sleeve gastrectomy  Surgery Date: 2015  Dr Martha Concepcion at [facility]  Surgeon: Dr Jennifer Huitron 4/27/2021    Nutrition Assessment   Navin Mayfield  49 y o   female  Last menstrual period 05/14/2019, not currently breastfeeding  Height:5'3 7""  Weight: 188 4#   BMI:  32 6  Weight on Day of Weight Loss Surgery: 215#  PAULO: 135#  Regain: at 197# - gained 60# over past 3years     Review of History and Medications     OTC: None    Past Medical History:   Diagnosis Date    Asthma     seasonal    Back pain     Kidney stone     Obesity     Postgastrectomy malabsorption      Past Surgical History:   Procedure Laterality Date    CHOLECYSTECTOMY      GASTRECTOMY SLEEVE LAPAROSCOPIC      KNEE ARTHROSCOPY W/ MENISCAL REPAIR Left     NEUROPLASTY / TRANSPOSITION MEDIAN NERVE AT CARPAL TUNNEL BILATERAL      [place] ESOPHAGOGASTRODUODENOSCOPY TRANSORAL DIAGNOSTIC N/A 4/30/2018    Procedure: ESOPHAGOGASTRODUODENOSCOPY (EGD); Surgeon: Orlando Arredondo MD;  Location: [facility];   Service: Gastroenterology     Social History     Socioeconomic History    Marital status: /Civil Union     Spouse name: Not on file    Number of children: Not on file    Years of education: Not on file    Highest education level: Not on file   Occupational History    Not on file   Social Needs    Financial resource strain: Not on file    Food insecurity     Worry: Not on file     Inability: Not on file    Transportation needs     Medical: Not on file     Non-medical: Not on file   Tobacco Use    Smoking status: Never Smoker    Smokeless tobacco: Never Used   Substance and Sexual Activity    Alcohol use: Yes     Frequency: Never     Drinks per session: 1 or 2     Binge frequency: Never     Comment: only on birthday   [facility] Drug use: Yes     Types: Marijuana     Comment: uses pill or vape (medical marijuania card)    Sexual activity: Yes     Partners: Male   Lifestyle    Physical activity     Days per week: 0 days     Minutes per session: 0 min    Stress: Not at all   Relationships    Social connections     Talks on phone: Not on file     Gets together: Not on file     Attends Sikhism service: Not on file     Active member of club or organization: Not on file     Attends meetings of clubs or organizations: Not on file     Relationship status: Not on file    Intimate partner violence     Fear of current or ex partner: Not on file     Emotionally abused: Not on file     Physically abused: Not on file     Forced sexual activity: Not on file   Other Topics Concern    Not on file   Social History Narrative    Not on file       Current Outpatient Medications:     albuterol (VENTOLIN HFA) 90 mcg/act inhaler, Inhale 2 puffs every 6 (six) hours as needed for wheezing (Patient taking differently: Inhale 2 puffs as needed for wheezing ), Disp: 1 Inhaler, Rfl: 3    aspirin-acetaminophen-caffeine (EXCEDRIN MIGRAINE) 250-250-65 MG per tablet, Take 1 tablet by mouth every 6 (six) hours as needed for headaches, Disp: , Rfl:     cetirizine (ZyrTEC) 10 mg tablet, Take 1 tablet (10 mg total) by mouth daily, Disp: 30 tablet, Rfl: 3    dicyclomine (BENTYL) 20 mg tablet, Take 1 tablet (20 mg total) by mouth every 6 (six) hours as needed (abdominal pain), Disp: 60 tablet, Rfl: 2    fluticasone (FLONASE) 50 mcg/act nasal spray, 2 sprays into each nostril daily (Patient taking differently: 2 sprays into each nostril as needed ), Disp: 1 Bottle, Rfl: 1    ibuprofen (MOTRIN) 800 mg tablet, Take 1 tablet (800 mg total) by mouth every 8 (eight) hours as needed for mild pain, Disp: 30 tablet, Rfl: 0    NON FORMULARY, , Disp: , Rfl:     norethindrone-ethinyl estradiol-iron (MICROGESTIN FE1 5/30) 1 5-30 MG-MCG tablet, Take 1 tablet by mouth daily, Disp: , Rfl:     ondansetron (ZOFRAN-ODT) 4 mg disintegrating tablet, Take 1 tablet (4 mg total) by mouth every 6 (six) hours as needed for nausea or vomiting (Patient not taking: Reported on 5/17/2021), Disp: 20 tablet, Rfl: 0    Food Intake and Lifestyle Assessment   Food Intake Assessment completed via usual diet recall  Breakfast: 2 coffee w/Monk sugar 1 oz milk  11:45 4 oz oatmeal banana,  honey, nuts, blueberry   Dinner: Root vegetables and meat Soup with rice - no liquid or salad w/beans, plantains, turkey burger  Snack: may graze if hungry - cheese or bevitas  Beverage intake: Tea w/spices  Coffee Water 6-7 bottlle  Diet texture/stage: regular  Protein supplement: None   Estimated protein intake per day: 40 - 50 grams  Estimated fluid intake per day: >64 oz  Meals eaten away from home: rare -  Typical meal pattern: 2 meals per day and  Snacks if hungry  Eating Behaviors: Craves bread  and rice but doesn't indulge  Volume: 1 5 cup  Follows 30/ 45- 60  Grazing - tempted  Coke zero (flat) - 4 oz  Food allergies or intolerances: None  Acid reflux - tomatoes & jar sauce   Cultural or Adventist considerations: None      Physical Assessment  Nutrition Related Findings  Return of Hunger    Physical Activity  Types of exercise: Walking - gradually getting back was at 8 miles - [place] and winter Now at 1 4 miles - Used to go back to gym  Current physical limitations: None    Psychosocial Assessment   Support systems:Spouse, children and mother in law  Socioeconomic factors: none    Nutrition Diagnosis  Diagnosis: Overweight / Obesity (NC-3 3) improved with WLS but relapsing via old habits returning    Interventions and Teaching   Patient educated on post-op nutrition guidelines  Patient educated and handouts provided    Capacity of post-surgery stomach  Adequate hydration  Exercise  Protein supplements  Meal planning and preparation  Appropriate carbohydrate, protein, and fat intake, and food/fluid choices to maximize safe weight loss, nutrient intake, and tolerance   Dietary and lifestyle changes  Possible problems with poor eating habits  Intuitive eating  Techniques for self monitoring and keeping daily food journal  Vitamin / Mineral supplementation of Multivitamin with minerals, Calcium, Vitamin B12, Iron and Vitamin D    Education provided to: patient    Barriers to learning: No barriers identified    Readiness to change: action    Comprehension: verbalizes understanding     Expected Compliance: good    Goals   Food Log via [employer] or My Fitness Pal   Review post surgery guidelines to help pt get back on track   Needs to complete labwork for vitamin recommendations   F/U with Dr Azalia Kay on 6/16/2021   Pt to schedule F/U with RD as needed    Time Spent:   1 Hour

## 2021-05-25 ENCOUNTER — CLINICAL SUPPORT (OUTPATIENT)
Dept: BARIATRICS | Facility: CLINIC | Age: 51
End: 2021-05-25

## 2021-05-25 VITALS — WEIGHT: 188.4 LBS | BODY MASS INDEX: 32.17 KG/M2 | HEIGHT: 64 IN

## 2021-05-25 DIAGNOSIS — Z98.84 BARIATRIC SURGERY STATUS: Primary | ICD-10-CM

## 2021-05-25 PROCEDURE — RECHECK

## 2021-06-16 ENCOUNTER — CONSULT (OUTPATIENT)
Dept: BARIATRICS | Facility: CLINIC | Age: 51
End: 2021-06-16
Payer: COMMERCIAL

## 2021-06-16 VITALS
DIASTOLIC BLOOD PRESSURE: 60 MMHG | RESPIRATION RATE: 12 BRPM | SYSTOLIC BLOOD PRESSURE: 84 MMHG | TEMPERATURE: 98.1 F | HEIGHT: 64 IN | WEIGHT: 187.2 LBS | BODY MASS INDEX: 31.96 KG/M2 | HEART RATE: 82 BPM

## 2021-06-16 DIAGNOSIS — E66.9 OBESITY, CLASS I, BMI 30-34.9: Primary | ICD-10-CM

## 2021-06-16 DIAGNOSIS — Z98.84 S/P LAPAROSCOPIC SLEEVE GASTRECTOMY: ICD-10-CM

## 2021-06-16 DIAGNOSIS — R63.5 RECENT WEIGHT GAIN AFTER SURGICAL THERAPY FOR OBESITY: ICD-10-CM

## 2021-06-16 DIAGNOSIS — Z87.442 HISTORY OF NEPHROLITHIASIS: ICD-10-CM

## 2021-06-16 DIAGNOSIS — K91.2 POSTSURGICAL MALABSORPTION: ICD-10-CM

## 2021-06-16 DIAGNOSIS — Z98.84 RECENT WEIGHT GAIN AFTER SURGICAL THERAPY FOR OBESITY: ICD-10-CM

## 2021-06-16 PROCEDURE — 99214 OFFICE O/P EST MOD 30 MIN: CPT | Performed by: INTERNAL MEDICINE

## 2021-06-16 RX ORDER — BUPROPION HYDROCHLORIDE 150 MG/1
150 TABLET ORAL DAILY
Qty: 30 TABLET | Refills: 1 | Status: SHIPPED | OUTPATIENT
Start: 2021-06-16 | End: 2021-08-16 | Stop reason: SDUPTHER

## 2021-06-16 NOTE — PROGRESS NOTES
Assessment/Plan:  Eddie Aldana was seen today for consult  Diagnoses and all orders for this visit:    S/P laparoscopic sleeve gastrectomy  Postsurgical malabsorption  Has labs due   - Vitamin B12; Future  - Vitamin D 25 hydroxy; Future  - PTH, intact; Future  - Iron Saturation %; Future  - Ferritin; Future  - Folate; Future  - Vitamin B1, whole blood; Future  - Vitamin A; Future    History of nephrolithiasis  Recent history   Would avoid topamax     Obesity, Class I, BMI 30-34 9  Recent weight gain after surgical therapy for obesity  Goals:  - buPROPion (WELLBUTRIN XL) 150 mg 24 hr tablet; Take 1 tablet (150 mg total) by mouth daily  Dispense: 30 tablet; Refill: 1  Goal protein intake 80g per day  4333-6193 calories  On the right track with weight loss  Would add more protein for B and L which can help reduce her cravings at night time  Will start Walks 2 miles a day   Try to incorporate strengthening/resistance 2-3x week     Follow up in approximately 2 months with Non-Surgical Physician/Advanced Practitioner  Subjective:   Chief Complaint   Patient presents with    Consult     for MWM with Dr Virgil Candelario        Patient ID: Jon Hendrix  is a 49 y o  female with excess weight/obesity here to pursue weight management   Patient is pursuing conservative mgmt    S/p LSG in 2105 with Dr Sebastien Agosto at [facility]  Initial 215 lbs  Judson 135 lbs  Weight regain: +65 lbs from medical marijuana use   Has chronic back pain/sciatica   Continues to take the medical marijuana pill at night to help her sleep    Wt Readings from Last 10 Encounters:   06/16/21 84 9 kg (187 lb 3 2 oz)   05/25/21 85 5 kg (188 lb 6 4 oz)   05/17/21 86 5 kg (190 lb 12 8 oz)   05/14/21 86 3 kg (190 lb 4 1 oz)   05/07/21 87 3 kg (192 lb 6 4 oz)   05/04/21 86 6 kg (191 lb)   05/04/21 86 8 kg (191 lb 6 4 oz)   04/28/21 88 5 kg (195 lb)   04/27/21 89 4 kg (197 lb)   04/26/21 91 2 kg (201 lb)     Initial weight MWM:  188lbs  Current weight: --  Change in weight: --  Goal weight: 165-170 lbs    Over the past few months has been changing her diet -   Switching to more whole grain  Logging her food- around 1200 calories or less   Uses Carb manager   Couldn't tolerate the protein shakes since her surgery  Feels hungry at night time 8-10pm        The following portions of the patient's history were reviewed and updated as appropriate: allergies, current medications, past family history, past medical history, past social history, past surgical history, and problem list     Review of Systems    Objective:  BP (!) 84/60   Pulse 82   Temp 98 1 °F (36 7 °C)   Resp 12   Ht 5' 3 7" (1 618 m)   Wt 84 9 kg (187 lb 3 2 oz)   LMP 05/14/2019   BMI 32 44 kg/m²   Constitutional: Well-developed, well-nourished and obese Body mass index is 32 44 kg/m²  Richard Ball HEENT: No conjunctival pallor or jaundice  Pulmonary: No increased work of breathing or signs of respiratory distress  CV: Normal rate, well-perfused  GI: Obese  Non-distended  MSK: No edema   Neuro: Oriented to person, place and time  Normal Speech  Normal gait  Psych: Normal affect and mood       Labs and Imaging  Reviewed

## 2021-06-17 ENCOUNTER — TELEPHONE (OUTPATIENT)
Dept: BARIATRICS | Facility: CLINIC | Age: 51
End: 2021-06-17

## 2021-06-17 ENCOUNTER — OFFICE VISIT (OUTPATIENT)
Dept: INTERNAL MEDICINE CLINIC | Facility: CLINIC | Age: 51
End: 2021-06-17
Payer: COMMERCIAL

## 2021-06-17 ENCOUNTER — APPOINTMENT (OUTPATIENT)
Dept: LAB | Facility: CLINIC | Age: 51
End: 2021-06-17
Payer: COMMERCIAL

## 2021-06-17 VITALS
BODY MASS INDEX: 31.72 KG/M2 | HEIGHT: 64 IN | SYSTOLIC BLOOD PRESSURE: 100 MMHG | OXYGEN SATURATION: 99 % | HEART RATE: 98 BPM | TEMPERATURE: 98.8 F | WEIGHT: 185.8 LBS | DIASTOLIC BLOOD PRESSURE: 58 MMHG

## 2021-06-17 DIAGNOSIS — Z98.84 S/P LAPAROSCOPIC SLEEVE GASTRECTOMY: ICD-10-CM

## 2021-06-17 DIAGNOSIS — I95.9 HYPOTENSION, UNSPECIFIED HYPOTENSION TYPE: Primary | ICD-10-CM

## 2021-06-17 DIAGNOSIS — J30.9 ALLERGIC RHINITIS, UNSPECIFIED SEASONALITY, UNSPECIFIED TRIGGER: ICD-10-CM

## 2021-06-17 DIAGNOSIS — E66.09 CLASS 1 OBESITY DUE TO EXCESS CALORIES WITH SERIOUS COMORBIDITY AND BODY MASS INDEX (BMI) OF 31.0 TO 31.9 IN ADULT: ICD-10-CM

## 2021-06-17 DIAGNOSIS — K91.2 POSTSURGICAL MALABSORPTION: ICD-10-CM

## 2021-06-17 DIAGNOSIS — J31.0 CHRONIC RHINITIS: ICD-10-CM

## 2021-06-17 LAB
25(OH)D3 SERPL-MCNC: 23.2 NG/ML (ref 30–100)
FERRITIN SERPL-MCNC: 34 NG/ML (ref 8–388)
FOLATE SERPL-MCNC: 6.9 NG/ML (ref 3.1–17.5)
IRON SATN MFR SERPL: 17 %
IRON SERPL-MCNC: 61 UG/DL (ref 50–170)
PTH-INTACT SERPL-MCNC: 75 PG/ML (ref 18.4–80.1)
TIBC SERPL-MCNC: 349 UG/DL (ref 250–450)
VIT B12 SERPL-MCNC: 190 PG/ML (ref 100–900)

## 2021-06-17 PROCEDURE — 36415 COLL VENOUS BLD VENIPUNCTURE: CPT

## 2021-06-17 PROCEDURE — 82728 ASSAY OF FERRITIN: CPT

## 2021-06-17 PROCEDURE — 83540 ASSAY OF IRON: CPT

## 2021-06-17 PROCEDURE — 82746 ASSAY OF FOLIC ACID SERUM: CPT

## 2021-06-17 PROCEDURE — 82306 VITAMIN D 25 HYDROXY: CPT

## 2021-06-17 PROCEDURE — 83970 ASSAY OF PARATHORMONE: CPT

## 2021-06-17 PROCEDURE — 99213 OFFICE O/P EST LOW 20 MIN: CPT | Performed by: PHYSICIAN ASSISTANT

## 2021-06-17 PROCEDURE — 1036F TOBACCO NON-USER: CPT | Performed by: PHYSICIAN ASSISTANT

## 2021-06-17 PROCEDURE — 83550 IRON BINDING TEST: CPT

## 2021-06-17 PROCEDURE — 3008F BODY MASS INDEX DOCD: CPT | Performed by: PHYSICIAN ASSISTANT

## 2021-06-17 PROCEDURE — 84590 ASSAY OF VITAMIN A: CPT

## 2021-06-17 PROCEDURE — 82607 VITAMIN B-12: CPT

## 2021-06-17 PROCEDURE — 84425 ASSAY OF VITAMIN B-1: CPT

## 2021-06-17 RX ORDER — CETIRIZINE HYDROCHLORIDE 10 MG/1
10 TABLET ORAL DAILY
Qty: 30 TABLET | Refills: 3 | Status: SHIPPED | OUTPATIENT
Start: 2021-06-17 | End: 2021-12-22 | Stop reason: SDUPTHER

## 2021-06-17 NOTE — PROGRESS NOTES
Assessment/Plan: blood pressure 90/60  No orthostatic drop no orthostatic symptoms  She is reassured she can add salt to her food  It is okay to continue with Wellbutrin follow-up if she develops dizziness       Diagnoses and all orders for this visit:    Hypotension, unspecified hypotension type    Class 1 obesity due to excess calories with serious comorbidity and body mass index (BMI) of 31 0 to 31 9 in adult    Allergic rhinitis, unspecified seasonality, unspecified trigger        No problem-specific Assessment & Plan notes found for this encounter  Subjective:      Patient ID: Vinicius Pino is a 49 y o  female  Seen at Bariatric yesterday blood pressure noted to be low she was to be started on Wellbutrin to help with her appetite suppression  Bariatric cervix wanted her to be seen because of the low blood pressure she denies any lightheadedness shortness of breath chest pain palpitations  Her appetite has been good she sleeps well she limit salt in her diet  Everyone else in her family is on a low-salt diet  She has had previous bariatric surgery and post gastrectomy malabsorption she is followed by GI she denies nausea vomiting or diarrhea currently      The following portions of the patient's history were reviewed and updated as appropriate:   She has a past medical history of Asthma, Back pain, Kidney stone, Obesity, and Postgastrectomy malabsorption  ,  does not have any pertinent problems on file  ,   has a past surgical history that includes Cholecystectomy; GASTRECTOMY SLEEVE LAPAROSCOPIC; Knee arthroscopy w/ meniscal repair (Left); Neuroplasty / transposition median nerve at carpal tunnel bilateral; and pr esophagogastroduodenoscopy transoral diagnostic (N/A, 4/30/2018)  ,  family history includes Diabetes in her father and mother; Hypertension in her father; Kidney disease in her mother; Nephrolithiasis in her family; Other in her father  ,   reports that she has never smoked   She has never used smokeless tobacco  She reports current alcohol use  She reports current drug use  Drug: Marijuana  ,  is allergic to other, penicillins, pollen extract, shellfish-derived products - food allergy, and adhesive [medical tape]     Current Outpatient Medications   Medication Sig Dispense Refill    albuterol (VENTOLIN HFA) 90 mcg/act inhaler Inhale 2 puffs every 6 (six) hours as needed for wheezing (Patient taking differently: Inhale 2 puffs as needed for wheezing ) 1 Inhaler 3    aspirin-acetaminophen-caffeine (EXCEDRIN MIGRAINE) 250-250-65 MG per tablet Take 1 tablet by mouth every 6 (six) hours as needed for headaches      buPROPion (WELLBUTRIN XL) 150 mg 24 hr tablet Take 1 tablet (150 mg total) by mouth daily 30 tablet 1    dicyclomine (BENTYL) 20 mg tablet Take 1 tablet (20 mg total) by mouth every 6 (six) hours as needed (abdominal pain) 60 tablet 2    fluticasone (FLONASE) 50 mcg/act nasal spray 2 sprays into each nostril daily (Patient taking differently: 2 sprays into each nostril as needed ) 1 Bottle 1    ibuprofen (MOTRIN) 800 mg tablet Take 1 tablet (800 mg total) by mouth every 8 (eight) hours as needed for mild pain 30 tablet 0    NON FORMULARY       norethindrone-ethinyl estradiol-iron (MICROGESTIN FE1 5/30) 1 5-30 MG-MCG tablet Take 1 tablet by mouth daily      cetirizine (ZyrTEC) 10 mg tablet Take 1 tablet (10 mg total) by mouth daily 30 tablet 3    ondansetron (ZOFRAN-ODT) 4 mg disintegrating tablet Take 1 tablet (4 mg total) by mouth every 6 (six) hours as needed for nausea or vomiting (Patient not taking: Reported on 5/17/2021) 20 tablet 0     No current facility-administered medications for this visit  Review of Systems   Constitutional: Negative for chills and fever  HENT: Negative for ear pain and sore throat  Eyes: Negative for pain and visual disturbance  Respiratory: Negative for cough and shortness of breath      Cardiovascular: Negative for chest pain and palpitations  Gastrointestinal: Negative for abdominal pain and vomiting  Genitourinary: Negative for dysuria and hematuria  Musculoskeletal: Negative for arthralgias and back pain  Skin: Negative for color change and rash  Neurological: Negative for seizures and syncope  All other systems reviewed and are negative  Objective:  Vitals:    06/17/21 0929   BP: 100/58   BP Location: Left arm   Patient Position: Sitting   Cuff Size: Standard   Pulse: 98   Temp: 98 8 °F (37 1 °C)   TempSrc: Temporal   SpO2: 99%   Weight: 84 3 kg (185 lb 12 8 oz)   Height: 5' 3 7" (1 618 m)     Body mass index is 32 19 kg/m²  Physical Exam  Vitals reviewed  Constitutional:       Appearance: She is well-developed  She is obese  HENT:      Head: Normocephalic  Right Ear: Tympanic membrane and external ear normal       Left Ear: Tympanic membrane and external ear normal       Nose: Nose normal       Mouth/Throat:      Mouth: Mucous membranes are moist    Eyes:      Extraocular Movements: Extraocular movements intact  Conjunctiva/sclera: Conjunctivae normal       Pupils: Pupils are equal, round, and reactive to light  Neck:      Thyroid: No thyromegaly  Vascular: No carotid bruit  Cardiovascular:      Rate and Rhythm: Normal rate and regular rhythm  Pulses: Normal pulses  Heart sounds: Normal heart sounds  No murmur heard  Pulmonary:      Effort: Pulmonary effort is normal  No respiratory distress  Breath sounds: Normal breath sounds  No wheezing or rhonchi  Abdominal:      General: Abdomen is flat  Bowel sounds are normal       Palpations: Abdomen is soft  Musculoskeletal:         General: No swelling  Normal range of motion  Cervical back: Normal range of motion and neck supple  Lymphadenopathy:      Cervical: No cervical adenopathy  Skin:     General: Skin is warm and dry  Neurological:      General: No focal deficit present        Mental Status: She is alert and oriented to person, place, and time  Deep Tendon Reflexes: Reflexes are normal and symmetric  Psychiatric:         Mood and Affect: Mood normal          Thought Content:  Thought content normal          Judgment: Judgment normal

## 2021-06-18 ENCOUNTER — TELEPHONE (OUTPATIENT)
Dept: INTERNAL MEDICINE CLINIC | Facility: CLINIC | Age: 51
End: 2021-06-18

## 2021-06-18 NOTE — TELEPHONE ENCOUNTER
Patient's blood work showed low vitamin D levels   She saw Nayeli Han yesterday and [facility]

## 2021-06-21 LAB — VIT A SERPL-MCNC: 47.5 UG/DL (ref 20.1–62)

## 2021-06-23 ENCOUNTER — TELEPHONE (OUTPATIENT)
Dept: GASTROENTEROLOGY | Facility: HOSPITAL | Age: 51
End: 2021-06-23

## 2021-06-23 LAB — VIT B1 BLD-SCNC: 118.9 NMOL/L (ref 66.5–200)

## 2021-06-25 ENCOUNTER — OFFICE VISIT (OUTPATIENT)
Dept: BARIATRICS | Facility: CLINIC | Age: 51
End: 2021-06-25

## 2021-06-25 DIAGNOSIS — Z98.84 BARIATRIC SURGERY STATUS: Primary | ICD-10-CM

## 2021-06-25 PROCEDURE — RECHECK

## 2021-06-25 NOTE — PROGRESS NOTES
Transfer follow up  Saw Dr Antonia Gonzalez- started her on Wellbutrin  Has been starting to have something small for breakfast, but not consistent  Has been going for walks 3-4x per week and doing yard work  Has been not drinking while eating  Patient also started taking vitamin d per Dr Knight Fairly- was low  Drinking about 4-5 bottles of water, occasional coke zero  Has been mindful about snacking, trying to do other things instead like going for a walk with family  Trying to avoid night snacking, going to bed early  Has been reading labels and has been mindful of portions  Patient has follow up with RD next week and Dr Antonia Gonzalez in August  Patient will follow up with SW as needed in the future   HC LCSW

## 2021-06-29 ENCOUNTER — TELEPHONE (OUTPATIENT)
Dept: GASTROENTEROLOGY | Facility: HOSPITAL | Age: 51
End: 2021-06-29

## 2021-06-30 ENCOUNTER — HOSPITAL ENCOUNTER (OUTPATIENT)
Dept: GASTROENTEROLOGY | Facility: HOSPITAL | Age: 51
Setting detail: OUTPATIENT SURGERY
Discharge: HOME | End: 2021-06-30
Attending: INTERNAL MEDICINE | Admitting: INTERNAL MEDICINE
Payer: COMMERCIAL

## 2021-06-30 ENCOUNTER — ANESTHESIA (OUTPATIENT)
Dept: GASTROENTEROLOGY | Facility: HOSPITAL | Age: 51
End: 2021-06-30

## 2021-06-30 ENCOUNTER — ANESTHESIA EVENT (OUTPATIENT)
Dept: GASTROENTEROLOGY | Facility: HOSPITAL | Age: 51
End: 2021-06-30

## 2021-06-30 VITALS
HEART RATE: 63 BPM | SYSTOLIC BLOOD PRESSURE: 105 MMHG | HEIGHT: 65 IN | BODY MASS INDEX: 30.08 KG/M2 | TEMPERATURE: 97.4 F | OXYGEN SATURATION: 100 % | DIASTOLIC BLOOD PRESSURE: 68 MMHG | RESPIRATION RATE: 19 BRPM | WEIGHT: 180.56 LBS

## 2021-06-30 DIAGNOSIS — Z12.12 SCREENING FOR COLORECTAL CANCER: ICD-10-CM

## 2021-06-30 DIAGNOSIS — Z12.11 SCREENING FOR COLORECTAL CANCER: ICD-10-CM

## 2021-06-30 DIAGNOSIS — R10.9 RIGHT SIDED ABDOMINAL PAIN: ICD-10-CM

## 2021-06-30 LAB
EXT PREGNANCY TEST URINE: NEGATIVE
EXT. CONTROL: NORMAL

## 2021-06-30 PROCEDURE — 45378 DIAGNOSTIC COLONOSCOPY: CPT | Performed by: INTERNAL MEDICINE

## 2021-06-30 PROCEDURE — 81025 URINE PREGNANCY TEST: CPT | Performed by: INTERNAL MEDICINE

## 2021-06-30 RX ORDER — LIDOCAINE HYDROCHLORIDE 10 MG/ML
INJECTION, SOLUTION EPIDURAL; INFILTRATION; INTRACAUDAL; PERINEURAL AS NEEDED
Status: DISCONTINUED | OUTPATIENT
Start: 2021-06-30 | End: 2021-06-30

## 2021-06-30 RX ORDER — SODIUM CHLORIDE, SODIUM LACTATE, POTASSIUM CHLORIDE, CALCIUM CHLORIDE 600; 310; 30; 20 MG/100ML; MG/100ML; MG/100ML; MG/100ML
125 INJECTION, SOLUTION INTRAVENOUS CONTINUOUS
Status: DISCONTINUED | OUTPATIENT
Start: 2021-06-30 | End: 2021-07-04 | Stop reason: HOSPADM

## 2021-06-30 RX ORDER — SODIUM CHLORIDE, SODIUM LACTATE, POTASSIUM CHLORIDE, CALCIUM CHLORIDE 600; 310; 30; 20 MG/100ML; MG/100ML; MG/100ML; MG/100ML
INJECTION, SOLUTION INTRAVENOUS CONTINUOUS PRN
Status: DISCONTINUED | OUTPATIENT
Start: 2021-06-30 | End: 2021-06-30

## 2021-06-30 RX ORDER — PROPOFOL 10 MG/ML
INJECTION, EMULSION INTRAVENOUS AS NEEDED
Status: DISCONTINUED | OUTPATIENT
Start: 2021-06-30 | End: 2021-06-30

## 2021-06-30 RX ADMIN — SODIUM CHLORIDE, SODIUM LACTATE, POTASSIUM CHLORIDE, AND CALCIUM CHLORIDE: .6; .31; .03; .02 INJECTION, SOLUTION INTRAVENOUS at 10:38

## 2021-06-30 RX ADMIN — PROPOFOL 50 MG: 10 INJECTION, EMULSION INTRAVENOUS at 10:42

## 2021-06-30 RX ADMIN — LIDOCAINE HYDROCHLORIDE 50 MG: 10 INJECTION, SOLUTION EPIDURAL; INFILTRATION; INTRACAUDAL; PERINEURAL at 10:39

## 2021-06-30 RX ADMIN — PROPOFOL 30 MG: 10 INJECTION, EMULSION INTRAVENOUS at 10:45

## 2021-06-30 RX ADMIN — SODIUM CHLORIDE, SODIUM LACTATE, POTASSIUM CHLORIDE, AND CALCIUM CHLORIDE 125 ML/HR: .6; .31; .03; .02 INJECTION, SOLUTION INTRAVENOUS at 08:45

## 2021-06-30 RX ADMIN — PROPOFOL 100 MG: 10 INJECTION, EMULSION INTRAVENOUS at 10:39

## 2021-06-30 NOTE — ANESTHESIA PREPROCEDURE EVALUATION
Procedure:  COLONOSCOPY    Relevant Problems   GI/HEPATIC   (+) Gastroesophageal reflux disease with esophagitis      /RENAL   (+) Nephrolithiasis      MUSCULOSKELETAL   (+) Arthritis      Other   (+) Abdominal pain   (+) Chronic right lower quadrant pain   (+) Obesity   (+) S/P laparoscopic sleeve gastrectomy      S/p sleeve gastrectomy 2018  Recent CXR clear  Uses MMJ for pain  Physical Exam    Airway    Mallampati score: II  TM Distance: >3 FB  Neck ROM: full     Dental   Comment: Denies loose teeth,     Cardiovascular  Cardiovascular exam normal    Pulmonary  Pulmonary exam normal     Other Findings  Portions of exam deferred due to low yield and/or unknown COVID status      Anesthesia Plan  ASA Score- 2     Anesthesia Type- IV sedation with anesthesia with ASA Monitors  Additional Monitors:   Airway Plan:           Plan Factors-Exercise tolerance (METS): >4 METS  Chart reviewed  Existing labs reviewed  Patient summary reviewed  Patient is not a current smoker  Induction- intravenous  Postoperative Plan-     Informed Consent- Anesthetic plan and risks discussed with patient  I personally reviewed this patient with the CRNA  Discussed and agreed on the Anesthesia Plan with the CRNA  Homer Lee

## 2021-06-30 NOTE — ANESTHESIA POSTPROCEDURE EVALUATION
Post-Op Assessment Note    CV Status:  Stable  Pain Score: 0    Pain management: adequate     Mental Status:  Alert and awake   Hydration Status:  Euvolemic   PONV Controlled:  Controlled   Airway Patency:  Patent and adequate      Post Op Vitals Reviewed: Yes      Staff: CRNA         No complications documented      BP   92/55   Temp      Pulse  56   Resp   20   SpO2   100

## 2021-06-30 NOTE — H&P
History and Physical -  [facility]  Sharin Lombard 49 y o  female MRN: [id number]                  HPI: Sharin Lombard is a 49y o  year old female who presents for colonoscopy for colon cancer screening and constipation      REVIEW OF SYSTEMS: Per the HPI, and otherwise unremarkable  Historical Information   Past Medical History:   Diagnosis Date    Asthma     seasonal    Back pain     Kidney stone     Obesity     Postgastrectomy malabsorption      Past Surgical History:   Procedure Laterality Date    CHOLECYSTECTOMY      COLONOSCOPY      GASTRECTOMY SLEEVE LAPAROSCOPIC      KNEE ARTHROSCOPY W/ MENISCAL REPAIR Left     NEUROPLASTY / TRANSPOSITION MEDIAN NERVE AT CARPAL TUNNEL BILATERAL      CT ESOPHAGOGASTRODUODENOSCOPY TRANSORAL DIAGNOSTIC N/A 4/30/2018    Procedure: ESOPHAGOGASTRODUODENOSCOPY (EGD); Surgeon: Michael Rankin MD;  Location: [facility]; Service: Gastroenterology     Social History   Social History     Substance and Sexual Activity   Alcohol Use Yes    Comment: only on birthday     Social History     Substance and Sexual Activity   Drug Use Yes    Types: Marijuana    Comment: uses pill  (medical [email or web address] card)     Social History     Tobacco Use   Smoking Status Never Smoker   Smokeless Tobacco Never Used     Family History   Problem Relation Age of Onset    Diabetes Mother     Kidney disease Mother         end stage   Stef Amin Diabetes Father     Other Father         gangrene    Hypertension Father     Nephrolithiasis Family        Meds/Allergies     (Not in a hospital admission)      Allergies   Allergen Reactions    Other Hives    Penicillins Other (See Comments)     Passed out    Pollen Extract Sneezing and Nasal Congestion    Shellfish-Derived Products - Food Allergy Hives    Adhesive [Medical Tape] Rash       Objective     Blood pressure 101/66, pulse 65, temperature 97 5 °F (36 4 °C), temperature source Temporal, resp   rate 16, height 5' 5" (8 651 m), weight 81 9 kg (180 lb 8 9 oz), last menstrual period 05/14/2019, SpO2 98 %, not currently breastfeeding  PHYSICAL EXAM    /66   Pulse 65   Temp 97 5 °F (36 4 °C) (Temporal)   Resp 16   Ht 5' 5" (1 651 m)   Wt 81 9 kg (180 lb 8 9 oz)   LMP 05/14/2019   SpO2 98%   BMI 30 05 kg/m²       Gen: NAD  CV: RRR  CHEST: Clear  ABD: soft, NT/ND  EXT: no edema      ASSESSMENT/PLAN:  This is a 49y o  year old female here for colonoscopy, and she is stable and optimized for her procedure

## 2021-07-09 ENCOUNTER — TELEPHONE (OUTPATIENT)
Dept: UROLOGY | Facility: MEDICAL CENTER | Age: 51
End: 2021-07-09

## 2021-07-09 NOTE — TELEPHONE ENCOUNTER
Call placed to patient, advised per provider UA micro shows only rare /trace blood in urine which is not clinically significant  Patient verbalized understanding and thanked me for the call

## 2021-07-09 NOTE — TELEPHONE ENCOUNTER
Patient managed by Bee Sultana in [facility] office  Patient calling in regards to her labs done at [facility]  Patient obgyn requested patient to call office  Patient has blood in urine  Please review and advise

## 2021-07-09 NOTE — TELEPHONE ENCOUNTER
[facility]  History of ureterolithiasis  Was last seen by AP April 2021  Per office note   " Pain and symptoms persist consider ureteroscopy  Patient would like to avoid surgical options  -  Discussed ER precautions with patient that she should go to the ER for severe pain or fever  - Urine sent out for microscopic evaluation and culture  PVR=16 mL  - Refill on Flomax and IBU have been prescribed  - Pt will call office to schedule surgery if pain persists"    Patient calling to advise UA done by gyn  Was advised to follow up with urology  Will route to provider to review and advise

## 2021-07-12 ENCOUNTER — APPOINTMENT (OUTPATIENT)
Dept: LAB | Facility: CLINIC | Age: 51
End: 2021-07-12
Payer: COMMERCIAL

## 2021-07-12 ENCOUNTER — OFFICE VISIT (OUTPATIENT)
Dept: INTERNAL MEDICINE CLINIC | Facility: CLINIC | Age: 51
End: 2021-07-12
Payer: COMMERCIAL

## 2021-07-12 ENCOUNTER — TELEPHONE (OUTPATIENT)
Dept: INTERNAL MEDICINE CLINIC | Facility: CLINIC | Age: 51
End: 2021-07-12

## 2021-07-12 VITALS
HEART RATE: 69 BPM | BODY MASS INDEX: 30.86 KG/M2 | DIASTOLIC BLOOD PRESSURE: 70 MMHG | HEIGHT: 65 IN | OXYGEN SATURATION: 97 % | WEIGHT: 185.2 LBS | TEMPERATURE: 97.7 F | SYSTOLIC BLOOD PRESSURE: 116 MMHG

## 2021-07-12 DIAGNOSIS — Z11.59 ENCOUNTER FOR HEPATITIS C SCREENING TEST FOR LOW RISK PATIENT: ICD-10-CM

## 2021-07-12 DIAGNOSIS — R19.7 DIARRHEA, UNSPECIFIED TYPE: ICD-10-CM

## 2021-07-12 DIAGNOSIS — Z87.442 HISTORY OF KIDNEY STONES: ICD-10-CM

## 2021-07-12 DIAGNOSIS — R11.0 NAUSEA: ICD-10-CM

## 2021-07-12 DIAGNOSIS — R10.2 PELVIC PAIN: ICD-10-CM

## 2021-07-12 DIAGNOSIS — R10.9 FLANK PAIN: Primary | ICD-10-CM

## 2021-07-12 LAB
BILIRUB UR QL STRIP: NEGATIVE
CLARITY UR: CLEAR
COLOR UR: YELLOW
GLUCOSE UR STRIP-MCNC: NEGATIVE MG/DL
HCV AB SER QL: NORMAL
HGB UR QL STRIP.AUTO: NEGATIVE
KETONES UR STRIP-MCNC: NEGATIVE MG/DL
LEUKOCYTE ESTERASE UR QL STRIP: NEGATIVE
NITRITE UR QL STRIP: NEGATIVE
PH UR STRIP.AUTO: 7 [PH]
PROT UR STRIP-MCNC: NEGATIVE MG/DL
SP GR UR STRIP.AUTO: 1.01 (ref 1–1.03)
UROBILINOGEN UR QL STRIP.AUTO: 1 E.U./DL

## 2021-07-12 PROCEDURE — 36415 COLL VENOUS BLD VENIPUNCTURE: CPT

## 2021-07-12 PROCEDURE — 87086 URINE CULTURE/COLONY COUNT: CPT

## 2021-07-12 PROCEDURE — 3008F BODY MASS INDEX DOCD: CPT | Performed by: NURSE PRACTITIONER

## 2021-07-12 PROCEDURE — 86803 HEPATITIS C AB TEST: CPT

## 2021-07-12 PROCEDURE — 81003 URINALYSIS AUTO W/O SCOPE: CPT | Performed by: NURSE PRACTITIONER

## 2021-07-12 PROCEDURE — 1036F TOBACCO NON-USER: CPT | Performed by: NURSE PRACTITIONER

## 2021-07-12 PROCEDURE — 99214 OFFICE O/P EST MOD 30 MIN: CPT | Performed by: NURSE PRACTITIONER

## 2021-07-12 RX ORDER — FLUCONAZOLE 150 MG/1
TABLET ORAL
COMMUNITY
Start: 2021-07-01 | End: 2021-09-14 | Stop reason: ALTCHOICE

## 2021-07-12 RX ORDER — CLOTRIMAZOLE AND BETAMETHASONE DIPROPIONATE 10; .64 MG/G; MG/G
CREAM TOPICAL
COMMUNITY
Start: 2021-06-28 | End: 2021-10-04

## 2021-07-12 NOTE — PROGRESS NOTES
Venu    NAME: Raquel Marshall  AGE: 49 y o  SEX: female  : 1970     DATE: 2021     Assessment and Plan:     Problem List Items Addressed This Visit        Other    Flank pain - Primary       The patient presents to the office today with the ongoing history of pelvic pain, right lower quadrant pain, and right flank pain  The patient states this worsened over the weekend  Currently in the office today she states her pain is a 10/10  She recently saw her gynecologist and they are following her for the pelvic pain  Patient is also experiencing nausea, and diarrhea which started yesterday  Patient does not want to be evaluated in the emergency room  A stat CT scan of the abdomen and pelvis has been ordered to rule out either a kidney stone or diverticulitis at this time  Relevant Orders    CT abdomen pelvis w contrast    Nausea    Relevant Orders    CT abdomen pelvis w contrast    Diarrhea    Relevant Orders    CT abdomen pelvis w contrast    Pelvic pain    Relevant Orders    UA (URINE) with reflex to Scope    Urine culture    CT abdomen pelvis w contrast      Other Visit Diagnoses     History of kidney stones        Relevant Orders    CT abdomen pelvis w contrast    Encounter for hepatitis C screening test for low risk patient        Relevant Orders    Hepatitis C antibody              No follow-ups on file  Chief Complaint:     Chief Complaint   Patient presents with    Pain     on right side of lower abdomen and into back region        History of Present Illness: The patient presents to the office today with a concern of right lower quadrant pain, right flank pain, nausea, and diarrhea  Patient has been following with Urology and Gynecology for the chronic pelvic pain she has been experiencing  The patient states that her flank pain and right lower quadrant pain started this weekend    Yesterday she had nausea without vomiting and diarrhea all day yesterday  Today in the office she states her pain is a 10/10  She does have a history of kidney stones  I did recommend a CT scan the abdomen pelvis to rule out either a kidney stone or diverticulitis  I will contact the patient today with those results  She has been advised that should her pain worsen or she develops any other worrisome symptoms she should be evaluated in the emergency room today  Review of Systems:     Review of Systems   Constitutional: Negative for activity change, fatigue and fever  HENT: Negative for congestion, hearing loss, rhinorrhea, trouble swallowing and voice change  Eyes: Negative for photophobia, pain, discharge and visual disturbance  Respiratory: Negative for cough, chest tightness and shortness of breath  Cardiovascular: Negative for chest pain, palpitations and leg swelling  Gastrointestinal: Positive for diarrhea and nausea  Negative for abdominal pain, blood in stool, constipation and vomiting  Endocrine: Negative for cold intolerance and heat intolerance  Genitourinary: Positive for flank pain and pelvic pain  Negative for difficulty urinating, frequency, hematuria, urgency, vaginal bleeding and vaginal discharge  Musculoskeletal: Negative for arthralgias and myalgias  Skin: Negative  Neurological: Negative for dizziness, weakness, numbness and headaches  Psychiatric/Behavioral: Negative for decreased concentration  The patient is not nervous/anxious           Problem List:     Patient Active Problem List   Diagnosis    Health maintenance examination    Bilateral shoulder pain    Abdominal pain    Gastroesophageal reflux disease with esophagitis    Obesity    Chronic rhinitis    S/P laparoscopic sleeve gastrectomy    Vitamin D deficiency    Arthritis    Left ear pain    Other chest pain    Elevated TSH    Nephrolithiasis    Chronic right lower quadrant pain    Ground glass opacity present on imaging of lung    Flank pain    Nausea    Diarrhea    Pelvic pain        Objective:     /70 (BP Location: Left arm, Patient Position: Sitting, Cuff Size: Standard)   Pulse 69   Temp 97 7 °F (36 5 °C) (Temporal) Comment: no nsaids  Ht 5' 5" (1 651 m)   Wt 84 kg (185 lb 3 2 oz)   SpO2 97%   BMI 30 82 kg/m²       Current Outpatient Medications   Medication Instructions    albuterol (VENTOLIN HFA) 90 mcg/act inhaler 2 puffs, Inhalation, Every 6 hours PRN    aspirin-acetaminophen-caffeine (EXCEDRIN MIGRAINE) 250-250-65 MG per tablet 1 tablet, Oral, Every 6 hours PRN    buPROPion (WELLBUTRIN XL) 150 mg, Oral, Daily    cetirizine (ZYRTEC) 10 mg, Oral, Daily    cholecalciferol (VITAMIN D3) 1,000 Units, Oral, Daily    clotrimazole-betamethasone (LOTRISONE) 1-0 05 % cream APPLY CREAM TOPICALLY TO AFFECTED AREA TWICE DAILY    dicyclomine (BENTYL) 20 mg, Oral, Every 6 hours PRN    fluconazole (DIFLUCAN) 150 mg tablet TAKE ONE TABLET BY MOUTH EVERY THIRD DAY FOR 2 DOSES    fluticasone (FLONASE) 50 mcg/act nasal spray 2 sprays, Nasal, Daily    ibuprofen (MOTRIN) 800 mg, Oral, Every 8 hours PRN    NON FORMULARY No dose, route, or frequency recorded   norethindrone-ethinyl estradiol-iron (MICROGESTIN FE1 5/30) 1 5-30 MG-MCG tablet 1 tablet, Oral, Daily    ondansetron (ZOFRAN-ODT) 4 mg, Oral, Every 6 hours PRN           Physical Exam  Vitals reviewed  Constitutional:       Appearance: Normal appearance  She is obese  HENT:      Head: Normocephalic  Nose: Nose normal       Mouth/Throat:      Mouth: Mucous membranes are moist       Pharynx: Oropharynx is clear  Eyes:      Extraocular Movements: Extraocular movements intact  Pupils: Pupils are equal, round, and reactive to light  Cardiovascular:      Rate and Rhythm: Normal rate and regular rhythm  Pulmonary:      Effort: Pulmonary effort is normal       Breath sounds: Normal breath sounds  Abdominal:      General: There is no distension  Palpations: There is no mass  Tenderness: There is abdominal tenderness  There is right CVA tenderness  There is no guarding  Musculoskeletal:         General: Normal range of motion  Skin:     General: Skin is warm and dry  Neurological:      General: No focal deficit present  Mental Status: She is alert and oriented to person, place, and time  Psychiatric:         Mood and Affect: Mood normal          Behavior: Behavior normal          Thought Content:  Thought content normal          Judgment: Judgment normal          [place], [address]

## 2021-07-12 NOTE — ASSESSMENT & PLAN NOTE
The patient presents to the office today with the ongoing history of pelvic pain, right lower quadrant pain, and right flank pain  The patient states this worsened over the weekend  Currently in the office today she states her pain is a 10/10  She recently saw her gynecologist and they are following her for the pelvic pain  Patient is also experiencing nausea, and diarrhea which started yesterday  Patient does not want to be evaluated in the emergency room  A stat CT scan of the abdomen and pelvis has been ordered to rule out either a kidney stone or diverticulitis at this time

## 2021-07-12 NOTE — TELEPHONE ENCOUNTER
Patient called regarding her stat CT scan appointment  We are waiting to hear back from her insurance company for prior authorization  The patient is wondering if she should go to the emergency room  I did explain to the patient that if she goes to the emergency room they will start a whole new evaluation I am unsure if they will do a CT scan while she is there  In addition if this is truly not emergent issue she should have a CT scan done as an outpatient  I did ask the patient that she contact our office if she is going to the emergency room so we can cancel the outpatient CT scan  Patient states that currently she is on her way to see her daughter and will let us know if she goes to the emergency room

## 2021-07-13 ENCOUNTER — TELEPHONE (OUTPATIENT)
Dept: INTERNAL MEDICINE CLINIC | Facility: CLINIC | Age: 51
End: 2021-07-13

## 2021-07-13 ENCOUNTER — TELEPHONE (OUTPATIENT)
Dept: GASTROENTEROLOGY | Facility: CLINIC | Age: 51
End: 2021-07-13

## 2021-07-13 LAB — BACTERIA UR CULT: NORMAL

## 2021-07-13 NOTE — TELEPHONE ENCOUNTER
Amor Nixon patient - Patient would like to know if she needs a F/U OV after her colonoscopy the end of June and is on new meds  Please RTRN call [phone]

## 2021-07-13 NOTE — TELEPHONE ENCOUNTER
Patient called in stating she went to the ED yesterday  Patient is asking if the doctor can review her urine tests  Patient stated when she went to her PT today they advised her the pain below her stomach maybe a urological issue  Patient stated she is going back and forth from her GYN   Patient can be reached at [phone]

## 2021-07-13 NOTE — TELEPHONE ENCOUNTER
Gyn found nothing  Still having pain on right side  GYN and pain management  suggests that she should have an MRI of the pelvis  Also would like her urine results that she had done on 7/12  [phone]

## 2021-07-14 NOTE — TELEPHONE ENCOUNTER
Urine testing unremarkable negative for infection  CT Impression: No acute findings in the abdomen or pelvis  No evidence of   nephrolithiasis  Apparently gyn and pain management advised MRI of pelvis for further evaluation  No further recommendations at this time

## 2021-07-15 NOTE — TELEPHONE ENCOUNTER
Yes, I do think she should have a follow-up with us, I will forward to the clerical team to schedule

## 2021-07-22 ENCOUNTER — OFFICE VISIT (OUTPATIENT)
Dept: BARIATRICS | Facility: CLINIC | Age: 51
End: 2021-07-22

## 2021-07-22 DIAGNOSIS — K91.2 POSTSURGICAL MALABSORPTION: Primary | ICD-10-CM

## 2021-07-22 PROCEDURE — RECHECK

## 2021-07-22 NOTE — PROGRESS NOTES
Bariatric Nutrition Assessment Note    Transfer care - for MW    Type of surgery  Vertical sleeve gastrectomy  Surgery Date: 2015  Dr Dee Sears at [facility]  Surgeon: Dr Kit Concepcion 4/27/2021    Nutrition Assessment   Tarik Martinez  49 y o   female  not currently breastfeeding  Height:5'3 7" Weight: 182 8#   BMI:  32 6  Weight on Day of Weight Loss Surgery: 215#  PAULO: 135#  Regain: at 197# - gained 60# over past 3years     Review of History and Medications     OTC: None    Past Medical History:   Diagnosis Date    Asthma     seasonal    Back pain     Kidney stone     Obesity     Postgastrectomy malabsorption      Past Surgical History:   Procedure Laterality Date    CHOLECYSTECTOMY      COLONOSCOPY      GASTRECTOMY SLEEVE LAPAROSCOPIC      KNEE ARTHROSCOPY W/ MENISCAL REPAIR Left     NEUROPLASTY / TRANSPOSITION MEDIAN NERVE AT CARPAL TUNNEL BILATERAL      MN ESOPHAGOGASTRODUODENOSCOPY TRANSORAL DIAGNOSTIC N/A 4/30/2018    Procedure: ESOPHAGOGASTRODUODENOSCOPY (EGD); Surgeon: Mis Marquez MD;  Location: [facility];   Service: Gastroenterology     Social History     Socioeconomic History    Marital status: /Civil Union     Spouse name: Not on file    Number of children: Not on file    Years of education: Not on file    Highest education level: Not on file   Occupational History    Not on file   Tobacco Use    Smoking status: Never Smoker    Smokeless tobacco: Never Used   Vaping Use    Vaping Use: Former    Substances: THC, CBD   Substance and Sexual Activity    Alcohol use: Yes     Comment: only on birthday    Drug use: Yes     Types: Marijuana     Comment: uses pill  (medical marijuania card)    Sexual activity: Yes     Partners: Male   Other Topics Concern    Not on file   Social History Narrative    Not on file     Social Determinants of Health     Financial Resource Strain:     Difficulty of Paying Living Expenses:    Food Insecurity:     Worried About Running Out of Food in the Last Year:    [address] in the Last Year:    Transportation Needs:     Lack of Transportation (Medical):      Lack of Transportation (Non-Medical):    Physical Activity: Inactive    Days of Exercise per Week: 0 days    Minutes of Exercise per Session: 0 min   Stress: No Stress Concern Present    Feeling of Stress : Not at all   Social Connections:     Frequency of Communication with Friends and Family:     Frequency of Social Gatherings with Friends and Family:     Attends Sikh Services:     Active Member of Clubs or Organizations:     Attends Club or Organization Meetings:     Marital Status:    Intimate Partner Violence:     Fear of Current or Ex-Partner:     Emotionally Abused:     Physically Abused:     Sexually Abused:        Current Outpatient Medications:     albuterol (VENTOLIN HFA) 90 mcg/act inhaler, Inhale 2 puffs every 6 (six) hours as needed for wheezing (Patient taking differently: Inhale 2 puffs as needed for wheezing ), Disp: 1 Inhaler, Rfl: 3    aspirin-acetaminophen-caffeine (EXCEDRIN MIGRAINE) 250-250-65 MG per tablet, Take 1 tablet by mouth every 6 (six) hours as needed for headaches, Disp: , Rfl:     buPROPion (WELLBUTRIN XL) 150 mg 24 hr tablet, Take 1 tablet (150 mg total) by mouth daily, Disp: 30 tablet, Rfl: 1    cetirizine (ZyrTEC) 10 mg tablet, Take 1 tablet (10 mg total) by mouth daily, Disp: 30 tablet, Rfl: 3    cholecalciferol (VITAMIN D3) 1,000 units tablet, Take 1 tablet (1,000 Units total) by mouth daily, Disp: 100 tablet, Rfl: 3    clotrimazole-betamethasone (LOTRISONE) 1-0 05 % cream, APPLY CREAM TOPICALLY TO AFFECTED AREA TWICE DAILY, Disp: , Rfl:     dicyclomine (BENTYL) 20 mg tablet, Take 1 tablet (20 mg total) by mouth every 6 (six) hours as needed (abdominal pain) (Patient not taking: Reported on 7/12/2021), Disp: 60 tablet, Rfl: 2    fluconazole (DIFLUCAN) 150 mg tablet, TAKE ONE TABLET BY MOUTH EVERY THIRD DAY FOR 2 DOSES, Disp: , Rfl:     fluticasone (FLONASE) 50 mcg/act nasal spray, 2 sprays into each nostril daily (Patient taking differently: 2 sprays into each nostril as needed ), Disp: 1 Bottle, Rfl: 1    ibuprofen (MOTRIN) 800 mg tablet, Take 1 tablet (800 mg total) by mouth every 8 (eight) hours as needed for mild pain, Disp: 30 tablet, Rfl: 0    NON FORMULARY, , Disp: , Rfl:     norethindrone-ethinyl estradiol-iron (MICROGESTIN FE1 5/30) 1 5-30 MG-MCG tablet, Take 1 tablet by mouth daily, Disp: , Rfl:     ondansetron (ZOFRAN-ODT) 4 mg disintegrating tablet, Take 1 tablet (4 mg total) by mouth every 6 (six) hours as needed for nausea or vomiting (Patient not taking: Reported on 5/17/2021), Disp: 20 tablet, Rfl: 0    Food Intake and Lifestyle Assessment   Food Intake Assessment completed via usual diet recall  Breakfast: 2 coffee w/Monk sugar 1 oz milk  11:45 4 oz oatmeal banana,  honey, nuts, blueberry   Dinner: Root vegetables and meat Soup with rice - no liquid or salad w/beans, plantains, turkey burger  Snack: may graze if hungry - cheese or bevitas  Beverage intake: Tea w/spices  Coffee Water 6-7 bottlle  Diet texture/stage: regular  Protein supplement: None   Estimated protein intake per day: 40 - 50 grams  Estimated fluid intake per day: >64 oz  Meals eaten away from home: rare -  Typical meal pattern: 2 meals per day and  Snacks if hungry  Eating Behaviors: Craves bread  and rice but doesn't indulge  Volume: 1 5 cup  Follows 30/ 45- 60  Grazing - tempted  Coke zero (flat) - 4 oz  Food allergies or intolerances: None  Acid reflux - tomatoes & jar sauce   Cultural or Sabianism considerations: None      Physical Assessment  Nutrition Related Findings  Return of Hunger    Physical Activity  Types of exercise: Walking - gradually getting back was at 8 miles - [place] and winter Now at 1 4 miles - Used to go back to gym  Current physical limitations: None    Psychosocial Assessment   Support systems:Spouse, children and mother in law  Socioeconomic factors: none    Nutrition Diagnosis  Diagnosis: Overweight / Obesity (NC-3 3) improved with WLS but relapsing via old habits returning    Interventions and Teaching   Patient educated on post-op nutrition guidelines  Patient educated and handouts provided  Capacity of post-surgery stomach  Adequate hydration  Exercise  Protein supplements  Meal planning and preparation  Appropriate carbohydrate, protein, and fat intake, and food/fluid choices to maximize safe weight loss, nutrient intake, and tolerance   Dietary and lifestyle changes  Possible problems with poor eating habits  Intuitive eating  Techniques for self monitoring and keeping daily food journal  Vitamin / Mineral supplementation of Multivitamin with minerals, Calcium, Vitamin B12, Iron and Vitamin D    Education provided to: patient    Barriers to learning: No barriers identified    Readiness to change: action    Comprehension: verbalizes understanding     Expected Compliance: good    Summary  Pt pleased as she has been losing weight  Meets with Dr Bertha Delacruz who prescribed medication which helps in curbing her " munchies"  Tried Premier to increase her protein but dislikes  So eating 3 meals and focusing on protein  B - Oatmeal, fruit- almond milk- honey -  Also uses monk sugar Uses Sour dough bread  L - Fruit,  1/4 c  Nuts  1/2 banana - if hungry  D - Fish or Chicken thighs , beans cauliflowers raw vegetables  Waits 30 minutes to drink - 4 oz coke zero, 6-7 bottles water, 1 cup tea w/ spices  Snacks - cheese stick, serving of pretzels  Activity 1 5 miles  Both pt and  eating healthier- organic and fresh foods vs processed     is a  so modifying his cooking - also to lower his cholesterol  Reviewed macros - entered into [employer] so pt can start logging  Also provided samples of Protein water and other protein drinks for pt to trial  Reviewed bloodwork and discussed   Provided sample bottle of Bariatric Pal capsules for pt to start using  Pt receptive        Goals   Start Food Log via [employer]  [address]  75g pro 135 g Carb 40 grams Fat  64 oz Water   Follow post surgery guidelines   Start Bariatric vitamins   F/U with Dr Floresita Tavares     F/U with RD in 2 months    Time Spent:    45 minutes

## 2021-07-26 ENCOUNTER — TELEPHONE (OUTPATIENT)
Dept: INTERNAL MEDICINE CLINIC | Facility: CLINIC | Age: 51
End: 2021-07-26

## 2021-07-26 DIAGNOSIS — G62.9 NEUROPATHY: Primary | ICD-10-CM

## 2021-07-26 RX ORDER — GABAPENTIN 100 MG/1
100 CAPSULE ORAL 3 TIMES DAILY
Qty: 90 CAPSULE | Refills: 1 | Status: SHIPPED | OUTPATIENT
Start: 2021-07-26 | End: 2021-08-25

## 2021-07-26 NOTE — TELEPHONE ENCOUNTER
Patient would like for Robert Worrell or Dr Kamran Amato to look at the MRI report from 7/22 and advise her on the next steps  She states it was suggested that she should see a neurosurgeon      # [phone]

## 2021-07-26 NOTE — TELEPHONE ENCOUNTER
This MRI reports a "Tarlov cyst "in sacral bone S2  The cysts apparently can cause nerve pain  So I agree with that suggestion    Meanwhile, try starting gabapentin 100 mg 3 times a day to see if it helps

## 2021-07-26 NOTE — PROGRESS NOTES
Assessment/Plan:       Diagnoses and all orders for this visit:    Neuropathy  -     gabapentin (NEURONTIN) 100 mg capsule; Take 1 capsule (100 mg total) by mouth 3 (three) times a day                Subjective:      Patient ID: Te Resendiz is a 49 y o  female  HPI    The following portions of the patient's history were reviewed and updated as appropriate:   She has a past medical history of Asthma, Back pain, Kidney stone, Obesity, and Postgastrectomy malabsorption  ,  does not have any pertinent problems on file  ,   has a past surgical history that includes Cholecystectomy; GASTRECTOMY SLEEVE LAPAROSCOPIC; Knee arthroscopy w/ meniscal repair (Left); Neuroplasty / transposition median nerve at carpal tunnel bilateral; pr esophagogastroduodenoscopy transoral diagnostic (N/A, 4/30/2018); and Colonoscopy  ,  family history includes Diabetes in her father and mother; Hypertension in her father; Kidney disease in her mother; Nephrolithiasis in her family; Other in her father  ,   reports that she has never smoked  She has never used smokeless tobacco  She reports current alcohol use  She reports current drug use  Drug: Marijuana  ,  is allergic to other, penicillins, pollen extract, shellfish-derived products - food allergy, and adhesive [medical tape]     Current Outpatient Medications   Medication Sig Dispense Refill    albuterol (VENTOLIN HFA) 90 mcg/act inhaler Inhale 2 puffs every 6 (six) hours as needed for wheezing (Patient taking differently: Inhale 2 puffs as needed for wheezing ) 1 Inhaler 3    aspirin-acetaminophen-caffeine (EXCEDRIN MIGRAINE) 250-250-65 MG per tablet Take 1 tablet by mouth every 6 (six) hours as needed for headaches      buPROPion (WELLBUTRIN XL) 150 mg 24 hr tablet Take 1 tablet (150 mg total) by mouth daily 30 tablet 1    cetirizine (ZyrTEC) 10 mg tablet Take 1 tablet (10 mg total) by mouth daily 30 tablet 3    cholecalciferol (VITAMIN D3) 1,000 units tablet Take 1 tablet (1,000 Units total) by mouth daily 100 tablet 3    clotrimazole-betamethasone (LOTRISONE) 1-0 05 % cream APPLY CREAM TOPICALLY TO AFFECTED AREA TWICE DAILY      dicyclomine (BENTYL) 20 mg tablet Take 1 tablet (20 mg total) by mouth every 6 (six) hours as needed (abdominal pain) (Patient not taking: Reported on 7/12/2021) 60 tablet 2    fluconazole (DIFLUCAN) 150 mg tablet TAKE ONE TABLET BY MOUTH EVERY THIRD DAY FOR 2 DOSES      fluticasone (FLONASE) 50 mcg/act nasal spray 2 sprays into each nostril daily (Patient taking differently: 2 sprays into each nostril as needed ) 1 Bottle 1    gabapentin (NEURONTIN) 100 mg capsule Take 1 capsule (100 mg total) by mouth 3 (three) times a day 90 capsule 1    ibuprofen (MOTRIN) 800 mg tablet Take 1 tablet (800 mg total) by mouth every 8 (eight) hours as needed for mild pain 30 tablet 0    NON FORMULARY  (Patient not taking: Reported on 7/12/2021)      norethindrone-ethinyl estradiol-iron (MICROGESTIN FE1 5/30) 1 5-30 MG-MCG tablet Take 1 tablet by mouth daily      ondansetron (ZOFRAN-ODT) 4 mg disintegrating tablet Take 1 tablet (4 mg total) by mouth every 6 (six) hours as needed for nausea or vomiting (Patient not taking: Reported on 5/17/2021) 20 tablet 0     No current facility-administered medications for this visit  Review of Systems      Objective: There were no vitals filed for this visit  Physical Exam      There are no Patient Instructions on file for this visit

## 2021-07-26 NOTE — TELEPHONE ENCOUNTER
Patient wont take gabapentinbecause of side effects   She was on it in the past, she is doing medical marijuana and is seeing her pain doc tomorrow

## 2021-07-27 DIAGNOSIS — G96.191 TARLOV CYST: Primary | ICD-10-CM

## 2021-08-02 ENCOUNTER — CONSULT (OUTPATIENT)
Dept: NEUROSURGERY | Facility: CLINIC | Age: 51
End: 2021-08-02
Payer: COMMERCIAL

## 2021-08-02 VITALS
WEIGHT: 179.5 LBS | SYSTOLIC BLOOD PRESSURE: 108 MMHG | HEIGHT: 65 IN | HEART RATE: 73 BPM | DIASTOLIC BLOOD PRESSURE: 82 MMHG | BODY MASS INDEX: 29.91 KG/M2 | TEMPERATURE: 97.2 F | RESPIRATION RATE: 16 BRPM

## 2021-08-02 DIAGNOSIS — M54.16 LUMBAR RADICULOPATHY: Primary | ICD-10-CM

## 2021-08-02 DIAGNOSIS — G96.191 TARLOV CYST: ICD-10-CM

## 2021-08-02 PROCEDURE — 1036F TOBACCO NON-USER: CPT | Performed by: PHYSICIAN ASSISTANT

## 2021-08-02 PROCEDURE — 99204 OFFICE O/P NEW MOD 45 MIN: CPT | Performed by: PHYSICIAN ASSISTANT

## 2021-08-02 PROCEDURE — 3008F BODY MASS INDEX DOCD: CPT | Performed by: PHYSICIAN ASSISTANT

## 2021-08-02 RX ORDER — BIOTIN 1000 MCG
TABLET,CHEWABLE ORAL DAILY
COMMUNITY

## 2021-08-02 NOTE — ASSESSMENT & PLAN NOTE
Small S2 tarlov cyst, discovered incidentally on MRI pelvis during abdominal/pelvic pain work up  · Patient c/o right sided back pain and N/T radiating down anterior and posterior thigh to knee, worse when at rest and improves with ambulation  · Recent history of kidney stone    Imaging:  · MRI pelvis w/wo, 7/22/21: small Tarlov cyst S2    Plan:  · We discussed tarlov cysts and potential treatments  · There is a possibility of surgical excision vs drainage in IR; would not recommend as this is unlikely the cause of her symptoms  · Will order MRI lumbar spine for further evaluation of back and RLE radiculopathy  · Continue physical therapy and follow up with pain management as instructed  · Follow up after MRI lumbar spine for further evaluation; patient would like to avoid surgery if possible

## 2021-08-02 NOTE — ASSESSMENT & PLAN NOTE
March 31 kidney stone  Left back/hip pain  N/T LLE post to knee, ant to knee  Decreased sensation LLE except increased sensation lateral calf  Pain with lumbar extension

## 2021-08-11 ENCOUNTER — OFFICE VISIT (OUTPATIENT)
Dept: GASTROENTEROLOGY | Facility: CLINIC | Age: 51
End: 2021-08-11
Payer: COMMERCIAL

## 2021-08-11 VITALS
SYSTOLIC BLOOD PRESSURE: 106 MMHG | BODY MASS INDEX: 30.19 KG/M2 | HEIGHT: 65 IN | DIASTOLIC BLOOD PRESSURE: 70 MMHG | WEIGHT: 181.2 LBS | HEART RATE: 69 BPM

## 2021-08-11 DIAGNOSIS — R10.9 RIGHT SIDED ABDOMINAL PAIN: ICD-10-CM

## 2021-08-11 PROCEDURE — 99213 OFFICE O/P EST LOW 20 MIN: CPT | Performed by: PHYSICIAN ASSISTANT

## 2021-08-11 RX ORDER — DICYCLOMINE HCL 20 MG
20 TABLET ORAL EVERY 6 HOURS PRN
Qty: 60 TABLET | Refills: 3 | Status: SHIPPED | OUTPATIENT
Start: 2021-08-11 | End: 2022-04-11 | Stop reason: SDUPTHER

## 2021-08-11 NOTE — PROGRESS NOTES
[address] [facility]  Lam Walls 49 y o  female MRN: [id number]       CC: Follow-up after colonoscopy    HPI:  Toro Sorenson is a 52-year-old female with history of obesity status post sleeve gastrectomy, IBS, lumbar radiculopathy and recent nephrolithiasis  She is also status post cholecystectomy  Patient  Presented to the office in May with chronic constipation and abdominal pain symptoms  At that time, we performed colonoscopy since she is now age 49  Fortunately, this was normal   I want her to trial low-dose Linzess, the patient reports that she was fearful to try this secondary to list of side effects  She reports that her normal bowel regimen is taking laxative such as MiraLax and Colace every few days  She has been using dicyclomine, which is helping  Last EGD was performed by bariatric surgery this year, revealing mild gastritis  Biopsies were negative  Review of Systems:    CONSTITUTIONAL: Denies any fever, chills, or rigors  Good appetite, and no recent weight loss  HEENT: No earache or tinnitus  Denies hearing loss or visual disturbances  CARDIOVASCULAR: No chest pain or palpitations  RESPIRATORY: Denies any cough, hemoptysis, shortness of breath or dyspnea on exertion  GASTROINTESTINAL: As noted in the History of Present Illness  GENITOURINARY: No problems with urination  Denies any hematuria or dysuria  NEUROLOGIC: No dizziness or vertigo, denies headaches  MUSCULOSKELETAL: Denies any muscle or joint pain  SKIN: Denies skin rashes or itching  ENDOCRINE: Denies excessive thirst  Denies intolerance to heat or cold  PSYCHOSOCIAL: Denies depression or anxiety  Denies any recent memory loss         Current Outpatient Medications   Medication Sig Dispense Refill    albuterol (VENTOLIN HFA) 90 mcg/act inhaler Inhale 2 puffs every 6 (six) hours as needed for wheezing (Patient taking differently: Inhale 2 puffs as needed for wheezing ) 1 Inhaler 3    aspirin-acetaminophen-caffeine (EXCEDRIN MIGRAINE) 250-250-65 MG per tablet Take 1 tablet by mouth every 6 (six) hours as needed for headaches      Biotin 1000 MCG CHEW Chew daily      buPROPion (WELLBUTRIN XL) 150 mg 24 hr tablet Take 1 tablet (150 mg total) by mouth daily 30 tablet 1    cetirizine (ZyrTEC) 10 mg tablet Take 1 tablet (10 mg total) by mouth daily 30 tablet 3    cholecalciferol (VITAMIN D3) 1,000 units tablet Take 1 tablet (1,000 Units total) by mouth daily 100 tablet 3    clotrimazole-betamethasone (LOTRISONE) 1-0 05 % cream APPLY CREAM TOPICALLY TO AFFECTED AREA TWICE DAILY (Patient not taking: Reported on 8/2/2021)      dicyclomine (BENTYL) 20 mg tablet Take 1 tablet (20 mg total) by mouth every 6 (six) hours as needed (abdominal pain) 60 tablet 3    fluconazole (DIFLUCAN) 150 mg tablet TAKE ONE TABLET BY MOUTH EVERY THIRD DAY FOR 2 DOSES      fluticasone (FLONASE) 50 mcg/act nasal spray 2 sprays into each nostril daily (Patient taking differently: 2 sprays into each nostril as needed ) 1 Bottle 1    gabapentin (NEURONTIN) 100 mg capsule Take 1 capsule (100 mg total) by mouth 3 (three) times a day (Patient not taking: Reported on 8/2/2021) 90 capsule 1    ibuprofen (MOTRIN) 800 mg tablet Take 1 tablet (800 mg total) by mouth every 8 (eight) hours as needed for mild pain (Patient not taking: Reported on 8/11/2021) 30 tablet 0    NON FORMULARY Medical marijuana capsule at HS   norethindrone-ethinyl estradiol-iron (MICROGESTIN FE1 5/30) 1 5-30 MG-MCG tablet Take 1 tablet by mouth daily       No current facility-administered medications for this visit       Past Medical History:   Diagnosis Date    Asthma     seasonal    Back pain     Kidney stone     Obesity     Postgastrectomy malabsorption      Past Surgical History:   Procedure Laterality Date    CHOLECYSTECTOMY      COLONOSCOPY      GASTRECTOMY SLEEVE LAPAROSCOPIC      KNEE ARTHROSCOPY W/ MENISCAL REPAIR Left     NEUROPLASTY / TRANSPOSITION MEDIAN NERVE AT CARPAL TUNNEL BILATERAL      OR ESOPHAGOGASTRODUODENOSCOPY TRANSORAL DIAGNOSTIC N/A 4/30/2018    Procedure: ESOPHAGOGASTRODUODENOSCOPY (EGD); Surgeon: Suly Tirado MD;  Location: [facility]; Service: Gastroenterology     Social History     Socioeconomic History    Marital status: /Civil Union     Spouse name: None    Number of children: None    Years of education: None    Highest education level: None   Occupational History    None   Tobacco Use    Smoking status: Never Smoker    Smokeless tobacco: Never Used   Vaping Use    Vaping Use: Former    Substances: THC, CBD   Substance and Sexual Activity    Alcohol use: Yes     Comment: only on birthday    Drug use: Yes     Types: Marijuana     Comment: uses pill  (medical marijuania card)    Sexual activity: Yes     Partners: Male   Other Topics Concern    None   Social History Narrative    None     Social Determinants of Health     Financial Resource Strain:     Difficulty of Paying Living Expenses:    Food Insecurity:     Worried About Running Out of Food in the Last Year:     Ran Out of Food in the Last Year:    Transportation Needs:     Lack of Transportation (Medical):      Lack of Transportation (Non-Medical):    Physical Activity: Inactive    Days of Exercise per Week: 0 days    Minutes of Exercise per Session: 0 min   Stress: No Stress Concern Present    Feeling of Stress : Not at all   Social Connections:     Frequency of Communication with Friends and Family:     Frequency of Social Gatherings with Friends and Family:     Attends [employer] Services:     Active Member of Clubs or Organizations:     Attends Club or Organization Meetings:     Marital Status:    Intimate Partner Violence:     Fear of Current or Ex-Partner:     Emotionally Abused:     Physically Abused:     Sexually Abused:      Family History   Problem Relation Age of Onset    Diabetes Mother     Kidney disease Mother         end stage    Diabetes Father     Other Father         gangrene    Hypertension Father     Nephrolithiasis Family             PHYSICAL EXAM:    Vitals:    08/11/21 0822   BP: 106/70   Pulse: 69   Weight: 82 2 kg (181 lb 3 2 oz)   Height: 5' 5" (1 651 m)     General Appearance:   Alert and oriented x 3  Cooperative, and in no respiratory distress   HEENT:   Normocephalic, atraumatic, anicteric      Neck:  Supple, symmetrical, trachea midline   Lungs:   Clear to auscultation bilaterally    Heart[de-identified]   Regular rate and rhythm   Abdomen:   Soft, non-tender, non-distended; normal bowel sounds; no masses, no organomegaly    Genitalia:   Deferred    Rectal:   Deferred    Extremities:  No cyanosis, clubbing or edema    Pulses:  2+ and symmetric all extremities    Skin:  Skin color, texture, turgor normal, no rashes or lesions    Lymph nodes:  No palpable cervical or supraclavicular lymphadenopathy        Lab Results:   Results from last 6 Months   Lab Units 04/26/21  1413   WBC Thousand/uL 5 11   HEMOGLOBIN g/dL 12 0   HEMATOCRIT % 37 4   PLATELETS Thousands/uL 195   NEUTROS PCT % 61   LYMPHS PCT % 29   MONOS PCT % 7   EOS PCT % 2     Results from last 6 Months   Lab Units 04/26/21  1413   POTASSIUM mmol/L 4 2   CHLORIDE mmol/L 103   CO2 mmol/L 24   BUN mg/dL 10   CREATININE mg/dL 0 88   CALCIUM mg/dL 8 1*   ALK PHOS U/L 51   ALT U/L 30   AST U/L 18         Results from last 6 Months   Lab Units 04/26/21  1413   LIPASE u/L 90       Imaging Studies: I have personally reviewed pertinent imaging studies  Colonoscopy    Result Date: 6/30/2021  Impression: No polyps Normal colonoscopy RECOMMENDATION: High-fiber diet Continue Linzess Repeat screening colonoscopy in 10 years Baylee Blanton MD       ASSESSMENT and PLAN:      1) IBS-C -   Patient will manage her constipation with over-the-counter MiraLax and Colace for now    She will call if she decides to trial the Linzess samples that I gave her at our last visit  I encouraged her to return sooner if she has any concerns  Follow up yearly

## 2021-08-16 ENCOUNTER — OFFICE VISIT (OUTPATIENT)
Dept: BARIATRICS | Facility: CLINIC | Age: 51
End: 2021-08-16
Payer: COMMERCIAL

## 2021-08-16 VITALS
WEIGHT: 182 LBS | DIASTOLIC BLOOD PRESSURE: 64 MMHG | SYSTOLIC BLOOD PRESSURE: 90 MMHG | RESPIRATION RATE: 12 BRPM | BODY MASS INDEX: 31.07 KG/M2 | HEART RATE: 73 BPM | HEIGHT: 64 IN

## 2021-08-16 DIAGNOSIS — R63.5 ABNORMAL WEIGHT GAIN: ICD-10-CM

## 2021-08-16 DIAGNOSIS — Z98.84 RECENT WEIGHT GAIN AFTER SURGICAL THERAPY FOR OBESITY: ICD-10-CM

## 2021-08-16 DIAGNOSIS — Z98.84 S/P LAPAROSCOPIC SLEEVE GASTRECTOMY: ICD-10-CM

## 2021-08-16 DIAGNOSIS — R63.5 RECENT WEIGHT GAIN AFTER SURGICAL THERAPY FOR OBESITY: ICD-10-CM

## 2021-08-16 DIAGNOSIS — E66.9 OBESITY, CLASS I, BMI 30-34.9: Primary | ICD-10-CM

## 2021-08-16 DIAGNOSIS — K21.00 GASTROESOPHAGEAL REFLUX DISEASE WITH ESOPHAGITIS: ICD-10-CM

## 2021-08-16 PROCEDURE — 99214 OFFICE O/P EST MOD 30 MIN: CPT | Performed by: INTERNAL MEDICINE

## 2021-08-16 RX ORDER — BUPROPION HYDROCHLORIDE 150 MG/1
150 TABLET ORAL DAILY
Qty: 90 TABLET | Refills: 1 | Status: SHIPPED | OUTPATIENT
Start: 2021-08-16 | End: 2022-01-03 | Stop reason: SDUPTHER

## 2021-08-16 NOTE — PROGRESS NOTES
Assessment/Plan:  Jane Benedict was seen today for follow-up  Diagnoses and all orders for this visit:    Obesity, Class I, BMI 30-34 9    Gastroesophageal reflux disease with esophagitis    S/P laparoscopic sleeve gastrectomy    Abnormal weight gain    Recent weight gain after surgical therapy for obesity       Topamax not indicated due to recent nephrolithiasis hx  Tolerating wellbutrin - continue  No issues with GERD as long as she avoids triggers  Goals:  Goal protein intake 80g per day  <1200 calories    Follow up in approximately 2 months with Non-Surgical Physician/Advanced Practitioner  Subjective:   Chief Complaint   Patient presents with    Follow-up     2 month follow up        Patient ID: Nelsy Jack  is a 49 y o  female with excess weight/obesity here to pursue weight management    Patient is pursuing Conservative Program      HPI  -Initial weight loss goal of 5-10% weight loss for improved health  Wt Readings from Last 10 Encounters:   08/16/21 82 6 kg (182 lb)   08/11/21 82 2 kg (181 lb 3 2 oz)   08/02/21 81 4 kg (179 lb 8 oz)   07/12/21 84 kg (185 lb 3 2 oz)   06/30/21 81 9 kg (180 lb 8 9 oz)   06/17/21 84 3 kg (185 lb 12 8 oz)   06/16/21 84 9 kg (187 lb 3 2 oz)   05/25/21 85 5 kg (188 lb 6 4 oz)   05/17/21 86 5 kg (190 lb 12 8 oz)   05/14/21 86 3 kg (190 lb 4 1 oz)     Initial weight: 183 lbs  Current weight: 182 lbs  Change in weight: -1 lb  Goal:  S/p LSG in 2105 with Dr Noel Galindo at [facility]  Initial 215 lbs  Judson 135 lbs  Weight regain: +65 lbs from medical marijuana use   Has chronic back pain/sciatica   Continues to take the medical marijuana pill at night to help her sleep  Sees pain mgmt, PT  Eating less than before  Buying more fruits, not other snacks   Tolerates Wellbutrin without issues  B- oatmeal   S- fruit  L- salad with sourdough toast and vegan cheese  S-  D- cauliflower, sardine, corn  S-   Drinks- 7 bottles water   Alcohol- no   Exercise- walking      The following portions of the patient's history were reviewed and updated as appropriate: allergies, current medications, past family history, past medical history, past social history, past surgical history, and problem list     Review of Systems   Respiratory: Negative  Cardiovascular: Negative  Gastrointestinal: Negative  Musculoskeletal: Negative  Psychiatric/Behavioral: Negative  Objective:  BP 90/64   Pulse 73   Resp 12   Ht 5' 3 75" (1 619 m)   Wt 82 6 kg (182 lb)   BMI 31 49 kg/m²   Constitutional: Well-developed, well-nourished and obese Body mass index is 31 49 kg/m²  Angela Rivas HEENT: No conjunctival pallor or jaundice  Pulmonary: No increased work of breathing or signs of respiratory distress  CV: Normal rate, well-perfused  GI: Obese  Non-distended  MSK: No edema   Neuro: Oriented to person, place and time  Normal Speech  Normal gait  Psych: Normal affect and mood       Labs and Imaging  Reviewed

## 2021-08-20 ENCOUNTER — HOSPITAL ENCOUNTER (OUTPATIENT)
Dept: MRI IMAGING | Facility: HOSPITAL | Age: 51
Discharge: HOME | End: 2021-08-20
Payer: COMMERCIAL

## 2021-08-20 DIAGNOSIS — M54.16 LUMBAR RADICULOPATHY: ICD-10-CM

## 2021-08-20 PROCEDURE — 72148 MRI LUMBAR SPINE W/O DYE: CPT

## 2021-08-20 PROCEDURE — G1004 CDSM NDSC: HCPCS

## 2021-08-25 ENCOUNTER — OFFICE VISIT (OUTPATIENT)
Dept: INTERNAL MEDICINE CLINIC | Facility: CLINIC | Age: 51
End: 2021-08-25
Payer: COMMERCIAL

## 2021-08-25 ENCOUNTER — TELEMEDICINE (OUTPATIENT)
Dept: NEUROSURGERY | Facility: CLINIC | Age: 51
End: 2021-08-25
Payer: COMMERCIAL

## 2021-08-25 VITALS
BODY MASS INDEX: 31.07 KG/M2 | WEIGHT: 182 LBS | DIASTOLIC BLOOD PRESSURE: 70 MMHG | HEART RATE: 88 BPM | TEMPERATURE: 97.9 F | OXYGEN SATURATION: 99 % | SYSTOLIC BLOOD PRESSURE: 104 MMHG | HEIGHT: 64 IN

## 2021-08-25 VITALS — WEIGHT: 179 LBS | BODY MASS INDEX: 30.56 KG/M2 | HEIGHT: 64 IN

## 2021-08-25 DIAGNOSIS — J02.9 SORE THROAT: Primary | ICD-10-CM

## 2021-08-25 DIAGNOSIS — M54.16 LUMBAR RADICULOPATHY: Primary | ICD-10-CM

## 2021-08-25 LAB — S PYO AG THROAT QL: NEGATIVE

## 2021-08-25 PROCEDURE — 87880 STREP A ASSAY W/OPTIC: CPT | Performed by: FAMILY MEDICINE

## 2021-08-25 PROCEDURE — 99214 OFFICE O/P EST MOD 30 MIN: CPT | Performed by: NURSE PRACTITIONER

## 2021-08-25 PROCEDURE — 99214 OFFICE O/P EST MOD 30 MIN: CPT | Performed by: FAMILY MEDICINE

## 2021-08-25 NOTE — PROGRESS NOTES
FOLLOW-UP OFFICE VISIT  [facility]    NAME: Agatha Flores  AGE: 49 y o  SEX: female  : 1970     DATE: 2021     Assessment and Plan:     Problem List Items Addressed This Visit     None      Visit Diagnoses     Sore throat    -  Primary    Relevant Medications    al mag oxide-diphenhydramine-lidocaine viscous (MAGIC MOUTHWASH) 1:1:1 suspension    Other Relevant Orders    POCT rapid strepA      rapid strept negative  likely viral  Symptom relief reviewed  Return if symptoms worsen or fail to improve  Chief Complaint:     Chief Complaint   Patient presents with    possible strep     grandkids had strep/cold   symptoms started  evening also both ears clogged  History of Present Illness:   55yo female presents c/o sore throat  Onset   grandkids sick a week before  Grandson negative for covid  Pt waking up with scratchy painful throat  No associated symtpoms except for ear fullness  Using nightquil, dayquil, allergy medicine and honey  Nothing has been helping  Sensation is worse in the morning   No fever, nausea or vomiting        Review of Systems:   Per HPI      Problem List:     Patient Active Problem List   Diagnosis    Health maintenance examination    Bilateral shoulder pain    Abdominal pain    Gastroesophageal reflux disease with esophagitis    Obesity    Chronic rhinitis    S/P laparoscopic sleeve gastrectomy    Vitamin D deficiency    Arthritis    Left ear pain    Other chest pain    Elevated TSH    Nephrolithiasis    Chronic right lower quadrant pain    Ground glass opacity present on imaging of lung    Flank pain    Nausea    Diarrhea    Pelvic pain    Lumbar radiculopathy    Tarlov cyst        Objective:     /70 (BP Location: Left arm, Patient Position: Sitting)   Pulse 88   Temp 97 9 °F (36 6 °C) (Tympanic)   Ht 5' 3 75" (1 619 m)   Wt 82 6 kg (182 lb)   LMP 2021 SpO2 99%   BMI 31 49 kg/m²     Physical Exam  Constitutional:       Appearance: She is not ill-appearing  HENT:      Head: Normocephalic and atraumatic  Right Ear: Tympanic membrane and external ear normal       Left Ear: Tympanic membrane and external ear normal       Nose: No congestion  Mouth/Throat:      Pharynx: Uvula midline  Posterior oropharyngeal erythema present  No oropharyngeal exudate  Tonsils: No tonsillar exudate  Cardiovascular:      Rate and Rhythm: Normal rate and regular rhythm  Heart sounds: No murmur heard  Pulmonary:      Effort: Pulmonary effort is normal       Breath sounds: Normal breath sounds  Lymphadenopathy:      Cervical: No cervical adenopathy  Neurological:      Mental Status: She is alert                 Jennifer GAMBINO [facility]  8/25/2021 2:26 PM

## 2021-08-25 NOTE — PATIENT INSTRUCTIONS
As discussed the strept testing was negative  Your pharyngitis is likely viral  Do saltwater gargle, tylenol, use the magic mouth wash and warm fluids to help with pain  Make sure to stay hydrated while ill to help recovery     Pharyngitis   AMBULATORY CARE:   Pharyngitis , or sore throat, is inflammation of the tissues and structures in your pharynx (throat)  Pharyngitis is most often caused by bacteria  It may also be caused by a cold or flu virus  Other causes include smoking, allergies, or acid reflux  Signs and symptoms that may occur with pharyngitis:   · Sore throat or pain when you swallow    · Fever, chills, and body aches    · Hoarse or raspy voice    · Cough, runny or stuffy nose, itchy or watery eyes    · Headache    · Upset stomach and loss of appetite    · Mild neck stiffness    · Swollen glands that feel like hard lumps when you touch your neck    · White and yellow pus-filled blisters in the back of your throat    Call 911 for any of the following:   · You have trouble breathing or swallowing because your throat is swollen or sore  Seek care immediately if:   · You are drooling because it hurts too much to swallow  · Your fever is higher than 102? F (39?C) or lasts longer than 3 days  · You are confused  · You taste blood in your throat  Contact your healthcare provider if:   · Your throat pain gets worse  · You have a painful lump in your throat that does not go away after 5 days  · Your symptoms do not improve after 5 days  · You have questions or concerns about your condition or care  Treatment for pharyngitis:  Viral pharyngitis will go away on its own without treatment  Your sore throat should start to feel better in 3 to 5 days for both viral and bacterial infections  You may need any of the following:  · Antibiotics  treat a bacterial infection  · NSAIDs , such as ibuprofen, help decrease swelling, pain, and fever   NSAIDs can cause stomach bleeding or kidney problems in certain people  If you take blood thinner medicine, always ask your healthcare provider if NSAIDs are safe for you  Always read the medicine label and follow directions  · Acetaminophen  decreases pain and fever  It is available without a doctor's order  Ask how much to take and how often to take it  Follow directions  Acetaminophen can cause liver damage if not taken correctly  Manage your symptoms:   · Gargle salt water  Mix ¼ teaspoon salt in an 8 ounce glass of warm water and gargle  This may help decrease swelling in your throat  · Drink liquids as directed  You may need to drink more liquids than usual  Liquids may help soothe your throat and prevent dehydration  Ask how much liquid to drink each day and which liquids are best for you  · Use a cool-steam humidifier  to help moisten the air in your room and calm your cough  · Soothe your throat  with cough drops, ice, soft foods, or popsicles  Prevent the spread of pharyngitis:  Cover your mouth and nose when you cough or sneeze  Do not share food or drinks  Wash your hands often  Use soap and water  If soap and water are unavailable, use an alcohol based hand   Follow up with your healthcare provider as directed:  Write down your questions so you remember to ask them during your visits  © Copyright [employer] 2021 Information is for End User's use only and may not be sold, redistributed or otherwise used for commercial purposes  All illustrations and images included in CareNotes® are the copyrighted property of A D A M [employer] Inc  or Mayda Santana   The above information is an  only  It is not intended as medical advice for individual conditions or treatments  Talk to your doctor, nurse or pharmacist before following any medical regimen to see if it is safe and effective for you

## 2021-08-25 NOTE — ASSESSMENT & PLAN NOTE
Patient presents for follow up after imaging  · Small S2 tarlov cyst, discovered incidentally on MRI pelvis during abdominal/pelvic pain work up  · Patient c/o right sided back pain and N/T radiating down anterior and posterior thigh to knee, worse when at rest and improves with ambulation  · S/p right BETTINA 3 weeks ago with Robin Molina with relief of RLE symptoms  · Denies any BBI, weakness or ambulatory dysfunction  Imaging:  · MRI lumbar spine wo contrast 8/20/2021:  Small protrusion-type disc herniation L3-4 with facet hypertrophy combined result in mild central stenosis  Plan:  · Imaging reviewed with patient, demonstrates small disc protrusion at L3-4 but otherwise fairly unremarkable MRI without surgical pathology to account for patient's complaints  · Continue physical therapy and follow up with pain management as scheduled    · Follow up PRN or sooner should patient develop worsening symptoms or red flag signs

## 2021-08-25 NOTE — PROGRESS NOTES
Virtual Regular Visit    Verification of patient location:    Patient is located in the following state in which I hold an active license [place]      Assessment/Plan:    Problem List Items Addressed This Visit        Nervous and Auditory    Lumbar radiculopathy - Primary     Patient presents for follow up after imaging  · Small S2 tarlov cyst, discovered incidentally on MRI pelvis during abdominal/pelvic pain work up  · Patient c/o right sided back pain and N/T radiating down anterior and posterior thigh to knee, worse when at rest and improves with ambulation  · S/p right BETTINA 3 weeks ago with Gloria Arreaga with relief of RLE symptoms  · Denies any BBI, weakness or ambulatory dysfunction  Imaging:  · MRI lumbar spine wo contrast 8/20/2021:  Small protrusion-type disc herniation L3-4 with facet hypertrophy combined result in mild central stenosis  Plan:  · Imaging reviewed with patient, demonstrates small disc protrusion at L3-4 but otherwise fairly unremarkable MRI without surgical pathology to account for patient's complaints  · Continue physical therapy and follow up with pain management as scheduled  · Follow up PRN or sooner should patient develop worsening symptoms or red flag signs                    Reason for visit is   Chief Complaint   Patient presents with    Virtual Regular Visit     lumbar radiculopathy        Encounter provider MAYCO Leyva    Provider located at [address] [address] [phone]      Recent Visits  No visits were found meeting these conditions  Showing recent visits within past 7 days and meeting all other requirements  Today's Visits  Date Type Provider Dept   08/25/21 Telemedicine Amb[facility] today's visits and meeting all other requirements  Future Appointments  No visits were found meeting these conditions    Showing future appointments within next 150 days and meeting all other requirements       The patient was identified by name and date of birth  Deion Pierre was informed that this is a telemedicine visit and that the visit is being conducted through [address] [employer] and patient was informed that this is a secure, HIPAA-compliant platform  She agrees to proceed     My office door was closed  No one else was in the room  She acknowledged consent and understanding of privacy and security of the video platform  The patient has agreed to participate and understands they can discontinue the visit at any time  Patient is aware this is a billable service  Subjective  Deion Pierre is a 49 y o  female  With past medical history of bariatric surgery status post gastric sleeve, kidney stones, who presents for follow-up with imaging for evaluation of Tarlov cyst and lumbar back pain  She has had ongoing issues with right-sided abdominal flank and pelvic pain since March of this year  She is undergoing workup for this pain with OBGYN  She initially had an MRI of her pelvis completed which indicated the Tarlov cyst and was referred to our service for evaluation of this as well as her lumbar back pain  She is also seeing pain management and having physical therapy to 3 times a week  She describes the pain as a the right side of her back at her waist that radiates down to the front and occasionally down her leg with some numbness  She underwent an BETTINA with Dr Heather Valero 3 weeks ago that relieved the symptoms down her right leg  She denies any bowel or bladder incontinence  She denies any ambulatory dysfunction  She denies any weakness          HPI     Past Medical History:   Diagnosis Date    Asthma     seasonal    Back pain     Kidney stone     Obesity     Postgastrectomy malabsorption        Past Surgical History:   Procedure Laterality Date    CHOLECYSTECTOMY      COLONOSCOPY      GASTRECTOMY SLEEVE LAPAROSCOPIC      KNEE ARTHROSCOPY W/ MENISCAL REPAIR Left     NEUROPLASTY / TRANSPOSITION MEDIAN NERVE AT CARPAL TUNNEL BILATERAL      DC ESOPHAGOGASTRODUODENOSCOPY TRANSORAL DIAGNOSTIC N/A 4/30/2018    Procedure: ESOPHAGOGASTRODUODENOSCOPY (EGD); Surgeon: Adriana Shaw MD;  Location: [facility]; Service: Gastroenterology       Current Outpatient Medications   Medication Sig Dispense Refill    albuterol (VENTOLIN HFA) 90 mcg/act inhaler Inhale 2 puffs every 6 (six) hours as needed for wheezing (Patient taking differently: Inhale 2 puffs as needed for wheezing ) 1 Inhaler 3    aspirin-acetaminophen-caffeine (EXCEDRIN MIGRAINE) 250-250-65 MG per tablet Take 1 tablet by mouth every 6 (six) hours as needed for headaches      Biotin 1000 MCG CHEW Chew daily      buPROPion (WELLBUTRIN XL) 150 mg 24 hr tablet Take 1 tablet (150 mg total) by mouth daily 90 tablet 1    cetirizine (ZyrTEC) 10 mg tablet Take 1 tablet (10 mg total) by mouth daily 30 tablet 3    cholecalciferol (VITAMIN D3) 1,000 units tablet Take 1 tablet (1,000 Units total) by mouth daily 100 tablet 3    dicyclomine (BENTYL) 20 mg tablet Take 1 tablet (20 mg total) by mouth every 6 (six) hours as needed (abdominal pain) 60 tablet 3    fluconazole (DIFLUCAN) 150 mg tablet TAKE ONE TABLET BY MOUTH EVERY THIRD DAY FOR 2 DOSES      fluticasone (FLONASE) 50 mcg/act nasal spray 2 sprays into each nostril daily (Patient taking differently: 2 sprays into each nostril as needed ) 1 Bottle 1    NON FORMULARY Medical marijuana capsule at HS        norethindrone-ethinyl estradiol-iron (MICROGESTIN FE1 5/30) 1 5-30 MG-MCG tablet Take 1 tablet by mouth daily      clotrimazole-betamethasone (LOTRISONE) 1-0 05 % cream APPLY CREAM TOPICALLY TO AFFECTED AREA TWICE DAILY (Patient not taking: Reported on 8/2/2021)      ibuprofen (MOTRIN) 800 mg tablet Take 1 tablet (800 mg total) by mouth every 8 (eight) hours as needed for mild pain (Patient not taking: Reported on 8/25/2021) 30 tablet 0     No current facility-administered medications for this visit  Allergies   Allergen Reactions    Other Hives    Penicillins Other (See Comments)     Passed out    Pollen Extract Sneezing and Nasal Congestion    Shellfish-Derived Products - Food Allergy Hives    Adhesive [Medical Tape] Rash       Review of Systems   Constitutional: Negative  HENT: Positive for sore throat  Eyes: Negative  Respiratory: Negative  Cardiovascular: Negative  Gastrointestinal: Negative  Endocrine: Negative  Genitourinary: Negative  Musculoskeletal: Positive for arthralgias (Right leg pain), back pain (Low back pain across the waist into the right leg  ), gait problem (Painful to walk ) and neck pain  Pain for many years  Pain got worse after a kidney stone in April, unsure if she passed the kidney stone  PT- Currently- Has session once a week, feels that physical therapy is helping some  BETTINA- Currently following with pain med had last injection 3 weeks ago  - Pain eased after injection  Skin: Negative  Allergic/Immunologic: Negative  Neurological: Positive for weakness (Right leg ) and numbness (Right leg )  Psychiatric/Behavioral: Negative  ROS reviewed and edited as needed  Video Exam    Vitals:    08/25/21 0946   Weight: 81 2 kg (179 lb)   Height: 5' 3 75" (1 619 m)       Physical Exam  Constitutional:       Comments: Virtual visit  PHILIPP  AOx3  I spent 14 minutes directly with the patient during this visit    VIRTUAL VISIT Ifeanyi verbally agrees to participate in [employer]  Pt is aware that [employer] could be limited without vital signs or the ability to perform a full hands-on physical Kip Osmel understands she or the provider may request at any time to terminate the video visit and request the patient to seek care or treatment in person

## 2021-08-26 ENCOUNTER — TELEPHONE (OUTPATIENT)
Dept: INTERNAL MEDICINE CLINIC | Facility: CLINIC | Age: 51
End: 2021-08-26

## 2021-08-26 DIAGNOSIS — H92.03 OTALGIA OF BOTH EARS: Primary | ICD-10-CM

## 2021-08-26 RX ORDER — HYDROCORTISONE AND ACETIC ACID 20.75; 10.375 MG/ML; MG/ML
3 SOLUTION AURICULAR (OTIC)
Qty: 10 ML | Refills: 0 | Status: SHIPPED | OUTPATIENT
Start: 2021-08-26 | End: 2021-09-14 | Stop reason: ALTCHOICE

## 2021-08-26 NOTE — TELEPHONE ENCOUNTER
Patient was just in yesterday and saw Dr Geo Singh  and  her ears are even worst today and would like the drops you spoke about   The script would go to [facility] DR MICHELINE ARAYA [phone]

## 2021-09-01 DIAGNOSIS — J02.9 SORE THROAT: Primary | ICD-10-CM

## 2021-09-01 RX ORDER — LIDOCAINE HYDROCHLORIDE 20 MG/ML
15 SOLUTION OROPHARYNGEAL 4 TIMES DAILY PRN
Qty: 15 ML | Refills: 0 | Status: SHIPPED | OUTPATIENT
Start: 2021-09-01 | End: 2021-09-14 | Stop reason: ALTCHOICE

## 2021-09-14 ENCOUNTER — OFFICE VISIT (OUTPATIENT)
Dept: INTERNAL MEDICINE CLINIC | Facility: CLINIC | Age: 51
End: 2021-09-14
Payer: COMMERCIAL

## 2021-09-14 VITALS
DIASTOLIC BLOOD PRESSURE: 72 MMHG | SYSTOLIC BLOOD PRESSURE: 104 MMHG | RESPIRATION RATE: 16 BRPM | OXYGEN SATURATION: 99 % | WEIGHT: 183.4 LBS | HEIGHT: 63 IN | HEART RATE: 75 BPM | TEMPERATURE: 97.4 F | BODY MASS INDEX: 32.5 KG/M2

## 2021-09-14 DIAGNOSIS — Z28.21 COVID-19 VACCINE DOSE DECLINED: ICD-10-CM

## 2021-09-14 DIAGNOSIS — J31.0 CHRONIC RHINITIS: ICD-10-CM

## 2021-09-14 DIAGNOSIS — T63.464D WASP STING, UNDETERMINED INTENT, SUBSEQUENT ENCOUNTER: Primary | ICD-10-CM

## 2021-09-14 PROBLEM — T63.461A WASP STING: Status: ACTIVE | Noted: 2021-09-14

## 2021-09-14 PROBLEM — M51.369 DISC DEGENERATION, LUMBAR: Status: ACTIVE | Noted: 2021-07-13

## 2021-09-14 PROBLEM — M54.17 LUMBOSACRAL NEURITIS: Status: ACTIVE | Noted: 2021-07-13

## 2021-09-14 PROBLEM — M51.36 DISC DEGENERATION, LUMBAR: Status: ACTIVE | Noted: 2021-07-13

## 2021-09-14 PROCEDURE — 99214 OFFICE O/P EST MOD 30 MIN: CPT | Performed by: NURSE PRACTITIONER

## 2021-09-14 RX ORDER — METHYLPREDNISOLONE 4 MG/1
4 TABLET ORAL DAILY
COMMUNITY
Start: 2021-09-12 | End: 2021-09-18

## 2021-09-14 RX ORDER — DIPHENHYDRAMINE HCL 25 MG
25 TABLET ORAL EVERY 6 HOURS PRN
COMMUNITY
End: 2021-10-04

## 2021-09-14 NOTE — PROGRESS NOTES
Gunjanmosmiley    NAME: Lam Walls  AGE: 49 y o  SEX: female  : 1970     DATE: 2021     Assessment and Plan:     Problem List Items Addressed This Visit        Respiratory    Chronic rhinitis       Other    Wasp sting - Primary       The patient was evaluated in the emergency room on  after sustaining multiple wasp stings  She immediately took Benadryl at home and was evaluated in the emergency room  She did not have any type of anaphylactic response  She was prescribed a Medrol Dosepak and continues with that along with her  Benadryl  The patient has multiple sites including her bilateral ankles and her back  None of these look infected in the office today  She will continue the steroids until they are complete  She can continue with the Benadryl  COVID-19 vaccine dose declined              No follow-ups on file  Chief Complaint:     Chief Complaint   Patient presents with    Follow-up     from ER visit  attacked by yellow jackdonovan        History of Present Illness:     Osirisjere Hassan to the office today for follow-up  She was evaluated in the emergency room on  after sustaining multiple wasp stings  This is discussed in the above assessment and plan  She will continue with steroids and Benadryl  Review of Systems:     Review of Systems   Constitutional: Negative for activity change, fatigue and fever  HENT: Negative for congestion, hearing loss, rhinorrhea, trouble swallowing and voice change  Eyes: Negative for photophobia, pain, discharge and visual disturbance  Respiratory: Negative for cough, chest tightness and shortness of breath  Cardiovascular: Negative for chest pain, palpitations and leg swelling  Gastrointestinal: Negative for abdominal pain, blood in stool, constipation, nausea and vomiting  Endocrine: Negative for cold intolerance and heat intolerance     Genitourinary: Negative for difficulty urinating, frequency, hematuria, urgency, vaginal bleeding and vaginal discharge  Musculoskeletal: Negative for arthralgias and myalgias  Skin: Negative  Multiple healing wasp stings   Neurological: Negative for dizziness, weakness, numbness and headaches  Psychiatric/Behavioral: Negative for decreased concentration  The patient is not nervous/anxious  Problem List:     Patient Active Problem List   Diagnosis    Health maintenance examination    Bilateral shoulder pain    Abdominal pain    Gastroesophageal reflux disease with esophagitis    Obesity    Chronic rhinitis    S/P laparoscopic sleeve gastrectomy    Vitamin D deficiency    Arthritis    Left ear pain    Other chest pain    Elevated TSH    Nephrolithiasis    Chronic right lower quadrant pain    Ground glass opacity present on imaging of lung    Flank pain    Nausea    Diarrhea    Pelvic pain    Lumbar radiculopathy    Tarlov cyst    Disc degeneration, lumbar    Lumbosacral neuritis    Wasp sting    COVID-19 vaccine dose declined        Objective:     /72 (BP Location: Left arm, Patient Position: Sitting, Cuff Size: Standard)   Pulse 75   Temp (!) 97 4 °F (36 3 °C) (Temporal) Comment: no nsaids  Resp 16   Ht 5' 3" (1 6 m)   Wt 83 2 kg (183 lb 6 4 oz)   LMP 08/16/2021   SpO2 99%   BMI 32 49 kg/m²     Physical Exam  Vitals reviewed  Constitutional:       Appearance: Normal appearance  She is obese  HENT:      Head: Normocephalic  Nose: Nose normal       Mouth/Throat:      Mouth: Mucous membranes are moist       Pharynx: Oropharynx is clear  Eyes:      Extraocular Movements: Extraocular movements intact  Pupils: Pupils are equal, round, and reactive to light  Cardiovascular:      Rate and Rhythm: Normal rate and regular rhythm  Pulmonary:      Effort: Pulmonary effort is normal       Breath sounds: Normal breath sounds     Musculoskeletal:         General: Normal range of motion  Skin:     General: Skin is warm and dry  Comments:   Multiple healing wasp sting areas on her ankles and back no signs of infection   Neurological:      General: No focal deficit present  Mental Status: She is alert and oriented to person, place, and time  Psychiatric:         Mood and Affect: Mood normal          Behavior: Behavior normal          Thought Content:  Thought content normal          Judgment: Judgment normal          Renee Gordon, [address] [facility]

## 2021-09-14 NOTE — ASSESSMENT & PLAN NOTE
The patient was evaluated in the emergency room on Sunday after sustaining multiple wasp stings  She immediately took Benadryl at home and was evaluated in the emergency room  She did not have any type of anaphylactic response  She was prescribed a Medrol Dosepak and continues with that along with her  Benadryl  The patient has multiple sites including her bilateral ankles and her back  None of these look infected in the office today  She will continue the steroids until they are complete  She can continue with the Benadryl

## 2021-09-16 ENCOUNTER — TELEPHONE (OUTPATIENT)
Dept: INTERNAL MEDICINE CLINIC | Facility: CLINIC | Age: 51
End: 2021-09-16

## 2021-09-16 NOTE — TELEPHONE ENCOUNTER
Brigido Baldwin on 9/14 for multiple yellowjacket stings  Now she is itching all over and is wheezing and coughing  Is this a side effect of the stings? Please call her at [phone]

## 2021-09-21 NOTE — PROGRESS NOTES
Bariatric Nutrition Assessment Note    Transfer care - for [facility]    Type of surgery  Vertical sleeve gastrectomy  Surgery Date: 2015  Dr Eamon Haro at [facility]  Surgeon: Dr Spencer Robert 4/27/2021    Nutrition Assessment   Loida Rea  49 y o   female  not currently breastfeeding  Height:5'3 7" Weight:     182#   BMI:  32 6  Weight on Day of Weight Loss Surgery: 215#  PAULO: 135#  Regain: at 197# - gained 60# over past 3years     Review of History and Medications     OTC: None    Past Medical History:   Diagnosis Date    Asthma     seasonal    Back pain     Kidney stone     Obesity     Postgastrectomy malabsorption      Past Surgical History:   Procedure Laterality Date    CHOLECYSTECTOMY      COLONOSCOPY      GASTRECTOMY SLEEVE LAPAROSCOPIC      KNEE ARTHROSCOPY W/ MENISCAL REPAIR Left     NEUROPLASTY / TRANSPOSITION MEDIAN NERVE AT CARPAL TUNNEL BILATERAL      NV ESOPHAGOGASTRODUODENOSCOPY TRANSORAL DIAGNOSTIC N/A 4/30/2018    Procedure: ESOPHAGOGASTRODUODENOSCOPY (EGD); Surgeon: Sheldon Nichole MD;  Location: [facility];   Service: Gastroenterology     Social History     Socioeconomic History    Marital status: /Civil Union     Spouse name: Not on file    Number of children: Not on file    Years of education: Not on file    Highest education level: Not on file   Occupational History    Not on file   Tobacco Use    Smoking status: Never Smoker    Smokeless tobacco: Never Used   Vaping Use    Vaping Use: Former    Substances: THC, CBD   Substance and Sexual Activity    Alcohol use: Yes     Comment: only on birthday    Drug use: Yes     Types: Marijuana     Comment: uses pill  (medical marijuania card)    Sexual activity: Yes     Partners: Male   Other Topics Concern    Not on file   Social History Narrative    Not on file     Social Determinants of Health     Financial Resource Strain:     Difficulty of Paying Living Expenses:    Food Insecurity:     Worried About Running Out of Food in the Last Year:     Maricarmen of Food in the Last Year:    Transportation Needs:     Lack of Transportation (Medical):  Lack of Transportation (Non-Medical):    Physical Activity: Inactive    Days of Exercise per Week: 0 days    Minutes of Exercise per Session: 0 min   Stress: Stress Concern Present    Feeling of Stress :  To some extent   Social Connections:     Frequency of Communication with Friends and Family:     Frequency of Social Gatherings with Friends and Family:     Attends Christian Services:     Active Member of Clubs or Organizations:     Attends Club or Organization Meetings:     Marital Status:    Intimate Partner Violence:     Fear of Current or Ex-Partner:     Emotionally Abused:     Physically Abused:     Sexually Abused:        Current Outpatient Medications:     albuterol (VENTOLIN HFA) 90 mcg/act inhaler, Inhale 2 puffs every 6 (six) hours as needed for wheezing (Patient taking differently: Inhale 2 puffs as needed for wheezing ), Disp: 1 Inhaler, Rfl: 3    aspirin-acetaminophen-caffeine (EXCEDRIN MIGRAINE) 250-250-65 MG per tablet, Take 1 tablet by mouth every 6 (six) hours as needed for headaches, Disp: , Rfl:     Biotin 1000 MCG CHEW, Chew daily, Disp: , Rfl:     buPROPion (WELLBUTRIN XL) 150 mg 24 hr tablet, Take 1 tablet (150 mg total) by mouth daily, Disp: 90 tablet, Rfl: 1    cetirizine (ZyrTEC) 10 mg tablet, Take 1 tablet (10 mg total) by mouth daily, Disp: 30 tablet, Rfl: 3    cholecalciferol (VITAMIN D3) 1,000 units tablet, Take 1 tablet (1,000 Units total) by mouth daily, Disp: 100 tablet, Rfl: 3    clotrimazole-betamethasone (LOTRISONE) 1-0 05 % cream, APPLY CREAM TOPICALLY TO AFFECTED AREA TWICE DAILY, Disp: , Rfl:     dicyclomine (BENTYL) 20 mg tablet, Take 1 tablet (20 mg total) by mouth every 6 (six) hours as needed (abdominal pain), Disp: 60 tablet, Rfl: 3    diphenhydrAMINE (BENADRYL) 25 mg tablet, Take 25 mg by mouth every 6 (six) hours as needed for itching, Disp: , Rfl:     fluticasone (FLONASE) 50 mcg/act nasal spray, 2 sprays into each nostril daily (Patient taking differently: 2 sprays into each nostril as needed ), Disp: 1 Bottle, Rfl: 1    predniSONE 10 mg tablet, 4 DAILY FOR 3 DAYS,3 DAILY FOR 3 DAYS,2 DAILY FOR 3 DAYS, ONE DAILY FOR 3 DAYS, Disp: 30 tablet, Rfl: 0    Food Intake and Lifestyle Assessment   Food Intake Assessment completed via usual diet recall  Breakfast: 2 coffee w/Monk sugar 1 oz milk  11:45 4 oz oatmeal banana,  honey, nuts, blueberry   Dinner: Root vegetables and meat Soup with rice - no liquid or salad w/beans, plantains, turkey burger  Snack: may graze if hungry - cheese or bevitas  Beverage intake: Tea w/spices  Coffee Water 6-7 bottlle  Diet texture/stage: regular  Protein supplement: None   Estimated protein intake per day: 40 - 50 grams  Estimated fluid intake per day: >64 oz  Meals eaten away from home: rare -  Typical meal pattern: 2 meals per day and  Snacks if hungry  Eating Behaviors: Craves bread  and rice but doesn't indulge  Volume: 1 5 cup  Follows 30/ 45- 60  Grazing - tempted  Coke zero (flat) - 4 oz  Food allergies or intolerances: None  Acid reflux - tomatoes & jar sauce   Cultural or Presybeterian considerations: None      Physical Assessment  Nutrition Related Findings  Return of Hunger    Physical Activity  Types of exercise: Walking - gradually getting back was at 8 miles - [place] and winter Now at 1 4 miles - Used to go back to gym  Current physical limitations: None    Psychosocial Assessment   Support systems:Spouse, children and mother in law  Socioeconomic factors: none    Nutrition Diagnosis  Diagnosis: Overweight / Obesity (NC-3 3) improved with WLS but relapsing via old habits returning    Interventions and Teaching   Patient educated on post-op nutrition guidelines  Patient educated and handouts provided    Capacity of post-surgery stomach  Adequate hydration  Exercise  Protein supplements  Meal planning and preparation  Appropriate carbohydrate, protein, and fat intake, and food/fluid choices to maximize safe weight loss, nutrient intake, and tolerance   Dietary and lifestyle changes  Possible problems with poor eating habits  Intuitive eating  Techniques for self monitoring and keeping daily food journal  Vitamin / Mineral supplementation of Multivitamin with minerals, Calcium, Vitamin B12, Iron and Vitamin D    Education provided to: patient    Barriers to learning: No barriers identified    Readiness to change: action    Comprehension: verbalizes understanding     Expected Compliance: good    Summary  Pt pleased as she has been losing weight  Meets with Dr Kristopher Dominguez who prescribed medication which helps in curbing her " munchies"  Tried [employer] to increase her protein but dislikes  So eating 3 meals and focusing on protein  B - Oatmeal, fruit- almond milk- honey -  Also uses monk sugar Uses Sour dough bread  L - Fruit,  1/4 c  Nuts  1/2 banana - if hungry  D - Fish or Chicken thighs , beans cauliflowers raw vegetables  Waits 30 minutes to drink - 4 oz coke zero, 6-7 bottles water, 1 cup tea w/ spices  Snacks - cheese stick, serving of pretzels  Activity 1 5 miles  Both pt and  eating healthier- organic and fresh foods vs processed   is a  so modifying his cooking - also to lower his cholesterol  Reviewed macros - entered into [address] so pt can start logging  Also provided samples of Protein water and other protein drinks for pt to trial  Reviewed bloodwork and discussed  Provided sample bottle of Bariatric Pal capsules for pt to start using  Pt receptive  Rebel Dominguez  Weight Stable past 2 monthsMedication helps with night time snacking  Eating 3 meals  Decreased rice and switched to cauliflower  To start detox diet for 3 days  Adds protein snacks like egg, tofu or HB egg during detox    Stays active - cares for grandchildren, MIL and has teenagers along with housework and outdoors  for winter  Logging but hasn't since started on  Prednisone  Noted  - got attacked by bees  Taking vitamins - Bariatric Pal and Vitamin D (no calcium per stones) Hydration adequate  Questions answered during discussion   Pt receptive    Goals   Start Food Log via Bariatastic  1200 [address]ler  75g pro 135 g Carb 40 grams Fat  64 oz Water   Follow post surgery guidelines    Continue  Bariatric vitamins   F/U with Dr Cass Conti     F/U with RD in 2-3  months    Time Spent:    30 minutes

## 2021-09-23 ENCOUNTER — OFFICE VISIT (OUTPATIENT)
Dept: BARIATRICS | Facility: CLINIC | Age: 51
End: 2021-09-23

## 2021-09-23 VITALS — WEIGHT: 182 LBS | BODY MASS INDEX: 32.24 KG/M2

## 2021-09-23 DIAGNOSIS — K91.2 POSTSURGICAL MALABSORPTION: Primary | ICD-10-CM

## 2021-09-23 PROCEDURE — RECHECK

## 2021-10-04 ENCOUNTER — OFFICE VISIT (OUTPATIENT)
Dept: INTERNAL MEDICINE CLINIC | Facility: CLINIC | Age: 51
End: 2021-10-04
Payer: COMMERCIAL

## 2021-10-04 ENCOUNTER — NURSE TRIAGE (OUTPATIENT)
Dept: OTHER | Facility: OTHER | Age: 51
End: 2021-10-04

## 2021-10-04 ENCOUNTER — APPOINTMENT (OUTPATIENT)
Dept: LAB | Facility: CLINIC | Age: 51
End: 2021-10-04
Payer: COMMERCIAL

## 2021-10-04 VITALS
WEIGHT: 185 LBS | OXYGEN SATURATION: 99 % | DIASTOLIC BLOOD PRESSURE: 64 MMHG | SYSTOLIC BLOOD PRESSURE: 100 MMHG | TEMPERATURE: 99.1 F | HEIGHT: 63 IN | BODY MASS INDEX: 32.78 KG/M2 | HEART RATE: 80 BPM

## 2021-10-04 DIAGNOSIS — K91.2 POSTSURGICAL MALABSORPTION: ICD-10-CM

## 2021-10-04 DIAGNOSIS — N20.0 NEPHROLITHIASIS: ICD-10-CM

## 2021-10-04 DIAGNOSIS — R11.0 NAUSEA: ICD-10-CM

## 2021-10-04 DIAGNOSIS — R58 ECCHYMOSIS: ICD-10-CM

## 2021-10-04 DIAGNOSIS — R58 ECCHYMOSIS: Primary | ICD-10-CM

## 2021-10-04 LAB
ALBUMIN SERPL BCP-MCNC: 3.2 G/DL (ref 3.5–5)
ALP SERPL-CCNC: 47 U/L (ref 46–116)
ALT SERPL W P-5'-P-CCNC: 30 U/L (ref 12–78)
AMYLASE SERPL-CCNC: 28 IU/L (ref 25–115)
ANION GAP SERPL CALCULATED.3IONS-SCNC: 4 MMOL/L (ref 4–13)
APTT PPP: 27 SECONDS (ref 23–37)
AST SERPL W P-5'-P-CCNC: 19 U/L (ref 5–45)
BASOPHILS # BLD AUTO: 0.03 THOUSANDS/ΜL (ref 0–0.1)
BASOPHILS NFR BLD AUTO: 1 % (ref 0–1)
BILIRUB SERPL-MCNC: 0.99 MG/DL (ref 0.2–1)
BILIRUB UR QL STRIP: NEGATIVE
BUN SERPL-MCNC: 13 MG/DL (ref 5–25)
CALCIUM ALBUM COR SERPL-MCNC: 9.2 MG/DL (ref 8.3–10.1)
CALCIUM SERPL-MCNC: 8.6 MG/DL (ref 8.3–10.1)
CHLORIDE SERPL-SCNC: 107 MMOL/L (ref 100–108)
CLARITY UR: CLEAR
CO2 SERPL-SCNC: 26 MMOL/L (ref 21–32)
COLOR UR: YELLOW
CREAT SERPL-MCNC: 0.94 MG/DL (ref 0.6–1.3)
EOSINOPHIL # BLD AUTO: 0.04 THOUSAND/ΜL (ref 0–0.61)
EOSINOPHIL NFR BLD AUTO: 1 % (ref 0–6)
ERYTHROCYTE [DISTWIDTH] IN BLOOD BY AUTOMATED COUNT: 13.3 % (ref 11.6–15.1)
FOLATE SERPL-MCNC: >20 NG/ML (ref 3.1–17.5)
GFR SERPL CREATININE-BSD FRML MDRD: 71 ML/MIN/1.73SQ M
GLUCOSE P FAST SERPL-MCNC: 89 MG/DL (ref 65–99)
GLUCOSE UR STRIP-MCNC: NEGATIVE MG/DL
HCT VFR BLD AUTO: 38.2 % (ref 34.8–46.1)
HGB BLD-MCNC: 12.4 G/DL (ref 11.5–15.4)
HGB UR QL STRIP.AUTO: NEGATIVE
IMM GRANULOCYTES # BLD AUTO: 0.01 THOUSAND/UL (ref 0–0.2)
IMM GRANULOCYTES NFR BLD AUTO: 0 % (ref 0–2)
INR PPP: 1.04 (ref 0.84–1.19)
IRON SERPL-MCNC: 126 UG/DL (ref 50–170)
KETONES UR STRIP-MCNC: NEGATIVE MG/DL
LEUKOCYTE ESTERASE UR QL STRIP: NEGATIVE
LIPASE SERPL-CCNC: 73 U/L (ref 73–393)
LYMPHOCYTES # BLD AUTO: 1.68 THOUSANDS/ΜL (ref 0.6–4.47)
LYMPHOCYTES NFR BLD AUTO: 30 % (ref 14–44)
MCH RBC QN AUTO: 29.3 PG (ref 26.8–34.3)
MCHC RBC AUTO-ENTMCNC: 32.5 G/DL (ref 31.4–37.4)
MCV RBC AUTO: 90 FL (ref 82–98)
MONOCYTES # BLD AUTO: 0.28 THOUSAND/ΜL (ref 0.17–1.22)
MONOCYTES NFR BLD AUTO: 5 % (ref 4–12)
NEUTROPHILS # BLD AUTO: 3.51 THOUSANDS/ΜL (ref 1.85–7.62)
NEUTS SEG NFR BLD AUTO: 63 % (ref 43–75)
NITRITE UR QL STRIP: NEGATIVE
NRBC BLD AUTO-RTO: 0 /100 WBCS
PH UR STRIP.AUTO: 6 [PH]
PLATELET # BLD AUTO: 196 THOUSANDS/UL (ref 149–390)
PMV BLD AUTO: 10.2 FL (ref 8.9–12.7)
POTASSIUM SERPL-SCNC: 3.7 MMOL/L (ref 3.5–5.3)
PROT SERPL-MCNC: 7.1 G/DL (ref 6.4–8.2)
PROT UR STRIP-MCNC: NEGATIVE MG/DL
PROTHROMBIN TIME: 13.2 SECONDS (ref 11.6–14.5)
RBC # BLD AUTO: 4.23 MILLION/UL (ref 3.81–5.12)
SODIUM SERPL-SCNC: 137 MMOL/L (ref 136–145)
SP GR UR STRIP.AUTO: 1.02 (ref 1–1.03)
TRANSFERRIN SERPL-MCNC: 271 MG/DL (ref 200–400)
TSH SERPL DL<=0.05 MIU/L-ACNC: 2.47 UIU/ML (ref 0.36–3.74)
UROBILINOGEN UR QL STRIP.AUTO: 0.2 E.U./DL
VIT B12 SERPL-MCNC: 509 PG/ML (ref 100–900)
WBC # BLD AUTO: 5.55 THOUSAND/UL (ref 4.31–10.16)

## 2021-10-04 PROCEDURE — 84443 ASSAY THYROID STIM HORMONE: CPT

## 2021-10-04 PROCEDURE — 85025 COMPLETE CBC W/AUTO DIFF WBC: CPT

## 2021-10-04 PROCEDURE — 81003 URINALYSIS AUTO W/O SCOPE: CPT

## 2021-10-04 PROCEDURE — 83690 ASSAY OF LIPASE: CPT

## 2021-10-04 PROCEDURE — 82746 ASSAY OF FOLIC ACID SERUM: CPT

## 2021-10-04 PROCEDURE — 85610 PROTHROMBIN TIME: CPT

## 2021-10-04 PROCEDURE — 85730 THROMBOPLASTIN TIME PARTIAL: CPT

## 2021-10-04 PROCEDURE — 82607 VITAMIN B-12: CPT

## 2021-10-04 PROCEDURE — 83540 ASSAY OF IRON: CPT

## 2021-10-04 PROCEDURE — 84466 ASSAY OF TRANSFERRIN: CPT

## 2021-10-04 PROCEDURE — 84630 ASSAY OF ZINC: CPT

## 2021-10-04 PROCEDURE — 99214 OFFICE O/P EST MOD 30 MIN: CPT | Performed by: INTERNAL MEDICINE

## 2021-10-04 PROCEDURE — 36415 COLL VENOUS BLD VENIPUNCTURE: CPT

## 2021-10-04 PROCEDURE — 80053 COMPREHEN METABOLIC PANEL: CPT

## 2021-10-04 PROCEDURE — 82150 ASSAY OF AMYLASE: CPT

## 2021-10-04 RX ORDER — DOCUSATE SODIUM 100 MG/1
100 CAPSULE, LIQUID FILLED ORAL 2 TIMES DAILY
COMMUNITY

## 2021-10-07 ENCOUNTER — OFFICE VISIT (OUTPATIENT)
Dept: INTERNAL MEDICINE CLINIC | Facility: CLINIC | Age: 51
End: 2021-10-07
Payer: COMMERCIAL

## 2021-10-07 VITALS
TEMPERATURE: 96.7 F | HEIGHT: 63 IN | RESPIRATION RATE: 14 BRPM | DIASTOLIC BLOOD PRESSURE: 62 MMHG | WEIGHT: 184 LBS | SYSTOLIC BLOOD PRESSURE: 94 MMHG | OXYGEN SATURATION: 97 % | BODY MASS INDEX: 32.6 KG/M2 | HEART RATE: 87 BPM

## 2021-10-07 DIAGNOSIS — R58 ECCHYMOSIS: Primary | ICD-10-CM

## 2021-10-07 PROCEDURE — 99212 OFFICE O/P EST SF 10 MIN: CPT | Performed by: NURSE PRACTITIONER

## 2021-10-09 LAB — ZINC SERPL-MCNC: 88 UG/DL (ref 44–115)

## 2021-10-18 ENCOUNTER — OFFICE VISIT (OUTPATIENT)
Dept: BARIATRICS | Facility: CLINIC | Age: 51
End: 2021-10-18
Payer: COMMERCIAL

## 2021-10-18 VITALS
WEIGHT: 183.4 LBS | HEIGHT: 64 IN | BODY MASS INDEX: 31.31 KG/M2 | DIASTOLIC BLOOD PRESSURE: 70 MMHG | HEART RATE: 77 BPM | SYSTOLIC BLOOD PRESSURE: 110 MMHG

## 2021-10-18 DIAGNOSIS — Z98.84 RECENT WEIGHT GAIN AFTER SURGICAL THERAPY FOR OBESITY: ICD-10-CM

## 2021-10-18 DIAGNOSIS — E55.9 VITAMIN D INSUFFICIENCY: ICD-10-CM

## 2021-10-18 DIAGNOSIS — K21.00 GASTROESOPHAGEAL REFLUX DISEASE WITH ESOPHAGITIS: ICD-10-CM

## 2021-10-18 DIAGNOSIS — E66.9 OBESITY, CLASS I, BMI 30-34.9: Primary | ICD-10-CM

## 2021-10-18 DIAGNOSIS — Z98.84 S/P LAPAROSCOPIC SLEEVE GASTRECTOMY: ICD-10-CM

## 2021-10-18 DIAGNOSIS — R63.5 RECENT WEIGHT GAIN AFTER SURGICAL THERAPY FOR OBESITY: ICD-10-CM

## 2021-10-18 PROCEDURE — 99214 OFFICE O/P EST MOD 30 MIN: CPT | Performed by: INTERNAL MEDICINE

## 2021-10-20 ENCOUNTER — OFFICE VISIT (OUTPATIENT)
Dept: BARIATRICS | Facility: CLINIC | Age: 51
End: 2021-10-20

## 2021-10-20 DIAGNOSIS — K91.2 POSTSURGICAL MALABSORPTION: Primary | ICD-10-CM

## 2021-10-20 PROCEDURE — RECHECK

## 2021-10-26 ENCOUNTER — IMMUNIZATIONS (OUTPATIENT)
Dept: FAMILY MEDICINE CLINIC | Facility: HOSPITAL | Age: 51
End: 2021-10-26

## 2021-10-26 DIAGNOSIS — Z23 ENCOUNTER FOR IMMUNIZATION: Primary | ICD-10-CM

## 2021-10-26 PROCEDURE — 0001A COVID-19 PFIZER VACC 0.3 ML: CPT

## 2021-10-26 PROCEDURE — 91300 COVID-19 PFIZER VACC 0.3 ML: CPT

## 2021-10-28 ENCOUNTER — APPOINTMENT (OUTPATIENT)
Dept: LAB | Facility: CLINIC | Age: 51
End: 2021-10-28
Payer: COMMERCIAL

## 2021-10-28 ENCOUNTER — OFFICE VISIT (OUTPATIENT)
Dept: INTERNAL MEDICINE CLINIC | Facility: CLINIC | Age: 51
End: 2021-10-28
Payer: COMMERCIAL

## 2021-10-28 VITALS
WEIGHT: 183.6 LBS | DIASTOLIC BLOOD PRESSURE: 68 MMHG | BODY MASS INDEX: 31.34 KG/M2 | SYSTOLIC BLOOD PRESSURE: 102 MMHG | HEIGHT: 64 IN | OXYGEN SATURATION: 98 % | TEMPERATURE: 97.8 F | HEART RATE: 72 BPM

## 2021-10-28 DIAGNOSIS — L60.0 INGROWN TOENAIL: Primary | ICD-10-CM

## 2021-10-28 DIAGNOSIS — R21 RASH AND NONSPECIFIC SKIN ERUPTION: ICD-10-CM

## 2021-10-28 DIAGNOSIS — E55.9 VITAMIN D INSUFFICIENCY: ICD-10-CM

## 2021-10-28 LAB — 25(OH)D3 SERPL-MCNC: 33.1 NG/ML (ref 30–100)

## 2021-10-28 PROCEDURE — 82306 VITAMIN D 25 HYDROXY: CPT

## 2021-10-28 PROCEDURE — 99214 OFFICE O/P EST MOD 30 MIN: CPT | Performed by: INTERNAL MEDICINE

## 2021-10-28 PROCEDURE — 36415 COLL VENOUS BLD VENIPUNCTURE: CPT

## 2021-10-28 RX ORDER — TRIAMCINOLONE ACETONIDE 0.25 MG/G
OINTMENT TOPICAL 2 TIMES DAILY
Qty: 30 G | Refills: 0 | Status: SHIPPED | OUTPATIENT
Start: 2021-10-28 | End: 2022-02-10

## 2021-11-19 ENCOUNTER — IMMUNIZATIONS (OUTPATIENT)
Dept: FAMILY MEDICINE CLINIC | Facility: HOSPITAL | Age: 51
End: 2021-11-19

## 2021-11-19 DIAGNOSIS — Z23 ENCOUNTER FOR IMMUNIZATION: Primary | ICD-10-CM

## 2021-11-19 PROCEDURE — 0002A COVID-19 PFIZER VACC 0.3 ML: CPT

## 2021-11-19 PROCEDURE — 91300 COVID-19 PFIZER VACC 0.3 ML: CPT

## 2021-12-09 ENCOUNTER — OFFICE VISIT (OUTPATIENT)
Dept: BARIATRICS | Facility: CLINIC | Age: 51
End: 2021-12-09

## 2021-12-09 DIAGNOSIS — K91.2 POSTSURGICAL MALABSORPTION: Primary | ICD-10-CM

## 2021-12-09 PROCEDURE — RECHECK

## 2021-12-15 ENCOUNTER — OFFICE VISIT (OUTPATIENT)
Dept: DERMATOLOGY | Facility: CLINIC | Age: 51
End: 2021-12-15
Payer: COMMERCIAL

## 2021-12-15 VITALS — WEIGHT: 180.8 LBS | TEMPERATURE: 97.2 F | BODY MASS INDEX: 31.52 KG/M2

## 2021-12-15 DIAGNOSIS — Z13.89 SCREENING FOR SKIN CONDITION: ICD-10-CM

## 2021-12-15 DIAGNOSIS — R58 ECCHYMOSIS: Primary | ICD-10-CM

## 2021-12-15 DIAGNOSIS — D18.01 CHERRY ANGIOMA: ICD-10-CM

## 2021-12-15 DIAGNOSIS — L81.1 MELASMA: ICD-10-CM

## 2021-12-15 PROCEDURE — 99203 OFFICE O/P NEW LOW 30 MIN: CPT | Performed by: DERMATOLOGY

## 2021-12-22 DIAGNOSIS — J31.0 CHRONIC RHINITIS: ICD-10-CM

## 2021-12-22 NOTE — TELEPHONE ENCOUNTER
----- Message from [place] fanta Kuhn [place] sent at 12/22/2021  9:49 AM EST -----  Regarding: Medication need refills   Good morning can you send a 90day Prescription to [facility] for cetirizine for my allergies thank you

## 2021-12-27 RX ORDER — CETIRIZINE HYDROCHLORIDE 10 MG/1
10 TABLET ORAL DAILY
Qty: 90 TABLET | Refills: 3 | Status: SHIPPED | OUTPATIENT
Start: 2021-12-27

## 2021-12-28 ENCOUNTER — VBI (OUTPATIENT)
Dept: ADMINISTRATIVE | Facility: OTHER | Age: 51
End: 2021-12-28

## 2022-01-03 ENCOUNTER — OFFICE VISIT (OUTPATIENT)
Dept: BARIATRICS | Facility: CLINIC | Age: 52
End: 2022-01-03
Payer: COMMERCIAL

## 2022-01-03 ENCOUNTER — TELEPHONE (OUTPATIENT)
Dept: BARIATRICS | Facility: CLINIC | Age: 52
End: 2022-01-03

## 2022-01-03 VITALS
HEIGHT: 64 IN | BODY MASS INDEX: 30.8 KG/M2 | DIASTOLIC BLOOD PRESSURE: 82 MMHG | RESPIRATION RATE: 12 BRPM | HEART RATE: 84 BPM | SYSTOLIC BLOOD PRESSURE: 118 MMHG | WEIGHT: 180.4 LBS | TEMPERATURE: 98.3 F

## 2022-01-03 DIAGNOSIS — K21.00 GASTROESOPHAGEAL REFLUX DISEASE WITH ESOPHAGITIS: ICD-10-CM

## 2022-01-03 DIAGNOSIS — Z98.84 RECENT WEIGHT GAIN AFTER SURGICAL THERAPY FOR OBESITY: ICD-10-CM

## 2022-01-03 DIAGNOSIS — Z98.84 S/P LAPAROSCOPIC SLEEVE GASTRECTOMY: ICD-10-CM

## 2022-01-03 DIAGNOSIS — E66.9 OBESITY, CLASS I, BMI 30-34.9: ICD-10-CM

## 2022-01-03 DIAGNOSIS — E66.9 OBESITY, CLASS I, BMI 30-34.9: Primary | ICD-10-CM

## 2022-01-03 DIAGNOSIS — R63.5 ABNORMAL WEIGHT GAIN: ICD-10-CM

## 2022-01-03 DIAGNOSIS — R63.5 RECENT WEIGHT GAIN AFTER SURGICAL THERAPY FOR OBESITY: ICD-10-CM

## 2022-01-03 PROCEDURE — 99214 OFFICE O/P EST MOD 30 MIN: CPT | Performed by: INTERNAL MEDICINE

## 2022-01-03 RX ORDER — OMEPRAZOLE 20 MG/1
20 CAPSULE, DELAYED RELEASE ORAL DAILY
Qty: 30 CAPSULE | Refills: 1 | Status: SHIPPED | OUTPATIENT
Start: 2022-01-03 | End: 2022-02-10 | Stop reason: SDUPTHER

## 2022-01-03 RX ORDER — OMEPRAZOLE 20 MG/1
20 CAPSULE, DELAYED RELEASE ORAL DAILY
Qty: 1 CAPSULE | Refills: 1 | Status: SHIPPED | OUTPATIENT
Start: 2022-01-03 | End: 2022-01-03 | Stop reason: SDUPTHER

## 2022-01-03 RX ORDER — BUPROPION HYDROCHLORIDE 150 MG/1
150 TABLET ORAL DAILY
Qty: 90 TABLET | Refills: 1 | Status: SHIPPED | OUTPATIENT
Start: 2022-01-03 | End: 2022-07-05 | Stop reason: SDUPTHER

## 2022-01-03 RX ORDER — NALTREXONE HYDROCHLORIDE 50 MG/1
TABLET, FILM COATED ORAL
Qty: 30 TABLET | Refills: 1 | Status: SHIPPED | OUTPATIENT
Start: 2022-01-03 | End: 2022-03-14

## 2022-01-03 NOTE — TELEPHONE ENCOUNTER
Patient is at  The pharmacy and they pharmacist said the RX is incorrect as it is for only 1 pill  Please amend the RX and resend it

## 2022-01-03 NOTE — PROGRESS NOTES
Assessment/Plan:  Tyrone Isaac was seen today for follow-up  Diagnoses and all orders for this visit:    Obesity, Class I, BMI 30-34 9  -     omeprazole (PriLOSEC) 20 mg delayed release capsule; Take 1 capsule (20 mg total) by mouth daily  -     buPROPion (WELLBUTRIN XL) 150 mg 24 hr tablet; Take 1 tablet (150 mg total) by mouth daily  -     naltrexone (REVIA) 50 mg tablet; Take half tablet once daily for 1 week, then take one tablet daily with Wellbutrin  Do not take with high fat meals  Gastroesophageal reflux disease with esophagitis  -     omeprazole (PriLOSEC) 20 mg delayed release capsule; Take 1 capsule (20 mg total) by mouth daily  Could be from gastritis/GERD  Try taking Prilosec to see if her sx's improve  Pt has a GI appt in Feb  Try to avoid caffeine    S/P laparoscopic sleeve gastrectomy  Postsurgical malabsorption  Continue vitamins  Labs UTD    Recent weight gain after surgical therapy for obesity  -     buPROPion (WELLBUTRIN XL) 150 mg 24 hr tablet; Take 1 tablet (150 mg total) by mouth daily    Abnormal weight gain  -     buPROPion (WELLBUTRIN XL) 150 mg 24 hr tablet; Take 1 tablet (150 mg total) by mouth daily  -     naltrexone (REVIA) 50 mg tablet; Take half tablet once daily for 1 week, then take one tablet daily with Wellbutrin  Do not take with high fat meals  Topamax not indicated due to recent nephrolithiasis hx  Some weight loss then regain due to steroid use  Tolerating wellbutrin - continue  Will add Naltrexone to it  GLP-1 not covered    Goals:  Goal protein intake 80g per day  <1200 calories  Continue bariatric vitamins  Repeat vitamin D level; 23 in June  Pt has been taking 1000 IU daily  Pt would like vitamin samples- will defer to Tessie Ma RD    Follow up in approximately 2 months with Non-Surgical Physician/Advanced Practitioner      Subjective:   Chief Complaint   Patient presents with    Follow-up       Patient ID: Travis Cordero  is a 47 y o  female with excess weight/obesity here to pursue weight management  Patient is pursuing Conservative Program      HPI  -Initial weight loss goal of 5-10% weight loss for improved health  Wt Readings from Last 10 Encounters:   01/03/22 81 8 kg (180 lb 6 4 oz)   12/15/21 82 kg (180 lb 12 8 oz)   10/28/21 83 3 kg (183 lb 9 6 oz)   10/18/21 83 2 kg (183 lb 6 4 oz)   10/07/21 83 5 kg (184 lb)   10/04/21 83 9 kg (185 lb)   09/23/21 82 6 kg (182 lb)   09/14/21 83 2 kg (183 lb 6 4 oz)   08/25/21 82 6 kg (182 lb)   08/25/21 81 2 kg (179 lb)     Initial weight MWM: 188 lbs (6/16/21)  Current weight: 180 lbs  Change in weight: -8 lb  Goal:  S/p LSG in 2105 with Dr Lobito Cyr at [facility]  Initial 215 lbs  Judson 135 lbs  Weight regain: +65 lbs from medical marijuana use   Has chronic back pain/sciatica   Continues to take the medical marijuana pill at night to help her sleep  Sees pain mgmt, PT    Before or after eating, has pain that she feels "hungry", like hunger pain  No nausea, vomiting, diarrhea  +Constipation if doesn't take colace daily  If overeats gets nausea  Tries to stay away from triggers for reflux  Pepcid helps with her sx's    Last year she had EGD 4/2021-  A  Stomach, antrum, biopsy:  - Gastric antral mucosa with mild chronic, inactive gastritis  - Negative for intestinal metaplasia, dysplasia or carcinoma  - No Helicobacter pylori is identified on H&E stained slides  Thinks she is doing under 1200 tyler   B- belvita, protein   sometimes protein shakes/coffee makes her have a BM  S-   L- salad with fish  S-  D- protein veggies cauliflower pasta   S-   Drinks- 7 bottles water   Alcohol- no   Exercise- walking at least 6500 steps       The following portions of the patient's history were reviewed and updated as appropriate: allergies, current medications, past family history, past medical history, past social history, past surgical history, and problem list     Review of Systems   Respiratory: Negative  Cardiovascular: Negative  Gastrointestinal: Positive for abdominal pain (epigastric)  Objective:  /82   Pulse 84   Temp 98 3 °F (36 8 °C) (Temporal)   Resp 12   Ht 5' 3 5" (1 613 m)   Wt 81 8 kg (180 lb 6 4 oz)   BMI 31 46 kg/m²   Constitutional: Well-developed, well-nourished and obese Body mass index is 31 46 kg/m²  Ann Ochoa HEENT: No conjunctival pallor or jaundice  Pulmonary: No increased work of breathing or signs of respiratory distress  CV: Normal rate, well-perfused  GI: Obese  Non-distended  MSK: No edema   Neuro: Oriented to person, place and time  Normal Speech  Normal gait  Psych: Normal affect and mood       Labs and Imaging  Reviewed

## 2022-01-05 ENCOUNTER — TELEPHONE (OUTPATIENT)
Dept: INTERNAL MEDICINE CLINIC | Facility: CLINIC | Age: 52
End: 2022-01-05

## 2022-01-05 NOTE — TELEPHONE ENCOUNTER
WANTS  TO KNOW  OSWALD   WOULD  ORDER  URINE  TEST   FOR  HER   THINKS  SHE  HAS   EITHER  UTI OR  KIDNEY  STONE   WANTS  TO KNOW  BEFORE  SHE  MAKE  APPT  FOR  UROLOGIST    CALL PT  [phone]

## 2022-01-06 ENCOUNTER — APPOINTMENT (OUTPATIENT)
Dept: LAB | Facility: HOSPITAL | Age: 52
End: 2022-01-06
Payer: COMMERCIAL

## 2022-01-06 ENCOUNTER — HOSPITAL ENCOUNTER (OUTPATIENT)
Dept: CT IMAGING | Facility: HOSPITAL | Age: 52
Discharge: HOME | End: 2022-01-06
Payer: COMMERCIAL

## 2022-01-06 ENCOUNTER — TELEPHONE (OUTPATIENT)
Dept: INTERNAL MEDICINE CLINIC | Facility: CLINIC | Age: 52
End: 2022-01-06

## 2022-01-06 ENCOUNTER — TELEPHONE (OUTPATIENT)
Dept: UROLOGY | Facility: MEDICAL CENTER | Age: 52
End: 2022-01-06

## 2022-01-06 DIAGNOSIS — N20.0 NEPHROLITHIASIS: Primary | ICD-10-CM

## 2022-01-06 DIAGNOSIS — N20.0 NEPHROLITHIASIS: ICD-10-CM

## 2022-01-06 DIAGNOSIS — R10.9 FLANK PAIN: ICD-10-CM

## 2022-01-06 LAB
BACTERIA UR QL AUTO: NORMAL /HPF
BILIRUB UR QL STRIP: NEGATIVE
CLARITY UR: CLEAR
COLOR UR: YELLOW
GLUCOSE UR STRIP-MCNC: NEGATIVE MG/DL
HGB UR QL STRIP.AUTO: ABNORMAL
KETONES UR STRIP-MCNC: NEGATIVE MG/DL
LEUKOCYTE ESTERASE UR QL STRIP: NEGATIVE
NITRITE UR QL STRIP: NEGATIVE
NON-SQ EPI CELLS URNS QL MICRO: NORMAL /HPF
PH UR STRIP.AUTO: 6 [PH]
PROT UR STRIP-MCNC: NEGATIVE MG/DL
RBC #/AREA URNS AUTO: NORMAL /HPF
SP GR UR STRIP.AUTO: <=1.005 (ref 1–1.03)
UROBILINOGEN UR QL STRIP.AUTO: 0.2 E.U./DL
WBC #/AREA URNS AUTO: NORMAL /HPF

## 2022-01-06 PROCEDURE — G1004 CDSM NDSC: HCPCS

## 2022-01-06 PROCEDURE — 81001 URINALYSIS AUTO W/SCOPE: CPT

## 2022-01-06 PROCEDURE — 74176 CT ABD & PELVIS W/O CONTRAST: CPT

## 2022-01-06 NOTE — TELEPHONE ENCOUNTER
Patient had pain right side pain   Started yesterday  Called pcp and blood in urine visible/ microwith urine   testing was ordered  Patient has nausea  No vomiting  Patient has chills, no fevers reported  Patient last seen in April 4/22/21 had 3 mm stone   Patient state she thinks she passed the stone   Has not had any pain until yesterday  PCP ordered urine testing  UA Micro with culture pending  Encouraged hydration with water 40-60oz of water daily  Avoiding Bladder irritants such as coffee,tea,soda,carbonated beverages  Limiting your alcohol intake  Avoiding constipation  Continue taking medications as prescribed  Reducing cigarette smoking  Advised patient monitor/contact our office with fever above 101 0, chills, decreased urination or unable to urinate, blood or clots in the urine  Health Call after hours protocol reviewed  Ed precautions reviewed   Patient verbalized understanding/ Agreement       Will send encounter to provider for recommendation

## 2022-01-06 NOTE — TELEPHONE ENCOUNTER
----- Message from Donna Puente MD sent at 1/6/2022 12:57 PM EST -----   No urinary tract infection or blood noted    Probably muscular pain

## 2022-01-06 NOTE — TELEPHONE ENCOUNTER
Called and spoke to patient  Explained to patient that if she thinks she passed the stone that we can obtain imaging in 6-8 weeks to ensure passage  Patient stated that she is unsure if she passed the stone  Patient states that she is having dull pain in her flank area and is very nauseous  Patient stated that she has not seen any blood in her urine but her urine yesterday was very dark  Advised that she needs to be drinking lots of water  Patient states that she is drinking a lot of water  Advised that I will send this to the provider and someone from the office will call back with recommendations

## 2022-01-06 NOTE — TELEPHONE ENCOUNTER
Called and left detailed message  Explained in message given her symptoms we have ordered a stat CT scan  Patient provided with CS number in message  Patient was provided with office number to callback with any questions

## 2022-01-06 NOTE — TELEPHONE ENCOUNTER
Sorry I understood previous message  Given that she is having flank pain and nausea, I would recommend a stat CT  Order placed  Can cancel KUB and US   Will monitor for imaging results and urine culture

## 2022-01-07 ENCOUNTER — TELEPHONE (OUTPATIENT)
Dept: UROLOGY | Facility: CLINIC | Age: 52
End: 2022-01-07

## 2022-01-07 NOTE — TELEPHONE ENCOUNTER
----- Message from Kamran Hidalgo PA-C sent at 1/7/2022  7:54 AM EST -----  CT is negative for any obstructive uropathy    Urine testing also normal

## 2022-02-08 NOTE — PROGRESS NOTES
Bariatric Nutrition Assessment Note       Transfer care - for MWM    S/P Vertical sleeve gastrectomy  2015  Dr Shaylee Alvarez at [facility] w/ Dr Bhavna Stanford  4/27/2021     CC: Follow-up for regain    Nutrition Assessment   Tyler Mendes  47 y o   female  not currently breastfeeding  Height:5'3 7" Weight:176        Weight on Day of Weight Loss Surgery: 215#  PAULO: 135#  Regain: at 197# - gained 60# over past 3years  Goal 160      Review of History and Medications     OTC: None    Past Medical History:   Diagnosis Date    Asthma     seasonal    Back pain     Kidney stone     Obesity     Postgastrectomy malabsorption      Past Surgical History:   Procedure Laterality Date    CHOLECYSTECTOMY      COLONOSCOPY      GASTRECTOMY SLEEVE LAPAROSCOPIC      KNEE ARTHROSCOPY W/ MENISCAL REPAIR Left     NEUROPLASTY / TRANSPOSITION MEDIAN NERVE AT CARPAL TUNNEL BILATERAL      [place] ESOPHAGOGASTRODUODENOSCOPY TRANSORAL DIAGNOSTIC N/A 4/30/2018    Procedure: ESOPHAGOGASTRODUODENOSCOPY (EGD); Surgeon: Woody Durán MD;  Location: [facility];   Service: Gastroenterology     Social History     Socioeconomic History    Marital status: /Civil Union     Spouse name: Not on file    Number of children: Not on file    Years of education: Not on file    Highest education level: Not on file   Occupational History    Not on file   Tobacco Use    Smoking status: Never Smoker    Smokeless tobacco: Never Used   Vaping Use    Vaping Use: Former    Substances: THC, CBD   Substance and Sexual Activity    Alcohol use: Yes     Comment: only on birthday    Drug use: Yes     Types: Marijuana     Comment: uses pill  (medical marijuania card)    Sexual activity: Yes     Partners: Male   Other Topics Concern    Not on file   Social History Narrative    Not on file     Social Determinants of Health     Financial Resource Strain: Not on file   Food Insecurity: Not on file   Transportation Needs: Not on file Physical Activity: Inactive    Days of Exercise per Week: 0 days    Minutes of Exercise per Session: 0 min   Stress: Stress Concern Present    Feeling of Stress : To some extent   Social Connections: Not on file   Intimate Partner Violence: Not on file   Housing Stability: Not on file       Current Outpatient Medications:     aspirin-acetaminophen-caffeine (EXCEDRIN MIGRAINE) 250-250-65 MG per tablet, Take 1 tablet by mouth every 6 (six) hours as needed for headaches, Disp: , Rfl:     Biotin 1000 MCG CHEW, Chew daily, Disp: , Rfl:     buPROPion (WELLBUTRIN XL) 150 mg 24 hr tablet, Take 1 tablet (150 mg total) by mouth daily, Disp: 90 tablet, Rfl: 1    cetirizine (ZyrTEC) 10 mg tablet, Take 1 tablet (10 mg total) by mouth daily, Disp: 90 tablet, Rfl: 3    cholecalciferol (VITAMIN D3) 1,000 units tablet, Take 1 tablet (1,000 Units total) by mouth daily, Disp: 100 tablet, Rfl: 3    dicyclomine (BENTYL) 20 mg tablet, Take 1 tablet (20 mg total) by mouth every 6 (six) hours as needed (abdominal pain), Disp: 60 tablet, Rfl: 3    docusate sodium (COLACE) 100 mg capsule, Take 100 mg by mouth 2 (two) times a day, Disp: , Rfl:     Multiple Vitamins-Minerals (BARIATRIC MULTIVITAMINS/IRON PO), Take by mouth, Disp: , Rfl:     naltrexone (REVIA) 50 mg tablet, Take half tablet once daily for 1 week, then take one tablet daily with Wellbutrin  Do not take with high fat meals  , Disp: 30 tablet, Rfl: 1    Norethin Ace-Eth Estrad-FE (MICROGESTIN FE 1 5/30 PO), Take by mouth, Disp: , Rfl:     omeprazole (PriLOSEC) 20 mg delayed release capsule, Take 1 capsule (20 mg total) by mouth daily, Disp: 30 capsule, Rfl: 1    triamcinolone (KENALOG) 0 025 % ointment, Apply topically 2 (two) times a day (Patient not taking: Reported on 1/3/2022 ), Disp: 30 g, Rfl: 0    Food Intake and Lifestyle Assessment   Food Intake Assessment completed via usual diet recall  Breakfast: 2 coffee w/Monk sugar 1 oz milk  11:45 4 oz oatmeal banana,  honey, nuts, blueberry   Dinner: Root vegetables and meat Soup with rice - no liquid or salad w/beans, plantains, turkey burger  Snack: may graze if hungry - cheese or bevitas  Beverage intake: Tea w/spices  Coffee Water 6-7 bottlle  Diet texture/stage: regular  Protein supplement: None   Estimated protein intake per day: 40 - 50 grams  Estimated fluid intake per day: >64 oz  Meals eaten away from home: rare -  Typical meal pattern: 2 meals per day and  Snacks if hungry  Eating Behaviors: Craves bread  and rice but doesn't indulge  Volume: 1 5 cup  Follows 30/ 45- 60  Grazing - tempted  Coke zero (flat) - 4 oz  Food allergies or intolerances: None  Acid reflux - tomatoes & jar sauce   Cultural or Latter day considerations: None      Physical Assessment  Nutrition Related Findings  Return of Hunger    Physical Activity  Types of exercise: Walking - gradually getting back was at 8 miles - [place] and winter Now at 1 4 miles - Used to go back to gym  Current physical limitations: None    Psychosocial Assessment   Support systems:Spouse, children and mother in law  Socioeconomic factors: none    Nutrition Diagnosis  Diagnosis: Overweight / Obesity (NC-3 3) improved with WLS but relapsing via old habits returning    Interventions and Teaching   Patient educated on post-op nutrition guidelines  Patient educated and handouts provided    Capacity of post-surgery stomach  Adequate hydration  Exercise  Protein supplements  Meal planning and preparation  Appropriate carbohydrate, protein, and fat intake, and food/fluid choices to maximize safe weight loss, nutrient intake, and tolerance   Dietary and lifestyle changes  Possible problems with poor eating habits  Intuitive eating  Techniques for self monitoring and keeping daily food journal  Vitamin / Mineral supplementation of Multivitamin with minerals, Calcium, Vitamin B12, Iron and Vitamin D    Education provided to: patient    Barriers to learning: No barriers identified    Readiness to change: action    Comprehension: verbalizes understanding     Expected Compliance: good    Summary  Pt pleased as she has been losing weight  Meets with Dr Isabelle Silva who prescribed medication which helps in curbing her " munchies"  Tried Premier to increase her protein but dislikes  So eating 3 meals and focusing on protein  B - Oatmeal, fruit- almond milk- honey -  Also uses monk sugar Uses Sour dough bread  L - Fruit,  1/4 c  Nuts  1/2 banana - if hungry  D - Fish or Chicken thighs , beans cauliflowers raw vegetables  Waits 30 minutes to drink - 4 oz coke zero, 6-7 bottles water, 1 cup tea w/ spices  Snacks - cheese stick, serving of pretzels  Activity 1 5 miles  Both pt and  eating healthier- organic and fresh foods vs processed   is a  so modifying his cooking - also to lower his cholesterol  Reviewed macros - entered into [employer] so pt can start logging  Also provided samples of Protein water and other protein drinks for pt to trial  Reviewed bloodwork and discussed  Provided sample bottle of Bariatric Pal capsules for pt to start using  Pt receptive  Past Visit Summary 7/22/2021  Follows Dr Isabelle Silva  Weight Stable past 2 monthsMedication helps with night time snacking  Eating 3 meals  Decreased rice and switched to cauliflower  To start detox diet for 3 days  Adds protein snacks like egg, tofu or HB egg during detox  Stays active - cares for grandchildren, MIL and has teenagers along with housework and outdoors  for winter  Logging but hasn't since started on  Prednisone  Noted  - got attacked by bees  Taking vitamins - Bariatric Pal and Vitamin D (no calcium per stones) Hydration adequate  Questions answered during discussion  Pt receptive    Past  Visit Summary 9/23/2021  Here today to further discuss vitamins and obtain samples to cover costs  Provided available samples and discount codes  Pt reports she is doing well   Met with Dr Isabelle Silva on Mondays and to follow-up with me in December    Past Visit Summary 12/9/2021  Doing well; maintaining weight past 2 months  Has lost 14# since April since Keeps to smaller portions and cooks healthier options for herself Continues to meet with Dr Azalia Kay  Started medication - Wellbutrin but still having some cravings  Maintains her vitamin regimen  Activity same - goal 6500 steps and meets daily  Today's Visit Summary 2/9/2022  Pt happy she is losing weight  Averaging 4#/month  Having some nausea in the morning from new weight loss medication but manageable  Mindful of diet and habits  Recall  B- Oatmeal with fruit  L- Chicken - salad  D- Taco - turkey - black beans on corn, lettuce, avocado   Less snacking - since started medication  Fluids adequate  Taking vitamins - Bariatric Pal  More physical activity but plans to walk when weather better  Going to [place] for a week and hopes to do some exerciseHasn't been using carlie to log but plans meals on paper    Goals  Continue meal planning     Food Log via Bariatastic or paper  1200 tyler  75g pro 135 g Carb 40 grams Fat  64 oz Water  Continue to follow post surgery guidelines    Continue  Bariatric vitamins   F/U with Dr Azalia Kay on 4/4/2022   F/U with RD 5/18/2022 for support and accountability    Time Spent:      30 minutes

## 2022-02-09 ENCOUNTER — OFFICE VISIT (OUTPATIENT)
Dept: BARIATRICS | Facility: CLINIC | Age: 52
End: 2022-02-09

## 2022-02-09 VITALS — BODY MASS INDEX: 30.69 KG/M2 | WEIGHT: 176 LBS

## 2022-02-09 DIAGNOSIS — K91.2 POSTSURGICAL MALABSORPTION: Primary | ICD-10-CM

## 2022-02-09 PROCEDURE — RECHECK

## 2022-02-10 ENCOUNTER — OFFICE VISIT (OUTPATIENT)
Dept: GASTROENTEROLOGY | Facility: CLINIC | Age: 52
End: 2022-02-10

## 2022-02-10 VITALS
BODY MASS INDEX: 29.91 KG/M2 | DIASTOLIC BLOOD PRESSURE: 80 MMHG | HEIGHT: 64 IN | SYSTOLIC BLOOD PRESSURE: 110 MMHG | RESPIRATION RATE: 16 BRPM | HEART RATE: 69 BPM | WEIGHT: 175.2 LBS

## 2022-02-10 DIAGNOSIS — K59.00 CONSTIPATION, UNSPECIFIED CONSTIPATION TYPE: ICD-10-CM

## 2022-02-10 DIAGNOSIS — K21.00 GASTROESOPHAGEAL REFLUX DISEASE WITH ESOPHAGITIS: ICD-10-CM

## 2022-02-10 PROCEDURE — 99213 OFFICE O/P EST LOW 20 MIN: CPT | Performed by: PHYSICIAN ASSISTANT

## 2022-02-10 RX ORDER — OMEPRAZOLE 20 MG/1
20 CAPSULE, DELAYED RELEASE ORAL DAILY
Qty: 60 CAPSULE | Refills: 1 | Status: SHIPPED | OUTPATIENT
Start: 2022-02-10 | End: 2022-02-22 | Stop reason: SDUPTHER

## 2022-02-10 NOTE — PROGRESS NOTES
[facility] - Outpatient Follow-up Note  Joy Mesa 47 y o  female MRN: [id number]  Encounter: [id number]          ASSESSMENT AND PLAN:      1  Gastroesophageal reflux disease with esophagitis  S/P gastric sleeve  She is on Omeprazole 20mg q day - increase to BID for 1 month and then f/u  Reviewed bariatric diet    2  Constipation, unspecified constipation type  Start Miralax 17gms daily  She reports fiber in various forms previously made her more gassy and bloating  Will start a probiotic    ______________________________________________________________________    SUBJECTIVE:  45-year-old female with acid reflux and constipation status post gastric sleeve surgery presents for routine follow-up  She reports that recently she has had a new epigastric discomfort  She reports that the symptom is there regardless of eating  She denies that it is a severe pain but it is uncomfortable  She has some minor nausea but no vomiting  She reports ongoing issues with constipation  She reports that she typically has a bowel movement every few days  She does feel bloated and gassy  She denies rectal bleeding  She had an EGD and colonoscopy in 2021 both of which were normal   She does not have a gallbladder  REVIEW OF SYSTEMS IS OTHERWISE NEGATIVE  Historical Information   Past Medical History:   Diagnosis Date    Asthma     seasonal    Back pain     Kidney stone     Obesity     Postgastrectomy malabsorption      Past Surgical History:   Procedure Laterality Date    CHOLECYSTECTOMY      COLONOSCOPY      GASTRECTOMY SLEEVE LAPAROSCOPIC      KNEE ARTHROSCOPY W/ MENISCAL REPAIR Left     NEUROPLASTY / TRANSPOSITION MEDIAN NERVE AT CARPAL TUNNEL BILATERAL      [place] ESOPHAGOGASTRODUODENOSCOPY TRANSORAL DIAGNOSTIC N/A 4/30/2018    Procedure: ESOPHAGOGASTRODUODENOSCOPY (EGD); Surgeon: Rogelio Pearl MD;  Location: [facility];   Service: Gastroenterology     Social History Social History     Substance and Sexual Activity   Alcohol Use Yes    Comment: only on birthday     Social History     Substance and Sexual Activity   Drug Use Yes    Types: Marijuana    Comment: uses pill  (medical [employer] card)     Social History     Tobacco Use   Smoking Status Never Smoker   Smokeless Tobacco Never Used     Family History   Problem Relation Age of Onset    Diabetes Mother     Kidney disease Mother         end stage   Akilah Mare Diabetes Father     Other Father         gangrene    Hypertension Father     Nephrolithiasis Family        Meds/Allergies       Current Outpatient Medications:     aspirin-acetaminophen-caffeine (EXCEDRIN MIGRAINE) 250-250-65 MG per tablet    Biotin 1000 MCG CHEW    buPROPion (WELLBUTRIN XL) 150 mg 24 hr tablet    cetirizine (ZyrTEC) 10 mg tablet    cholecalciferol (VITAMIN D3) 1,000 units tablet    dicyclomine (BENTYL) 20 mg tablet    docusate sodium (COLACE) 100 mg capsule    Multiple Vitamins-Minerals (BARIATRIC MULTIVITAMINS/IRON PO)    naltrexone (REVIA) 50 mg tablet    Norethin Ace-Eth Estrad-FE (MICROGESTIN FE 1 5/30 PO)    omeprazole (PriLOSEC) 20 mg delayed release capsule    Allergies   Allergen Reactions    Other Hives    Penicillins Other (See Comments)     Passed out    Pollen Extract Sneezing and Nasal Congestion    Shellfish-Derived Products - Food Allergy Hives    Prednisone Hives    Adhesive [Medical Tape] Rash           Objective     Blood pressure 110/80, pulse 69, resp  rate 16, height 5' 3 5" (1 613 m), weight 79 5 kg (175 lb 3 2 oz), not currently breastfeeding  Body mass index is 30 55 kg/m²  PHYSICAL EXAM:      General Appearance:   Alert, cooperative, no distress   HEENT:   Normocephalic, atraumatic, anicteric      Neck:  Supple, symmetrical, trachea midline   Lungs:   Clear to auscultation bilaterally; no rales, rhonchi or wheezing; respirations unlabored    Heart[de-identified]   Regular rate and rhythm; no murmur, rub, or gallop  Abdomen:   Soft, non-tender, non-distended; normal bowel sounds; no masses, no organomegaly    Genitalia:   Deferred    Rectal:   Deferred    Extremities:  No cyanosis, clubbing or edema    Pulses:  2+ and symmetric    Skin:  No jaundice, rashes, or lesions    Lymph nodes:  No palpable cervical lymphadenopathy        Lab Results:   No visits with results within 1 Day(s) from this visit  Latest known visit with results is:   Appointment on 01/06/2022   Component Date Value    Color, UA 01/06/2022 Yellow     Clarity, UA 01/06/2022 Clear     Specific Gravity, UA 01/06/2022 <=1 005     pH, UA 01/06/2022 6 0     Leukocytes, UA 01/06/2022 Negative     Nitrite, UA 01/06/2022 Negative     Protein, UA 01/06/2022 Negative     Glucose, UA 01/06/2022 Negative     Ketones, UA 01/06/2022 Negative     Urobilinogen, UA 01/06/2022 0 2     Bilirubin, UA 01/06/2022 Negative     Blood, UA 01/06/2022 Trace-Intact*    RBC, UA 01/06/2022 0-1     WBC, UA 01/06/2022 None Seen     Epithelial Cells 01/06/2022 Occasional     Bacteria, UA 01/06/2022 None Seen          Radiology Results:   No results found

## 2022-02-22 DIAGNOSIS — K21.00 GASTROESOPHAGEAL REFLUX DISEASE WITH ESOPHAGITIS: ICD-10-CM

## 2022-02-22 DIAGNOSIS — K59.00 CONSTIPATION, UNSPECIFIED CONSTIPATION TYPE: ICD-10-CM

## 2022-02-22 NOTE — TELEPHONE ENCOUNTER
Margaret pt-  RX: Omeprazole 20 mg/ 2 per day/ refills     Dosage was increased     Shayy Garza [phone]  Please phone [phone] if, any questions

## 2022-02-23 RX ORDER — OMEPRAZOLE 20 MG/1
20 CAPSULE, DELAYED RELEASE ORAL 2 TIMES DAILY
Qty: 60 CAPSULE | Refills: 1 | Status: SHIPPED | OUTPATIENT
Start: 2022-02-23 | End: 2022-05-06 | Stop reason: SDUPTHER

## 2022-02-26 ENCOUNTER — HOSPITAL ENCOUNTER (OUTPATIENT)
Dept: ULTRASOUND IMAGING | Facility: HOSPITAL | Age: 52
Discharge: HOME | End: 2022-02-26
Payer: COMMERCIAL

## 2022-02-26 DIAGNOSIS — N20.0 NEPHROLITHIASIS: ICD-10-CM

## 2022-02-26 PROCEDURE — 76770 US EXAM ABDO BACK WALL COMP: CPT

## 2022-03-03 ENCOUNTER — TELEPHONE (OUTPATIENT)
Dept: UROLOGY | Facility: CLINIC | Age: 52
End: 2022-03-03

## 2022-03-03 NOTE — TELEPHONE ENCOUNTER
Spoke with patient, advised results  Patient requested follow up appointment, I assisted in scheduling a follow up for her  Patient thankful for the call

## 2022-03-22 NOTE — PROGRESS NOTES
3/23/2022      Chief Complaint   Patient presents with    Nephrolithiasis     Assessment and Plan    1  Nephrolithiasis  - CT from 01/06/2022 negative for obstructing urinary tract calculi or hydronephrosis  - Ultrasound completed on 02/26/2022 negative for shadowing calculi or hydronephrosis  - recommend continued dietary modifications and proper hydration to prevent future kidney stone formation  - can follow-up as needed  History of Present Illness  David Barragan is a 47 y o  female here for follow up evaluation of kidney stone  Recently patient completed imaging which was negative for any urinary tract calculi or hydronephrosis  She is asymptomatic at this time and denies any flank pain or urinary symptoms  Denies any passage of kidney stones recently  She does have some epigastric pain/indigestion and is seeing GI  Review of Systems   Constitutional: Negative for chills and fever  Respiratory: Negative for shortness of breath  Cardiovascular: Negative for chest pain  Gastrointestinal: Positive for abdominal pain (epigastric)  Genitourinary: Negative for difficulty urinating, dysuria, flank pain, frequency, hematuria and urgency  Neurological: Negative for dizziness  Past Medical History  Past Medical History:   Diagnosis Date    Asthma     seasonal    Back pain     Kidney stone     Obesity     Postgastrectomy malabsorption        Past Social History  Past Surgical History:   Procedure Laterality Date    CHOLECYSTECTOMY      COLONOSCOPY      GASTRECTOMY SLEEVE LAPAROSCOPIC      KNEE ARTHROSCOPY W/ MENISCAL REPAIR Left     NEUROPLASTY / TRANSPOSITION MEDIAN NERVE AT CARPAL TUNNEL BILATERAL      MS ESOPHAGOGASTRODUODENOSCOPY TRANSORAL DIAGNOSTIC N/A 4/30/2018    Procedure: ESOPHAGOGASTRODUODENOSCOPY (EGD); Surgeon: Gordon Jolly MD;  Location: [facility];   Service: Gastroenterology     Social History     Tobacco Use   Smoking Status Never Smoker Smokeless Tobacco Never Used       Past Family History  Family History   Problem Relation Age of Onset    Diabetes Mother     Kidney disease Mother         end stage   Virginia Bustamante Diabetes Father     Other Father         gangrene    Hypertension Father     Nephrolithiasis Family        Past Social history  Social History     Socioeconomic History    Marital status: /Civil Union     Spouse name: Not on file    Number of children: Not on file    Years of education: Not on file    Highest education level: Not on file   Occupational History    Not on file   Tobacco Use    Smoking status: Never Smoker    Smokeless tobacco: Never Used   Vaping Use    Vaping Use: Former    Substances: THC, CBD   Substance and Sexual Activity    Alcohol use: Yes     Comment: only on birthday    Drug use: Yes     Types: Marijuana     Comment: uses pill  (medical marijuania card)    Sexual activity: Yes     Partners: Male   Other Topics Concern    Not on file   Social History Narrative    Not on file     Social Determinants of Health     Financial Resource Strain: Not on file   Food Insecurity: Not on file   Transportation Needs: Not on file   Physical Activity: Inactive    Days of Exercise per Week: 0 days   [employer] of Exercise per Session: 0 min   Stress: Stress Concern Present    Feeling of Stress :  To some extent   Social Connections: Not on file   Intimate Partner Violence: Not on file   Housing Stability: Not on file       Current Medications  Current Outpatient Medications   Medication Sig Dispense Refill    aspirin-acetaminophen-caffeine (EXCEDRIN MIGRAINE) 250-250-65 MG per tablet Take 1 tablet by mouth every 6 (six) hours as needed for headaches      Biotin 1000 MCG CHEW Chew daily      buPROPion (WELLBUTRIN XL) 150 mg 24 hr tablet Take 1 tablet (150 mg total) by mouth daily 90 tablet 1    cetirizine (ZyrTEC) 10 mg tablet Take 1 tablet (10 mg total) by mouth daily 90 tablet 3    cholecalciferol (VITAMIN D3) 1,000 units tablet Take 1 tablet (1,000 Units total) by mouth daily 100 tablet 3    dicyclomine (BENTYL) 20 mg tablet Take 1 tablet (20 mg total) by mouth every 6 (six) hours as needed (abdominal pain) 60 tablet 3    Multiple Vitamins-Minerals (BARIATRIC MULTIVITAMINS/IRON PO) Take by mouth      naltrexone (REVIA) 50 mg tablet TAKE ONE TABLET DAILY WITH WELLBUTRIN  DO NOT TAKE WITH HIGH FAT MEALS 30 tablet 2    Norethin Ace-Eth Estrad-FE (MICROGESTIN FE 1 5/30 PO) Take by mouth      omeprazole (PriLOSEC) 20 mg delayed release capsule Take 1 capsule (20 mg total) by mouth 2 (two) times a day 60 capsule 1    docusate sodium (COLACE) 100 mg capsule Take 100 mg by mouth 2 (two) times a day (Patient not taking: Reported on 3/23/2022 )       No current facility-administered medications for this visit  Allergies  Allergies   Allergen Reactions    Other Hives    Penicillins Other (See Comments)     Passed out    Pollen Extract Sneezing and Nasal Congestion    Shellfish-Derived Products - Food Allergy Hives    Prednisone Hives    Adhesive [Medical Tape] Rash         The following portions of the patient's history were reviewed and updated as appropriate: allergies, current medications, past medical history, past social history, past surgical history and problem list       Vitals  Vitals:    03/23/22 0806   BP: 100/60   Pulse: 74   SpO2: 95%   Weight: 79 4 kg (175 lb)   Height: 5' 3" (1 6 m)           Physical Exam  Physical Exam  Constitutional:       Appearance: Normal appearance  HENT:      Head: Normocephalic and atraumatic  Right Ear: External ear normal       Left Ear: External ear normal    Eyes:      General: No scleral icterus  Conjunctiva/sclera: Conjunctivae normal    Cardiovascular:      Pulses: Normal pulses  Pulmonary:      Effort: Pulmonary effort is normal    Musculoskeletal:         General: Normal range of motion  Cervical back: Normal range of motion     Neurological: General: No focal deficit present  Mental Status: She is alert and oriented to person, place, and time  Psychiatric:         Mood and Affect: Mood normal          Behavior: Behavior normal          Thought Content: Thought content normal          Judgment: Judgment normal            Results  No results found for this or any previous visit (from the past 1 hour(s))  ]  No results found for: PSA  Lab Results   Component Value Date    GLUCOSE 76 12/15/2015    CALCIUM 8 6 10/04/2021     12/15/2015    K 3 7 10/04/2021    CO2 26 10/04/2021     10/04/2021    BUN 13 10/04/2021    CREATININE 0 94 10/04/2021     Lab Results   Component Value Date    WBC 5 55 10/04/2021    HGB 12 4 10/04/2021    HCT 38 2 10/04/2021    MCV 90 10/04/2021     10/04/2021           Orders  No orders of the defined types were placed in this encounter        Heidi Sanford PA-C

## 2022-03-23 ENCOUNTER — OFFICE VISIT (OUTPATIENT)
Dept: UROLOGY | Facility: CLINIC | Age: 52
End: 2022-03-23
Payer: COMMERCIAL

## 2022-03-23 ENCOUNTER — OFFICE VISIT (OUTPATIENT)
Dept: GASTROENTEROLOGY | Facility: CLINIC | Age: 52
End: 2022-03-23
Payer: COMMERCIAL

## 2022-03-23 VITALS
WEIGHT: 175 LBS | HEIGHT: 63 IN | HEART RATE: 74 BPM | DIASTOLIC BLOOD PRESSURE: 60 MMHG | OXYGEN SATURATION: 95 % | BODY MASS INDEX: 31.01 KG/M2 | SYSTOLIC BLOOD PRESSURE: 100 MMHG

## 2022-03-23 VITALS
BODY MASS INDEX: 28.92 KG/M2 | WEIGHT: 173.6 LBS | HEIGHT: 65 IN | DIASTOLIC BLOOD PRESSURE: 60 MMHG | SYSTOLIC BLOOD PRESSURE: 110 MMHG | HEART RATE: 78 BPM

## 2022-03-23 DIAGNOSIS — K58.1 IRRITABLE BOWEL SYNDROME WITH CONSTIPATION: Primary | ICD-10-CM

## 2022-03-23 DIAGNOSIS — N20.0 NEPHROLITHIASIS: Primary | ICD-10-CM

## 2022-03-23 PROCEDURE — 99213 OFFICE O/P EST LOW 20 MIN: CPT | Performed by: PHYSICIAN ASSISTANT

## 2022-03-23 NOTE — PROGRESS NOTES
[facility]  Gallo Villagomez 47 y o  female MRN: [id number]       CC: Epigastric pain, constipation    HPI: Alex Howell is a 43-year-old female with history of irritable bowel syndrome, constipation predominant, lumbar radiculopathy, and previous obesity status post gastric sleeve  Patient presents to the office for epigastric discomfort that she has now noticed her epigastric pain comes on when she has not had a bowel movement in 2 days, and will be relieved after she has a bowel movement  She has failed MiraLax and Colace  She has been using dicyclomine more frequently  She denies GERD symptoms at this time  No rectal bleeding  She had normal colonoscopy at age 49  She had EGD performed by bariatric surgery in 2021 revealing mild gastritis  Review of Systems:    CONSTITUTIONAL: Denies any fever, chills, or rigors  Good appetite, and no recent weight loss  HEENT: No earache or tinnitus  Denies hearing loss or visual disturbances  CARDIOVASCULAR: No chest pain or palpitations  RESPIRATORY: Denies any cough, hemoptysis, shortness of breath or dyspnea on exertion  GASTROINTESTINAL: As noted in the History of Present Illness  GENITOURINARY: No problems with urination  Denies any hematuria or dysuria  NEUROLOGIC: No dizziness or vertigo, denies headaches  MUSCULOSKELETAL: Denies any muscle or joint pain  SKIN: Denies skin rashes or itching  ENDOCRINE: Denies excessive thirst  Denies intolerance to heat or cold  PSYCHOSOCIAL: Denies depression or anxiety  Denies any recent memory loss         Current Outpatient Medications   Medication Sig Dispense Refill    aspirin-acetaminophen-caffeine (EXCEDRIN MIGRAINE) 250-250-65 MG per tablet Take 1 tablet by mouth every 6 (six) hours as needed for headaches      Biotin 1000 MCG CHEW Chew daily      buPROPion (WELLBUTRIN XL) 150 mg 24 hr tablet Take 1 tablet (150 mg total) by mouth daily 90 tablet 1    cetirizine (ZyrTEC) 10 mg tablet Take 1 tablet (10 mg total) by mouth daily 90 tablet 3    cholecalciferol (VITAMIN D3) 1,000 units tablet Take 1 tablet (1,000 Units total) by mouth daily 100 tablet 3    dicyclomine (BENTYL) 20 mg tablet Take 1 tablet (20 mg total) by mouth every 6 (six) hours as needed (abdominal pain) 60 tablet 3    Multiple Vitamins-Minerals (BARIATRIC MULTIVITAMINS/IRON PO) Take by mouth      naltrexone (REVIA) 50 mg tablet TAKE ONE TABLET DAILY WITH WELLBUTRIN  DO NOT TAKE WITH HIGH FAT MEALS 30 tablet 2    Norethin Ace-Eth Estrad-FE (MICROGESTIN FE 1 5/30 PO) Take by mouth      omeprazole (PriLOSEC) 20 mg delayed release capsule Take 1 capsule (20 mg total) by mouth 2 (two) times a day 60 capsule 1    docusate sodium (COLACE) 100 mg capsule Take 100 mg by mouth 2 (two) times a day   (Patient not taking: Reported on 3/23/2022 )       No current facility-administered medications for this visit  Past Medical History:   Diagnosis Date    Asthma     seasonal    Back pain     Kidney stone     Obesity     Postgastrectomy malabsorption      Past Surgical History:   Procedure Laterality Date    CHOLECYSTECTOMY      COLONOSCOPY      GASTRECTOMY SLEEVE LAPAROSCOPIC      KNEE ARTHROSCOPY W/ MENISCAL REPAIR Left     NEUROPLASTY / TRANSPOSITION MEDIAN NERVE AT CARPAL TUNNEL BILATERAL      DE ESOPHAGOGASTRODUODENOSCOPY TRANSORAL DIAGNOSTIC N/A 4/30/2018    Procedure: ESOPHAGOGASTRODUODENOSCOPY (EGD); Surgeon: Julia Cevallos MD;  Location: [facility];   Service: Gastroenterology     Social History     Socioeconomic History    Marital status: /Civil Union     Spouse name: None    Number of children: None    Years of education: None    Highest education level: None   Occupational History    None   Tobacco Use    Smoking status: Never Smoker    Smokeless tobacco: Never Used   Vaping Use    Vaping Use: Former    Substances: THC, CBD   Substance and Sexual Activity    Alcohol use: Yes     Comment: only on birthday    Drug use: Yes     Types: Marijuana     Comment: uses pill  (medical marijuania card)    Sexual activity: Yes     Partners: Male   Other Topics Concern    None   Social History Narrative    None     Social Determinants of Health     Financial Resource Strain: Not on file   Food Insecurity: Not on file   Transportation Needs: Not on file   Physical Activity: Inactive    Days of Exercise per Week: 0 days   [employer] of Exercise per Session: 0 min   Stress: Stress Concern Present    Feeling of Stress : To some extent   Social Connections: Not on file   Intimate Partner Violence: Not on file   Housing Stability: Not on file     Family History   Problem Relation Age of Onset    Diabetes Mother     Kidney disease Mother         end stage   Greg Lee Diabetes Father     Other Father         gangrene    Hypertension Father     Nephrolithiasis Family             PHYSICAL EXAM:    Vitals:    03/23/22 0915   BP: 110/60   Pulse: 78   Weight: 78 7 kg (173 lb 9 6 oz)   Height: 5' 5" (1 651 m)     General Appearance:   Alert and oriented x 3   Cooperative, and in no respiratory distress   HEENT:   Normocephalic, atraumatic, anicteric      Neck:  Supple, symmetrical, trachea midline   Lungs:   Clear to auscultation bilaterally    Heart[de-identified]   Regular rate and rhythm   Abdomen:   Soft, non-tender, non-distended; normal bowel sounds; no masses, no organomegaly    Genitalia:   Deferred    Rectal:   Deferred    Extremities:  No cyanosis, clubbing or edema    Pulses:  2+ and symmetric all extremities    Skin:  Skin color, texture, turgor normal, no rashes or lesions    Lymph nodes:  No palpable cervical or supraclavicular lymphadenopathy        Lab Results:   Results from last 6 Months   Lab Units 10/04/21  1148   WBC Thousand/uL 5 55   HEMOGLOBIN g/dL 12 4   HEMATOCRIT % 38 2   PLATELETS Thousands/uL 196   NEUTROS PCT % 63   LYMPHS PCT % 30   MONOS PCT % 5   EOS PCT % 1     Results from last 6 Months   Lab Units 10/04/21  1148   POTASSIUM mmol/L 3 7   CHLORIDE mmol/L 107   CO2 mmol/L 26   BUN mg/dL 13   CREATININE mg/dL 0 94   CALCIUM mg/dL 8 6   ALK PHOS U/L 47   ALT U/L 30   AST U/L 19     Results from last 6 Months   Lab Units 10/04/21  1148   INR  1 04     Results from last 6 Months   Lab Units 10/04/21  1148   LIPASE u/L 73   AMYLASE IU/L 28       Imaging Studies: I have personally reviewed pertinent imaging studies  US kidney and bladder    Result Date: 3/2/2022  Impression: Within normal limits  Workstation performed: [id number]       ASSESSMENT and PLAN:      1) IBS-C -  In the past, patient was reluctant to try Linzess secondary to diarrhea side effect  However, patient has failed MiraLax and Colace and is now open to trying    -  I provided her samples of Linzess 72 mcg daily  -  I told the patient to call me with her progress, she does not have enough response we will then send her 145 mcg dosing  -  I also told her to take Bentyl on a more as-needed basis since this can worsen constipation secondary to his antispasmodic effect  -  Patient agrees with above plan        Follow up in 4-8 weeks

## 2022-04-04 ENCOUNTER — OFFICE VISIT (OUTPATIENT)
Dept: BARIATRICS | Facility: CLINIC | Age: 52
End: 2022-04-04
Payer: COMMERCIAL

## 2022-04-04 VITALS
HEIGHT: 64 IN | SYSTOLIC BLOOD PRESSURE: 122 MMHG | RESPIRATION RATE: 16 BRPM | TEMPERATURE: 97.6 F | HEART RATE: 63 BPM | BODY MASS INDEX: 29.6 KG/M2 | WEIGHT: 173.4 LBS | DIASTOLIC BLOOD PRESSURE: 80 MMHG

## 2022-04-04 DIAGNOSIS — K21.00 GASTROESOPHAGEAL REFLUX DISEASE WITH ESOPHAGITIS: Primary | ICD-10-CM

## 2022-04-04 DIAGNOSIS — E66.9 OBESITY, CLASS I, BMI 30-34.9: ICD-10-CM

## 2022-04-04 DIAGNOSIS — R63.5 ABNORMAL WEIGHT GAIN: ICD-10-CM

## 2022-04-04 DIAGNOSIS — Z98.84 RECENT WEIGHT GAIN AFTER SURGICAL THERAPY FOR OBESITY: ICD-10-CM

## 2022-04-04 DIAGNOSIS — R63.5 RECENT WEIGHT GAIN AFTER SURGICAL THERAPY FOR OBESITY: ICD-10-CM

## 2022-04-04 PROCEDURE — 99214 OFFICE O/P EST MOD 30 MIN: CPT | Performed by: INTERNAL MEDICINE

## 2022-04-04 NOTE — PROGRESS NOTES
Assessment/Plan:  Cris Perez was seen today for follow-up  Diagnoses and all orders for this visit:    Obesity, Class I, BMI 30-34 9  Abnormal weight gain  Recent weight gain after surgical therapy for obesity  Good weight loss- Continue Wellbutrin-Naltrexone  Topamax not indicated due to recent nephrolithiasis hx  GLP-1 not covered    Goals:  Goal protein intake 80g per day  <1200 calories    Gastroesophageal reflux disease with esophagitis  IBS-C  On omeprzole 20mg BID, linzess    S/P laparoscopic sleeve gastrectomy  Postsurgical malabsorption  Continue vitamins  Recheck labs in 10/2022    Follow up in approximately 3 months with Non-Surgical Physician/Advanced Practitioner  Subjective:   Chief Complaint   Patient presents with    Follow-up       Patient ID: Martine Colon  is a 47 y o  female with excess weight/obesity here to pursue weight management  Patient is pursuing Conservative Program      HPI  -Initial weight loss goal of 5-10% weight loss for improved health  Wt Readings from Last 10 Encounters:   04/04/22 78 7 kg (173 lb 6 4 oz)   03/23/22 78 7 kg (173 lb 9 6 oz)   03/23/22 79 4 kg (175 lb)   02/10/22 79 5 kg (175 lb 3 2 oz)   02/09/22 79 8 kg (176 lb)   01/03/22 81 8 kg (180 lb 6 4 oz)   12/15/21 82 kg (180 lb 12 8 oz)   10/28/21 83 3 kg (183 lb 9 6 oz)   10/18/21 83 2 kg (183 lb 6 4 oz)   10/07/21 83 5 kg (184 lb)     Initial weight MWM: 188 lbs (6/16/21)  Current weight: 173 lbs  Change in weight: -15 lb (-7 lbs since last visit)  Goal:  S/p LSG in 2105 with Dr Estephania Abdi at [facility]  Initial 215 lbs  Judson 135 lbs  Weight regain: +65 lbs from medical marijuana use   Has chronic back pain/sciatica   Continues to take the medical marijuana pill at night to help her sleep  Started wellbutrin in 6/2021, naltrexone added 10/2021  Doing well on weight loss   Continues to help with appetite suppression  Intermittent nausea in the morning but no vomiting  Thinks she is doing under 1200 tyler   B- coffee   Oatmeal wth fruit  S-   L- salad with fish  S-  D- protein veggies cauliflower pasta   S-   Drinks- 7 bottles water   Alcohol- no   Exercise- walking at least 6500 steps       The following portions of the patient's history were reviewed and updated as appropriate: allergies, current medications, past family history, past medical history, past social history, past surgical history, and problem list     Review of Systems   Respiratory: Negative  Cardiovascular: Negative  Gastrointestinal: Positive for abdominal pain (epigastric)  Objective:  /80   Pulse 63   Temp 97 6 °F (36 4 °C)   Resp 16   Ht 5' 3 5" (1 613 m)   Wt 78 7 kg (173 lb 6 4 oz)   BMI 30 23 kg/m²   Constitutional: Well-developed, well-nourished and obese Body mass index is 30 23 kg/m²  Darletta Pac HEENT: No conjunctival pallor or jaundice  Pulmonary: No increased work of breathing or signs of respiratory distress  CV: Normal rate, well-perfused  GI: Obese  Non-distended  MSK: No edema   Neuro: Oriented to person, place and time  Normal Speech  Normal gait  Psych: Normal affect and mood       Labs and Imaging  Reviewed

## 2022-04-11 ENCOUNTER — TELEPHONE (OUTPATIENT)
Dept: GASTROENTEROLOGY | Facility: CLINIC | Age: 52
End: 2022-04-11

## 2022-04-11 DIAGNOSIS — R10.9 RIGHT SIDED ABDOMINAL PAIN: ICD-10-CM

## 2022-04-11 DIAGNOSIS — K59.00 CONSTIPATION, UNSPECIFIED CONSTIPATION TYPE: Primary | ICD-10-CM

## 2022-04-11 RX ORDER — LINACLOTIDE 72 UG/1
1 CAPSULE, GELATIN COATED ORAL DAILY
Qty: 90 CAPSULE | Refills: 0 | Status: SHIPPED | OUTPATIENT
Start: 2022-04-11 | End: 2022-04-18

## 2022-04-11 RX ORDER — DICYCLOMINE HCL 20 MG
20 TABLET ORAL EVERY 6 HOURS PRN
Qty: 60 TABLET | Refills: 1 | Status: SHIPPED | OUTPATIENT
Start: 2022-04-11 | End: 2022-06-07

## 2022-04-11 NOTE — TELEPHONE ENCOUNTER
----- Message from Quanah   Lissette How sent at 4/11/2022  2:22 PM EDT -----  Regarding: Refill   Hello if possible can you send [facility] in [place] refill for 90 days on Dicyclomine 20mg Tab and the samples you gave me can you send a prescription for Linzes low dose for 90 days thank you Merry Peacock

## 2022-04-13 ENCOUNTER — TELEPHONE (OUTPATIENT)
Dept: GASTROENTEROLOGY | Facility: CLINIC | Age: 52
End: 2022-04-13

## 2022-04-13 NOTE — TELEPHONE ENCOUNTER
----- Message from [facility] sent at 4/13/2022 11:17 AM EDT -----  Regarding:  They need pre-authorization  Linzess 72mcg Cap They need pre-authorization from the doctor I was not able to pick it up at [facility] can you please tell the doctor to send a pre-authorization to  [facility] Thank you Bin Nieto

## 2022-04-16 ENCOUNTER — OFFICE VISIT (OUTPATIENT)
Dept: INTERNAL MEDICINE CLINIC | Facility: CLINIC | Age: 52
End: 2022-04-16
Payer: COMMERCIAL

## 2022-04-16 VITALS
HEIGHT: 63 IN | TEMPERATURE: 97.8 F | BODY MASS INDEX: 30.72 KG/M2 | OXYGEN SATURATION: 97 % | DIASTOLIC BLOOD PRESSURE: 68 MMHG | HEART RATE: 74 BPM | SYSTOLIC BLOOD PRESSURE: 114 MMHG | RESPIRATION RATE: 17 BRPM

## 2022-04-16 DIAGNOSIS — M54.16 LUMBAR RADICULOPATHY: Primary | ICD-10-CM

## 2022-04-16 PROCEDURE — 99213 OFFICE O/P EST LOW 20 MIN: CPT | Performed by: INTERNAL MEDICINE

## 2022-04-16 RX ORDER — CYCLOBENZAPRINE HCL 10 MG
10 TABLET ORAL 3 TIMES DAILY PRN
Qty: 45 TABLET | Refills: 1 | Status: SHIPPED | OUTPATIENT
Start: 2022-04-16

## 2022-04-16 NOTE — PROGRESS NOTES
Assessment/Plan:       Diagnoses and all orders for this visit:    Lumbar radiculopathy  -     cyclobenzaprine (FLEXERIL) 10 mg tablet; Take 1 tablet (10 mg total) by mouth 3 (three) times a day as needed for muscle spasms                Subjective:      Patient ID: Everett Cid is a 47 y o  female  A 47year-old with chronic recurrent lumbar back pain; combination of radiculopathy with muscular pain, is in because she has had a flare for few days and wants some Flexeril 10 t i d  to tide her over  No urinary or bladder dysfunction  No fever or chills or sweats      The following portions of the patient's history were reviewed and updated as appropriate:   She has a past medical history of Asthma, Back pain, Kidney stone, Obesity, and Postgastrectomy malabsorption  ,  does not have any pertinent problems on file  ,   has a past surgical history that includes Cholecystectomy; GASTRECTOMY SLEEVE LAPAROSCOPIC; Knee arthroscopy w/ meniscal repair (Left); Neuroplasty / transposition median nerve at carpal tunnel bilateral; pr esophagogastroduodenoscopy transoral diagnostic (N/A, 4/30/2018); and Colonoscopy  ,  family history includes Diabetes in her father and mother; Hypertension in her father; Kidney disease in her mother; Nephrolithiasis in her family; Other in her father  ,   reports that she has never smoked  She has never used smokeless tobacco  She reports current alcohol use  She reports current drug use  Drug: Marijuana  ,  is allergic to other, penicillins, pollen extract, shellfish-derived products - food allergy, prednisone, and adhesive [medical tape]     Current Outpatient Medications   Medication Sig Dispense Refill    aspirin-acetaminophen-caffeine (EXCEDRIN MIGRAINE) 250-250-65 MG per tablet Take 1 tablet by mouth every 6 (six) hours as needed for headaches      Biotin 1000 MCG CHEW Chew daily      buPROPion (WELLBUTRIN XL) 150 mg 24 hr tablet Take 1 tablet (150 mg total) by mouth daily 90 tablet 1  cetirizine (ZyrTEC) 10 mg tablet Take 1 tablet (10 mg total) by mouth daily 90 tablet 3    cholecalciferol (VITAMIN D3) 1,000 units tablet Take 1 tablet (1,000 Units total) by mouth daily 100 tablet 3    dicyclomine (BENTYL) 20 mg tablet Take 1 tablet (20 mg total) by mouth every 6 (six) hours as needed (abdominal pain) 60 tablet 1    docusate sodium (COLACE) 100 mg capsule Take 100 mg by mouth 2 (two) times a day        linaCLOtide (Linzess) 72 MCG CAPS Take 1 caplet by mouth in the morning (Patient taking differently: Take 1 caplet by mouth in the morning Not yet taking medication 04/16/22 ) 90 capsule 0    Multiple Vitamins-Minerals (BARIATRIC MULTIVITAMINS/IRON PO) Take by mouth      naltrexone (REVIA) 50 mg tablet TAKE ONE TABLET DAILY WITH WELLBUTRIN  DO NOT TAKE WITH HIGH FAT MEALS 30 tablet 2    Norethin Ace-Eth Estrad-FE (MICROGESTIN FE 1 5/30 PO) Take by mouth      omeprazole (PriLOSEC) 20 mg delayed release capsule Take 1 capsule (20 mg total) by mouth 2 (two) times a day 60 capsule 1    cyclobenzaprine (FLEXERIL) 10 mg tablet Take 1 tablet (10 mg total) by mouth 3 (three) times a day as needed for muscle spasms 45 tablet 1     No current facility-administered medications for this visit  Review of Systems   Musculoskeletal: Positive for arthralgias and back pain  Objective:  Vitals:    04/16/22 0951   BP: 114/68   Pulse: 74   Resp: 17   Temp: 97 8 °F (36 6 °C)   SpO2: 97%      Physical Exam  Constitutional:       Comments: Alert female patient who appears to be the stated age; walking with a bit of an antalgic gait and bent forward   Musculoskeletal:         General: Tenderness present  Comments: Modest paralumbar tenderness to palpation   Skin:     General: Skin is warm and dry  Findings: No rash  Neurological:      Mental Status: She is alert             Patient Instructions   Lumbar pain to be treated with Flexeril temporarily

## 2022-04-18 ENCOUNTER — TELEPHONE (OUTPATIENT)
Dept: GASTROENTEROLOGY | Facility: CLINIC | Age: 52
End: 2022-04-18

## 2022-04-18 NOTE — TELEPHONE ENCOUNTER
Please let patient know that her insurance company will cover the low-dose of Linzess  She will have to try the 145 mcg dose  I will send this to the pharmacy  Thank you

## 2022-04-19 ENCOUNTER — APPOINTMENT (OUTPATIENT)
Dept: LAB | Facility: CLINIC | Age: 52
End: 2022-04-19
Payer: COMMERCIAL

## 2022-04-19 DIAGNOSIS — R39.9 UTI SYMPTOMS: ICD-10-CM

## 2022-04-19 PROCEDURE — 87086 URINE CULTURE/COLONY COUNT: CPT

## 2022-04-20 LAB — BACTERIA UR CULT: NORMAL

## 2022-04-25 NOTE — TELEPHONE ENCOUNTER
Hermelinda Ortez - patient called lm patient's insurance is requiring PA for the medication   Please call Mi Miller at [phone]

## 2022-04-27 ENCOUNTER — TELEPHONE (OUTPATIENT)
Dept: GASTROENTEROLOGY | Facility: CLINIC | Age: 52
End: 2022-04-27

## 2022-04-27 NOTE — TELEPHONE ENCOUNTER
Received approval letter for [facility] from [address] [place] [address]  Approval dates 4/26/2022 till 4/26/2023   Will fax to patients pharmacy and scan into patient chart None known

## 2022-06-01 ENCOUNTER — TELEPHONE (OUTPATIENT)
Dept: INTERNAL MEDICINE CLINIC | Facility: CLINIC | Age: 52
End: 2022-06-01

## 2022-06-01 NOTE — TELEPHONE ENCOUNTER
Samantha Peter would like Dr Vivienne Candelario to put in a referral for a cardiologist    Call her; when it's in

## 2022-06-06 DIAGNOSIS — Z00.00 HEALTHCARE MAINTENANCE: Primary | ICD-10-CM

## 2022-06-07 DIAGNOSIS — R10.9 RIGHT SIDED ABDOMINAL PAIN: ICD-10-CM

## 2022-06-07 RX ORDER — DICYCLOMINE HCL 20 MG
TABLET ORAL
Qty: 60 TABLET | Refills: 0 | Status: SHIPPED | OUTPATIENT
Start: 2022-06-07 | End: 2022-06-08 | Stop reason: SDUPTHER

## 2022-06-07 NOTE — PROGRESS NOTES
Bariatric Nutrition Assessment Note       Transfer care - for MWM    S/P Vertical sleeve gastrectomy  2015  Dr Michael Sheikh at [facility] w/ Dr Kacey Lugo  4/27/2021     CC: Follow-up for regain    Nutrition Assessment   Cassie Melendez  47 y o   female  not currently breastfeeding  Height:5'3 7" Weight: 168 2#        Weight on Day of Weight Loss Surgery: 215#  PAULO: 135#  Regain: at 197# - gained 60# over past 3years  Goal 160      Review of History and Medications     OTC: None    Past Medical History:   Diagnosis Date    Asthma     seasonal    Back pain     Kidney stone     Obesity     Postgastrectomy malabsorption      Past Surgical History:   Procedure Laterality Date    CHOLECYSTECTOMY      COLONOSCOPY      GASTRECTOMY SLEEVE LAPAROSCOPIC      KNEE ARTHROSCOPY W/ MENISCAL REPAIR Left     NEUROPLASTY / TRANSPOSITION MEDIAN NERVE AT CARPAL TUNNEL BILATERAL      [place] ESOPHAGOGASTRODUODENOSCOPY TRANSORAL DIAGNOSTIC N/A 4/30/2018    Procedure: ESOPHAGOGASTRODUODENOSCOPY (EGD); Surgeon: Sherron Real MD;  Location: [facility];   Service: Gastroenterology     Social History     Socioeconomic History    Marital status: /Civil Union     Spouse name: Not on file    Number of children: Not on file    Years of education: Not on file    Highest education level: Not on file   Occupational History    Not on file   Tobacco Use    Smoking status: Never Smoker    Smokeless tobacco: Never Used   Vaping Use    Vaping Use: Former    Substances: THC, CBD   Substance and Sexual Activity    Alcohol use: Yes     Comment: only on birthday    Drug use: Yes     Types: Marijuana     Comment: uses pill  (medical marijuania card)    Sexual activity: Yes     Partners: Male   Other Topics Concern    Not on file   Social History Narrative    Not on file     Social Determinants of Health     Financial Resource Strain: Not on file   Food Insecurity: Not on file   Transportation Needs: Not on file Physical Activity: Inactive    Days of Exercise per Week: 0 days    Minutes of Exercise per Session: 0 min   Stress: Stress Concern Present    Feeling of Stress : To some extent   Social Connections: Not on file   Intimate Partner Violence: Not on file   Housing Stability: Not on file       Current Outpatient Medications:     aspirin-acetaminophen-caffeine (BeehiveID) 250-250-65 MG per tablet, Take 1 tablet by mouth every 6 (six) hours as needed for headaches, Disp: , Rfl:     Biotin 1000 MCG CHEW, Chew daily, Disp: , Rfl:     buPROPion (WELLBUTRIN XL) 150 mg 24 hr tablet, Take 1 tablet (150 mg total) by mouth daily, Disp: 90 tablet, Rfl: 1    cetirizine (ZyrTEC) 10 mg tablet, Take 1 tablet (10 mg total) by mouth daily, Disp: 90 tablet, Rfl: 3    cholecalciferol (VITAMIN D3) 1,000 units tablet, Take 1 tablet (1,000 Units total) by mouth daily, Disp: 100 tablet, Rfl: 3    cyclobenzaprine (FLEXERIL) 10 mg tablet, Take 1 tablet (10 mg total) by mouth 3 (three) times a day as needed for muscle spasms, Disp: 45 tablet, Rfl: 1    dicyclomine (BENTYL) 20 mg tablet, TAKE 1 TABLET BY MOUTH EVERY 6 HOURS AS NEEDED FOR  ABDOMINAL  PAIN, Disp: 60 tablet, Rfl: 0    docusate sodium (COLACE) 100 mg capsule, Take 100 mg by mouth 2 (two) times a day  , Disp: , Rfl:     linaCLOtide (Linzess) 145 MCG CAPS, Take 1 capsule (145 mcg total) by mouth in the morning, Disp: 30 capsule, Rfl: 2    Multiple Vitamins-Minerals (BARIATRIC MULTIVITAMINS/IRON PO), Take by mouth, Disp: , Rfl:     naltrexone (REVIA) 50 mg tablet, TAKE 1 TABLET BY MOUTH ONCE DAILY WITH WELLBUTRIN   DO NOT TAKE WITH HIGH FAT MEALS, Disp: 30 tablet, Rfl: 5    Norethin Ace-Eth Estrad-FE (MICROGESTIN FE 1 5/30 PO), Take by mouth, Disp: , Rfl:     omeprazole (PriLOSEC) 20 mg delayed release capsule, Take 1 capsule (20 mg total) by mouth 2 (two) times a day, Disp: 60 capsule, Rfl: 1    Food Intake and Lifestyle Assessment   Food Intake Assessment completed via usual diet recall  Breakfast: 2 coffee w/Monk sugar 1 oz milk  11:45 4 oz oatmeal banana,  honey, nuts, blueberry   Dinner: Root vegetables and meat Soup with rice - no liquid or salad w/beans, plantains, turkey burger  Snack: may graze if hungry - cheese or bevitas  Beverage intake: Tea w/spices  Coffee Water 6-7 bottlle  Diet texture/stage: regular  Protein supplement: None   Estimated protein intake per day: 40 - 50 grams  Estimated fluid intake per day: >64 oz  Meals eaten away from home: rare -  Typical meal pattern: 2 meals per day and  Snacks if hungry  Eating Behaviors: Craves bread  and rice but doesn't indulge  Volume: 1 5 cup  Follows 30/ 45- 60  Grazing - tempted  Coke zero (flat) - 4 oz  Food allergies or intolerances: None  Acid reflux - tomatoes & jar sauce   Cultural or Druze considerations: None      Physical Assessment  Nutrition Related Findings  Return of Hunger    Physical Activity  Types of exercise: Walking - gradually getting back was at 8 miles - [employer] and winter Now at 1 4 miles - Used to go back to gym  Current physical limitations: None    Psychosocial Assessment   Support systems:Spouse, children and mother in law  Socioeconomic factors: none    Nutrition Diagnosis  Diagnosis: Overweight / Obesity (NC-3 3) improved with WLS but relapsing via old habits returning    Interventions and Teaching   Patient educated on post-op nutrition guidelines  Patient educated and handouts provided    Capacity of post-surgery stomach  Adequate hydration  Exercise  Protein supplements  Meal planning and preparation  Appropriate carbohydrate, protein, and fat intake, and food/fluid choices to maximize safe weight loss, nutrient intake, and tolerance   Dietary and lifestyle changes  Possible problems with poor eating habits  Intuitive eating  Techniques for self monitoring and keeping daily food journal  Vitamin / Mineral supplementation of Multivitamin with minerals, Calcium, Vitamin B12, Iron and Vitamin D    Education provided to: patient    Barriers to learning: No barriers identified    Readiness to change: action    Comprehension: verbalizes understanding     Expected Compliance: good    Summary  Pt pleased as she has been losing weight  Meets with Dr Bandar Gordon who prescribed medication which helps in curbing her " munchies"  Tried Premier to increase her protein but dislikes  So eating 3 meals and focusing on protein  B - Oatmeal, fruit- almond milk- honey -  Also uses monk sugar Uses Sour dough bread  L - Fruit,  1/4 c  Nuts  1/2 banana - if hungry  D - Fish or Chicken thighs , beans cauliflowers raw vegetables  Waits 30 minutes to drink - 4 oz coke zero, 6-7 bottles water, 1 cup tea w/ spices  Snacks - cheese stick, serving of pretzels  Activity 1 5 miles  Both pt and  eating healthier- organic and fresh foods vs processed   is a  so modifying his cooking - also to lower his cholesterol  Reviewed macros - entered into [employer] so pt can start logging  Also provided samples of Protein water and other protein drinks for pt to trial  Reviewed bloodwork and discussed  Provided sample bottle of Bariatric Pal capsules for pt to start using  Pt receptive  Past Visit Summary 7/22/2021  Follows Dr Bandar Gordon  Weight Stable past 2 monthsMedication helps with night time snacking  Eating 3 meals  Decreased rice and switched to cauliflower  To start detox diet for 3 days  Adds protein snacks like egg, tofu or HB egg during detox  Stays active - cares for grandchildren, MIL and has teenagers along with housework and outdoors  for winter  Logging but hasn't since started on  Prednisone  Noted  - got attacked by bees  Taking vitamins - Bariatric Pal and Vitamin D (no calcium per stones) Hydration adequate  Questions answered during discussion  Pt receptive    Past  Visit Summary 9/23/2021  Here today to further discuss vitamins and obtain samples to cover costs   Provided available samples and discount codes  Pt reports she is doing well  Met with Dr Lina Jackson on Mondays and to follow-up with me in December    Past Visit Summary 12/9/2021  Doing well; maintaining weight past 2 months  Has lost 14# since April since Keeps to smaller portions and cooks healthier options for herself Continues to meet with Dr Lina Jackson  Started medication - Wellbutrin but still having some cravings  Maintains her vitamin regimen  Activity same - goal 6500 steps and meets daily  Visit Summary 2/9/2022  Pt happy she is losing weight  Averaging 4#/month  Having some nausea in the morning from new weight loss medication but manageable  Mindful of diet and habits  Recall  B- Oatmeal with fruit  L- Chicken - salad  D- Taco - turkey - black beans on corn, lettuce, avocado   Less snacking - since started medication  Fluids adequate  Taking vitamins - Bariatric Pal  More physical activity but plans to walk when weather better  Going to [place] for a week and hopes to do some exerciseHasn't been using carlie to log but plans meals on paper    Visit Summary 6/8/2022  Pt continues to lose weight - near goal of 165#  Lost  30#  Remains on medicationwhich helps with cravings  Continues to eat 3 meals unless has busy day with appts  Cut back on dairy and avoids added sugar  Reduced snacks and limits diet soda to 8 oz day  Tries to keep up activity but did have some back issues  As pt near her goal - discussed boundary to avoid weight gain  Pt plans to stay under 170#  To monitor via weighing herself regularly and staying mindful of her eating habits  Goals  Continue meal planning     Food Log via Bariatastic or paper  1200 tyler  75g pro 135 g Carb 40 grams Fat  64 oz Water  Continue to follow post surgery guidelines    Continue  Bariatric vitamins   F/U with Dr iLna Jackson on 7/6/2022   F/U RD as needed    Time Spent:      30 minutes

## 2022-06-08 ENCOUNTER — OFFICE VISIT (OUTPATIENT)
Dept: BARIATRICS | Facility: CLINIC | Age: 52
End: 2022-06-08

## 2022-06-08 ENCOUNTER — OFFICE VISIT (OUTPATIENT)
Dept: GASTROENTEROLOGY | Facility: CLINIC | Age: 52
End: 2022-06-08
Payer: COMMERCIAL

## 2022-06-08 VITALS
HEART RATE: 69 BPM | HEIGHT: 65 IN | WEIGHT: 170.8 LBS | OXYGEN SATURATION: 97 % | DIASTOLIC BLOOD PRESSURE: 60 MMHG | BODY MASS INDEX: 28.46 KG/M2 | SYSTOLIC BLOOD PRESSURE: 108 MMHG

## 2022-06-08 VITALS — BODY MASS INDEX: 27.99 KG/M2 | WEIGHT: 168.2 LBS

## 2022-06-08 DIAGNOSIS — K91.2 POSTSURGICAL MALABSORPTION: Primary | ICD-10-CM

## 2022-06-08 DIAGNOSIS — K59.00 CONSTIPATION, UNSPECIFIED CONSTIPATION TYPE: ICD-10-CM

## 2022-06-08 DIAGNOSIS — K58.1 IRRITABLE BOWEL SYNDROME WITH CONSTIPATION: ICD-10-CM

## 2022-06-08 DIAGNOSIS — K21.00 GASTROESOPHAGEAL REFLUX DISEASE WITH ESOPHAGITIS: ICD-10-CM

## 2022-06-08 DIAGNOSIS — R10.9 RIGHT SIDED ABDOMINAL PAIN: ICD-10-CM

## 2022-06-08 PROCEDURE — RECHECK

## 2022-06-08 PROCEDURE — 99213 OFFICE O/P EST LOW 20 MIN: CPT | Performed by: PHYSICIAN ASSISTANT

## 2022-06-08 RX ORDER — BUDESONIDE 9 MG/1
TABLET, FILM COATED, EXTENDED RELEASE ORAL
COMMUNITY
Start: 2022-06-02 | End: 2022-07-19 | Stop reason: ALTCHOICE

## 2022-06-08 RX ORDER — IBUPROFEN 600 MG/1
TABLET ORAL
COMMUNITY
Start: 2022-04-27

## 2022-06-08 RX ORDER — LINACLOTIDE 145 UG/1
145 CAPSULE, GELATIN COATED ORAL DAILY
Qty: 90 CAPSULE | Refills: 2 | Status: SHIPPED | OUTPATIENT
Start: 2022-06-08 | End: 2022-09-06

## 2022-06-08 RX ORDER — DICYCLOMINE HCL 20 MG
20 TABLET ORAL EVERY 6 HOURS PRN
Qty: 120 TABLET | Refills: 3 | Status: SHIPPED | OUTPATIENT
Start: 2022-06-08

## 2022-06-08 RX ORDER — OMEPRAZOLE 20 MG/1
20 CAPSULE, DELAYED RELEASE ORAL 2 TIMES DAILY
Qty: 180 CAPSULE | Refills: 2 | Status: SHIPPED | OUTPATIENT
Start: 2022-06-08 | End: 2022-09-06

## 2022-06-08 NOTE — PROGRESS NOTES
[facility]  Ayana Hollis 47 y o  female MRN: [id number]       CC:  Chronic constipation    HPI: Augustin Campos is a 64-year-old female with history of irritable bowel syndrome, constipation predominant, lumbar radiculopathy, and previous obesity status post gastric sleeve  Patient presents for follow-up as we are managing her chronic constipation  She reports that the samples of Linzess I gave her at 72 mcg was giving her regular bowel movements  Unfortunately, her insurance company was not covering the lower dose and we had to switch her to 145 mcg  She reports that surprisingly this does not induce enough bowel movements  However, she had noticed that when she takes the medication after meals, she has better success  Otherwise, she denies GERD symptoms  No other complaints today  She had normal colonoscopy at age 49  She had EGD performed by bariatric surgery in 2021 revealing mild gastritis  Review of Systems:    CONSTITUTIONAL: Denies any fever, chills, or rigors  Good appetite, and no recent weight loss  HEENT: No earache or tinnitus  Denies hearing loss or visual disturbances  CARDIOVASCULAR: No chest pain or palpitations  RESPIRATORY: Denies any cough, hemoptysis, shortness of breath or dyspnea on exertion  GASTROINTESTINAL: As noted in the History of Present Illness  GENITOURINARY: No problems with urination  Denies any hematuria or dysuria  NEUROLOGIC: No dizziness or vertigo, denies headaches  MUSCULOSKELETAL: Denies any muscle or joint pain  SKIN: Denies skin rashes or itching  ENDOCRINE: Denies excessive thirst  Denies intolerance to heat or cold  PSYCHOSOCIAL: Denies depression or anxiety  Denies any recent memory loss         Current Outpatient Medications   Medication Sig Dispense Refill    aspirin-acetaminophen-caffeine (EXCEDRIN MIGRAINE) 250-250-65 MG per tablet Take 1 tablet by mouth every 6 (six) hours as needed for headaches      Biotin 1000 MCG CHEW Chew daily      budesonide 9 mg TB24       buPROPion (WELLBUTRIN XL) 150 mg 24 hr tablet Take 1 tablet (150 mg total) by mouth daily 90 tablet 1    cetirizine (ZyrTEC) 10 mg tablet Take 1 tablet (10 mg total) by mouth daily 90 tablet 3    cholecalciferol (VITAMIN D3) 1,000 units tablet Take 1 tablet (1,000 Units total) by mouth daily 100 tablet 3    cyclobenzaprine (FLEXERIL) 10 mg tablet Take 1 tablet (10 mg total) by mouth 3 (three) times a day as needed for muscle spasms 45 tablet 1    dicyclomine (BENTYL) 20 mg tablet Take 1 tablet (20 mg total) by mouth every 6 (six) hours as needed (abdominal cramping) 120 tablet 3    docusate sodium (COLACE) 100 mg capsule Take 100 mg by mouth 2 (two) times a day        ibuprofen (MOTRIN) 600 mg tablet TAKE 1 TABLET BY MOUTH THREE TIMES DAILY WITH FOOD OR MILK AS NEEDED      linaCLOtide (Linzess) 145 MCG CAPS Take 1 capsule (145 mcg total) by mouth in the morning 90 capsule 2    Multiple Vitamins-Minerals (BARIATRIC MULTIVITAMINS/IRON PO) Take by mouth      naltrexone (REVIA) 50 mg tablet TAKE 1 TABLET BY MOUTH ONCE DAILY WITH WELLBUTRIN  DO NOT TAKE WITH HIGH FAT MEALS 30 tablet 5    Norethin Ace-Eth Estrad-FE (MICROGESTIN FE 1 5/30 PO) Take by mouth      omeprazole (PriLOSEC) 20 mg delayed release capsule Take 1 capsule (20 mg total) by mouth 2 (two) times a day 180 capsule 2     No current facility-administered medications for this visit  Past Medical History:   Diagnosis Date    Asthma     seasonal    Back pain     Kidney stone     Obesity     Postgastrectomy malabsorption      Past Surgical History:   Procedure Laterality Date    CHOLECYSTECTOMY      COLONOSCOPY      GASTRECTOMY SLEEVE LAPAROSCOPIC      KNEE ARTHROSCOPY W/ MENISCAL REPAIR Left     NEUROPLASTY / TRANSPOSITION MEDIAN NERVE AT CARPAL TUNNEL BILATERAL      [place] ESOPHAGOGASTRODUODENOSCOPY TRANSORAL DIAGNOSTIC N/A 4/30/2018    Procedure: ESOPHAGOGASTRODUODENOSCOPY (EGD);   Surgeon: Elva Faulkner MD;  Location: [facility]; Service: Gastroenterology     Social History     Socioeconomic History    Marital status: /Civil Union     Spouse name: None    Number of children: None    Years of education: None    Highest education level: None   Occupational History    None   Tobacco Use    Smoking status: Never Smoker    Smokeless tobacco: Never Used   Vaping Use    Vaping Use: Former    Substances: THC, CBD   Substance and Sexual Activity    Alcohol use: Yes     Comment: only on birthday    Drug use: Yes     Types: Marijuana     Comment: uses pill  (medical marijuania card)    Sexual activity: Yes     Partners: Male   Other Topics Concern    None   Social History Narrative    None     Social Determinants of Health     Financial Resource Strain: Not on file   Food Insecurity: Not on file   Transportation Needs: Not on file   Physical Activity: Inactive    Days of Exercise per Week: 0 days   [employer] of Exercise per Session: 0 min   Stress: Stress Concern Present    Feeling of Stress : To some extent   Social Connections: Not on file   Intimate Partner Violence: Not on file   Housing Stability: Not on file     Family History   Problem Relation Age of Onset    Diabetes Mother     Kidney disease Mother         end stage   Cameron Bones Diabetes Father     Other Father         gangrene    Hypertension Father     Colon cancer Paternal Grandfather     Lung cancer Paternal Grandfather     Breast cancer Maternal Aunt     Thyroid cancer Maternal Aunt     Brain cancer Cousin     Nephrolithiasis Family             PHYSICAL EXAM:    Vitals:    06/08/22 0825   BP: 108/60   BP Location: Right arm   Patient Position: Sitting   Cuff Size: Standard   Pulse: 69   SpO2: 97%   Weight: 77 5 kg (170 lb 12 8 oz)   Height: 5' 5" (1 651 m)     General Appearance:   Alert and oriented x 3   Cooperative, and in no respiratory distress   HEENT:   Normocephalic, atraumatic, anicteric      Neck:  Supple, symmetrical, trachea midline   Lungs:   Clear to auscultation bilaterally    Heart[de-identified]   Regular rate and rhythm   Abdomen:   Soft, non-tender, non-distended; normal bowel sounds; no masses, no organomegaly    Genitalia:   Deferred    Rectal:   Deferred    Extremities:  No cyanosis, clubbing or edema    Pulses:  2+ and symmetric all extremities    Skin:  Skin color, texture, turgor normal, no rashes or lesions    Lymph nodes:  No palpable cervical or supraclavicular lymphadenopathy        Lab Results:             Invalid input(s): LABALBU            Imaging Studies: I have personally reviewed pertinent imaging studies  US kidney and bladder    Result Date: 3/2/2022  Impression: Within normal limits  Workstation performed: [id number]       ASSESSMENT and PLAN:      1) IBS-C -  Patient will trial Linzess after she has had a full stomach of food to help with absorption  If she fails this, we can try Trulance  Patient agrees with this plan  She will continue with Bentyl as needed  Follow up in 6-8 weeks

## 2022-06-24 ENCOUNTER — TELEPHONE (OUTPATIENT)
Dept: INTERNAL MEDICINE CLINIC | Facility: CLINIC | Age: 52
End: 2022-06-24

## 2022-06-28 ENCOUNTER — TELEPHONE (OUTPATIENT)
Dept: INTERNAL MEDICINE CLINIC | Facility: CLINIC | Age: 52
End: 2022-06-28

## 2022-06-28 NOTE — TELEPHONE ENCOUNTER
Pt went to [facility] Wednesday 06/22/22, cut pointer and middle finger  With lucia jacob    They rx'd abx, cephalexin 500mg 4 times daily every 6 hours    Started out as little dots, then noticed she is developing a rash on both arms    She is seeing a hand dr on Thursday  And has PT on Friday    Should she stop taking cephalexin?   Please advise, call back

## 2022-06-29 ENCOUNTER — OFFICE VISIT (OUTPATIENT)
Dept: INTERNAL MEDICINE CLINIC | Facility: CLINIC | Age: 52
End: 2022-06-29
Payer: COMMERCIAL

## 2022-06-29 VITALS
HEART RATE: 74 BPM | OXYGEN SATURATION: 98 % | SYSTOLIC BLOOD PRESSURE: 112 MMHG | HEIGHT: 65 IN | DIASTOLIC BLOOD PRESSURE: 70 MMHG | WEIGHT: 171.2 LBS | RESPIRATION RATE: 17 BRPM | BODY MASS INDEX: 28.52 KG/M2 | TEMPERATURE: 98.2 F

## 2022-06-29 DIAGNOSIS — R21 RASH: Primary | ICD-10-CM

## 2022-06-29 PROCEDURE — 99214 OFFICE O/P EST MOD 30 MIN: CPT | Performed by: NURSE PRACTITIONER

## 2022-06-29 NOTE — PROGRESS NOTES
[place]    NAME: Christianne Amado  AGE: 47 y o  SEX: female  : 1970     DATE: 2022     Assessment and Plan:     Problem List Items Addressed This Visit        Musculoskeletal and Integument    Rash - Primary      The patient was evaluated in the emergency room last week after having sustained an injury  She lacerated to fingers with a weed nidia  They did place her on an antibiotic  She developed a rash on her bilateral forearms and on day 6 of the antibiotics did stop them  She has been off the antibiotics for several days  Her rash is almost resolved  No further antibiotics needed as her fingers do not appear to have signs of infection                     No follow-ups on file  Chief Complaint:     Chief Complaint   Patient presents with    Follow-up     Rash, allergic to anti biotic        History of Present Illness:    patient presents to the office today with concerns of an antibiotic induced rash  This rash on her forearms is improving  No further interventions needed  Review of Systems:     Review of Systems   Constitutional: Negative for activity change, fatigue and fever  HENT: Negative for congestion, hearing loss, rhinorrhea, trouble swallowing and voice change  Eyes: Negative for photophobia, pain, discharge and visual disturbance  Respiratory: Negative for cough, chest tightness and shortness of breath  Cardiovascular: Negative for chest pain, palpitations and leg swelling  Gastrointestinal: Negative for abdominal pain, blood in stool, constipation, nausea and vomiting  Endocrine: Negative for cold intolerance and heat intolerance  Genitourinary: Negative for difficulty urinating, frequency, hematuria, urgency, vaginal bleeding and vaginal discharge  Musculoskeletal: Negative for arthralgias and myalgias  Skin: Positive for rash and wound     Neurological: Negative for dizziness, weakness, numbness and headaches  Psychiatric/Behavioral: Negative for decreased concentration  The patient is not nervous/anxious  Problem List:     Patient Active Problem List   Diagnosis    Health maintenance examination    Bilateral shoulder pain    Abdominal pain    Gastroesophageal reflux disease with esophagitis    Obesity    Chronic rhinitis    S/P laparoscopic sleeve gastrectomy    Vitamin D deficiency    Arthritis    Left ear pain    Other chest pain    Elevated TSH    Nephrolithiasis    Chronic right lower quadrant pain    Ground glass opacity present on imaging of lung    Flank pain    Nausea    Diarrhea    Pelvic pain    Lumbar radiculopathy    Tarlov cyst    Disc degeneration, lumbar    Lumbosacral neuritis    Wasp sting    COVID-19 vaccine dose declined    Ecchymosis    Rash        Objective:     /70 (BP Location: Left arm, Patient Position: Sitting, Cuff Size: Standard)   Pulse 74   Temp 98 2 °F (36 8 °C) (Temporal) Comment: no nsaids  Resp 17   Ht 5' 5" (1 651 m)   Wt 77 7 kg (171 lb 3 2 oz)   SpO2 98%   BMI 28 49 kg/m²     Current Outpatient Medications   Medication Instructions    aspirin-acetaminophen-caffeine (EXCEDRIN MIGRAINE) 250-250-65 MG per tablet 1 tablet, Oral, Every 6 hours PRN    Biotin 1000 MCG CHEW Daily    budesonide 9 mg TB24 No dose, route, or frequency recorded      buPROPion (WELLBUTRIN XL) 150 mg, Oral, Daily    cetirizine (ZYRTEC) 10 mg, Oral, Daily    cholecalciferol (VITAMIN D3) 1,000 Units, Oral, Daily    cyclobenzaprine (FLEXERIL) 10 mg, Oral, 3 times daily PRN    dicyclomine (BENTYL) 20 mg, Oral, Every 6 hours PRN    docusate sodium (COLACE) 100 mg, Oral, 2 times daily    ibuprofen (MOTRIN) 600 mg tablet TAKE 1 TABLET BY MOUTH THREE TIMES DAILY WITH FOOD OR MILK AS NEEDED    Linzess 145 mcg, Oral, Daily    Multiple Vitamins-Minerals (BARIATRIC MULTIVITAMINS/IRON PO) Oral    naltrexone (REVIA) 50 mg tablet TAKE 1 TABLET BY MOUTH ONCE DAILY WITH Irais Archer  DO NOT TAKE WITH HIGH FAT MEALS    Norethin Ace-Eth Estrad-FE (MICROGESTIN FE 1 5/30 PO) Oral    omeprazole (PRILOSEC) 20 mg, Oral, 2 times daily         Physical Exam  Vitals reviewed  Constitutional:       Appearance: Normal appearance  She is obese  HENT:      Head: Normocephalic  Nose: Nose normal       Mouth/Throat:      Mouth: Mucous membranes are moist       Pharynx: Oropharynx is clear  Eyes:      Extraocular Movements: Extraocular movements intact  Pupils: Pupils are equal, round, and reactive to light  Cardiovascular:      Rate and Rhythm: Normal rate and regular rhythm  Pulmonary:      Effort: Pulmonary effort is normal       Breath sounds: Normal breath sounds  Musculoskeletal:         General: Normal range of motion  Skin:     General: Skin is warm and dry  Findings: Rash present  Comments: Resolving rash   Neurological:      General: No focal deficit present  Mental Status: She is alert and oriented to person, place, and time  Psychiatric:         Mood and Affect: Mood normal          Behavior: Behavior normal          Thought Content:  Thought content normal          Judgment: Judgment normal          Georgina Gunn, [address] [facility]

## 2022-06-29 NOTE — TELEPHONE ENCOUNTER
If the patient is developing a rash that it is probably a reaction from the antibiotic  She should be evaluated in the office    She can certainly stop the antibiotic

## 2022-06-30 PROBLEM — R21 RASH: Status: ACTIVE | Noted: 2022-06-30

## 2022-06-30 NOTE — ASSESSMENT & PLAN NOTE
The patient was evaluated in the emergency room last week after having sustained an injury  She lacerated to fingers with a weed nidia  They did place her on an antibiotic  She developed a rash on her bilateral forearms and on day 6 of the antibiotics did stop them  She has been off the antibiotics for several days  Her rash is almost resolved    No further antibiotics needed as her fingers do not appear to have signs of infection

## 2022-07-05 DIAGNOSIS — Z98.84 RECENT WEIGHT GAIN AFTER SURGICAL THERAPY FOR OBESITY: ICD-10-CM

## 2022-07-05 DIAGNOSIS — R63.5 RECENT WEIGHT GAIN AFTER SURGICAL THERAPY FOR OBESITY: ICD-10-CM

## 2022-07-05 DIAGNOSIS — E66.9 OBESITY, CLASS I, BMI 30-34.9: ICD-10-CM

## 2022-07-05 DIAGNOSIS — R63.5 ABNORMAL WEIGHT GAIN: ICD-10-CM

## 2022-07-05 RX ORDER — BUPROPION HYDROCHLORIDE 150 MG/1
150 TABLET ORAL DAILY
Qty: 90 TABLET | Refills: 0 | Status: SHIPPED | OUTPATIENT
Start: 2022-07-05 | End: 2022-08-29 | Stop reason: SDUPTHER

## 2022-07-05 NOTE — TELEPHONE ENCOUNTER
Pt was scheduled with Garett Miramontes, but appt was cancel and needs a refill on bupropion and naltrexone  Pt wants it sent to [facility] if possible

## 2022-07-14 ENCOUNTER — VBI (OUTPATIENT)
Dept: ADMINISTRATIVE | Facility: OTHER | Age: 52
End: 2022-07-14

## 2022-07-19 ENCOUNTER — CONSULT (OUTPATIENT)
Dept: CARDIOLOGY CLINIC | Facility: CLINIC | Age: 52
End: 2022-07-19
Payer: COMMERCIAL

## 2022-07-19 VITALS
HEART RATE: 72 BPM | SYSTOLIC BLOOD PRESSURE: 108 MMHG | WEIGHT: 170 LBS | OXYGEN SATURATION: 98 % | DIASTOLIC BLOOD PRESSURE: 60 MMHG | BODY MASS INDEX: 28.32 KG/M2 | RESPIRATION RATE: 16 BRPM | HEIGHT: 65 IN

## 2022-07-19 DIAGNOSIS — Z00.00 HEALTHCARE MAINTENANCE: ICD-10-CM

## 2022-07-19 DIAGNOSIS — R07.89 OTHER CHEST PAIN: Primary | ICD-10-CM

## 2022-07-19 PROCEDURE — 99243 OFF/OP CNSLTJ NEW/EST LOW 30: CPT | Performed by: INTERNAL MEDICINE

## 2022-07-19 NOTE — PROGRESS NOTES
[facility] Cardiology   Office Consultation    Gallo Villagomez 47 y o  female MRN: [id number]    07/19/22          Assessment:  1  Chest pain     Plan:  · Will evaluate with a TTE and exercise stress test  · Check FLP    Follow up: 4 months or sooner as needed    1  Other chest pain  Echo complete w/ contrast if indicated    Stress test only, exercise    Lipid Panel with Direct LDL reflex   2  Healthcare maintenance  Ambulatory Referral to Cardiology       HPI: Gallo Villagomez is a 47y o  year old female who was referred by her PCP Dr Aren Harris to establish care  She denies prior history of cardiac disease  She has noted intermittent left sided chest pressure for several years  She feels it could be related to chronic back pain or anxiety  She has been active caring for her grandchildren without limitation  Her blood pressure and lipids have been at goal  She denies exertional chest pain, dyspnea, palpitations or any other cardiac concerns at this time      ECG personally reviewed: NSR; left axis deviation     Family History: father with hypertension; mother with DM2    Social history: denies tobacco abuse; medical marijuana due to back pain      Allergies   Allergen Reactions    Other Hives    Penicillins Other (See Comments)     Passed out    Pollen Extract Sneezing and Nasal Congestion    Shellfish-Derived Products - Food Allergy Hives    Prednisone Hives    Adhesive [Medical Tape] Rash    Keflex [Cephalexin] Rash         Current Outpatient Medications:     aspirin-acetaminophen-caffeine (EXCEDRIN MIGRAINE) 250-250-65 MG per tablet, Take 1 tablet by mouth every 6 (six) hours as needed for headaches, Disp: , Rfl:     buPROPion (WELLBUTRIN XL) 150 mg 24 hr tablet, Take 1 tablet (150 mg total) by mouth daily, Disp: 90 tablet, Rfl: 0    cetirizine (ZyrTEC) 10 mg tablet, Take 1 tablet (10 mg total) by mouth daily, Disp: 90 tablet, Rfl: 3    cyclobenzaprine (FLEXERIL) 10 mg tablet, Take 1 tablet (10 mg total) by mouth 3 (three) times a day as needed for muscle spasms, Disp: 45 tablet, Rfl: 1    dicyclomine (BENTYL) 20 mg tablet, Take 1 tablet (20 mg total) by mouth every 6 (six) hours as needed (abdominal cramping), Disp: 120 tablet, Rfl: 3    docusate sodium (COLACE) 100 mg capsule, Take 100 mg by mouth 2 (two) times a day  , Disp: , Rfl:     ibuprofen (MOTRIN) 600 mg tablet, TAKE 1 TABLET BY MOUTH THREE TIMES DAILY WITH FOOD OR MILK AS NEEDED, Disp: , Rfl:     linaCLOtide (Linzess) 145 MCG CAPS, Take 1 capsule (145 mcg total) by mouth in the morning, Disp: 90 capsule, Rfl: 2    naltrexone (REVIA) 50 mg tablet, TAKE 1 TABLET BY MOUTH ONCE DAILY WITH WELLBUTRIN   DO NOT TAKE WITH HIGH FAT MEALS, Disp: 30 tablet, Rfl: 5    Norethin Ace-Eth Estrad-FE (MICROGESTIN FE 1 5/30 PO), Take by mouth, Disp: , Rfl:     omeprazole (PriLOSEC) 20 mg delayed release capsule, Take 1 capsule (20 mg total) by mouth 2 (two) times a day, Disp: 180 capsule, Rfl: 2    Biotin 1000 MCG CHEW, Chew daily (Patient not taking: No sig reported), Disp: , Rfl:     cholecalciferol (VITAMIN D3) 1,000 units tablet, Take 1 tablet (1,000 Units total) by mouth daily (Patient not taking: No sig reported), Disp: 100 tablet, Rfl: 3    Past Medical History:   Diagnosis Date    Asthma     seasonal    Back pain     Cut of finger 06/2022    cut fingers on left hand    Kidney stone     Obesity     Postgastrectomy malabsorption        Family History   Problem Relation Age of Onset    Diabetes Mother     Kidney disease Mother         end stage    Diabetes Father     Other Father         gangrene    Hypertension Father     Colon cancer Paternal Grandfather     Lung cancer Paternal Grandfather     Breast cancer Maternal Aunt     Thyroid cancer Maternal Aunt     Brain cancer Cousin     Nephrolithiasis Family        Past Surgical History:   Procedure Laterality Date    CHOLECYSTECTOMY      COLONOSCOPY      GASTRECTOMY SLEEVE LAPAROSCOPIC  KNEE ARTHROSCOPY W/ MENISCAL REPAIR Left     NEUROPLASTY / TRANSPOSITION MEDIAN NERVE AT CARPAL TUNNEL BILATERAL      [place] ESOPHAGOGASTRODUODENOSCOPY TRANSORAL DIAGNOSTIC N/A 4/30/2018    Procedure: ESOPHAGOGASTRODUODENOSCOPY (EGD); Surgeon: Cj Pena MD;  Location: [facility]; Service: Gastroenterology       Social History     Socioeconomic History    Marital status: /Civil Union     Spouse name: Not on file    Number of children: Not on file    Years of education: Not on file    Highest education level: Not on file   Occupational History    Not on file   Tobacco Use    Smoking status: Never Smoker    Smokeless tobacco: Never Used   Vaping Use    Vaping Use: Former    Substances: THC, CBD   Substance and Sexual Activity    Alcohol use: Yes     Comment: only on birthday    Drug use: Yes     Types: Marijuana     Comment: uses pill  (medical marijuania card)    Sexual activity: Yes     Partners: Male   Other Topics Concern    Not on file   Social History Narrative    Not on file     Social Determinants of Health     Financial Resource Strain: Not on file   Food Insecurity: Not on file   Transportation Needs: Not on file   Physical Activity: Inactive    Days of Exercise per Week: 0 days   [employer] of Exercise per Session: 0 min   Stress: Stress Concern Present    Feeling of Stress : To some extent   Social Connections: Not on file   Intimate Partner Violence: Not on file   Housing Stability: Not on file       Review of Systems   Constitutional: Negative for diaphoresis, weight gain and weight loss  HENT: Negative for congestion  Cardiovascular: Positive for chest pain  Negative for dyspnea on exertion, irregular heartbeat, leg swelling, near-syncope, orthopnea, palpitations, paroxysmal nocturnal dyspnea and syncope  Respiratory: Negative for shortness of breath, sleep disturbances due to breathing and snoring  Hematologic/Lymphatic: Does not bruise/bleed easily  Skin: Negative for rash  Musculoskeletal: Negative for myalgias  Gastrointestinal: Negative for nausea and vomiting  Neurological: Negative for excessive daytime sleepiness and light-headedness  Psychiatric/Behavioral: The patient is not nervous/anxious  Vitals: /60 (BP Location: Left arm, Patient Position: Sitting)   Pulse 72   Resp 16   Ht 5' 5" (1 651 m)   Wt 77 1 kg (170 lb)   SpO2 98%   BMI 28 29 kg/m²       Physical Exam:     GEN: Alert and oriented x 3, in no acute distress  Well appearing and well nourished  HEENT: Sclera anicteric, conjunctivae pink, mucous membranes moist  Oropharynx clear  NECK: Supple, no carotid bruits, no significant JVD  Trachea midline, no thyromegaly  HEART: Regular rhythm, normal S1 and S2, no murmurs, clicks, gallops or rubs  PMI nondisplaced, no thrills  LUNGS: Clear to auscultation bilaterally; no wheezes, rales, or rhonchi  No increased work of breathing or signs of respiratory distress  ABDOMEN: Soft, nontender, nondistended  EXTREMITIES: Skin warm and well perfused, no clubbing, cyanosis, or edema  NEURO: No focal findings  Normal speech  Mood and affect normal    SKIN: Normal without suspicious lesions on exposed skin

## 2022-08-12 RX ORDER — CLOTRIMAZOLE AND BETAMETHASONE DIPROPIONATE 10; .64 MG/G; MG/G
CREAM TOPICAL
COMMUNITY
Start: 2022-06-29

## 2022-08-12 RX ORDER — FLUCONAZOLE 150 MG/1
TABLET ORAL
COMMUNITY
Start: 2022-06-23

## 2022-08-12 RX ORDER — CEPHALEXIN 500 MG/1
CAPSULE ORAL
COMMUNITY
Start: 2022-06-23 | End: 2022-10-20 | Stop reason: ALTCHOICE

## 2022-08-15 ENCOUNTER — TELEPHONE (OUTPATIENT)
Dept: INTERNAL MEDICINE CLINIC | Facility: CLINIC | Age: 52
End: 2022-08-15
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
## 2022-08-17 ENCOUNTER — APPOINTMENT (OUTPATIENT)
Dept: LAB | Facility: CLINIC | Age: 52
End: 2022-08-17
Payer: COMMERCIAL

## 2022-08-17 ENCOUNTER — OFFICE VISIT (OUTPATIENT)
Dept: INTERNAL MEDICINE CLINIC | Facility: CLINIC | Age: 52
End: 2022-08-17
Payer: COMMERCIAL

## 2022-08-17 ENCOUNTER — OFFICE VISIT (OUTPATIENT)
Dept: GASTROENTEROLOGY | Facility: CLINIC | Age: 52
End: 2022-08-17
Payer: COMMERCIAL

## 2022-08-17 VITALS
HEART RATE: 68 BPM | BODY MASS INDEX: 27.46 KG/M2 | OXYGEN SATURATION: 98 % | DIASTOLIC BLOOD PRESSURE: 70 MMHG | HEIGHT: 65 IN | SYSTOLIC BLOOD PRESSURE: 118 MMHG | WEIGHT: 164.8 LBS

## 2022-08-17 VITALS
DIASTOLIC BLOOD PRESSURE: 72 MMHG | OXYGEN SATURATION: 99 % | TEMPERATURE: 98 F | WEIGHT: 164.8 LBS | HEIGHT: 65 IN | BODY MASS INDEX: 27.46 KG/M2 | RESPIRATION RATE: 16 BRPM | HEART RATE: 71 BPM | SYSTOLIC BLOOD PRESSURE: 106 MMHG

## 2022-08-17 DIAGNOSIS — K58.1 IRRITABLE BOWEL SYNDROME WITH CONSTIPATION: ICD-10-CM

## 2022-08-17 DIAGNOSIS — R07.9 CHEST PAIN, UNSPECIFIED TYPE: Primary | ICD-10-CM

## 2022-08-17 DIAGNOSIS — R07.9 CHEST PAIN, UNSPECIFIED TYPE: ICD-10-CM

## 2022-08-17 DIAGNOSIS — K21.00 GASTROESOPHAGEAL REFLUX DISEASE WITH ESOPHAGITIS, UNSPECIFIED WHETHER HEMORRHAGE: Primary | ICD-10-CM

## 2022-08-17 LAB
CARDIAC TROPONIN I PNL SERPL HS: <2 NG/L (ref 8–18)
CK MB SERPL-MCNC: <1 % (ref 0–2.5)
CK MB SERPL-MCNC: <1 NG/ML (ref 0–5)
CK SERPL-CCNC: 159 U/L (ref 26–192)

## 2022-08-17 PROCEDURE — 93000 ELECTROCARDIOGRAM COMPLETE: CPT | Performed by: INTERNAL MEDICINE

## 2022-08-17 PROCEDURE — 82553 CREATINE MB FRACTION: CPT

## 2022-08-17 PROCEDURE — 99214 OFFICE O/P EST MOD 30 MIN: CPT | Performed by: PHYSICIAN ASSISTANT

## 2022-08-17 PROCEDURE — 84484 ASSAY OF TROPONIN QUANT: CPT

## 2022-08-17 PROCEDURE — 99214 OFFICE O/P EST MOD 30 MIN: CPT | Performed by: INTERNAL MEDICINE

## 2022-08-17 PROCEDURE — 82550 ASSAY OF CK (CPK): CPT

## 2022-08-17 PROCEDURE — 36415 COLL VENOUS BLD VENIPUNCTURE: CPT

## 2022-08-17 RX ORDER — NORETHINDRONE ACETATE/ETHINYL ESTRADIOL AND FERROUS FUMARATE 1.5-30(21)
1 KIT ORAL DAILY
COMMUNITY
Start: 2022-07-29

## 2022-08-17 RX ORDER — SUCRALFATE 1 G/1
1 TABLET ORAL 3 TIMES DAILY
Qty: 90 TABLET | Refills: 0 | Status: SHIPPED | OUTPATIENT
Start: 2022-08-17 | End: 2022-10-20

## 2022-08-17 NOTE — PATIENT INSTRUCTIONS
Atypical chest pain with normal EKG  Check troponin  Patient schedule for stress testing next week pretest likelihood of disease is low
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
## 2022-08-19 ENCOUNTER — TELEPHONE (OUTPATIENT)
Dept: INTERNAL MEDICINE CLINIC | Facility: CLINIC | Age: 52
End: 2022-08-19

## 2022-08-23 ENCOUNTER — LAB (OUTPATIENT)
Dept: LAB | Facility: CLINIC | Age: 52
End: 2022-08-23
Payer: COMMERCIAL

## 2022-08-23 ENCOUNTER — HOSPITAL ENCOUNTER (OUTPATIENT)
Dept: NON INVASIVE DIAGNOSTICS | Facility: CLINIC | Age: 52
Discharge: HOME | End: 2022-08-23
Payer: COMMERCIAL

## 2022-08-23 VITALS
SYSTOLIC BLOOD PRESSURE: 106 MMHG | BODY MASS INDEX: 27.32 KG/M2 | WEIGHT: 164 LBS | DIASTOLIC BLOOD PRESSURE: 72 MMHG | HEIGHT: 65 IN | HEART RATE: 75 BPM

## 2022-08-23 VITALS
SYSTOLIC BLOOD PRESSURE: 110 MMHG | OXYGEN SATURATION: 98 % | DIASTOLIC BLOOD PRESSURE: 70 MMHG | WEIGHT: 170 LBS | HEART RATE: 72 BPM | HEIGHT: 65 IN | BODY MASS INDEX: 28.32 KG/M2

## 2022-08-23 DIAGNOSIS — R07.89 OTHER CHEST PAIN: ICD-10-CM

## 2022-08-23 LAB
AORTIC ROOT: 3.6 CM
AORTIC VALVE MEAN VELOCITY: 7.5 M/S
APICAL FOUR CHAMBER EJECTION FRACTION: 64 %
AV LVOT MEAN GRADIENT: 2 MMHG
AV LVOT PEAK GRADIENT: 3 MMHG
AV MEAN GRADIENT: 3 MMHG
AV PEAK GRADIENT: 5 MMHG
AV VELOCITY RATIO: 0.81
BASELINE ST DEPRESSION: 0 MM
CHOLEST SERPL-MCNC: 152 MG/DL
DOP CALC AO PEAK VEL: 1.08 M/S
DOP CALC AO VTI: 21.45 CM
DOP CALC LVOT PEAK VEL VTI: 18.85 CM
DOP CALC LVOT PEAK VEL: 0.87 M/S
E WAVE DECELERATION TIME: 206 MS
FRACTIONAL SHORTENING: 42 % (ref 28–44)
HDLC SERPL-MCNC: 41 MG/DL
INTERVENTRICULAR SEPTUM IN DIASTOLE (PARASTERNAL SHORT AXIS VIEW): 0.7 CM
INTERVENTRICULAR SEPTUM: 0.7 CM (ref 0.6–1.1)
LAAS-AP2: 17.5 CM2
LAAS-AP4: 16.6 CM2
LDLC SERPL CALC-MCNC: 93 MG/DL (ref 0–100)
LEFT ATRIUM AREA SYSTOLE SINGLE PLANE A4C: 14.1 CM2
LEFT ATRIUM SIZE: 3 CM
LEFT INTERNAL DIMENSION IN SYSTOLE: 2.6 CM (ref 2.1–4)
LEFT VENTRICULAR INTERNAL DIMENSION IN DIASTOLE: 4.5 CM (ref 3.5–6)
LEFT VENTRICULAR POSTERIOR WALL IN END DIASTOLE: 0.9 CM
LEFT VENTRICULAR STROKE VOLUME: 69 ML
LVSV (TEICH): 69 ML
MAX HR PERCENT: 100 %
MAX HR: 169 BPM
MV E'TISSUE VEL-LAT: 14 CM/S
MV E'TISSUE VEL-SEP: 12 CM/S
MV PEAK A VEL: 0.66 M/S
MV PEAK E VEL: 80 CM/S
MV STENOSIS PRESSURE HALF TIME: 60 MS
MV VALVE AREA P 1/2 METHOD: 3.67 CM2
RATE PRESSURE PRODUCT: NORMAL
RIGHT ATRIUM AREA SYSTOLE A4C: 12.7 CM2
RIGHT VENTRICLE ID DIMENSION: 3.2 CM
SL CV LEFT ATRIUM LENGTH A2C: 4.8 CM
SL CV LV EF: 60
SL CV PED ECHO LEFT VENTRICLE DIASTOLIC VOLUME (MOD BIPLANE) 2D: 94 ML
SL CV PED ECHO LEFT VENTRICLE SYSTOLIC VOLUME (MOD BIPLANE) 2D: 25 ML
SL CV STRESS RECOVERY BP: NORMAL MMHG
SL CV STRESS RECOVERY HR: 89 BPM
SL CV STRESS RECOVERY O2 SAT: 99 %
SL CV STRESS STAGE REACHED: 3
STRESS ANGINA INDEX: 1
STRESS BASELINE BP: NORMAL MMHG
STRESS BASELINE HR: 72 BPM
STRESS DUKE TREADMILL SCORE: 5
STRESS O2 SAT REST: 98 %
STRESS PEAK HR: 169 BPM
STRESS POST ESTIMATED WORKLOAD: 10.1 METS
STRESS POST EXERCISE DUR MIN: 9 MIN
STRESS POST O2 SAT PEAK: 99 %
STRESS POST PEAK BP: 148 MMHG
STRESS ST DEPRESSION: 0 MM
TR MAX PG: 12 MMHG
TR PEAK VELOCITY: 1.7 M/S
TRICUSPID VALVE PEAK REGURGITATION VELOCITY: 1.7 M/S
TRIGL SERPL-MCNC: 92 MG/DL

## 2022-08-23 PROCEDURE — 93017 CV STRESS TEST TRACING ONLY: CPT

## 2022-08-23 PROCEDURE — 36415 COLL VENOUS BLD VENIPUNCTURE: CPT

## 2022-08-23 PROCEDURE — 93306 TTE W/DOPPLER COMPLETE: CPT

## 2022-08-23 PROCEDURE — 93016 CV STRESS TEST SUPVJ ONLY: CPT | Performed by: INTERNAL MEDICINE

## 2022-08-23 PROCEDURE — 93306 TTE W/DOPPLER COMPLETE: CPT | Performed by: INTERNAL MEDICINE

## 2022-08-23 PROCEDURE — 80061 LIPID PANEL: CPT

## 2022-08-23 PROCEDURE — 93018 CV STRESS TEST I&R ONLY: CPT | Performed by: INTERNAL MEDICINE

## 2022-08-24 LAB
CHEST PAIN STATEMENT: NORMAL
MAX DIASTOLIC BP: 70 MMHG
MAX HEART RATE: 169 BPM
MAX PREDICTED HEART RATE: 169 BPM
MAX. SYSTOLIC BP: 148 MMHG
PROTOCOL NAME: NORMAL
TARGET HR FORMULA: NORMAL
TEST INDICATION: NORMAL
TIME IN EXERCISE PHASE: NORMAL

## 2022-08-29 ENCOUNTER — OFFICE VISIT (OUTPATIENT)
Dept: BARIATRICS | Facility: CLINIC | Age: 52
End: 2022-08-29
Payer: COMMERCIAL

## 2022-08-29 VITALS
RESPIRATION RATE: 16 BRPM | SYSTOLIC BLOOD PRESSURE: 100 MMHG | BODY MASS INDEX: 27.82 KG/M2 | HEIGHT: 65 IN | HEART RATE: 72 BPM | WEIGHT: 167 LBS | DIASTOLIC BLOOD PRESSURE: 62 MMHG | TEMPERATURE: 99.2 F

## 2022-08-29 DIAGNOSIS — M51.36 DISC DEGENERATION, LUMBAR: ICD-10-CM

## 2022-08-29 DIAGNOSIS — F41.9 ANXIETY: ICD-10-CM

## 2022-08-29 DIAGNOSIS — K21.00 GASTROESOPHAGEAL REFLUX DISEASE WITH ESOPHAGITIS WITHOUT HEMORRHAGE: ICD-10-CM

## 2022-08-29 DIAGNOSIS — Z98.84 S/P LAPAROSCOPIC SLEEVE GASTRECTOMY: Primary | ICD-10-CM

## 2022-08-29 DIAGNOSIS — K59.00 CONSTIPATION: ICD-10-CM

## 2022-08-29 DIAGNOSIS — E55.9 VITAMIN D DEFICIENCY: ICD-10-CM

## 2022-08-29 DIAGNOSIS — R63.5 ABNORMAL WEIGHT GAIN: ICD-10-CM

## 2022-08-29 DIAGNOSIS — E66.3 OVERWEIGHT: ICD-10-CM

## 2022-08-29 PROCEDURE — 99214 OFFICE O/P EST MOD 30 MIN: CPT | Performed by: FAMILY MEDICINE

## 2022-08-29 RX ORDER — BUPROPION HYDROCHLORIDE 150 MG/1
150 TABLET ORAL DAILY
Qty: 90 TABLET | Refills: 0 | Status: SHIPPED | OUTPATIENT
Start: 2022-08-29

## 2022-08-29 NOTE — PROGRESS NOTES
Assessment/Plan:  Ghanshyam Silva was seen today for follow-up  Diagnoses and all orders for this visit:  1  S/P laparoscopic sleeve gastrectomy     2  Gastroesophageal reflux disease with esophagitis without hemorrhage     3  Disc degeneration, lumbar     4  Vitamin D deficiency     5  Constipation     6  Overweight     7  Abnormal weight gain  buPROPion (WELLBUTRIN XL) 150 mg 24 hr tablet   8  Anxiety       Abnormal weight gain  Recent weight gain after surgical therapy for obesity  Good weight loss- Continue Wellbutrin-Naltrexone return in 3 mo  Topamax not indicated due to recent nephrolithiasis hx  GLP-1 not covered   Split protein shake in 2 servings since one has 60g     Goals:  Goal protein intake 90g per day  <1200 calories    Lab Results   Component Value Date    HGBA1C 5 2 02/25/2021     Gastroesophageal reflux disease with esophagitis  IBS-C  On omeprazole 20mg BID, linzess  Advised start vitamin D 2000 units daily and calcium 500mg daily -Hx of kidney stones  S/P laparoscopic sleeve gastrectomy  Postsurgical malabsorption  Continue vitamins  Restart vit D  Recheck labs in 10/2022  She does not take vit B12 last level 506 good  Vitamin D def:  Last level 33 restart vitamin D goal 2000 units daily  Constipation; continue Linzess and hydration  Add fiber to diet  Follow up in approximately 3 months with Non-Surgical Physician/Advanced Practitioner  Subjective:   Chief Complaint   Patient presents with    Follow-up     SB 1/8       Patient ID: Thalia Nyhan  is a 47 y o  female with excess weight/obesity here to pursue weight management    Patient is pursuing Conservative Program      HPI  -Initial weight loss goal of 5-10% weight loss for improved health  Wt Readings from Last 10 Encounters:   08/29/22 75 8 kg (167 lb)   08/23/22 74 4 kg (164 lb)   08/23/22 77 1 kg (170 lb)   08/17/22 74 8 kg (164 lb 12 8 oz)   08/17/22 74 8 kg (164 lb 12 8 oz)   07/19/22 77 1 kg (170 lb)   06/29/22 77 7 kg (171 lb 3 2 oz) 06/08/22 76 3 kg (168 lb 3 2 oz)   06/08/22 77 5 kg (170 lb 12 8 oz)   04/04/22 78 7 kg (173 lb 6 4 oz)     Initial weight MWM: 188 lbs (6/16/21)  Current weight: 167 lbs  Change in weight: - 11lb since last visit  Goal:  S/p LSG in 2105 with Dr Lobito Cyr at [facility]  Initial 215 lbs  Judson 135 lbs  Weight regain: +65 lbs from medical marijuana use and stress   Has chronic back pain/sciatica   Continues to take the medical marijuana pill at night to help her sleep and not daily use just as needed  Started wellbutrin in 6/2021, naltrexone added 10/2021  Doing well on weight loss  Continues to help with appetite suppression  Intermittent nausea in the morning but no vomiting, dry mouth  Takes both Naltrexone and Wellbutrin after lunch  Thinks she is doing under 1200 tyler   B- coffee   Oatmeal with fruit  S-   L- salad leftover 11-12:30am  S-protein shake 1-3pm 60g per serving  D- chicken sandwich about 4 oz   S-   Drinks- 4-5 bottles water   Alcohol- no   Exercise- walking at least 6500 steps       The following portions of the patient's history were reviewed and updated as appropriate: allergies, current medications, past family history, past medical history, past social history, past surgical history, and problem list     Review of Systems   Respiratory: Negative  Cardiovascular: Negative  Gastrointestinal: Positive for abdominal pain (epigastric)  Objective:  /62 (Cuff Size: Large)   Pulse 72   Temp 99 2 °F (37 3 °C)   Resp 16   Ht 5' 5" (1 651 m)   Wt 75 8 kg (167 lb)   BMI 27 79 kg/m²   Constitutional: Well-developed, well-nourished and obese Body mass index is 27 79 kg/m²  Shelvy Mingle HEENT: No conjunctival pallor or jaundice  Pulmonary: No increased work of breathing or signs of respiratory distress  CV: Normal rate, well-perfused Regular rate and rhythm  GI: Obese  Non-distended  MSK: No edema   Neuro: Oriented to person, place and time  Normal Speech  Normal gait  Psych: Normal affect and mood  Labs and Imaging  Reviewed

## 2022-10-03 ENCOUNTER — TELEPHONE (OUTPATIENT)
Dept: CARDIOLOGY CLINIC | Facility: CLINIC | Age: 52
End: 2022-10-03

## 2022-10-03 NOTE — TELEPHONE ENCOUNTER
Pt called and stated she had chest pressure last Thursday and it radiated down left arm to wrist  Pt admitted to having carpel tunnel  Pt also stated that she gpt pain radiating up her neck to jaw  She has been having intermittent headaches as well  Pt is scheduled for appointment ending of October with Dr Nola Mayer 10/20/2022    I advised pt if symptoms persist or worsen, to go to ED for further work up

## 2022-10-04 ENCOUNTER — TELEPHONE (OUTPATIENT)
Dept: CARDIOLOGY CLINIC | Facility: CLINIC | Age: 52
End: 2022-10-04

## 2022-10-04 NOTE — TELEPHONE ENCOUNTER
Spoke with pt and Ileana's message was related  Pt verbally understood if symptoms gets worse to proceed to the ED and to keep her appt

## 2022-10-04 NOTE — TELEPHONE ENCOUNTER
----- Message from [place]  Andrea Antoine sent at 10/3/2022  4:46 PM EDT -----  Regarding: Spoke with Sherif River   She ask for me to take my blood pressure   Im sending you a picture of my blood pressure because Im still having that pain near my heart I have an appointment with you on 10/20/2022

## 2022-10-20 ENCOUNTER — OFFICE VISIT (OUTPATIENT)
Dept: CARDIOLOGY CLINIC | Facility: CLINIC | Age: 52
End: 2022-10-20
Payer: COMMERCIAL

## 2022-10-20 VITALS
DIASTOLIC BLOOD PRESSURE: 68 MMHG | WEIGHT: 162 LBS | HEART RATE: 70 BPM | RESPIRATION RATE: 16 BRPM | OXYGEN SATURATION: 99 % | BODY MASS INDEX: 26.99 KG/M2 | HEIGHT: 65 IN | SYSTOLIC BLOOD PRESSURE: 104 MMHG

## 2022-10-20 DIAGNOSIS — I20.8 OTHER FORMS OF ANGINA PECTORIS (HCC): Primary | ICD-10-CM

## 2022-10-20 PROCEDURE — 93000 ELECTROCARDIOGRAM COMPLETE: CPT | Performed by: INTERNAL MEDICINE

## 2022-10-20 PROCEDURE — 99213 OFFICE O/P EST LOW 20 MIN: CPT | Performed by: INTERNAL MEDICINE

## 2022-10-20 NOTE — PROGRESS NOTES
[address] [facility]   Office Consultation    Moy Queen 47 y o  female MRN: [id number]    10/20/22          Assessment:  1  Chest pain/anginal equivalent   2  Palpitations   3  S/p laparoscopic sleeve gastrectomy     Plan:  · She notes continued symptoms and is interested in definitive evaluation  Will evaluate with cardiac catheterization    Risks and benefits of cardiac catheterization including but not limited to risk of vascular injury, renal failure, bleeding, myocardial infarction, stroke, and death were discussed with the patient  Patient understands the risks and benefits and wishes to proceed  Follow up: 2 months or sooner as needed    1  Other forms of angina pectoris [facility])         HPI: Moy Queen is a 47y o  year old female who presents for routine follow up  Past cardiac evaluation:   · ETT 8/2022: no evidence of ischemia however there was significant artifact  · TTE 8/2022: EF: 60%      She continues to note chest pain that radiates to her neck and down her left arm  The episodes occur 2-3 times per week predominantly at rest  She also notes occasional palpitations  She had recent spinal pain injection with no relief  She is interested in definitive evaluation        ECG personally reviewed: NSR, sinus arrhythmia     Family History: father with hypertension; mother with DM2    Social history: denies tobacco abuse; medical marijuana due to back pain      Allergies   Allergen Reactions   • Other Hives   • Penicillins Other (See Comments)     Passed out   • Pollen Extract Sneezing and Nasal Congestion   • Shellfish-Derived Products - Food Allergy Hives   • Prednisone Hives   • Adhesive [Medical Tape] Rash   • Keflex [Cephalexin] Rash         Current Outpatient Medications:   •  aspirin-acetaminophen-caffeine (EXCEDRIN MIGRAINE) 250-250-65 MG per tablet, Take 1 tablet by mouth every 6 (six) hours as needed for headaches, Disp: , Rfl:   •  buPROPion (WELLBUTRIN XL) 150 mg 24 hr tablet, Take 1 tablet (150 mg total) by mouth daily, Disp: 90 tablet, Rfl: 0  •  cetirizine (ZyrTEC) 10 mg tablet, Take 1 tablet (10 mg total) by mouth daily, Disp: 90 tablet, Rfl: 3  •  cholecalciferol (VITAMIN D3) 1,000 units tablet, Take 1 tablet (1,000 Units total) by mouth daily, Disp: 100 tablet, Rfl: 3  •  cyclobenzaprine (FLEXERIL) 10 mg tablet, Take 1 tablet (10 mg total) by mouth 3 (three) times a day as needed for muscle spasms, Disp: 45 tablet, Rfl: 1  •  dicyclomine (BENTYL) 20 mg tablet, Take 1 tablet (20 mg total) by mouth every 6 (six) hours as needed (abdominal cramping), Disp: 120 tablet, Rfl: 3  •  docusate sodium (COLACE) 100 mg capsule, Take 100 mg by mouth 2 (two) times a day  , Disp: , Rfl:   •  fluconazole (DIFLUCAN) 150 mg tablet, TAKE ONE TABLET FOR A ONE TIME DOSE, Disp: , Rfl:   •  ibuprofen (MOTRIN) 600 mg tablet, TAKE 1 TABLET BY MOUTH THREE TIMES DAILY WITH FOOD OR MILK AS NEEDED, Disp: , Rfl:   •  Herb Fe 1 5/30 1 5-30 MG-MCG tablet, Take 1 tablet by mouth daily, Disp: , Rfl:   •  linaCLOtide (Linzess) 145 MCG CAPS, Take 1 capsule (145 mcg total) by mouth in the morning, Disp: 90 capsule, Rfl: 2  •  naltrexone (REVIA) 50 mg tablet, TAKE 1 TABLET BY MOUTH ONCE DAILY WITH WELLBUTRIN   DO NOT TAKE WITH HIGH FAT MEALS, Disp: 30 tablet, Rfl: 5  •  Norethin Ace-Eth Estrad-FE (MICROGESTIN FE 1 5/30 PO), Take by mouth, Disp: , Rfl:   •  omeprazole (PriLOSEC) 20 mg delayed release capsule, Take 1 capsule (20 mg total) by mouth 2 (two) times a day, Disp: 180 capsule, Rfl: 2  •  sucralfate (CARAFATE) 1 g tablet, Take 1 tablet (1 g total) by mouth 3 (three) times a day Crush and mix with a small amount of water, Disp: 90 tablet, Rfl: 0  •  Biotin 1000 MCG CHEW, Chew daily (Patient not taking: No sig reported), Disp: , Rfl:   •  clotrimazole-betamethasone (LOTRISONE) 1-0 05 % cream, APPLY TOPICALLY TWICE DAILY TO AFFECTED AREA(S) (Patient not taking: No sig reported), Disp: , Rfl:     Past Medical History:   Diagnosis Date   • Asthma     seasonal   • Back pain    • Cut of finger 06/2022    cut fingers on left hand   • Kidney stone    • Obesity    • Postgastrectomy malabsorption        Family History   Problem Relation Age of Onset   • Diabetes Mother    • Kidney disease Mother         end stage   • Diabetes Father    • Other Father         gangrene   • Hypertension Father    • Colon cancer Paternal Grandfather    • Lung cancer Paternal Grandfather    • Breast cancer Maternal Aunt    • Thyroid cancer Maternal Aunt    • Brain cancer Cousin    • Nephrolithiasis Family        Past Surgical History:   Procedure Laterality Date   • CHOLECYSTECTOMY     • COLONOSCOPY     • GASTRECTOMY SLEEVE LAPAROSCOPIC     • KNEE ARTHROSCOPY W/ MENISCAL REPAIR Left    • NEUROPLASTY / TRANSPOSITION MEDIAN NERVE AT CARPAL TUNNEL BILATERAL     • PA ESOPHAGOGASTRODUODENOSCOPY TRANSORAL DIAGNOSTIC N/A 4/30/2018    Procedure: ESOPHAGOGASTRODUODENOSCOPY (EGD); Surgeon: Carmel Montana MD;  Location: [facility];   Service: Gastroenterology       Social History     Socioeconomic History   • Marital status: /Civil Union     Spouse name: Not on file   • Number of children: Not on file   • Years of education: Not on file   • Highest education level: Not on file   Occupational History   • Not on file   Tobacco Use   • Smoking status: Never Smoker   • Smokeless tobacco: Never Used   Vaping Use   • Vaping Use: Former   • Substances: THC, CBD   Substance and Sexual Activity   • Alcohol use: Yes     Comment: only on birthday   • Drug use: Yes     Types: Marijuana     Comment: uses pill  (medical marijuania card)   • Sexual activity: Yes     Partners: Male   Other Topics Concern   • Not on file   Social History Narrative   • Not on file     Social Determinants of Health     Financial Resource Strain: Not on file   Food Insecurity: Not on file   Transportation Needs: Not on file   Physical Activity: Not on file   Stress: Stress Concern Present   • Feeling of Stress : To some extent   Social Connections: Not on file   Intimate Partner Violence: Not on file   Housing Stability: Not on file       Review of Systems   Constitutional: Negative for diaphoresis, weight gain and weight loss  HENT: Negative for congestion  Cardiovascular: Positive for chest pain and palpitations  Negative for dyspnea on exertion, irregular heartbeat, leg swelling, near-syncope, orthopnea, paroxysmal nocturnal dyspnea and syncope  Respiratory: Negative for shortness of breath, sleep disturbances due to breathing and snoring  Hematologic/Lymphatic: Does not bruise/bleed easily  Skin: Negative for rash  Musculoskeletal: Negative for myalgias  Gastrointestinal: Negative for nausea and vomiting  Neurological: Negative for excessive daytime sleepiness and light-headedness  Psychiatric/Behavioral: The patient is not nervous/anxious  Vitals: /68 (BP Location: Left arm, Patient Position: Sitting)   Pulse 70   Resp 16   Ht 5' 5" (1 651 m)   Wt 73 5 kg (162 lb)   SpO2 99%   BMI 26 96 kg/m²       Physical Exam:     GEN: Alert and oriented x 3, in no acute distress  Well appearing and well nourished  HEENT: Sclera anicteric, conjunctivae pink, mucous membranes moist  Oropharynx clear  NECK: Supple, no carotid bruits, no significant JVD  Trachea midline, no thyromegaly  HEART: Regular rhythm, normal S1 and S2, no murmurs, clicks, gallops or rubs  PMI nondisplaced, no thrills  LUNGS: Clear to auscultation bilaterally; no wheezes, rales, or rhonchi  No increased work of breathing or signs of respiratory distress  ABDOMEN: Soft, nontender, nondistended   EXTREMITIES: Skin warm and well perfused, no clubbing, cyanosis, or edema  NEURO: No focal findings  Normal speech  Mood and affect normal    SKIN: Normal without suspicious lesions on exposed skin

## 2022-10-20 NOTE — H&P (VIEW-ONLY)
[facility]   Office Consultation    Bandar Fajardo 47 y o  female MRN: [id number]    10/20/22          Assessment:  1  Chest pain/anginal equivalent   2  Palpitations   3  S/p laparoscopic sleeve gastrectomy     Plan:  · She notes continued symptoms and is interested in definitive evaluation  Will evaluate with cardiac catheterization    Risks and benefits of cardiac catheterization including but not limited to risk of vascular injury, renal failure, bleeding, myocardial infarction, stroke, and death were discussed with the patient  Patient understands the risks and benefits and wishes to proceed  Follow up: 2 months or sooner as needed    1  Other forms of angina pectoris [facility])         HPI: Bandar Fajardo is a 47y o  year old female who presents for routine follow up  Past cardiac evaluation:   · ETT 8/2022: no evidence of ischemia however there was significant artifact  · TTE 8/2022: EF: 60%      She continues to note chest pain that radiates to her neck and down her left arm  The episodes occur 2-3 times per week predominantly at rest  She also notes occasional palpitations  She had recent spinal pain injection with no relief  She is interested in definitive evaluation        ECG personally reviewed: NSR, sinus arrhythmia     Family History: father with hypertension; mother with DM2    Social history: denies tobacco abuse; medical marijuana due to back pain      Allergies   Allergen Reactions   • Other Hives   • Penicillins Other (See Comments)     Passed out   • Pollen Extract Sneezing and Nasal Congestion   • Shellfish-Derived Products - Food Allergy Hives   • Prednisone Hives   • Adhesive [Medical Tape] Rash   • Keflex [Cephalexin] Rash         Current Outpatient Medications:   •  aspirin-acetaminophen-caffeine (EXCEDRIN MIGRAINE) 250-250-65 MG per tablet, Take 1 tablet by mouth every 6 (six) hours as needed for headaches, Disp: , Rfl:   •  buPROPion (WELLBUTRIN XL) 150 mg 24 hr tablet, Take 1 tablet (150 mg total) by mouth daily, Disp: 90 tablet, Rfl: 0  •  cetirizine (ZyrTEC) 10 mg tablet, Take 1 tablet (10 mg total) by mouth daily, Disp: 90 tablet, Rfl: 3  •  cholecalciferol (VITAMIN D3) 1,000 units tablet, Take 1 tablet (1,000 Units total) by mouth daily, Disp: 100 tablet, Rfl: 3  •  cyclobenzaprine (FLEXERIL) 10 mg tablet, Take 1 tablet (10 mg total) by mouth 3 (three) times a day as needed for muscle spasms, Disp: 45 tablet, Rfl: 1  •  dicyclomine (BENTYL) 20 mg tablet, Take 1 tablet (20 mg total) by mouth every 6 (six) hours as needed (abdominal cramping), Disp: 120 tablet, Rfl: 3  •  docusate sodium (COLACE) 100 mg capsule, Take 100 mg by mouth 2 (two) times a day  , Disp: , Rfl:   •  fluconazole (DIFLUCAN) 150 mg tablet, TAKE ONE TABLET FOR A ONE TIME DOSE, Disp: , Rfl:   •  ibuprofen (MOTRIN) 600 mg tablet, TAKE 1 TABLET BY MOUTH THREE TIMES DAILY WITH FOOD OR MILK AS NEEDED, Disp: , Rfl:   •  Herb Fe 1 5/30 1 5-30 MG-MCG tablet, Take 1 tablet by mouth daily, Disp: , Rfl:   •  linaCLOtide (Linzess) 145 MCG CAPS, Take 1 capsule (145 mcg total) by mouth in the morning, Disp: 90 capsule, Rfl: 2  •  naltrexone (REVIA) 50 mg tablet, TAKE 1 TABLET BY MOUTH ONCE DAILY WITH WELLBUTRIN   DO NOT TAKE WITH HIGH FAT MEALS, Disp: 30 tablet, Rfl: 5  •  Norethin Ace-Eth Estrad-FE (MICROGESTIN FE 1 5/30 PO), Take by mouth, Disp: , Rfl:   •  omeprazole (PriLOSEC) 20 mg delayed release capsule, Take 1 capsule (20 mg total) by mouth 2 (two) times a day, Disp: 180 capsule, Rfl: 2  •  sucralfate (CARAFATE) 1 g tablet, Take 1 tablet (1 g total) by mouth 3 (three) times a day Crush and mix with a small amount of water, Disp: 90 tablet, Rfl: 0  •  Biotin 1000 MCG CHEW, Chew daily (Patient not taking: No sig reported), Disp: , Rfl:   •  clotrimazole-betamethasone (LOTRISONE) 1-0 05 % cream, APPLY TOPICALLY TWICE DAILY TO AFFECTED AREA(S) (Patient not taking: No sig reported), Disp: , Rfl:     Past Medical History:   Diagnosis Date   • Asthma     seasonal   • Back pain    • Cut of finger 06/2022    cut fingers on left hand   • Kidney stone    • Obesity    • Postgastrectomy malabsorption        Family History   Problem Relation Age of Onset   • Diabetes Mother    • Kidney disease Mother         end stage   • Diabetes Father    • Other Father         gangrene   • Hypertension Father    • Colon cancer Paternal Grandfather    • Lung cancer Paternal Grandfather    • Breast cancer Maternal Aunt    • Thyroid cancer Maternal Aunt    • Brain cancer Cousin    • Nephrolithiasis Family        Past Surgical History:   Procedure Laterality Date   • CHOLECYSTECTOMY     • COLONOSCOPY     • GASTRECTOMY SLEEVE LAPAROSCOPIC     • KNEE ARTHROSCOPY W/ MENISCAL REPAIR Left    • NEUROPLASTY / TRANSPOSITION MEDIAN NERVE AT CARPAL TUNNEL BILATERAL     • NM ESOPHAGOGASTRODUODENOSCOPY TRANSORAL DIAGNOSTIC N/A 4/30/2018    Procedure: ESOPHAGOGASTRODUODENOSCOPY (EGD); Surgeon: Brittney Salcedo MD;  Location: [facility];   Service: Gastroenterology       Social History     Socioeconomic History   • Marital status: /Civil Union     Spouse name: Not on file   • Number of children: Not on file   • Years of education: Not on file   • Highest education level: Not on file   Occupational History   • Not on file   Tobacco Use   • Smoking status: Never Smoker   • Smokeless tobacco: Never Used   Vaping Use   • Vaping Use: Former   • Substances: THC, CBD   Substance and Sexual Activity   • Alcohol use: Yes     Comment: only on birthday   • Drug use: Yes     Types: Marijuana     Comment: uses pill  (medical marijuania card)   • Sexual activity: Yes     Partners: Male   Other Topics Concern   • Not on file   Social History Narrative   • Not on file     Social Determinants of Health     Financial Resource Strain: Not on file   Food Insecurity: Not on file   Transportation Needs: Not on file   Physical Activity: Not on file   Stress: Stress Concern Present   • Feeling of Stress : To some extent   Social Connections: Not on file   Intimate Partner Violence: Not on file   Housing Stability: Not on file       Review of Systems   Constitutional: Negative for diaphoresis, weight gain and weight loss  HENT: Negative for congestion  Cardiovascular: Positive for chest pain and palpitations  Negative for dyspnea on exertion, irregular heartbeat, leg swelling, near-syncope, orthopnea, paroxysmal nocturnal dyspnea and syncope  Respiratory: Negative for shortness of breath, sleep disturbances due to breathing and snoring  Hematologic/Lymphatic: Does not bruise/bleed easily  Skin: Negative for rash  Musculoskeletal: Negative for myalgias  Gastrointestinal: Negative for nausea and vomiting  Neurological: Negative for excessive daytime sleepiness and light-headedness  Psychiatric/Behavioral: The patient is not nervous/anxious  Vitals: /68 (BP Location: Left arm, Patient Position: Sitting)   Pulse 70   Resp 16   Ht 5' 5" (1 651 m)   Wt 73 5 kg (162 lb)   SpO2 99%   BMI 26 96 kg/m²       Physical Exam:     GEN: Alert and oriented x 3, in no acute distress  Well appearing and well nourished  HEENT: Sclera anicteric, conjunctivae pink, mucous membranes moist  Oropharynx clear  NECK: Supple, no carotid bruits, no significant JVD  Trachea midline, no thyromegaly  HEART: Regular rhythm, normal S1 and S2, no murmurs, clicks, gallops or rubs  PMI nondisplaced, no thrills  LUNGS: Clear to auscultation bilaterally; no wheezes, rales, or rhonchi  No increased work of breathing or signs of respiratory distress  ABDOMEN: Soft, nontender, nondistended   EXTREMITIES: Skin warm and well perfused, no clubbing, cyanosis, or edema  NEURO: No focal findings  Normal speech  Mood and affect normal    SKIN: Normal without suspicious lesions on exposed skin

## 2022-10-21 ENCOUNTER — PREP FOR PROCEDURE (OUTPATIENT)
Dept: CARDIOLOGY CLINIC | Facility: CLINIC | Age: 52
End: 2022-10-21

## 2022-10-21 ENCOUNTER — TELEPHONE (OUTPATIENT)
Dept: CARDIOLOGY CLINIC | Facility: CLINIC | Age: 52
End: 2022-10-21

## 2022-10-21 DIAGNOSIS — I20.8 OTHER FORMS OF ANGINA PECTORIS (HCC): ICD-10-CM

## 2022-10-21 DIAGNOSIS — R07.89 OTHER CHEST PAIN: Primary | ICD-10-CM

## 2022-10-21 DIAGNOSIS — Z91.013 SHELLFISH ALLERGY: Primary | ICD-10-CM

## 2022-10-21 RX ORDER — DIPHENHYDRAMINE HCL 50 MG
CAPSULE ORAL
Qty: 1 CAPSULE | Refills: 0 | Status: SHIPPED | OUTPATIENT
Start: 2022-10-21

## 2022-10-21 RX ORDER — METHYLPREDNISOLONE 32 MG/1
TABLET ORAL
Qty: 2 TABLET | Refills: 0 | Status: SHIPPED | OUTPATIENT
Start: 2022-10-21

## 2022-10-21 NOTE — TELEPHONE ENCOUNTER
----- Message from Mckenna Miles MD sent at 10/20/2022  8:48 AM EDT -----  Kriss Monreal,     Please arrange for left heart cath for her     Dx: anginal equivalent/chest pain

## 2022-10-21 NOTE — TELEPHONE ENCOUNTER
Prescreening in progress  Pt aware of labs  She has a allergy to shellfish  Allergy medication sent to pharmacy, pt understood instructions  Pt has no meds hold  Information also sent to pts my chart

## 2022-10-25 ENCOUNTER — PREP FOR PROCEDURE (OUTPATIENT)
Dept: CARDIOLOGY CLINIC | Facility: CLINIC | Age: 52
End: 2022-10-25

## 2022-10-25 NOTE — PROGRESS NOTES
As per Dr Hernandez Six pt has medrol allergy  Pt has allergy to contrast dye  Pt is to take benadryl 50mg 1 hour prior to cath   Then medication will be given thru IV for contrast dye allergy

## 2022-10-28 ENCOUNTER — APPOINTMENT (OUTPATIENT)
Dept: LAB | Facility: CLINIC | Age: 52
End: 2022-10-28
Payer: COMMERCIAL

## 2022-10-28 DIAGNOSIS — R07.89 OTHER CHEST PAIN: ICD-10-CM

## 2022-10-28 DIAGNOSIS — I20.8 OTHER FORMS OF ANGINA PECTORIS (HCC): ICD-10-CM

## 2022-10-28 LAB
ANION GAP SERPL CALCULATED.3IONS-SCNC: 8 MMOL/L (ref 4–13)
BUN SERPL-MCNC: 19 MG/DL (ref 5–25)
CALCIUM SERPL-MCNC: 8.9 MG/DL (ref 8.3–10.1)
CHLORIDE SERPL-SCNC: 106 MMOL/L (ref 96–108)
CO2 SERPL-SCNC: 23 MMOL/L (ref 21–32)
CREAT SERPL-MCNC: 0.96 MG/DL (ref 0.6–1.3)
ERYTHROCYTE [DISTWIDTH] IN BLOOD BY AUTOMATED COUNT: 12.7 % (ref 11.6–15.1)
GFR SERPL CREATININE-BSD FRML MDRD: 68 ML/MIN/1.73SQ M
GLUCOSE P FAST SERPL-MCNC: 92 MG/DL (ref 65–99)
HCT VFR BLD AUTO: 40.2 % (ref 34.8–46.1)
HGB BLD-MCNC: 13.1 G/DL (ref 11.5–15.4)
INR PPP: 1.03 (ref 0.84–1.19)
MCH RBC QN AUTO: 29.3 PG (ref 26.8–34.3)
MCHC RBC AUTO-ENTMCNC: 32.6 G/DL (ref 31.4–37.4)
MCV RBC AUTO: 90 FL (ref 82–98)
PLATELET # BLD AUTO: 189 THOUSANDS/UL (ref 149–390)
PMV BLD AUTO: 10.8 FL (ref 8.9–12.7)
POTASSIUM SERPL-SCNC: 4.1 MMOL/L (ref 3.5–5.3)
PROTHROMBIN TIME: 13.8 SECONDS (ref 11.6–14.5)
RBC # BLD AUTO: 4.47 MILLION/UL (ref 3.81–5.12)
SODIUM SERPL-SCNC: 137 MMOL/L (ref 135–147)
WBC # BLD AUTO: 4.9 THOUSAND/UL (ref 4.31–10.16)

## 2022-10-28 PROCEDURE — 85610 PROTHROMBIN TIME: CPT

## 2022-10-28 PROCEDURE — 80048 BASIC METABOLIC PNL TOTAL CA: CPT

## 2022-10-28 PROCEDURE — 36415 COLL VENOUS BLD VENIPUNCTURE: CPT

## 2022-10-28 PROCEDURE — 85027 COMPLETE CBC AUTOMATED: CPT

## 2022-11-03 ENCOUNTER — HOSPITAL ENCOUNTER (OUTPATIENT)
Facility: HOSPITAL | Age: 52
Setting detail: OUTPATIENT SURGERY
Discharge: HOME | End: 2022-11-03
Attending: INTERNAL MEDICINE | Admitting: INTERNAL MEDICINE

## 2022-11-03 VITALS
HEIGHT: 65 IN | SYSTOLIC BLOOD PRESSURE: 108 MMHG | TEMPERATURE: 98.8 F | WEIGHT: 164.02 LBS | RESPIRATION RATE: 12 BRPM | OXYGEN SATURATION: 99 % | DIASTOLIC BLOOD PRESSURE: 59 MMHG | BODY MASS INDEX: 27.33 KG/M2 | HEART RATE: 82 BPM

## 2022-11-03 DIAGNOSIS — I20.8 OTHER FORMS OF ANGINA PECTORIS (HCC): ICD-10-CM

## 2022-11-03 DIAGNOSIS — R07.89 OTHER CHEST PAIN: ICD-10-CM

## 2022-11-03 LAB
ANION GAP SERPL CALCULATED.3IONS-SCNC: 9 MMOL/L (ref 4–13)
ATRIAL RATE: 69 BPM
BUN SERPL-MCNC: 19 MG/DL (ref 5–25)
CALCIUM SERPL-MCNC: 8.3 MG/DL (ref 8.3–10.1)
CHLORIDE SERPL-SCNC: 106 MMOL/L (ref 96–108)
CO2 SERPL-SCNC: 25 MMOL/L (ref 21–32)
CREAT SERPL-MCNC: 0.99 MG/DL (ref 0.6–1.3)
GFR SERPL CREATININE-BSD FRML MDRD: 66 ML/MIN/1.73SQ M
GLUCOSE P FAST SERPL-MCNC: 90 MG/DL (ref 65–99)
GLUCOSE SERPL-MCNC: 90 MG/DL (ref 65–140)
P AXIS: 46 DEGREES
POTASSIUM SERPL-SCNC: 3.8 MMOL/L (ref 3.5–5.3)
PR INTERVAL: 150 MS
QRS AXIS: -37 DEGREES
QRSD INTERVAL: 100 MS
QT INTERVAL: 388 MS
QTC INTERVAL: 415 MS
SODIUM SERPL-SCNC: 140 MMOL/L (ref 135–147)
T WAVE AXIS: 36 DEGREES
VENTRICULAR RATE: 69 BPM

## 2022-11-03 RX ORDER — SODIUM CHLORIDE 9 MG/ML
75 INJECTION, SOLUTION INTRAVENOUS CONTINUOUS
Status: DISCONTINUED | OUTPATIENT
Start: 2022-11-03 | End: 2022-11-03 | Stop reason: HOSPADM

## 2022-11-03 RX ORDER — MIDAZOLAM HYDROCHLORIDE 2 MG/2ML
INJECTION, SOLUTION INTRAMUSCULAR; INTRAVENOUS AS NEEDED
Status: DISCONTINUED | OUTPATIENT
Start: 2022-11-03 | End: 2022-11-03 | Stop reason: HOSPADM

## 2022-11-03 RX ORDER — HEPARIN SODIUM 1000 [USP'U]/ML
INJECTION, SOLUTION INTRAVENOUS; SUBCUTANEOUS AS NEEDED
Status: DISCONTINUED | OUTPATIENT
Start: 2022-11-03 | End: 2022-11-03 | Stop reason: HOSPADM

## 2022-11-03 RX ORDER — VERAPAMIL HCL 2.5 MG/ML
AMPUL (ML) INTRAVENOUS AS NEEDED
Status: DISCONTINUED | OUTPATIENT
Start: 2022-11-03 | End: 2022-11-03 | Stop reason: HOSPADM

## 2022-11-03 RX ORDER — FENTANYL CITRATE 50 UG/ML
INJECTION, SOLUTION INTRAMUSCULAR; INTRAVENOUS AS NEEDED
Status: DISCONTINUED | OUTPATIENT
Start: 2022-11-03 | End: 2022-11-03 | Stop reason: HOSPADM

## 2022-11-03 RX ORDER — LIDOCAINE HYDROCHLORIDE 10 MG/ML
INJECTION, SOLUTION EPIDURAL; INFILTRATION; INTRACAUDAL; PERINEURAL AS NEEDED
Status: DISCONTINUED | OUTPATIENT
Start: 2022-11-03 | End: 2022-11-03 | Stop reason: HOSPADM

## 2022-11-03 RX ADMIN — SODIUM CHLORIDE 75 ML/HR: 0.9 INJECTION, SOLUTION INTRAVENOUS at 07:53

## 2022-11-03 NOTE — Clinical Note
Defib pad site: anterior/posterior and left lower flank  Defib pad site: Right anterior chest Defib pad site assessment: skin integrity intact

## 2022-11-03 NOTE — INTERVAL H&P NOTE
Update: (This section must be completed if the H&P was completed greater than 24 hrs to procedure or admission)    H&P reviewed  After examining the patient, I find no changed to the H&P since it had been written  Patient re-evaluated  Accept as history and physical       I have discussed in detail with patient regarding the indications, alternatives, risks and benefit of cardiac catheterization and possible PCI  The procedure risks, benefits, and complications (including but not limited to bleeding, infection, arrhythmia, nephrotoxicity, vessel injury, myocardial infarction, CVA, and death) were reviewed  Patient is alert and oriented x3 and wishes to proceed  All questions answered         Estela Zhu/November 3, 2022/8:10 AM

## 2022-11-11 ENCOUNTER — TELEPHONE (OUTPATIENT)
Dept: CARDIOLOGY CLINIC | Facility: CLINIC | Age: 52
End: 2022-11-11

## 2022-11-11 NOTE — TELEPHONE ENCOUNTER
----- Message from [place]  Anyi Watson sent at 11/11/2022  2:37 PM EST -----  Regarding: Had my procedure last week  Hello I had my procedure last week on Thursday 11/3/2022 this is what happened while I had to keep changing the bandage everyday  Just keeping you posted I’m allergic to adhesive

## 2022-11-28 ENCOUNTER — OFFICE VISIT (OUTPATIENT)
Dept: INTERNAL MEDICINE CLINIC | Facility: CLINIC | Age: 52
End: 2022-11-28

## 2022-11-28 VITALS
BODY MASS INDEX: 26.99 KG/M2 | WEIGHT: 162 LBS | OXYGEN SATURATION: 97 % | HEIGHT: 65 IN | RESPIRATION RATE: 17 BRPM | DIASTOLIC BLOOD PRESSURE: 70 MMHG | TEMPERATURE: 97.7 F | SYSTOLIC BLOOD PRESSURE: 106 MMHG | HEART RATE: 79 BPM

## 2022-11-28 DIAGNOSIS — H65.91 OTHER NONSUPPURATIVE OTITIS MEDIA OF RIGHT EAR, UNSPECIFIED CHRONICITY: Primary | ICD-10-CM

## 2022-11-28 RX ORDER — FLUTICASONE PROPIONATE 50 MCG
1 SPRAY, SUSPENSION (ML) NASAL DAILY
COMMUNITY

## 2022-11-28 RX ORDER — AZITHROMYCIN 250 MG/1
TABLET, FILM COATED ORAL
Qty: 6 TABLET | Refills: 0 | Status: SHIPPED | OUTPATIENT
Start: 2022-11-28 | End: 2022-12-03

## 2022-11-28 NOTE — PROGRESS NOTES
Name: Melvin Larios      : 1970      MRN: [id number]  Encounter Provider: MAYCO Corrales  Encounter Date: 2022   [facility]   [address]     1  Other nonsuppurative otitis media of right ear, unspecified chronicity  Comments: Follow-up in 1 week  Orders:  -     azithromycin (Zithromax) 250 mg tablet; Take 2 tablets (500 mg total) by mouth daily for 1 day, THEN 1 tablet (250 mg total) daily for 4 days  Subjective      Earache   There is pain in both ears  This is a new problem  The current episode started in the past 7 days  The problem occurs constantly  The problem has been gradually improving  There has been no fever  Associated symptoms include headaches  Pertinent negatives include no diarrhea, ear discharge, hearing loss, rash, rhinorrhea, sore throat or vomiting  She has tried acetaminophen and NSAIDs for the symptoms  The treatment provided mild relief  Review of Systems   Constitutional: Negative  HENT: Positive for ear pain  Negative for ear discharge, hearing loss, rhinorrhea, sore throat and tinnitus  Eyes: Negative  Respiratory: Negative  Cardiovascular: Negative  Gastrointestinal: Negative  Negative for diarrhea and vomiting  Endocrine: Negative  Genitourinary: Negative  Musculoskeletal: Negative  Skin: Negative for rash  Allergic/Immunologic: Positive for environmental allergies  Neurological: Positive for headaches  Hematological: Negative  Psychiatric/Behavioral: Negative          Current Outpatient Medications on File Prior to Visit   Medication Sig   • aspirin-acetaminophen-caffeine (EXCEDRIN MIGRAINE) 250-250-65 MG per tablet Take 1 tablet by mouth every 6 (six) hours as needed for headaches   • buPROPion (WELLBUTRIN XL) 150 mg 24 hr tablet Take 1 tablet (150 mg total) by mouth daily   • cetirizine (ZyrTEC) 10 mg tablet Take 1 tablet (10 mg total) by mouth daily   • cyclobenzaprine (FLEXERIL) 10 mg tablet Take 1 tablet (10 mg total) by mouth 3 (three) times a day as needed for muscle spasms   • dicyclomine (BENTYL) 20 mg tablet Take 1 tablet (20 mg total) by mouth every 6 (six) hours as needed (abdominal cramping)   • diphenhydrAMINE (BENADRYL) 50 mg capsule Take 50 mg 1 hour prior to contrast administration   • docusate sodium (COLACE) 100 mg capsule Take 100 mg by mouth 2 (two) times a day     • fluconazole (DIFLUCAN) 150 mg tablet TAKE ONE TABLET FOR A ONE TIME DOSE   • fluticasone (FLONASE) 50 mcg/act nasal spray 1 spray into each nostril daily   • ibuprofen (MOTRIN) 600 mg tablet TAKE 1 TABLET BY MOUTH THREE TIMES DAILY WITH FOOD OR MILK AS NEEDED   • Herb Fe 1 5/30 1 5-30 MG-MCG tablet Take 1 tablet by mouth daily   • linaCLOtide (Linzess) 145 MCG CAPS Take 1 capsule (145 mcg total) by mouth in the morning   • naltrexone (REVIA) 50 mg tablet TAKE 1 TABLET BY MOUTH ONCE DAILY WITH WELLBUTRIN  DO NOT TAKE WITH HIGH FAT MEALS   • Norethin Ace-Eth Estrad-FE (MICROGESTIN FE 1 5/30 PO) Take by mouth   • omeprazole (PriLOSEC) 20 mg delayed release capsule Take 1 capsule (20 mg total) by mouth 2 (two) times a day   • sucralfate (CARAFATE) 1 g tablet Take 1 tablet (1 g total) by mouth 3 (three) times a day Crush and mix with a small amount of water   • [DISCONTINUED] methylPREDNISolone (MEDROL) 32 MG tablet Take 32 mg 12 hours prior to contrast administration then take 32 mg 2 hours prior to contrast administration      • [DISCONTINUED] Biotin 1000 MCG CHEW Chew daily (Patient not taking: Reported on 11/28/2022)   • [DISCONTINUED] cholecalciferol (VITAMIN D3) 1,000 units tablet Take 1 tablet (1,000 Units total) by mouth daily (Patient not taking: Reported on 11/28/2022)   • [DISCONTINUED] clotrimazole-betamethasone (LOTRISONE) 1-0 05 % cream APPLY TOPICALLY TWICE DAILY TO AFFECTED AREA(S) (Patient not taking: Reported on 8/29/2022)       Objective     /70 (BP Location: Left arm, Patient Position: Sitting, Cuff Size: Standard)   Pulse 79   Temp 97 7 °F (36 5 °C) (Temporal)   Resp 17   Ht 5' 5" (1 651 m)   Wt 73 5 kg (162 lb)   LMP 08/16/2021   SpO2 97%   BMI 26 96 kg/m²     Physical Exam  Vitals and nursing note reviewed  Constitutional:       Appearance: Normal appearance  HENT:      Head: Normocephalic and atraumatic  Right Ear: Hearing normal  Tympanic membrane is injected, erythematous and bulging  Left Ear: Hearing normal  Left ear erythematous TM: Congested  Nose: Congestion present  Mouth/Throat:      Pharynx: No oropharyngeal exudate or posterior oropharyngeal erythema  Eyes:      Conjunctiva/sclera: Conjunctivae normal    Cardiovascular:      Rate and Rhythm: Normal rate and regular rhythm  Pulses: Normal pulses  Heart sounds: Normal heart sounds  Pulmonary:      Effort: Pulmonary effort is normal       Breath sounds: Normal breath sounds  Abdominal:      General: Abdomen is flat  Palpations: Abdomen is soft  Musculoskeletal:         General: Normal range of motion  Cervical back: Normal range of motion and neck supple  Skin:     General: Skin is warm and dry  Neurological:      General: No focal deficit present  Mental Status: She is alert and oriented to person, place, and time     Psychiatric:         Mood and Affect: Mood normal          Behavior: Behavior normal        MAYCO Rincon

## 2022-12-05 ENCOUNTER — OFFICE VISIT (OUTPATIENT)
Dept: INTERNAL MEDICINE CLINIC | Facility: CLINIC | Age: 52
End: 2022-12-05

## 2022-12-05 VITALS
DIASTOLIC BLOOD PRESSURE: 74 MMHG | BODY MASS INDEX: 27.39 KG/M2 | WEIGHT: 164.4 LBS | SYSTOLIC BLOOD PRESSURE: 110 MMHG | HEART RATE: 78 BPM | OXYGEN SATURATION: 98 % | HEIGHT: 65 IN | RESPIRATION RATE: 17 BRPM | TEMPERATURE: 97.5 F

## 2022-12-05 DIAGNOSIS — Z00.00 HEALTHCARE MAINTENANCE: ICD-10-CM

## 2022-12-05 DIAGNOSIS — H65.91 OTHER NONSUPPURATIVE OTITIS MEDIA OF RIGHT EAR, UNSPECIFIED CHRONICITY: Primary | ICD-10-CM

## 2022-12-05 NOTE — PROGRESS NOTES
Name: Grazyna De La Cruz      : 1970      MRN: [id number]  Encounter Provider: MAYCO Ham  Encounter Date: 2022   [facility]: [facility] [address]     1  Other nonsuppurative otitis media of right ear, unspecified chronicity  Comments:  Antibiotics completed, patient denies pain or change in hearing  TM normal    2  Healthcare maintenance  Comments:  Patient to schedule appointment for annual exam -blood work order  Orders:  -     CBC and differential; Future; Expected date: 2023  -     Comprehensive metabolic panel; Future; Expected date: 2023  -     Vitamin D 25 hydroxy; Future; Expected date: 2023  -     TSH, 3rd generation; Future; Expected date: 2023  -     Lipid Panel with Direct LDL reflex; Future; Expected date: 2023        follow-up at next scheduled visit or earlier if needed  Nikky Carballo is here for follow-up for ear pain  The patient completed her antibiotics without incident  She feels well overall, her only source of discomfort is lower back pain secondary to chronic lumbar disc degeneration  Review of Systems    Current Outpatient Medications on File Prior to Visit   Medication Sig   • aspirin-acetaminophen-caffeine (EXCEDRIN MIGRAINE) 250-250-65 MG per tablet Take 1 tablet by mouth every 6 (six) hours as needed for headaches   • buPROPion (WELLBUTRIN XL) 150 mg 24 hr tablet Take 1 tablet (150 mg total) by mouth daily   • cetirizine (ZyrTEC) 10 mg tablet Take 1 tablet (10 mg total) by mouth daily   • cyclobenzaprine (FLEXERIL) 10 mg tablet Take 1 tablet (10 mg total) by mouth 3 (three) times a day as needed for muscle spasms   • dicyclomine (BENTYL) 20 mg tablet Take 1 tablet (20 mg total) by mouth every 6 (six) hours as needed (abdominal cramping)   • diphenhydrAMINE (BENADRYL) 50 mg capsule Take 50 mg 1 hour prior to contrast administration      • docusate sodium (COLACE) 100 mg capsule Take 100 mg by mouth 2 (two) times a day     • fluconazole (DIFLUCAN) 150 mg tablet TAKE ONE TABLET FOR A ONE TIME DOSE   • fluticasone (FLONASE) 50 mcg/act nasal spray 1 spray into each nostril daily   • ibuprofen (MOTRIN) 600 mg tablet TAKE 1 TABLET BY MOUTH THREE TIMES DAILY WITH FOOD OR MILK AS NEEDED   • Herb Fe 1 5/30 1 5-30 MG-MCG tablet Take 1 tablet by mouth daily   • linaCLOtide (Linzess) 145 MCG CAPS Take 1 capsule (145 mcg total) by mouth in the morning   • naltrexone (REVIA) 50 mg tablet TAKE 1 TABLET BY MOUTH ONCE DAILY WITH WELLBUTRIN   DO NOT TAKE WITH HIGH FAT MEALS   • Norethin Ace-Eth Estrad-FE (MICROGESTIN FE 1 5/30 PO) Take by mouth   • omeprazole (PriLOSEC) 20 mg delayed release capsule Take 1 capsule (20 mg total) by mouth 2 (two) times a day   • sucralfate (CARAFATE) 1 g tablet Take 1 tablet (1 g total) by mouth 3 (three) times a day Crush and mix with a small amount of water       Objective     /74 (BP Location: Left arm, Patient Position: Sitting, Cuff Size: Standard)   Pulse 78   Temp 97 5 °F (36 4 °C) (Temporal)   Resp 17   Ht 5' 5" (1 651 m)   Wt 74 6 kg (164 lb 6 4 oz)   LMP 08/16/2021   SpO2 98%   BMI 27 36 kg/m²     Physical Exam  MAYCO Rincon

## 2022-12-06 ENCOUNTER — OFFICE VISIT (OUTPATIENT)
Dept: BARIATRICS | Facility: CLINIC | Age: 52
End: 2022-12-06

## 2022-12-06 VITALS
WEIGHT: 164 LBS | RESPIRATION RATE: 16 BRPM | BODY MASS INDEX: 27.32 KG/M2 | DIASTOLIC BLOOD PRESSURE: 64 MMHG | HEART RATE: 84 BPM | HEIGHT: 65 IN | TEMPERATURE: 97.8 F | SYSTOLIC BLOOD PRESSURE: 120 MMHG

## 2022-12-06 DIAGNOSIS — R63.5 ABNORMAL WEIGHT GAIN: ICD-10-CM

## 2022-12-06 DIAGNOSIS — K59.01 SLOW TRANSIT CONSTIPATION: ICD-10-CM

## 2022-12-06 DIAGNOSIS — E66.3 OVERWEIGHT (BMI 25.0-29.9): ICD-10-CM

## 2022-12-06 DIAGNOSIS — Z98.84 S/P LAPAROSCOPIC SLEEVE GASTRECTOMY: ICD-10-CM

## 2022-12-06 DIAGNOSIS — E66.3 OVERWEIGHT: Primary | ICD-10-CM

## 2022-12-06 RX ORDER — NALTREXONE HYDROCHLORIDE 50 MG/1
TABLET, FILM COATED ORAL
Qty: 90 TABLET | Refills: 1 | Status: SHIPPED | OUTPATIENT
Start: 2022-12-06 | End: 2022-12-08 | Stop reason: SDUPTHER

## 2022-12-06 RX ORDER — BUPROPION HYDROCHLORIDE 150 MG/1
150 TABLET, EXTENDED RELEASE ORAL 2 TIMES DAILY
Qty: 180 TABLET | Refills: 1 | Status: SHIPPED | OUTPATIENT
Start: 2022-12-06

## 2022-12-06 NOTE — PROGRESS NOTES
Assessment/Plan:  David Wakefield was seen today for follow-up  Diagnoses and all orders for this visit:  1  Overweight  buPROPion (Wellbutrin SR) 150 mg 12 hr tablet      2  S/P laparoscopic sleeve gastrectomy        3  Abnormal weight gain  naltrexone (REVIA) 50 mg tablet    buPROPion (Wellbutrin SR) 150 mg 12 hr tablet      4  Overweight (BMI 25 0-29 9)  naltrexone (REVIA) 50 mg tablet      5  Slow transit constipation          Abnormal weight gain  Weight gain after surgical therapy for obesity  Good weight loss- Continue Wellbutrin-Naltrexone return in 3 mo  Topamax not indicated due to recent nephrolithiasis hx  GLP-1 not covered   Continue to split protein shake in 2 servings since one has 60g     Goals:  Goal protein intake 90g per day  <1200 calories    Lab Results   Component Value Date    HGBA1C 5 2 02/25/2021     S/P laparoscopic sleeve gastrectomy  Continue vitamins  Recheck labs in 10/2022  She does not take vit B12 last level 506 good  Vitamin D def:  Last level 33 restart vitamin D goal 2000 units daily  Constipation; IBS-C  On omeprazole 20mg BID  Cont  vitamin D 2000 units daily and calcium 500mg daily -Hx of kidney stones  continue Linzess and hydration  improved to 3 stools per week  Add fiber to diet  Follow up in approximately 3 months     Subjective:   Chief Complaint   Patient presents with   • Follow-up     Pt preesnts needs refills       Patient ID: Alannah Lopez  is a 47 y o  female with excess weight/obesity here to pursue weight management    Patient is pursuing Conservative Program      HPI  -Initial weight loss goal of 5-10% weight loss for improved health  Wt Readings from Last 10 Encounters:   12/06/22 74 4 kg (164 lb)   12/05/22 74 6 kg (164 lb 6 4 oz)   11/28/22 73 5 kg (162 lb)   11/03/22 74 4 kg (164 lb 0 4 oz)   10/20/22 73 5 kg (162 lb)   08/29/22 75 8 kg (167 lb)   08/23/22 74 4 kg (164 lb)   08/23/22 77 1 kg (170 lb)   08/17/22 74 8 kg (164 lb 12 8 oz)   08/17/22 74 8 kg (164 lb 12 8 oz)     Initial weight MWM: 188 lbs (6/16/21)  Last OV weight: 167 lbs  Current weight 164lbs  Change in weight: - 3lb since last visit  Goal:  S/p LSG in 2105 with Dr Laurie Cisneros at [facility]  Initial 215 lbs  Judson 135 lbs  Weight regain: +65 lbs from medical marijuana use and stress   Has chronic back pain/sciatica   Continues to take the medical marijuana pill at night to help her sleep and not daily use just as needed  Uses Ibuprofen  Started wellbutrin in 6/2021, naltrexone added 10/2021 takes both in evening time  Doing well on weight loss  Continues to help with appetite suppression  Intermittent nausea in the morning but no vomiting, dry mouth  Thinks she is doing under 1200 tyler   Good changes started morning with protein shake  B- coffee protein shake 30g  S-   L- salad leftover 11-12:30am chicken 6 oz and chickpeas  S-  D- chicken sandwich about 4 oz   S-   Drinks- 4-5 bottles water   Alcohol- no   Exercise- walking at least 6500 steps       The following portions of the patient's history were reviewed and updated as appropriate: allergies, current medications, past family history, past medical history, past social history, past surgical history, and problem list   Review of Systems   Constitutional: Negative for activity change  Fatigue  HENT: Negative for trouble swallowing  Respiratory: Negative for shortness of breath  Cardiovascular: Negative for chest pain, edema  Gastrointestinal: Negative for abdominal pain, nausea and vomiting, acid reflux, constipation/diarrhea  Psychiatric/Behavioral: Negative for behavioral problems , anxiety or depression    Objective:  /64   Pulse 84   Temp 97 8 °F (36 6 °C)   Resp 16   Ht 5' 5" (1 651 m)   Wt 74 4 kg (164 lb)   LMP 08/16/2021   BMI 27 29 kg/m²   Constitutional: Well-developed, well-nourished and Overweight Body mass index is 27 29 kg/m²  [place] Ureña HEENT: No conjunctival injection  No thyroid masses  Pulmonary: No increased work of breathing or signs of respiratory distress  Clear respiratory sounds  CV: Well-perfused, Regular rate and rhythm, no murmurs, no edema  GI: increased abdominal girth  Non-distended  Not tender   Endo: no ophthalmopathy, no dermopathy, truncal obesity  MSK: no sarcopenia  Neuro: Oriented to person, place and time  Normal Speech  Normal gait  Psych: Normal affect and mood   No delusion or hallucinations, normal thought process

## 2022-12-07 ENCOUNTER — NURSE TRIAGE (OUTPATIENT)
Dept: OTHER | Facility: OTHER | Age: 52
End: 2022-12-07

## 2022-12-07 DIAGNOSIS — E66.3 OVERWEIGHT (BMI 25.0-29.9): ICD-10-CM

## 2022-12-07 DIAGNOSIS — R63.5 ABNORMAL WEIGHT GAIN: ICD-10-CM

## 2022-12-07 NOTE — TELEPHONE ENCOUNTER
Regarding: Need clarification on medication dosage  ----- Message from Vira Bear sent at 12/7/2022  4:44 PM EST -----  '' I at the pharmacy trying to  my medication naltrexone (REVIA) 50 mg need clarification on the dosage of the medication naltrexone (REVIA) 50 mg ''

## 2022-12-07 NOTE — TELEPHONE ENCOUNTER
Triage RN was able to review dose administration for naltrexone with patient to take one tablet daily with Wellbutrin; take Wellbutrin every 12 hours  Patient verbalized understanding  Provider ordered quantity 90 tablets and patient reports insurer covered 30 tablets  Please follow up with patient for difference in quantity  Reason for Disposition  • Caller has NON-URGENT medicine question about med that PCP or specialist prescribed and triager unable to answer question    Answer Assessment - Initial Assessment Questions  1  NAME of MEDICATION: "What medicine are you calling about?"      Naltrexone (REVIA) 50 mg  2  QUESTION: "What is your question?" (e g , medication refill, side effect)      Review administration dose of medication  3  PRESCRIBING HCP: "Who prescribed it?" Reason: if prescribed by specialist, call should be referred to that group        Danny Lozada MD    Protocols used: MEDICATION QUESTION CALL-ADULT-[place]

## 2022-12-08 RX ORDER — NALTREXONE HYDROCHLORIDE 50 MG/1
TABLET, FILM COATED ORAL
Qty: 30 TABLET | Refills: 2 | Status: SHIPPED | OUTPATIENT
Start: 2022-12-08

## 2022-12-09 ENCOUNTER — OFFICE VISIT (OUTPATIENT)
Dept: CARDIOLOGY CLINIC | Facility: CLINIC | Age: 52
End: 2022-12-09

## 2022-12-09 VITALS
HEIGHT: 65 IN | WEIGHT: 163 LBS | DIASTOLIC BLOOD PRESSURE: 68 MMHG | HEART RATE: 83 BPM | BODY MASS INDEX: 27.16 KG/M2 | RESPIRATION RATE: 16 BRPM | SYSTOLIC BLOOD PRESSURE: 108 MMHG | OXYGEN SATURATION: 99 %

## 2022-12-09 DIAGNOSIS — I25.10 CORONARY ARTERY DISEASE INVOLVING NATIVE CORONARY ARTERY OF NATIVE HEART WITHOUT ANGINA PECTORIS: Primary | ICD-10-CM

## 2022-12-09 RX ORDER — ASPIRIN 81 MG/1
81 TABLET ORAL DAILY
Qty: 90 TABLET | Refills: 3 | Status: SHIPPED | OUTPATIENT
Start: 2022-12-09

## 2022-12-09 NOTE — PROGRESS NOTES
[address] [facility]   Office Consultation    Felisha Urrutia 47 y o  female MRN: [id number]    12/09/22          Assessment:  1  Mild nonobstructive CAD  2  Chronic neck and back pain  3  S/p laparoscopic sleeve gastrectomy     Plan:  · Cardiac catheterization revealed mild nonobstructive CAD with mild bridging  Will start low-dose aspirin  She was advised not to it with Excedrin  · She follows closely with pain management for chronic neck and back pain  Follow up: 6 months or sooner as needed    1  Coronary artery disease involving native coronary artery of native heart without angina pectoris  aspirin (ECOTRIN LOW STRENGTH) 81 mg EC tablet          HPI: Felisha Urrutia is a 47y o  year old female who presents for routine follow up  Past cardiac evaluation:   · ETT 8/2022: no evidence of ischemia however there was significant artifact  · TTE 8/2022: EF: 60%    On her prior evaluation she noted left-sided chest pain  She was evaluated with a cardiac catheterization revealed mild nonobstructive CAD with mild myocardial bridging  Her symptoms predominantly occur at rest and likely radiate from her neck  She denies anginal symptoms or equivalents  She follows closely with pain management        Family History: father with hypertension; mother with DM2    Social history: denies tobacco abuse; medical marijuana due to back pain      Allergies   Allergen Reactions   • Other Hives   • Penicillins Other (See Comments)     Passed out   • Pollen Extract Sneezing and Nasal Congestion   • Shellfish-Derived Products - Food Allergy Hives   • Prednisone Hives   • Adhesive [Medical Tape] Rash   • Keflex [Cephalexin] Rash         Current Outpatient Medications:   •  aspirin (ECOTRIN LOW STRENGTH) 81 mg EC tablet, Take 1 tablet (81 mg total) by mouth daily, Disp: 90 tablet, Rfl: 3  •  aspirin-acetaminophen-caffeine (EXCEDRIN MIGRAINE) 250-250-65 MG per tablet, Take 1 tablet by mouth every 6 (six) hours as needed for headaches, Disp: , Rfl:   •  buPROPion (Wellbutrin SR) 150 mg 12 hr tablet, Take 1 tablet (150 mg total) by mouth 2 (two) times a day, Disp: 180 tablet, Rfl: 1  •  cyclobenzaprine (FLEXERIL) 10 mg tablet, Take 1 tablet (10 mg total) by mouth 3 (three) times a day as needed for muscle spasms, Disp: 45 tablet, Rfl: 1  •  dicyclomine (BENTYL) 20 mg tablet, Take 1 tablet (20 mg total) by mouth every 6 (six) hours as needed (abdominal cramping), Disp: 120 tablet, Rfl: 3  •  docusate sodium (COLACE) 100 mg capsule, Take 100 mg by mouth 2 (two) times a day  , Disp: , Rfl:   •  fluconazole (DIFLUCAN) 150 mg tablet, TAKE ONE TABLET FOR A ONE TIME DOSE, Disp: , Rfl:   •  fluticasone (FLONASE) 50 mcg/act nasal spray, 1 spray into each nostril daily, Disp: , Rfl:   •  ibuprofen (MOTRIN) 600 mg tablet, TAKE 1 TABLET BY MOUTH THREE TIMES DAILY WITH FOOD OR MILK AS NEEDED, Disp: , Rfl:   •  Herb Fe 1 5/30 1 5-30 MG-MCG tablet, Take 1 tablet by mouth daily, Disp: , Rfl:   •  linaCLOtide (Linzess) 145 MCG CAPS, Take 1 capsule (145 mcg total) by mouth in the morning, Disp: 90 capsule, Rfl: 2  •  naltrexone (REVIA) 50 mg tablet, TAKE 1 TABLET BY MOUTH ONCE DAILY WITH WELLBUTRIN   DO NOT TAKE WITH HIGH FAT MEALS, Disp: 30 tablet, Rfl: 2  •  omeprazole (PriLOSEC) 20 mg delayed release capsule, Take 1 capsule (20 mg total) by mouth 2 (two) times a day, Disp: 180 capsule, Rfl: 2  •  sucralfate (CARAFATE) 1 g tablet, Take 1 tablet (1 g total) by mouth 3 (three) times a day Crush and mix with a small amount of water, Disp: 90 tablet, Rfl: 0  •  Norethin Ace-Eth Estrad-FE (MICROGESTIN FE 1 5/30 PO), Take by mouth, Disp: , Rfl:     Past Medical History:   Diagnosis Date   • Asthma     seasonal   • Back pain    • Cut of finger 06/2022    cut fingers on left hand   • Kidney stone    • Obesity    • Postgastrectomy malabsorption        Family History   Problem Relation Age of Onset   • Diabetes Mother    • Kidney disease Mother         end stage • Diabetes Father    • Other Father         gangrene   • Hypertension Father    • Colon cancer Paternal Grandfather    • Lung cancer Paternal Grandfather    • Breast cancer Maternal Aunt    • Thyroid cancer Maternal Aunt    • Brain cancer Cousin    • Nephrolithiasis Family        Past Surgical History:   Procedure Laterality Date   • CARDIAC CATHETERIZATION Left 11/3/2022    Procedure: Cardiac Left Heart Cath;  Surgeon: Noelle Loera MD;  Location: [facility]; Service: Cardiology   • CARDIAC CATHETERIZATION N/A 11/3/2022    Procedure: Cardiac Coronary Angiogram;  Surgeon: Noelle Loera MD;  Location: [address] [place] [address] CATH LAB; Service: Cardiology   • CHOLECYSTECTOMY     • COLONOSCOPY     • GASTRECTOMY SLEEVE LAPAROSCOPIC     • KNEE ARTHROSCOPY W/ MENISCAL REPAIR Right    • NEUROPLASTY / TRANSPOSITION MEDIAN NERVE AT CARPAL TUNNEL BILATERAL     • NH ESOPHAGOGASTRODUODENOSCOPY TRANSORAL DIAGNOSTIC N/A 04/30/2018    Procedure: ESOPHAGOGASTRODUODENOSCOPY (EGD); Surgeon: Manley Babinski, MD;  Location: [facility];   Service: Gastroenterology       Social History     Socioeconomic History   • Marital status: /Civil Union     Spouse name: Not on file   • Number of children: Not on file   • Years of education: Not on file   • Highest education level: Not on file   Occupational History   • Not on file   Tobacco Use   • Smoking status: Never   • Smokeless tobacco: Never   Vaping Use   • Vaping Use: Former   • Substances: THC, CBD   Substance and Sexual Activity   • Alcohol use: Yes     Comment: only on birthday   • Drug use: Yes     Types: Marijuana     Comment: uses pill  (medical marijuania card)   • Sexual activity: Yes     Partners: Male   Other Topics Concern   • Not on file   Social History Narrative   • Not on file     Social Determinants of Health     Financial Resource Strain: Not on file   Food Insecurity: Not on file   Transportation Needs: Not on file   Physical Activity: Not on file Stress: Stress Concern Present   • Feeling of Stress : To some extent   Social Connections: Not on file   Intimate Partner Violence: Not on file   Housing Stability: Not on file       Review of Systems   Constitutional: Negative for diaphoresis, weight gain and weight loss  HENT: Negative for congestion  Cardiovascular: Positive for chest pain  Negative for dyspnea on exertion, irregular heartbeat, leg swelling, near-syncope, orthopnea, palpitations, paroxysmal nocturnal dyspnea and syncope  Respiratory: Negative for shortness of breath, sleep disturbances due to breathing and snoring  Hematologic/Lymphatic: Does not bruise/bleed easily  Skin: Negative for rash  Musculoskeletal: Positive for myalgias  Gastrointestinal: Negative for nausea and vomiting  Neurological: Negative for excessive daytime sleepiness and light-headedness  Psychiatric/Behavioral: The patient is not nervous/anxious  Vitals: /68 (BP Location: Left arm, Patient Position: Sitting)   Pulse 83   Resp 16   Ht 5' 5" (1 651 m)   Wt 73 9 kg (163 lb)   LMP 08/16/2021   SpO2 99%   BMI 27 12 kg/m²       Physical Exam:     GEN: Alert and oriented x 3, in no acute distress  Well appearing and well nourished  HEENT: Sclera anicteric, conjunctivae pink, mucous membranes moist  Oropharynx clear  NECK: Supple, no carotid bruits, no significant JVD  Trachea midline, no thyromegaly  HEART: Regular rhythm, normal S1 and S2, no murmurs, clicks, gallops or rubs  PMI nondisplaced, no thrills  LUNGS: Clear to auscultation bilaterally; no wheezes, rales, or rhonchi  No increased work of breathing or signs of respiratory distress  ABDOMEN: Soft, nontender, nondistended, normoactive bowel sounds  EXTREMITIES: Skin warm and well perfused, no clubbing, cyanosis, or edema  NEURO: No focal findings  Normal speech  Mood and affect normal    SKIN: Normal without suspicious lesions on exposed skin

## 2022-12-12 ENCOUNTER — OFFICE VISIT (OUTPATIENT)
Dept: GASTROENTEROLOGY | Facility: CLINIC | Age: 52
End: 2022-12-12

## 2022-12-12 VITALS
SYSTOLIC BLOOD PRESSURE: 122 MMHG | DIASTOLIC BLOOD PRESSURE: 78 MMHG | BODY MASS INDEX: 26.92 KG/M2 | WEIGHT: 161.6 LBS | HEIGHT: 65 IN | HEART RATE: 80 BPM

## 2022-12-12 DIAGNOSIS — K21.00 GASTROESOPHAGEAL REFLUX DISEASE WITH ESOPHAGITIS, UNSPECIFIED WHETHER HEMORRHAGE: Primary | ICD-10-CM

## 2022-12-12 RX ORDER — FAMOTIDINE 40 MG/1
40 TABLET, FILM COATED ORAL 2 TIMES DAILY PRN
Qty: 60 TABLET | Refills: 3 | Status: SHIPPED | OUTPATIENT
Start: 2022-12-12

## 2022-12-12 NOTE — PROGRESS NOTES
[facility]  Gary Pitts 47 y o  female MRN: [id number]       CC: Follow-up    HPI: Last Sharpe is a pleasant 64-year-old female with history of irritable bowel syndrome, constipation predominant, lumbar radiculopathy, and is status post gastric sleeve  Patient is here for follow-up as I last saw her in August formedication review and for chest pain  Patient reported increased stress at the time secondary to her son being in a car accident  Fortunately, he is doing well currently  She had stress test and cardiac catheterization with Dr Adriano Coelho  She was recommended to take a baby aspirin  Otherwise, patient reports that she is doing well overall  She is no longer having chest pain, pyrosis or regurgitation  She has been taking omeprazole once daily in the morning  With regard to her constipation, she has been taking Linzess on an as-needed basis, which works well for her  She is having regular bowel movements  She had normal colonoscopy at age 49  She had EGD performed by bariatric surgery in 2021 revealing mild gastritis  Review of Systems:    CONSTITUTIONAL: Denies any fever, chills, or rigors  Good appetite, and no recent weight loss  HEENT: No earache or tinnitus  Denies hearing loss or visual disturbances  CARDIOVASCULAR: No chest pain or palpitations  RESPIRATORY: Denies any cough, hemoptysis, shortness of breath or dyspnea on exertion  GASTROINTESTINAL: As noted in the History of Present Illness  GENITOURINARY: No problems with urination  Denies any hematuria or dysuria  NEUROLOGIC: No dizziness or vertigo, denies headaches  MUSCULOSKELETAL: Denies any muscle or joint pain  SKIN: Denies skin rashes or itching  ENDOCRINE: Denies excessive thirst  Denies intolerance to heat or cold  PSYCHOSOCIAL: Denies depression or anxiety  Denies any recent memory loss         Current Outpatient Medications   Medication Sig Dispense Refill   • aspirin (ECOTRIN LOW STRENGTH) 81 mg EC tablet Take 1 tablet (81 mg total) by mouth daily 90 tablet 3   • aspirin-acetaminophen-caffeine (EXCEDRIN MIGRAINE) 250-250-65 MG per tablet Take 1 tablet by mouth every 6 (six) hours as needed for headaches     • buPROPion (Wellbutrin SR) 150 mg 12 hr tablet Take 1 tablet (150 mg total) by mouth 2 (two) times a day 180 tablet 1   • cyclobenzaprine (FLEXERIL) 10 mg tablet Take 1 tablet (10 mg total) by mouth 3 (three) times a day as needed for muscle spasms 45 tablet 1   • dicyclomine (BENTYL) 20 mg tablet Take 1 tablet (20 mg total) by mouth every 6 (six) hours as needed (abdominal cramping) 120 tablet 3   • docusate sodium (COLACE) 100 mg capsule Take 100 mg by mouth 2 (two) times a day       • famotidine (PEPCID) 40 MG tablet Take 1 tablet (40 mg total) by mouth 2 (two) times a day as needed for heartburn 60 tablet 3   • fluticasone (FLONASE) 50 mcg/act nasal spray 1 spray into each nostril daily     • ibuprofen (MOTRIN) 600 mg tablet TAKE 1 TABLET BY MOUTH THREE TIMES DAILY WITH FOOD OR MILK AS NEEDED     • Herb Fe 1 5/30 1 5-30 MG-MCG tablet Take 1 tablet by mouth daily     • naltrexone (REVIA) 50 mg tablet TAKE 1 TABLET BY MOUTH ONCE DAILY WITH WELLBUTRIN  DO NOT TAKE WITH HIGH FAT MEALS 30 tablet 2   • Norethin Ace-Eth Estrad-FE (MICROGESTIN FE 1 5/30 PO) Take by mouth     • fluconazole (DIFLUCAN) 150 mg tablet TAKE ONE TABLET FOR A ONE TIME DOSE (Patient not taking: Reported on 12/12/2022)     • linaCLOtide (Linzess) 145 MCG CAPS Take 1 capsule (145 mcg total) by mouth in the morning 90 capsule 2   • omeprazole (PriLOSEC) 20 mg delayed release capsule Take 1 capsule (20 mg total) by mouth 2 (two) times a day 180 capsule 2   • sucralfate (CARAFATE) 1 g tablet Take 1 tablet (1 g total) by mouth 3 (three) times a day Crush and mix with a small amount of water 90 tablet 0     No current facility-administered medications for this visit       Past Medical History:   Diagnosis Date   • Asthma     seasonal   • Back pain    • Cut of finger 06/2022    cut fingers on left hand   • Kidney stone    • Obesity    • Postgastrectomy malabsorption      Past Surgical History:   Procedure Laterality Date   • CARDIAC CATHETERIZATION Left 11/3/2022    Procedure: Cardiac Left Heart Cath;  Surgeon: Miguel Ángel Joe MD;  Location: [facility]; Service: Cardiology   • CARDIAC CATHETERIZATION N/A 11/3/2022    Procedure: Cardiac Coronary Angiogram;  Surgeon: Miguel Ángel Joe MD;  Location: [address] [place] [address] CATH LAB; Service: Cardiology   • CHOLECYSTECTOMY     • COLONOSCOPY     • GASTRECTOMY SLEEVE LAPAROSCOPIC     • KNEE ARTHROSCOPY W/ MENISCAL REPAIR Right    • NEUROPLASTY / TRANSPOSITION MEDIAN NERVE AT CARPAL TUNNEL BILATERAL     • MS ESOPHAGOGASTRODUODENOSCOPY TRANSORAL DIAGNOSTIC N/A 04/30/2018    Procedure: ESOPHAGOGASTRODUODENOSCOPY (EGD); Surgeon: Emily Wise MD;  Location: [facility]; Service: Gastroenterology     Social History     Socioeconomic History   • Marital status: /Civil Union     Spouse name: None   • Number of children: None   • Years of education: None   • Highest education level: None   Occupational History   • None   Tobacco Use   • Smoking status: Never   • Smokeless tobacco: Never   Vaping Use   • Vaping Use: Some days   • Substances: THC, CBD   Substance and Sexual Activity   • Alcohol use: Yes     Comment: only on birthday   • Drug use: Yes     Types: Marijuana     Comment: uses pill  (medical marijuania card)   • Sexual activity: Yes     Partners: Male   Other Topics Concern   • None   Social History Narrative   • None     Social Determinants of Health     Financial Resource Strain: Not on file   Food Insecurity: Not on file   Transportation Needs: Not on file   Physical Activity: Not on file   Stress: Stress Concern Present   • Feeling of Stress :  To some extent   Social Connections: Not on file   Intimate Partner Violence: Not on file   Housing Stability: Not on file     Family History Problem Relation Age of Onset   • Diabetes Mother    • Kidney disease Mother         end stage   • Diabetes Father    • Other Father         gangrene   • Hypertension Father    • Colon cancer Paternal Grandfather    • Lung cancer Paternal Grandfather    • Breast cancer Maternal Aunt    • Thyroid cancer Maternal Aunt    • Brain cancer Cousin    • Nephrolithiasis Family             PHYSICAL EXAM:    Vitals:    12/12/22 0911   BP: 122/78   Pulse: 80   Weight: 73 3 kg (161 lb 9 6 oz)   Height: 5' 5" (1 651 m)     General Appearance:   Alert and oriented x 3  Cooperative, and in no respiratory distress   HEENT:   Normocephalic, atraumatic, anicteric      Neck:  Supple, symmetrical, trachea midline   Lungs:   Clear to auscultation bilaterally    Heart[de-identified]   Regular rate and rhythm   Abdomen:   Soft, non-tender, non-distended; normal bowel sounds; no masses, no organomegaly    Genitalia:   Deferred    Rectal:   Deferred    Extremities:  No cyanosis, clubbing or edema    Pulses:  2+ and symmetric all extremities    Skin:  Skin color, texture, turgor normal, no rashes or lesions    Lymph nodes:  No palpable cervical or supraclavicular lymphadenopathy        Lab Results:   Results from last 6 Months   Lab Units 10/28/22  0726   WBC Thousand/uL 4 90   HEMOGLOBIN g/dL 13 1   HEMATOCRIT % 40 2   PLATELETS Thousands/uL 189     Results from last 6 Months   Lab Units 11/03/22  0749   POTASSIUM mmol/L 3 8   CHLORIDE mmol/L 106   CO2 mmol/L 25   BUN mg/dL 19   CREATININE mg/dL 0 99   CALCIUM mg/dL 8 3     Results from last 6 Months   Lab Units 10/28/22  0726   INR  1 03           Imaging Studies: I have personally reviewed pertinent imaging studies  No results found  ASSESSMENT and PLAN:      1) GERD, chest pain - Fortunately, patient had cardiac work-up and was recommended to take a baby aspirin for mild nonobstructive CAD seen on cardiac catheterization    - At this point, we will wean down her resolved    I had written down instructions for her to take omeprazole every other day for 2 weeks while taking famotidine every night  After 2 weeks, she will discontinue omeprazole and take Pepcid twice a day for 2 weeks  Then, she will take famotidine on an as-needed basis  Follow up in March

## 2022-12-23 NOTE — TELEPHONE ENCOUNTER
----- Message from Anna Burr MD sent at 12/17/2018 10:15 AM EST -----  Slightly elevated rheumatoid factor not a significant level    No signs of lupus
CALLED PT  [email or web address]
FORWARDING MSG  TO DR Shell Gonzalez ADVISE
Neurosurgery
Patient called back to check on the details of that lab that hasn't come back yet  Lab still not back  She made an appointment with Steve- since she was the original provider who she saw and ordered labs, and Katie Garcia didn't have anything till next Thursday  If Dr Katie Garcia wants to see her, Please contact patient  Patient ok with seeing Steve 
Please advise 
That indicates muscle inflammation  I am waiting for 1 more to come back    Have her schedule a revisit toward the end of the week so we can go over everything together
There is nothing here at all critical   It can wait till next week 
WENT ON PORTAL AND WOULD LIKE TO DIS[facility]OD [facility]URSE    Augustina Bunn
Hospitalist
Neurosurgery

## 2022-12-27 ENCOUNTER — TELEPHONE (OUTPATIENT)
Dept: GASTROENTEROLOGY | Facility: CLINIC | Age: 52
End: 2022-12-27

## 2022-12-27 DIAGNOSIS — R11.2 NAUSEA AND VOMITING, UNSPECIFIED VOMITING TYPE: Primary | ICD-10-CM

## 2022-12-27 RX ORDER — ONDANSETRON 4 MG/1
4 TABLET, FILM COATED ORAL EVERY 8 HOURS PRN
Qty: 20 TABLET | Refills: 0 | Status: SHIPPED | OUTPATIENT
Start: 2022-12-27

## 2022-12-27 NOTE — TELEPHONE ENCOUNTER
Patients GI provider:  JAIME Pimentel     Number to return call: [phone]    Reason for call: Pt called in stating that she was experiencing pain and discomfort and would like to know if she could be seen today or sometime this week      Scheduled procedure/appointment date if applicable: 1/0/6745

## 2022-12-27 NOTE — TELEPHONE ENCOUNTER
Spoke with patient  History of GERD    Patient c/o mid epigastric pain, upper abdominal fullness, nausea x 3 days  Constipation x 2 days  Stopped omeprazole 1 day go (finished QOD x 2 weeks)  Patient feels symptoms worsened after stopping PPI  Taking Famotidine 40mg at HS  Patient will restart omeprazole 20mg daily, continue famotidine 40mg at HS  Reviewed antireflux diet/measures, 64oz of fluids a day, and mylanta PRN  Patient will start miralax PRN for constipation  Follow-up OV is scheduled

## 2023-01-06 DIAGNOSIS — R63.5 ABNORMAL WEIGHT GAIN: ICD-10-CM

## 2023-01-06 DIAGNOSIS — E66.3 OVERWEIGHT (BMI 25.0-29.9): ICD-10-CM

## 2023-01-06 RX ORDER — NALTREXONE HYDROCHLORIDE 50 MG/1
TABLET, FILM COATED ORAL
Qty: 30 TABLET | Refills: 1 | Status: SHIPPED | OUTPATIENT
Start: 2023-01-06 | End: 2023-01-09 | Stop reason: SDUPTHER

## 2023-01-06 NOTE — TELEPHONE ENCOUNTER
Patient called and left message that her prescription for Naltrexone was cancelled with pharmacy  I contacted [facility] DR MICHELINE ARAYA they did receive prescription in December for this medication with 2 refills, but then the next day prescription was electronically cancelled  If patient is to continue this medication please submit new prescription

## 2023-01-09 RX ORDER — NALTREXONE HYDROCHLORIDE 50 MG/1
25 TABLET, FILM COATED ORAL 2 TIMES DAILY
Qty: 30 TABLET | Refills: 1
Start: 2023-01-09

## 2023-01-09 NOTE — TELEPHONE ENCOUNTER
Clarified with patient that the Naltrexone should be half tab twice a day together with her Wellbutrin   She understood

## 2023-01-18 ENCOUNTER — APPOINTMENT (OUTPATIENT)
Dept: RADIOLOGY | Facility: CLINIC | Age: 53
End: 2023-01-18

## 2023-01-18 DIAGNOSIS — M17.11 OSTEOARTHRITIS OF RIGHT KNEE, UNSPECIFIED OSTEOARTHRITIS TYPE: ICD-10-CM

## 2023-02-23 ENCOUNTER — VBI (OUTPATIENT)
Dept: ADMINISTRATIVE | Facility: OTHER | Age: 53
End: 2023-02-23

## 2023-02-27 DIAGNOSIS — E66.3 OVERWEIGHT (BMI 25.0-29.9): ICD-10-CM

## 2023-02-27 DIAGNOSIS — R63.5 ABNORMAL WEIGHT GAIN: ICD-10-CM

## 2023-02-27 RX ORDER — NALTREXONE HYDROCHLORIDE 50 MG/1
TABLET, FILM COATED ORAL
Qty: 30 TABLET | Refills: 0 | Status: SHIPPED | OUTPATIENT
Start: 2023-02-27

## 2023-03-07 ENCOUNTER — OFFICE VISIT (OUTPATIENT)
Dept: BARIATRICS | Facility: CLINIC | Age: 53
End: 2023-03-07

## 2023-03-07 VITALS
TEMPERATURE: 97.6 F | HEART RATE: 78 BPM | WEIGHT: 157 LBS | RESPIRATION RATE: 16 BRPM | BODY MASS INDEX: 26.16 KG/M2 | HEIGHT: 65 IN | DIASTOLIC BLOOD PRESSURE: 70 MMHG | SYSTOLIC BLOOD PRESSURE: 110 MMHG

## 2023-03-07 DIAGNOSIS — E66.3 OVERWEIGHT: ICD-10-CM

## 2023-03-07 DIAGNOSIS — Z98.84 S/P LAPAROSCOPIC SLEEVE GASTRECTOMY: Primary | ICD-10-CM

## 2023-03-07 DIAGNOSIS — K21.00 GASTROESOPHAGEAL REFLUX DISEASE WITH ESOPHAGITIS WITHOUT HEMORRHAGE: ICD-10-CM

## 2023-03-07 DIAGNOSIS — K59.01 CONSTIPATION BY DELAYED COLONIC TRANSIT: ICD-10-CM

## 2023-03-07 NOTE — PROGRESS NOTES
Assessment/Plan:  Beni Deleon was seen today for follow-up  Diagnoses and all orders for this visit:  1  S/P laparoscopic sleeve gastrectomy    - Zinc; Future  - Copper Level; Future  - Iron, TIBC and Ferritin Panel; Future  - Vitamin A; Future  - Vitamin B1, whole blood; Future  - Folate; Future  - PTH, intact; Future  - Vitamin B12; Future    2  Gastroesophageal reflux disease with esophagitis without hemorrhage  Cont PPI    3  Overweight  Good weight loss- Continue Wellbutrin-Naltrexone return in 3 mo  Topamax not indicated due to recent nephrolithiasis hx  GLP-1 not covered  Goal protein intake 70g per day  <1200 calories  Meet dietician for metabolic testing  4  Constipation  Decrease protein goal to 70 g and increase plant base nutrition  continue Linzess and hydration  improved to 3 stools per week  Add fiber to diet  Follow up in approximately 3 months     Subjective:   Chief Complaint   Patient presents with   • Follow-up     Pt presents c/o constipation  Patient ID: Miguel Malhotra  is a 47 y o  female with excess weight/obesity here to pursue weight management    Patient is pursuing Conservative Program      HPI  -Initial weight loss goal of 5-10% weight loss for improved health  Wt Readings from Last 10 Encounters:   03/07/23 71 2 kg (157 lb)   12/12/22 73 3 kg (161 lb 9 6 oz)   12/09/22 73 9 kg (163 lb)   12/06/22 74 4 kg (164 lb)   12/05/22 74 6 kg (164 lb 6 4 oz)   11/28/22 73 5 kg (162 lb)   11/03/22 74 4 kg (164 lb 0 4 oz)   10/20/22 73 5 kg (162 lb)   08/29/22 75 8 kg (167 lb)   08/23/22 74 4 kg (164 lb)   S/p LSG in 2105 with Dr Chelle Hill at [facility]  Initial 215 lbs  Judson 135 lbs  Weight regain: +65 lbs from medical marijuana use and stress   Has chronic back pain/sciatica     Initial weight MWM: 188 lbs (6/16/21)  Last OV weight: 164 lbs   Current weight 157lbs  Change in weight: - 7lbs since last visit    Started wellbutrin in 6/2021, naltrexone added 10/2021 takes both in evening time  Doing well on weight loss  Continues to help with appetite suppression  Intermittent nausea in the morning but no vomiting, dry mouth  Thinks she is doing under 1200 tyler   Good changes started morning with protein shake  B- coffee protein shake 30g  S-   L- salad leftover 11-12:30am chicken 6 oz and chickpeas  S-  D- chicken sandwich about 4 oz   S-   Drinks- 4-5 bottles water   Alcohol- no   Exercise- walking at least 6500 steps       The following portions of the patient's history were reviewed and updated as appropriate: allergies, current medications, past family history, past medical history, past social history, past surgical history, and problem list   Review of Systems   Constitutional: Negative for activity change  Fatigue  HENT: Negative for trouble swallowing  Respiratory: Negative for shortness of breath  Cardiovascular: Negative for chest pain, edema  Gastrointestinal: Negative for abdominal pain, nausea and vomiting, acid reflux, constipation/diarrhea  Psychiatric/Behavioral: Negative for behavioral problems , anxiety or depression    Objective:  /70 (BP Location: Left arm, Patient Position: Sitting, Cuff Size: Large)   Pulse 78   Temp 97 6 °F (36 4 °C)   Resp 16   Ht 5' 5" (1 651 m)   Wt 71 2 kg (157 lb)   LMP 08/16/2021   BMI 26 13 kg/m²   Constitutional: Well-developed, well-nourished and Overweight Body mass index is 26 13 kg/m²  Soundra Eaves HEENT: No conjunctival injection  No thyroid masses  Pulmonary: No increased work of breathing or signs of respiratory distress  Clear respiratory sounds  CV: Well-perfused, Regular rate and rhythm, no murmurs, no edema  GI: increased abdominal girth  Non-distended  Not tender   Endo: no ophthalmopathy, no dermopathy, truncal obesity  MSK: no sarcopenia  Neuro: Oriented to person, place and time  Normal Speech  Normal gait  Psych: Normal affect and mood   No delusion or hallucinations, normal thought process

## 2023-03-10 RX ORDER — BUDESONIDE 9 MG/1
TABLET, FILM COATED, EXTENDED RELEASE ORAL
COMMUNITY
Start: 2023-03-03

## 2023-03-10 RX ORDER — CLOTRIMAZOLE AND BETAMETHASONE DIPROPIONATE 10; .64 MG/G; MG/G
CREAM TOPICAL
COMMUNITY
Start: 2023-02-02

## 2023-03-14 ENCOUNTER — OFFICE VISIT (OUTPATIENT)
Dept: GASTROENTEROLOGY | Facility: CLINIC | Age: 53
End: 2023-03-14

## 2023-03-14 ENCOUNTER — HOSPITAL ENCOUNTER (OUTPATIENT)
Dept: CT IMAGING | Facility: HOSPITAL | Age: 53
Discharge: HOME | End: 2023-03-14

## 2023-03-14 ENCOUNTER — APPOINTMENT (OUTPATIENT)
Dept: LAB | Facility: HOSPITAL | Age: 53
End: 2023-03-14

## 2023-03-14 ENCOUNTER — TELEPHONE (OUTPATIENT)
Dept: GASTROENTEROLOGY | Facility: CLINIC | Age: 53
End: 2023-03-14

## 2023-03-14 VITALS
TEMPERATURE: 98 F | HEIGHT: 65 IN | RESPIRATION RATE: 16 BRPM | DIASTOLIC BLOOD PRESSURE: 60 MMHG | OXYGEN SATURATION: 97 % | WEIGHT: 153 LBS | SYSTOLIC BLOOD PRESSURE: 112 MMHG | BODY MASS INDEX: 25.49 KG/M2 | HEART RATE: 79 BPM

## 2023-03-14 DIAGNOSIS — R11.0 NAUSEA: ICD-10-CM

## 2023-03-14 DIAGNOSIS — R10.10 UPPER ABDOMINAL PAIN: Primary | ICD-10-CM

## 2023-03-14 DIAGNOSIS — K59.00 CONSTIPATION: ICD-10-CM

## 2023-03-14 DIAGNOSIS — R68.83 CHILLS: ICD-10-CM

## 2023-03-14 DIAGNOSIS — R10.10 UPPER ABDOMINAL PAIN: ICD-10-CM

## 2023-03-14 LAB
ALBUMIN SERPL BCP-MCNC: 4.2 G/DL (ref 3.5–5)
ALP SERPL-CCNC: 46 U/L (ref 34–104)
ALT SERPL W P-5'-P-CCNC: 16 U/L (ref 7–52)
ANION GAP SERPL CALCULATED.3IONS-SCNC: 6 MMOL/L (ref 4–13)
AST SERPL W P-5'-P-CCNC: 17 U/L (ref 13–39)
BILIRUB SERPL-MCNC: 0.44 MG/DL (ref 0.2–1)
BUN SERPL-MCNC: 12 MG/DL (ref 5–25)
CALCIUM SERPL-MCNC: 9.3 MG/DL (ref 8.4–10.2)
CHLORIDE SERPL-SCNC: 104 MMOL/L (ref 96–108)
CO2 SERPL-SCNC: 29 MMOL/L (ref 21–32)
CREAT SERPL-MCNC: 0.94 MG/DL (ref 0.6–1.3)
ERYTHROCYTE [DISTWIDTH] IN BLOOD BY AUTOMATED COUNT: 12.5 % (ref 11.6–15.1)
GFR SERPL CREATININE-BSD FRML MDRD: 69 ML/MIN/1.73SQ M
GLUCOSE SERPL-MCNC: 98 MG/DL (ref 65–140)
HCT VFR BLD AUTO: 41.8 % (ref 34.8–46.1)
HGB BLD-MCNC: 13.7 G/DL (ref 11.5–15.4)
MCH RBC QN AUTO: 29.5 PG (ref 26.8–34.3)
MCHC RBC AUTO-ENTMCNC: 32.8 G/DL (ref 31.4–37.4)
MCV RBC AUTO: 90 FL (ref 82–98)
PLATELET # BLD AUTO: 193 THOUSANDS/UL (ref 149–390)
PMV BLD AUTO: 9.8 FL (ref 8.9–12.7)
POTASSIUM SERPL-SCNC: 4 MMOL/L (ref 3.5–5.3)
PROT SERPL-MCNC: 7.5 G/DL (ref 6.4–8.4)
RBC # BLD AUTO: 4.64 MILLION/UL (ref 3.81–5.12)
SODIUM SERPL-SCNC: 139 MMOL/L (ref 135–147)
WBC # BLD AUTO: 4.41 THOUSAND/UL (ref 4.31–10.16)

## 2023-03-14 RX ADMIN — IOHEXOL 100 ML: 350 INJECTION, SOLUTION INTRAVENOUS at 13:00

## 2023-03-14 NOTE — TELEPHONE ENCOUNTER
----- Message from Anuja Steward PA-C sent at 3/14/2023  2:23 PM EDT -----  Please let patient know that her CAT scan was unremarkable  I want her to start the Linzess [facility]  [place] you

## 2023-03-14 NOTE — PROGRESS NOTES
[facility]  Toy Harper 47 y o  female MRN: [id number]       CC: New onset abdominal pain    HPI: Lina Alva is a 42-year-old female with history of IBS, lumbar radiculopathy, and is status post gastric sleeve  Patient also is known to us for history of reflux on omeprazole 20 mg  When I last saw her in December, we were trying to wean her off of PPI  Unfortunately, she had recurrence of symptoms as she was weaning off  Therefore, she has been taking omeprazole once daily  In terms of her constipation, she reports that she had an abrupt worsening 2 weeks ago  Patient reports that she is passing gas  However, very difficult for her to pass a bowel movement  She reports a harder stool followed by soft consistency  She reports that she typically does very well on Linzess 145 mcg  She has associated upper abdominal pain that she rates a 7 out of 10 currently  She had normal colonoscopy at age 49  She had EGD performed by bariatric surgery in 2021 revealing mild gastritis  Review of Systems:    CONSTITUTIONAL: Denies any fever, chills, or rigors  Good appetite, and no recent weight loss  HEENT: No earache or tinnitus  Denies hearing loss or visual disturbances  CARDIOVASCULAR: No chest pain or palpitations  RESPIRATORY: Denies any cough, hemoptysis, shortness of breath or dyspnea on exertion  GASTROINTESTINAL: As noted in the History of Present Illness  GENITOURINARY: No problems with urination  Denies any hematuria or dysuria  NEUROLOGIC: No dizziness or vertigo, denies headaches  MUSCULOSKELETAL: Denies any muscle or joint pain  SKIN: Denies skin rashes or itching  ENDOCRINE: Denies excessive thirst  Denies intolerance to heat or cold  PSYCHOSOCIAL: Denies depression or anxiety  Denies any recent memory loss         Current Outpatient Medications   Medication Sig Dispense Refill   • aspirin-acetaminophen-caffeine (EXCEDRIN MIGRAINE) 145-845-36 MG per tablet Take 1 tablet by mouth every 6 (six) hours as needed for headaches     • budesonide 9 mg TB24      • buPROPion (Wellbutrin SR) 150 mg 12 hr tablet Take 1 tablet (150 mg total) by mouth 2 (two) times a day 180 tablet 1   • clotrimazole-betamethasone (LOTRISONE) 1-0 05 % cream APPLY CREAM TOPICALLY TO AFFECTED AREAS TWICE DAILY     • cyclobenzaprine (FLEXERIL) 10 mg tablet Take 1 tablet (10 mg total) by mouth 3 (three) times a day as needed for muscle spasms 45 tablet 1   • dicyclomine (BENTYL) 20 mg tablet Take 1 tablet (20 mg total) by mouth every 6 (six) hours as needed (abdominal cramping) 120 tablet 3   • docusate sodium (COLACE) 100 mg capsule Take 100 mg by mouth 2 (two) times a day       • famotidine (PEPCID) 40 MG tablet Take 1 tablet (40 mg total) by mouth 2 (two) times a day as needed for heartburn 60 tablet 3   • fluconazole (DIFLUCAN) 150 mg tablet      • fluticasone (FLONASE) 50 mcg/act nasal spray 1 spray into each nostril daily     • ibuprofen (MOTRIN) 600 mg tablet TAKE 1 TABLET BY MOUTH THREE TIMES DAILY WITH FOOD OR MILK AS NEEDED     • linaCLOtide (Linzess) 145 MCG CAPS Take 1 capsule (145 mcg total) by mouth in the morning 90 capsule 2   • naltrexone (REVIA) 50 mg tablet TAKE 1 TABLET BY MOUTH ONCE DAILY WITH WELLBUTRIN   DO NOT TAKE WITH HIGH FAT MEALS 30 tablet 0   • Norethin Ace-Eth Estrad-FE (MICROGESTIN FE 1 5/30 PO) Take by mouth     • omeprazole (PriLOSEC) 20 mg delayed release capsule Take 1 capsule (20 mg total) by mouth 2 (two) times a day 180 capsule 2   • ondansetron (ZOFRAN) 4 mg tablet Take 1 tablet (4 mg total) by mouth every 8 (eight) hours as needed for nausea or vomiting 20 tablet 0   • sucralfate (CARAFATE) 1 g tablet Take 1 tablet (1 g total) by mouth 3 (three) times a day Crush and mix with a small amount of water 90 tablet 0   • aspirin (ECOTRIN LOW STRENGTH) 81 mg EC tablet Take 1 tablet (81 mg total) by mouth daily 90 tablet 3   • Herb Fe 1 5/30 1 5-30 MG-MCG tablet Take 1 tablet by mouth daily       No current facility-administered medications for this visit  Past Medical History:   Diagnosis Date   • Asthma     seasonal   • Back pain    • Cut of finger 06/2022    cut fingers on left hand   • Kidney stone    • Obesity    • Postgastrectomy malabsorption      Past Surgical History:   Procedure Laterality Date   • CARDIAC CATHETERIZATION Left 11/3/2022    Procedure: Cardiac Left Heart Cath;  Surgeon: Bibi Husain MD;  [facility]: [facility]; Service: Cardiology   • CARDIAC CATHETERIZATION N/A 11/3/2022    Procedure: Cardiac Coronary Angiogram;  Surgeon: Bibi Husain MD;  Location: [address] [place] [address] CATH LAB; Service: Cardiology   • CHOLECYSTECTOMY     • COLONOSCOPY     • GASTRECTOMY SLEEVE LAPAROSCOPIC     • KNEE ARTHROSCOPY W/ MENISCAL REPAIR Right    • NEUROPLASTY / TRANSPOSITION MEDIAN NERVE AT CARPAL TUNNEL BILATERAL     • AR ESOPHAGOGASTRODUODENOSCOPY TRANSORAL DIAGNOSTIC N/A 04/30/2018    Procedure: ESOPHAGOGASTRODUODENOSCOPY (EGD); Surgeon: Wilner Acosta MD;  Location: [facility];   Service: Gastroenterology     Social History     Socioeconomic History   • Marital status: /Civil Union     Spouse name: None   • Number of children: None   • Years of education: None   • Highest education level: None   Occupational History   • None   Tobacco Use   • Smoking status: Never   • Smokeless tobacco: Never   Vaping Use   • Vaping Use: Some days   • Substances: THC, CBD   Substance and Sexual Activity   • Alcohol use: Yes     Comment: only on birthday   • Drug use: Yes     Types: Marijuana     Comment: uses pill  (medical marijuania card)   • Sexual activity: Yes     Partners: Male   Other Topics Concern   • None   Social History Narrative   • None     Social Determinants of Health     Financial Resource Strain: Not on file   Food Insecurity: Not on file   Transportation Needs: Not on file   Physical Activity: Not on file   Stress: Stress Concern Present   • Feeling of Stress : To some extent   Social Connections: Not on file   Intimate Partner Violence: Not on file   Housing Stability: Not on file     Family History   Problem Relation Age of Onset   • Diabetes Mother    • Kidney disease Mother         end stage   • Diabetes Father    • Other Father         gangrene   • Hypertension Father    • Colon cancer Paternal Grandfather    • Lung cancer Paternal Grandfather    • Breast cancer Maternal Aunt    • Thyroid cancer Maternal Aunt    • Brain cancer Cousin    • Nephrolithiasis Family             PHYSICAL EXAM:    Vitals:    03/14/23 1147   BP: 112/60   Pulse: 79   Resp: 16   Temp: 98 °F (36 7 °C)   TempSrc: Temporal   SpO2: 97%   Weight: 69 4 kg (153 lb)   Height: 5' 5" (1 651 m)     General Appearance:   Alert and oriented x 3  Cooperative, and in no respiratory distress   HEENT:   Normocephalic, atraumatic, anicteric      Neck:  Supple, symmetrical, trachea midline   Lungs:   Clear to auscultation bilaterally    Heart[de-identified]   Regular rate and rhythm   Abdomen:   Soft, non-tender, non-distended; normal bowel sounds; no masses, no organomegaly    Genitalia:   Deferred    Rectal:   Deferred    Extremities:  No cyanosis, clubbing or edema    Pulses:  2+ and symmetric all extremities    Skin:  Skin color, texture, turgor normal, no rashes or lesions    Lymph nodes:  No palpable cervical or supraclavicular lymphadenopathy        Lab Results:   Results from last 6 Months   Lab Units 10/28/22  0726   WBC Thousand/uL 4 90   HEMOGLOBIN g/dL 13 1   HEMATOCRIT % 40 2   PLATELETS Thousands/uL 189     Results from last 6 Months   Lab Units 11/03/22  0749   POTASSIUM mmol/L 3 8   CHLORIDE mmol/L 106   CO2 mmol/L 25   BUN mg/dL 19   CREATININE mg/dL 0 99   CALCIUM mg/dL 8 3     Results from last 6 Months   Lab Units 10/28/22  0726   INR  1 03           Imaging Studies: I have personally reviewed pertinent imaging studies  No results found      ASSESSMENT and PLAN:      1) Acute onset worsening constipation x2 weeks and upper abdominal pain - Patient reports that 2 weeks ago she began having abrupt change in response to Linzess with worsening abdominal pain  Historically, reticulosis has not been seen on colonoscopy or imaging that I can review  - We will order stat CT scan to rule out diverticulitis  - We will increase her Linzess to 290 mcg, provided samples  - Further recommendations after imaging  - Continue omeprazole 20 mg daily      Follow up depending on CT results

## 2023-03-15 ENCOUNTER — TELEPHONE (OUTPATIENT)
Dept: GASTROENTEROLOGY | Facility: CLINIC | Age: 53
End: 2023-03-15

## 2023-03-15 NOTE — TELEPHONE ENCOUNTER
Please call patient to schedule EGD  Thanks! Regarding: Upper middle pain in the stomach   Contact: [phone]  ----- Message from Kobi Li sent at 3/15/2023 12:38 PM EDT -----       ----- Message from Monica Miller to Venkatesh Ko PA-C sent at 3/15/2023 12:02 PM -----   I do not mind I just wanna get to the bottom of this pain      ----- Message -----       From:Tana Beasley       Sent:3/15/2023 11:31 AM EDT         To:Roselia Carmona    Subject:Upper middle pain in the stomach     Hi Martha Sweeney! Do you have any dicyclomine at home? We may have to plan for an endoscopy to see if there are any new changes after your surgery  Would you be OK if I forwarded your info over to our  to get that set up? Roe Navarrete PA-C      ----- Message -----       From:Roselia Roche       Sent:3/15/2023  7:28 AM EDT         To:Tana Hodge    Subject:Upper middle pain in the stomach     I am stills having this pain in the upper middle of my stomach, this pain is not normal I go to sleep and wake up with the same pain  I need to get to the bottom of this, again this pain is not normal  I do understand about taking a higher dose on my medication but that won’t take the pain I’m having  I woke up with this pain it’s a 8 and with a headache      Thank you   Martha Sweeney

## 2023-03-16 ENCOUNTER — TELEPHONE (OUTPATIENT)
Dept: GASTROENTEROLOGY | Facility: CLINIC | Age: 53
End: 2023-03-16

## 2023-03-16 NOTE — TELEPHONE ENCOUNTER
Spoke with the patient and scheduled her EGD  Prep instructions mailed to her home      Scheduled date of EGD(as of today):4/27/23  Physician performing EGD:Mo  Location of EGD:[facility]  Instructions reviewed with patient by:Ally REVELES  Clearances:   none

## 2023-03-17 ENCOUNTER — OFFICE VISIT (OUTPATIENT)
Dept: BARIATRICS | Facility: CLINIC | Age: 53
End: 2023-03-17

## 2023-03-17 VITALS — HEIGHT: 65 IN | BODY MASS INDEX: 25.43 KG/M2 | WEIGHT: 152.6 LBS

## 2023-03-17 DIAGNOSIS — R63.5 ABNORMAL WEIGHT GAIN: ICD-10-CM

## 2023-03-17 NOTE — PROGRESS NOTES
Reevue indirect calorimter revealed REE is   Fast ( +15)  compared to the predictive normal for someone her same age, height, and gender       Reevue Indirect Calorimeter REE: 1584         Weight loss without exercise: 1268 - 1584          Weight loss with exercise: 2223                    Maintenance:

## 2023-03-27 ENCOUNTER — TELEPHONE (OUTPATIENT)
Dept: GASTROENTEROLOGY | Facility: CLINIC | Age: 53
End: 2023-03-27

## 2023-04-05 ENCOUNTER — ANESTHESIA EVENT (OUTPATIENT)
Dept: GASTROENTEROLOGY | Facility: HOSPITAL | Age: 53
End: 2023-04-05

## 2023-04-05 ENCOUNTER — ANESTHESIA (OUTPATIENT)
Dept: GASTROENTEROLOGY | Facility: HOSPITAL | Age: 53
End: 2023-04-05

## 2023-04-05 RX ORDER — SODIUM CHLORIDE, SODIUM LACTATE, POTASSIUM CHLORIDE, CALCIUM CHLORIDE 600; 310; 30; 20 MG/100ML; MG/100ML; MG/100ML; MG/100ML
INJECTION, SOLUTION INTRAVENOUS CONTINUOUS PRN
Status: DISCONTINUED | OUTPATIENT
Start: 2023-04-05 | End: 2023-04-05

## 2023-04-05 RX ORDER — LIDOCAINE HYDROCHLORIDE 20 MG/ML
INJECTION, SOLUTION EPIDURAL; INFILTRATION; INTRACAUDAL; PERINEURAL AS NEEDED
Status: DISCONTINUED | OUTPATIENT
Start: 2023-04-05 | End: 2023-04-05

## 2023-04-05 RX ORDER — PROPOFOL 10 MG/ML
INJECTION, EMULSION INTRAVENOUS AS NEEDED
Status: DISCONTINUED | OUTPATIENT
Start: 2023-04-05 | End: 2023-04-05

## 2023-04-05 RX ADMIN — SODIUM CHLORIDE, SODIUM LACTATE, POTASSIUM CHLORIDE, AND CALCIUM CHLORIDE: .6; .31; .03; .02 INJECTION, SOLUTION INTRAVENOUS at 09:01

## 2023-04-05 RX ADMIN — PROPOFOL 110 MG: 10 INJECTION, EMULSION INTRAVENOUS at 09:04

## 2023-04-05 RX ADMIN — LIDOCAINE HYDROCHLORIDE 100 MG: 20 INJECTION, SOLUTION EPIDURAL; INFILTRATION; INTRACAUDAL at 09:04

## 2023-04-05 NOTE — ANESTHESIA POSTPROCEDURE EVALUATION
Post-Op Assessment Note    CV Status:  Stable    Pain management: adequate     Mental Status:  Alert and awake   Hydration Status:  Euvolemic   PONV Controlled:  Controlled   Airway Patency:  Patent      Post Op Vitals Reviewed: Yes      Staff: CRNA         There were no known notable events for this encounter      BP 94/57 (04/05/23 0912)    Temp 97 8 °F (36 6 °C) (04/05/23 0912)    Pulse 67 (04/05/23 0912)   Resp 18 (04/05/23 0912)    SpO2 99 % (04/05/23 0912)

## 2023-04-05 NOTE — ANESTHESIA PREPROCEDURE EVALUATION
Procedure:  EGD    Relevant Problems   ANESTHESIA (within normal limits)      CARDIO   (+) Other chest pain      GI/HEPATIC   (+) Gastroesophageal reflux disease with esophagitis      /RENAL   (+) Nephrolithiasis      MUSCULOSKELETAL   (+) Arthritis   (+) Disc degeneration, lumbar      PULMONARY   (+) Asthma        Physical Exam    Airway    Mallampati score: II  TM Distance: >3 FB  Neck ROM: full     Dental   No notable dental hx     Cardiovascular  Rhythm: regular, Rate: normal,     Pulmonary  Breath sounds clear to auscultation,     Other Findings        Anesthesia Plan  ASA Score- 2     Anesthesia Type- IV sedation with anesthesia with ASA Monitors  Additional Monitors:   Airway Plan:           Plan Factors-Exercise tolerance (METS): >4 METS  Chart reviewed  Existing labs reviewed  Patient summary reviewed  Patient is not a current smoker  Induction-     Postoperative Plan-     Informed Consent- Anesthetic plan and risks discussed with patient  I personally reviewed this patient with the CRNA  Discussed and agreed on the Anesthesia Plan with the CRNA  Arnold Weber

## 2023-04-06 ENCOUNTER — TELEPHONE (OUTPATIENT)
Dept: BARIATRICS | Facility: CLINIC | Age: 53
End: 2023-04-06

## 2023-04-06 DIAGNOSIS — E66.3 OVERWEIGHT (BMI 25.0-29.9): ICD-10-CM

## 2023-04-06 DIAGNOSIS — R63.5 ABNORMAL WEIGHT GAIN: ICD-10-CM

## 2023-04-06 DIAGNOSIS — K58.9 IRRITABLE BOWEL SYNDROME, UNSPECIFIED TYPE: Primary | ICD-10-CM

## 2023-04-06 RX ORDER — NALTREXONE HYDROCHLORIDE 50 MG/1
TABLET, FILM COATED ORAL
Qty: 30 TABLET | Refills: 1 | Status: SHIPPED | OUTPATIENT
Start: 2023-04-06

## 2023-04-06 RX ORDER — DICYCLOMINE HCL 20 MG
20 TABLET ORAL EVERY 6 HOURS PRN
Qty: 90 TABLET | Refills: 2 | Status: SHIPPED | OUTPATIENT
Start: 2023-04-06

## 2023-04-06 NOTE — TELEPHONE ENCOUNTER
Good Day Dr Lion Olivo,     Patient calls and requests refill on her naltrexone (REVIA) 50 mg tablet   [facility]  Thank you

## 2023-04-28 ENCOUNTER — OFFICE VISIT (OUTPATIENT)
Dept: GASTROENTEROLOGY | Facility: CLINIC | Age: 53
End: 2023-04-28

## 2023-04-28 VITALS
BODY MASS INDEX: 25.59 KG/M2 | WEIGHT: 153.6 LBS | DIASTOLIC BLOOD PRESSURE: 82 MMHG | HEART RATE: 84 BPM | SYSTOLIC BLOOD PRESSURE: 122 MMHG | HEIGHT: 65 IN

## 2023-04-28 DIAGNOSIS — K58.1 IRRITABLE BOWEL SYNDROME WITH CONSTIPATION: Primary | ICD-10-CM

## 2023-04-28 NOTE — PROGRESS NOTES
[facility]  Francoise Rascon 47 y o  female MRN: [id number]       CC: IBS with constipation    HPI: Cassie Cheng is a pleasant 81-year-old female known to us for history of IBS, lumbar radiculopathy, and is status post gastric sleeve  Patient also has history of acid reflux  Patient is here to follow-up after I last saw her in March for abdominal pain and increasing constipation  Patient was previously doing very well on Linzess 145 mcg, however the medication lost effect  We increased her medication up to 290 mcg  She reports that she will have urgency to go to the bathroom, but no substantial evacuation of stool  She has noticed that because of her constipation, she is unable to fully evacuate her urine at times  Because she was also reporting upper abdominal pain, we had performed an endoscopy, which was essentially unremarkable except for a 1 cm hiatal hernia  Patient's last colonoscopy was at age 49  Review of Systems:    CONSTITUTIONAL: Denies any fever, chills, or rigors  Good appetite, and no recent weight loss  HEENT: No earache or tinnitus  Denies hearing loss or visual disturbances  CARDIOVASCULAR: No chest pain or palpitations  RESPIRATORY: Denies any cough, hemoptysis, shortness of breath or dyspnea on exertion  GASTROINTESTINAL: As noted in the History of Present Illness  GENITOURINARY: No problems with urination  Denies any hematuria or dysuria  NEUROLOGIC: No dizziness or vertigo, denies headaches  MUSCULOSKELETAL: Denies any muscle or joint pain  SKIN: Denies skin rashes or itching  ENDOCRINE: Denies excessive thirst  Denies intolerance to heat or cold  PSYCHOSOCIAL: Denies depression or anxiety  Denies any recent memory loss         Current Outpatient Medications   Medication Sig Dispense Refill   • buPROPion (Wellbutrin SR) 150 mg 12 hr tablet Take 1 tablet (150 mg total) by mouth 2 (two) times a day 180 tablet 1   • cyclobenzaprine (FLEXERIL) 10 mg tablet Take 1 tablet (10 mg total) by mouth 3 (three) times a day as needed for muscle spasms 45 tablet 1   • dicyclomine (BENTYL) 20 mg tablet Take 1 tablet (20 mg total) by mouth every 6 (six) hours as needed (abdominal cramping) 120 tablet 3   • famotidine (PEPCID) 40 MG tablet Take 1 tablet (40 mg total) by mouth 2 (two) times a day as needed for heartburn 60 tablet 3   • fluticasone (FLONASE) 50 mcg/act nasal spray 1 spray into each nostril daily     • Herb Fe 1 5/30 1 5-30 MG-MCG tablet Take 1 tablet by mouth daily     • linaCLOtide (Linzess) 145 MCG CAPS Take 1 capsule (145 mcg total) by mouth in the morning 90 capsule 2   • naltrexone (REVIA) 50 mg tablet Take by mouth half tab in am and half tab pm 30 tablet 1   • Norethin Ace-Eth Estrad-FE (MICROGESTIN FE 1 5/30 PO) Take by mouth     • ondansetron (ZOFRAN) 4 mg tablet Take 1 tablet (4 mg total) by mouth every 8 (eight) hours as needed for nausea or vomiting 20 tablet 0   • aspirin (ECOTRIN LOW STRENGTH) 81 mg EC tablet Take 1 tablet (81 mg total) by mouth daily (Patient not taking: Reported on 4/18/2023) 90 tablet 3   • aspirin-acetaminophen-caffeine (EXCEDRIN MIGRAINE) 250-250-65 MG per tablet Take 1 tablet by mouth every 6 (six) hours as needed for headaches (Patient not taking: Reported on 4/28/2023)     • budesonide 9 mg TB24  (Patient not taking: Reported on 4/18/2023)     • clotrimazole-betamethasone (LOTRISONE) 1-0 05 % cream APPLY CREAM TOPICALLY TO AFFECTED AREAS TWICE DAILY (Patient not taking: Reported on 4/18/2023)     • dicyclomine (BENTYL) 20 mg tablet Take 1 tablet (20 mg total) by mouth every 6 (six) hours as needed (abdominal cramping) (Patient not taking: Reported on 4/18/2023) 90 tablet 2   • docusate sodium (COLACE) 100 mg capsule Take 100 mg by mouth 2 (two) times a day   (Patient not taking: Reported on 4/18/2023)     • ibuprofen (MOTRIN) 600 mg tablet TAKE 1 TABLET BY MOUTH THREE TIMES DAILY WITH FOOD OR MILK AS NEEDED (Patient not taking: Reported on 4/18/2023)       No current facility-administered medications for this visit  Past Medical History:   Diagnosis Date   • Asthma     seasonal   • Back pain    • Cut of finger 06/2022    cut fingers on left hand   • Kidney stone    • Obesity    • Postgastrectomy malabsorption      Past Surgical History:   Procedure Laterality Date   • CARDIAC CATHETERIZATION Left 11/3/2022    Procedure: Cardiac Left Heart Cath;  Surgeon: Susana Hunt MD;  Location: [facility]; Service: Cardiology   • CARDIAC CATHETERIZATION N/A 11/3/2022    Procedure: Cardiac Coronary Angiogram;  Surgeon: Susana Hunt MD;  Location: [address] [place] [address] CATH LAB; Service: Cardiology   • CHOLECYSTECTOMY     • COLONOSCOPY     • GASTRECTOMY SLEEVE LAPAROSCOPIC     • KNEE ARTHROSCOPY W/ MENISCAL REPAIR Right    • NEUROPLASTY / TRANSPOSITION MEDIAN NERVE AT CARPAL TUNNEL BILATERAL     • VT ESOPHAGOGASTRODUODENOSCOPY TRANSORAL DIAGNOSTIC N/A 04/30/2018    Procedure: ESOPHAGOGASTRODUODENOSCOPY (EGD); Surgeon: Silvio Wright MD;  Location: [facility];   Service: Gastroenterology     Social History     Socioeconomic History   • Marital status: /Civil Union     Spouse name: None   • Number of children: None   • Years of education: None   • Highest education level: None   Occupational History   • None   Tobacco Use   • Smoking status: Never   • Smokeless tobacco: Never   Vaping Use   • Vaping Use: Some days   • Substances: THC, CBD   Substance and Sexual Activity   • Alcohol use: Not Currently   • Drug use: Yes     Types: Marijuana     Comment: uses pill  (medical marijuania card)   • Sexual activity: Yes     Partners: Male   Other Topics Concern   • None   Social History Narrative   • None     Social Determinants of Health     Financial Resource Strain: Not on file   Food Insecurity: Not on file   Transportation Needs: Not on file   Physical Activity: Not on file   Stress: Stress Concern Present   • Feeling of Stress : "To some extent   Social Connections: Not on file   Intimate Partner Violence: Not on file   Housing Stability: Not on file     Family History   Problem Relation Age of Onset   • Diabetes Mother    • Kidney disease Mother         end stage   • Diabetes Father    • Other Father         gangrene   • Hypertension Father    • Colon cancer Paternal Grandfather    • Lung cancer Paternal Grandfather    • Breast cancer Maternal Aunt    • Thyroid cancer Maternal Aunt    • Brain cancer Cousin    • Nephrolithiasis Family             PHYSICAL EXAM:    Vitals:    04/28/23 0809   BP: 122/82   BP Location: Right arm   Patient Position: Sitting   Pulse: 84   Weight: 69 7 kg (153 lb 9 6 oz)   Height: 5' 5\" (1 651 m)     General Appearance:   Alert and oriented x 3  Cooperative, and in no respiratory distress   HEENT:   Normocephalic, atraumatic, anicteric      Neck:  Supple, symmetrical, trachea midline   Lungs:   Clear to auscultation bilaterally    Heart[de-identified]   Regular rate and rhythm   Abdomen:   Soft, non-tender, non-distended; normal bowel sounds; no masses, no organomegaly    Genitalia:   Deferred    Rectal:   Deferred    Extremities:  No cyanosis, clubbing or edema    Pulses:  2+ and symmetric all extremities    Skin:  Skin color, texture, turgor normal, no rashes or lesions    Lymph nodes:  No palpable cervical or supraclavicular lymphadenopathy        Lab Results:   Results from last 6 Months   Lab Units 03/14/23  1238   WBC Thousand/uL 4 41   HEMOGLOBIN g/dL 13 7   HEMATOCRIT % 41 8   PLATELETS Thousands/uL 193     Results from last 6 Months   Lab Units 03/14/23  1238   POTASSIUM mmol/L 4 0   CHLORIDE mmol/L 104   CO2 mmol/L 29   BUN mg/dL 12   CREATININE mg/dL 0 94   CALCIUM mg/dL 9 3   ALK PHOS U/L 46   ALT U/L 16   AST U/L 17               Imaging Studies: I have personally reviewed pertinent imaging studies      EGD    Result Date: 4/5/2023  Impression: 1 cm hiatal hernia Normal sleeve gastrectomy anatomy RECOMMENDATION: " Continue omeprazole Continue Linzess 290 mcg daily High-fiber diet Pathology Will refer to physical therapy for pelvic floor therapy   Lawrence Brown III, MD       ASSESSMENT and PLAN:      1) Irritable bowel syndrome, constipation predominant - Recent EGD and CT scan not showing any acute abdominal pathology fortunately  She is also failing Linzess 290 mcg   - Provided samples for IBSRELA 50 mg twice daily  - Asked patient to contact me via [facility] regarding her progress, and if she would be interested in sending a prescription  - Referral to pelvic floor therapy      Follow up in 4 weeks

## 2023-05-17 ENCOUNTER — OFFICE VISIT (OUTPATIENT)
Age: 53
End: 2023-05-17

## 2023-05-17 ENCOUNTER — APPOINTMENT (OUTPATIENT)
Age: 53
End: 2023-05-17
Payer: COMMERCIAL

## 2023-05-17 VITALS
RESPIRATION RATE: 17 BRPM | BODY MASS INDEX: 24.93 KG/M2 | HEART RATE: 80 BPM | WEIGHT: 149.6 LBS | OXYGEN SATURATION: 98 % | TEMPERATURE: 48.2 F | HEIGHT: 65 IN | SYSTOLIC BLOOD PRESSURE: 112 MMHG | DIASTOLIC BLOOD PRESSURE: 72 MMHG

## 2023-05-17 DIAGNOSIS — K59.01 CONSTIPATION BY DELAYED COLONIC TRANSIT: ICD-10-CM

## 2023-05-17 DIAGNOSIS — R10.9 RIGHT FLANK PAIN: ICD-10-CM

## 2023-05-17 DIAGNOSIS — M54.16 LUMBAR RADICULOPATHY: Primary | ICD-10-CM

## 2023-05-17 DIAGNOSIS — M54.50 ACUTE RIGHT-SIDED LOW BACK PAIN WITHOUT SCIATICA: ICD-10-CM

## 2023-05-17 LAB
BACTERIA UR QL AUTO: ABNORMAL /HPF
BILIRUB UR QL STRIP: NEGATIVE
CAOX CRY URNS QL MICRO: ABNORMAL /HPF
CLARITY UR: CLEAR
COLOR UR: YELLOW
GLUCOSE UR STRIP-MCNC: NEGATIVE MG/DL
HGB UR QL STRIP.AUTO: ABNORMAL
KETONES UR STRIP-MCNC: ABNORMAL MG/DL
LEUKOCYTE ESTERASE UR QL STRIP: NEGATIVE
MUCOUS THREADS UR QL AUTO: ABNORMAL
NITRITE UR QL STRIP: NEGATIVE
NON-SQ EPI CELLS URNS QL MICRO: ABNORMAL /HPF
PH UR STRIP.AUTO: 6.5 [PH]
PROT UR STRIP-MCNC: ABNORMAL MG/DL
RBC #/AREA URNS AUTO: ABNORMAL /HPF
SP GR UR STRIP.AUTO: 1.03 (ref 1–1.03)
UROBILINOGEN UR STRIP-ACNC: 3 MG/DL
WBC #/AREA URNS AUTO: ABNORMAL /HPF

## 2023-05-17 PROCEDURE — 87086 URINE CULTURE/COLONY COUNT: CPT

## 2023-05-17 RX ORDER — OMEPRAZOLE 20 MG/1
20 CAPSULE, DELAYED RELEASE ORAL DAILY
COMMUNITY

## 2023-05-17 RX ORDER — CYCLOBENZAPRINE HCL 10 MG
10 TABLET ORAL 3 TIMES DAILY PRN
Qty: 45 TABLET | Refills: 1 | Status: SHIPPED | OUTPATIENT
Start: 2023-05-17

## 2023-05-17 RX ORDER — MELOXICAM 15 MG/1
15 TABLET ORAL DAILY PRN
Qty: 30 TABLET | Refills: 3 | Status: SHIPPED | OUTPATIENT
Start: 2023-05-17

## 2023-05-17 NOTE — PATIENT INSTRUCTIONS
Help a muscle strain heal:   3 to 7 days after the injury:  Use Rest, Ice, Compression, and Elevation (RICE) to help stop bruising and decrease pain and swelling  Rest your muscle to allow the injury to heal   When the pain decreases, begin normal, slow movements  For mild and moderate muscle strains, you should rest your muscles for about 2 days  If you have a severe muscle strain, you should rest for 10 to 14 days  You may need to use crutches to walk if your muscle strain is in your legs or lower body  Apply ice on the injured area  Use an ice pack, or put crushed ice in a plastic bag  Cover the bag with a towel before you apply it to your skin  Apply ice for 15 to 20 minutes each hour, or as directed  Use compression to decrease swelling  You can wrap an elastic bandage around the area to create compression  The bandage should be tight enough for you to feel support  Do not wrap it too tightly

## 2023-05-17 NOTE — PROGRESS NOTES
Name: Ray Capone      : 1970      MRN: [id number]  Encounter Provider: MAYCO Acosta  Encounter Date: 2023   Encounter department: [address] [place] [address]  Right flank pain    Negative for dysuria symptoms however patient reports history of kidney stones  Also reports decreased urinary output for 1 months despite adequate hydration  No new medication  Encourage hydration and consider ultrasound of the kidneys and bladder if symptoms continue  Most recent CT of abdomen and pelvis reviewed  - UA (URINE) with reflex to Scope  - Urine culture; Future    2  Lumbar radiculopathy  Right thoracic back pain since Saturday  Denies  mechanism of injury or trauma  Conservative treatment with Flexeril and NSAIDs  Physical therapy if symptoms continue  - cyclobenzaprine (FLEXERIL) 10 mg tablet; Take 1 tablet (10 mg total) by mouth 3 (three) times a day as needed for muscle spasms  Dispense: 45 tablet; Refill: 1  - meloxicam (MOBIC) 15 mg tablet; Take 1 tablet (15 mg total) by mouth daily as needed for moderate pain  Dispense: 30 tablet; Refill: 3    3  Constipation by delayed colonic transit   Chronic constipation, last bowel movement was yesterday  Average bowel movement every 3 days  She is following with GI  Continue Linzess and stool softener  Encouraged adequate hydration and fiber intake  Follow-up in 6 weeks or earlier if needed  Subjective      HPI  Review of Systems   Constitutional: Negative for activity change, appetite change, chills and fever  Respiratory: Negative for cough and shortness of breath  Cardiovascular: Negative for chest pain and palpitations  Gastrointestinal: Positive for abdominal pain and constipation  Negative for abdominal distention, blood in stool and vomiting  Genitourinary: Negative for dysuria and hematuria  Musculoskeletal: Negative for arthralgias and back pain     Skin: Negative for color change and rash    Neurological: Negative for seizures and syncope  All other systems reviewed and are negative        Current Outpatient Medications on File Prior to Visit   Medication Sig   • dicyclomine (BENTYL) 20 mg tablet Take 1 tablet (20 mg total) by mouth every 6 (six) hours as needed (abdominal cramping)   • famotidine (PEPCID) 40 MG tablet Take 1 tablet (40 mg total) by mouth 2 (two) times a day as needed for heartburn   • fluticasone (FLONASE) 50 mcg/act nasal spray 1 spray into each nostril daily   • omeprazole (PriLOSEC) 20 mg delayed release capsule Take 20 mg by mouth daily One tablet twice daily 05/17/23   • ondansetron (ZOFRAN) 4 mg tablet Take 1 tablet (4 mg total) by mouth every 8 (eight) hours as needed for nausea or vomiting   • [DISCONTINUED] cyclobenzaprine (FLEXERIL) 10 mg tablet Take 1 tablet (10 mg total) by mouth 3 (three) times a day as needed for muscle spasms   • aspirin (ECOTRIN LOW STRENGTH) 81 mg EC tablet Take 1 tablet (81 mg total) by mouth daily (Patient not taking: Reported on 4/18/2023)   • aspirin-acetaminophen-caffeine (EXCEDRIN MIGRAINE) 250-250-65 MG per tablet Take 1 tablet by mouth every 6 (six) hours as needed for headaches (Patient not taking: Reported on 4/28/2023)   • budesonide 9 mg TB24  (Patient not taking: Reported on 4/18/2023)   • buPROPion (Wellbutrin SR) 150 mg 12 hr tablet Take 1 tablet (150 mg total) by mouth 2 (two) times a day (Patient not taking: Reported on 5/17/2023)   • clotrimazole-betamethasone (LOTRISONE) 1-0 05 % cream APPLY CREAM TOPICALLY TO AFFECTED AREAS TWICE DAILY (Patient not taking: Reported on 4/18/2023)   • dicyclomine (BENTYL) 20 mg tablet Take 1 tablet (20 mg total) by mouth every 6 (six) hours as needed (abdominal cramping) (Patient not taking: Reported on 4/18/2023)   • docusate sodium (COLACE) 100 mg capsule Take 100 mg by mouth 2 (two) times a day   (Patient not taking: Reported on 4/18/2023)   • Herb Fe 1 5/30 1 5-30 MG-MCG tablet Take 1 "tablet by mouth daily (Patient not taking: Reported on 5/17/2023)   • linaCLOtide (Linzess) 145 MCG CAPS Take 1 capsule (145 mcg total) by mouth in the morning (Patient not taking: Reported on 5/17/2023)   • naltrexone (REVIA) 50 mg tablet Take by mouth half tab in am and half tab pm (Patient not taking: Reported on 5/17/2023)   • Norethin Ace-Eth Estrad-FE ([place] FE 1 5/30 PO) Take by mouth (Patient not taking: Reported on 5/17/2023)   • [DISCONTINUED] ibuprofen (MOTRIN) 600 mg tablet TAKE 1 TABLET BY MOUTH THREE TIMES DAILY WITH FOOD OR MILK AS NEEDED (Patient not taking: Reported on 4/18/2023)       Objective     /72 (BP Location: Right arm, Patient Position: Sitting, Cuff Size: Standard)   Pulse 80   Temp (!) 48 2 °F (9 °C) (Temporal)   Resp 17   Ht 5' 5\" (1 651 m)   Wt 67 9 kg (149 lb 9 6 oz)   LMP 08/16/2021   SpO2 98%   BMI 24 89 kg/m²     Physical Exam  Vitals and nursing note reviewed  Constitutional:       Appearance: Normal appearance  HENT:      Mouth/Throat:      Pharynx: No posterior oropharyngeal erythema  Cardiovascular:      Rate and Rhythm: Normal rate and regular rhythm  Pulses: Normal pulses  Heart sounds: Normal heart sounds  Pulmonary:      Effort: Pulmonary effort is normal       Breath sounds: Normal breath sounds  Abdominal:      General: Bowel sounds are normal       Tenderness: There is right CVA tenderness  There is no left CVA tenderness  Musculoskeletal:         General: Normal range of motion  Cervical back: Normal range of motion and neck supple  Lymphadenopathy:      Cervical: No cervical adenopathy  Skin:     General: Skin is warm and dry  Neurological:      General: No focal deficit present  Mental Status: She is oriented to person, place, and time     Psychiatric:         Mood and Affect: Mood normal          Behavior: Behavior normal        MAYCO Rincon  "

## 2023-05-19 ENCOUNTER — PATIENT MESSAGE (OUTPATIENT)
Dept: UROLOGY | Facility: CLINIC | Age: 53
End: 2023-05-19

## 2023-05-19 ENCOUNTER — TELEPHONE (OUTPATIENT)
Age: 53
End: 2023-05-19

## 2023-05-19 DIAGNOSIS — R82.90 ABNORMAL URINE FINDING: Primary | ICD-10-CM

## 2023-05-19 LAB — BACTERIA UR CULT: ABNORMAL

## 2023-05-19 NOTE — TELEPHONE ENCOUNTER
----- [facility] sent at 5/19/2023  1:16 PM EDT -----  To the patient know that her urine has sediment, likely from small kidney stone that did not show up on the CT  She should try to drink 2 L of  water per day  We will repeat UA in 2 weeks  The order is in epic

## 2023-05-25 DIAGNOSIS — N20.0 NEPHROLITHIASIS: Primary | ICD-10-CM

## 2023-06-01 ENCOUNTER — APPOINTMENT (OUTPATIENT)
Age: 53
End: 2023-06-01
Payer: COMMERCIAL

## 2023-06-01 DIAGNOSIS — R82.90 ABNORMAL URINE FINDING: ICD-10-CM

## 2023-06-01 DIAGNOSIS — Z00.00 HEALTHCARE MAINTENANCE: ICD-10-CM

## 2023-06-01 DIAGNOSIS — Z98.84 S/P LAPAROSCOPIC SLEEVE GASTRECTOMY: ICD-10-CM

## 2023-06-01 DIAGNOSIS — N20.0 NEPHROLITHIASIS: ICD-10-CM

## 2023-06-01 LAB
25(OH)D3 SERPL-MCNC: 35.3 NG/ML (ref 30–100)
ALBUMIN SERPL BCP-MCNC: 3.7 G/DL (ref 3.5–5)
ALP SERPL-CCNC: 49 U/L (ref 46–116)
ALT SERPL W P-5'-P-CCNC: 24 U/L (ref 12–78)
AMORPH URATE CRY URNS QL MICRO: ABNORMAL
AMORPH URATE CRY URNS QL MICRO: ABNORMAL
ANION GAP SERPL CALCULATED.3IONS-SCNC: -1 MMOL/L (ref 4–13)
AST SERPL W P-5'-P-CCNC: 23 U/L (ref 5–45)
BACTERIA UR QL AUTO: ABNORMAL /HPF
BACTERIA UR QL AUTO: ABNORMAL /HPF
BASOPHILS # BLD AUTO: 0.05 THOUSANDS/ÂΜL (ref 0–0.1)
BASOPHILS NFR BLD AUTO: 2 % (ref 0–1)
BILIRUB SERPL-MCNC: 0.72 MG/DL (ref 0.2–1)
BILIRUB UR QL STRIP: NEGATIVE
BILIRUB UR QL STRIP: NEGATIVE
BUN SERPL-MCNC: 20 MG/DL (ref 5–25)
CALCIUM SERPL-MCNC: 8.9 MG/DL (ref 8.3–10.1)
CHLORIDE SERPL-SCNC: 107 MMOL/L (ref 96–108)
CHOLEST SERPL-MCNC: 156 MG/DL
CLARITY UR: ABNORMAL
CLARITY UR: ABNORMAL
CO2 SERPL-SCNC: 29 MMOL/L (ref 21–32)
COLOR UR: ABNORMAL
COLOR UR: ABNORMAL
CREAT SERPL-MCNC: 0.82 MG/DL (ref 0.6–1.3)
EOSINOPHIL # BLD AUTO: 0.11 THOUSAND/ÂΜL (ref 0–0.61)
EOSINOPHIL NFR BLD AUTO: 3 % (ref 0–6)
ERYTHROCYTE [DISTWIDTH] IN BLOOD BY AUTOMATED COUNT: 12.4 % (ref 11.6–15.1)
FERRITIN SERPL-MCNC: 56 NG/ML (ref 11–307)
FOLATE SERPL-MCNC: 7.3 NG/ML
GFR SERPL CREATININE-BSD FRML MDRD: 82 ML/MIN/1.73SQ M
GLUCOSE P FAST SERPL-MCNC: 88 MG/DL (ref 65–99)
GLUCOSE UR STRIP-MCNC: NEGATIVE MG/DL
GLUCOSE UR STRIP-MCNC: NEGATIVE MG/DL
HCT VFR BLD AUTO: 38 % (ref 34.8–46.1)
HDLC SERPL-MCNC: 48 MG/DL
HGB BLD-MCNC: 12.1 G/DL (ref 11.5–15.4)
HGB UR QL STRIP.AUTO: NEGATIVE
HGB UR QL STRIP.AUTO: NEGATIVE
IMM GRANULOCYTES # BLD AUTO: 0.01 THOUSAND/UL (ref 0–0.2)
IMM GRANULOCYTES NFR BLD AUTO: 0 % (ref 0–2)
IRON SATN MFR SERPL: 29 % (ref 15–50)
IRON SERPL-MCNC: 88 UG/DL (ref 50–170)
KETONES UR STRIP-MCNC: NEGATIVE MG/DL
KETONES UR STRIP-MCNC: NEGATIVE MG/DL
LDLC SERPL CALC-MCNC: 92 MG/DL (ref 0–100)
LEUKOCYTE ESTERASE UR QL STRIP: NEGATIVE
LEUKOCYTE ESTERASE UR QL STRIP: NEGATIVE
LYMPHOCYTES # BLD AUTO: 1.52 THOUSANDS/ÂΜL (ref 0.6–4.47)
LYMPHOCYTES NFR BLD AUTO: 45 % (ref 14–44)
MCH RBC QN AUTO: 28.7 PG (ref 26.8–34.3)
MCHC RBC AUTO-ENTMCNC: 31.8 G/DL (ref 31.4–37.4)
MCV RBC AUTO: 90 FL (ref 82–98)
MONOCYTES # BLD AUTO: 0.28 THOUSAND/ÂΜL (ref 0.17–1.22)
MONOCYTES NFR BLD AUTO: 8 % (ref 4–12)
MUCOUS THREADS UR QL AUTO: ABNORMAL
MUCOUS THREADS UR QL AUTO: ABNORMAL
NEUTROPHILS # BLD AUTO: 1.44 THOUSANDS/ÂΜL (ref 1.85–7.62)
NEUTS SEG NFR BLD AUTO: 42 % (ref 43–75)
NITRITE UR QL STRIP: NEGATIVE
NITRITE UR QL STRIP: NEGATIVE
NON-SQ EPI CELLS URNS QL MICRO: ABNORMAL /HPF
NON-SQ EPI CELLS URNS QL MICRO: ABNORMAL /HPF
NRBC BLD AUTO-RTO: 0 /100 WBCS
PH UR STRIP.AUTO: 6 [PH]
PH UR STRIP.AUTO: 6 [PH]
PLATELET # BLD AUTO: 241 THOUSANDS/UL (ref 149–390)
PMV BLD AUTO: 10.4 FL (ref 8.9–12.7)
POTASSIUM SERPL-SCNC: 4 MMOL/L (ref 3.5–5.3)
PROT SERPL-MCNC: 7.3 G/DL (ref 6.4–8.4)
PROT UR STRIP-MCNC: ABNORMAL MG/DL
PROT UR STRIP-MCNC: ABNORMAL MG/DL
PTH-INTACT SERPL-MCNC: 102.4 PG/ML (ref 12–88)
RBC # BLD AUTO: 4.21 MILLION/UL (ref 3.81–5.12)
RBC #/AREA URNS AUTO: ABNORMAL /HPF
RBC #/AREA URNS AUTO: ABNORMAL /HPF
SODIUM SERPL-SCNC: 135 MMOL/L (ref 135–147)
SP GR UR STRIP.AUTO: 1.03 (ref 1–1.03)
SP GR UR STRIP.AUTO: 1.03 (ref 1–1.03)
TIBC SERPL-MCNC: 304 UG/DL (ref 250–450)
TRIGL SERPL-MCNC: 81 MG/DL
TSH SERPL DL<=0.05 MIU/L-ACNC: 2.22 UIU/ML (ref 0.45–4.5)
UROBILINOGEN UR STRIP-ACNC: 2 MG/DL
UROBILINOGEN UR STRIP-ACNC: 2 MG/DL
VIT B12 SERPL-MCNC: 375 PG/ML (ref 180–914)
WBC # BLD AUTO: 3.41 THOUSAND/UL (ref 4.31–10.16)
WBC #/AREA URNS AUTO: ABNORMAL /HPF
WBC #/AREA URNS AUTO: ABNORMAL /HPF

## 2023-06-01 PROCEDURE — 82306 VITAMIN D 25 HYDROXY: CPT

## 2023-06-01 PROCEDURE — 82607 VITAMIN B-12: CPT

## 2023-06-01 PROCEDURE — 85025 COMPLETE CBC W/AUTO DIFF WBC: CPT

## 2023-06-01 PROCEDURE — 83550 IRON BINDING TEST: CPT

## 2023-06-01 PROCEDURE — 83970 ASSAY OF PARATHORMONE: CPT

## 2023-06-01 PROCEDURE — 82728 ASSAY OF FERRITIN: CPT

## 2023-06-01 PROCEDURE — 80061 LIPID PANEL: CPT

## 2023-06-01 PROCEDURE — 82746 ASSAY OF FOLIC ACID SERUM: CPT

## 2023-06-01 PROCEDURE — 81001 URINALYSIS AUTO W/SCOPE: CPT

## 2023-06-01 PROCEDURE — 84590 ASSAY OF VITAMIN A: CPT

## 2023-06-01 PROCEDURE — 84443 ASSAY THYROID STIM HORMONE: CPT

## 2023-06-01 PROCEDURE — 87086 URINE CULTURE/COLONY COUNT: CPT

## 2023-06-01 PROCEDURE — 84630 ASSAY OF ZINC: CPT

## 2023-06-01 PROCEDURE — 82525 ASSAY OF COPPER: CPT

## 2023-06-01 PROCEDURE — 80053 COMPREHEN METABOLIC PANEL: CPT

## 2023-06-01 PROCEDURE — 83540 ASSAY OF IRON: CPT

## 2023-06-01 PROCEDURE — 36415 COLL VENOUS BLD VENIPUNCTURE: CPT

## 2023-06-01 PROCEDURE — 84425 ASSAY OF VITAMIN B-1: CPT

## 2023-06-02 LAB — BACTERIA UR CULT: NORMAL

## 2023-06-03 LAB — ZINC SERPL-MCNC: 70 UG/DL (ref 44–115)

## 2023-06-04 LAB
COPPER SERPL-MCNC: 124 UG/DL (ref 80–158)
VIT B1 BLD-SCNC: 125.8 NMOL/L (ref 66.5–200)

## 2023-06-05 ENCOUNTER — TELEPHONE (OUTPATIENT)
Dept: UROLOGY | Facility: CLINIC | Age: 53
End: 2023-06-05

## 2023-06-05 ENCOUNTER — TELEPHONE (OUTPATIENT)
Dept: BARIATRICS | Facility: CLINIC | Age: 53
End: 2023-06-05

## 2023-06-05 NOTE — TELEPHONE ENCOUNTER
Patient was made aware of message below  Can you please assist in scheduling patient a follow up appointment       ----- Message from Lyndsey Baires PA-C sent at 6/4/2023 10:40 AM EDT -----  Please call patient to inform her that her urine testing did not demonstrate a urinary tract infection however there is microscopic blood in the urine   Patient should be seen in office to discuss this further and about further workup

## 2023-06-05 NOTE — TELEPHONE ENCOUNTER
returned [facility] message as follows : You  Yoana Daugherty Just now (11:40 AM)     CHARLIE Yanes,   Would you like to schedule a f/u appointment? We are scheduling at the end of July right now

## 2023-06-06 LAB — VIT A SERPL-MCNC: 27.5 UG/DL (ref 20.1–62)

## 2023-06-09 ENCOUNTER — TELEPHONE (OUTPATIENT)
Dept: UROLOGY | Facility: CLINIC | Age: 53
End: 2023-06-09

## 2023-06-09 ENCOUNTER — OFFICE VISIT (OUTPATIENT)
Age: 53
End: 2023-06-09
Payer: COMMERCIAL

## 2023-06-09 ENCOUNTER — OFFICE VISIT (OUTPATIENT)
Dept: GASTROENTEROLOGY | Facility: CLINIC | Age: 53
End: 2023-06-09
Payer: COMMERCIAL

## 2023-06-09 ENCOUNTER — TELEPHONE (OUTPATIENT)
Dept: GASTROENTEROLOGY | Facility: CLINIC | Age: 53
End: 2023-06-09

## 2023-06-09 ENCOUNTER — TELEPHONE (OUTPATIENT)
Dept: ENDOCRINOLOGY | Facility: CLINIC | Age: 53
End: 2023-06-09

## 2023-06-09 VITALS
BODY MASS INDEX: 24.99 KG/M2 | OXYGEN SATURATION: 98 % | HEIGHT: 65 IN | HEART RATE: 82 BPM | WEIGHT: 150 LBS | SYSTOLIC BLOOD PRESSURE: 120 MMHG | DIASTOLIC BLOOD PRESSURE: 68 MMHG

## 2023-06-09 VITALS
SYSTOLIC BLOOD PRESSURE: 116 MMHG | HEART RATE: 79 BPM | DIASTOLIC BLOOD PRESSURE: 70 MMHG | BODY MASS INDEX: 24.99 KG/M2 | WEIGHT: 150 LBS | HEIGHT: 65 IN | OXYGEN SATURATION: 98 %

## 2023-06-09 DIAGNOSIS — K58.1 IRRITABLE BOWEL SYNDROME WITH CONSTIPATION: Primary | ICD-10-CM

## 2023-06-09 DIAGNOSIS — E21.3 HYPERPARATHYROIDISM (HCC): Primary | ICD-10-CM

## 2023-06-09 PROCEDURE — 99213 OFFICE O/P EST LOW 20 MIN: CPT | Performed by: PHYSICIAN ASSISTANT

## 2023-06-09 PROCEDURE — 99213 OFFICE O/P EST LOW 20 MIN: CPT | Performed by: INTERNAL MEDICINE

## 2023-06-09 RX ORDER — TENAPANOR HYDROCHLORIDE 53.2 MG/1
50 TABLET ORAL 2 TIMES DAILY
Qty: 60 TABLET | Refills: 3 | Status: SHIPPED | OUTPATIENT
Start: 2023-06-09

## 2023-06-09 NOTE — PROGRESS NOTES
[facility]  Mitch Van 47 y o  female MRN: [id number]       CC: Follow-up for IBS, constipation predominant    HPI: Gary Bhatt is a pleasant 69-year-old female known to us for history of IBS and GERD  She also has history of lumbar radiculopathy and is status post gastric sleeve  Patient was last here in April to discuss her constipation symptoms  I had trialed her on IBSRELA, and she reports that this was not giving her diarrhea and increasing her stool frequency  She is interested in a prescription  He continues to have intermittent abdominal pain in the epigastric region  Her last CT scan was in March, and she is already status post cholecystectomy  Patient also has an appointment scheduled with endocrinology for hyperparathyroidism  PTH was 102  Previously normal in 2021  Patient's last EGD was in April 2023 revealing 1 cm hiatal hernia and expected sleeve anatomy  Patient's last colonoscopy was at age 49, and was recommended a 10-year recall  Review of Systems:    CONSTITUTIONAL: Denies any fever, chills, or rigors  Good appetite, and no recent weight loss  HEENT: No earache or tinnitus  Denies hearing loss or visual disturbances  CARDIOVASCULAR: No chest pain or palpitations  RESPIRATORY: Denies any cough, hemoptysis, shortness of breath or dyspnea on exertion  GASTROINTESTINAL: As noted in the History of Present Illness  GENITOURINARY: No problems with urination  Denies any hematuria or dysuria  NEUROLOGIC: No dizziness or vertigo, denies headaches  MUSCULOSKELETAL: Denies any muscle or joint pain  SKIN: Denies skin rashes or itching  ENDOCRINE: Denies excessive thirst  Denies intolerance to heat or cold  PSYCHOSOCIAL: Denies depression or anxiety  Denies any recent memory loss         Current Outpatient Medications   Medication Sig Dispense Refill   • aspirin-acetaminophen-caffeine (EXCEDRIN MIGRAINE) 250-250-65 MG per tablet Take 1 tablet by mouth every 6 (six) hours as needed for headaches     • cyclobenzaprine (FLEXERIL) 10 mg tablet Take 1 tablet (10 mg total) by mouth 3 (three) times a day as needed for muscle spasms 45 tablet 1   • dicyclomine (BENTYL) 20 mg tablet Take 1 tablet (20 mg total) by mouth every 6 (six) hours as needed (abdominal cramping) 120 tablet 3   • famotidine (PEPCID) 40 MG tablet Take 1 tablet (40 mg total) by mouth 2 (two) times a day as needed for heartburn 60 tablet 3   • meloxicam (MOBIC) 15 mg tablet Take 1 tablet (15 mg total) by mouth daily as needed for moderate pain 30 tablet 3   • Tenapanor HCl (Ibsrela) 50 MG TABS Take 50 mg by mouth 2 (two) times a day 60 tablet 3   • aspirin (ECOTRIN LOW STRENGTH) 81 mg EC tablet Take 1 tablet (81 mg total) by mouth daily (Patient not taking: Reported on 4/18/2023) 90 tablet 3   • budesonide 9 mg TB24  (Patient not taking: Reported on 4/18/2023)     • buPROPion (Wellbutrin SR) 150 mg 12 hr tablet Take 1 tablet (150 mg total) by mouth 2 (two) times a day (Patient not taking: Reported on 5/17/2023) 180 tablet 1   • clotrimazole-betamethasone (LOTRISONE) 1-0 05 % cream APPLY CREAM TOPICALLY TO AFFECTED AREAS TWICE DAILY (Patient not taking: Reported on 4/18/2023)     • dicyclomine (BENTYL) 20 mg tablet Take 1 tablet (20 mg total) by mouth every 6 (six) hours as needed (abdominal cramping) (Patient not taking: Reported on 4/18/2023) 90 tablet 2   • docusate sodium (COLACE) 100 mg capsule Take 100 mg by mouth 2 (two) times a day   (Patient not taking: Reported on 4/18/2023)     • fluticasone (FLONASE) 50 mcg/act nasal spray 1 spray into each nostril daily     • naltrexone (REVIA) 50 mg tablet Take by mouth half tab in am and half tab pm (Patient not taking: Reported on 5/17/2023) 30 tablet 1   • Norethin Ace-Eth Estrad-FE (MICROGESTIN FE 1 5/30 PO) Take by mouth (Patient not taking: Reported on 5/17/2023)     • omeprazole (PriLOSEC) 20 mg delayed release capsule Take 20 mg by mouth daily One tablet twice daily 05/17/23     • ondansetron (ZOFRAN) 4 mg tablet Take 1 tablet (4 mg total) by mouth every 8 (eight) hours as needed for nausea or vomiting 20 tablet 0     No current facility-administered medications for this visit  Past Medical History:   Diagnosis Date   • Asthma     seasonal   • Back pain    • Cut of finger 06/2022    cut fingers on left hand   • Kidney stone    • Obesity    • Postgastrectomy malabsorption      Past Surgical History:   Procedure Laterality Date   • CARDIAC CATHETERIZATION Left 11/3/2022    Procedure: Cardiac Left Heart Cath;  Surgeon: Yamila Nj MD;  Location: [facility]; Service: Cardiology   • CARDIAC CATHETERIZATION N/A 11/3/2022    Procedure: Cardiac Coronary Angiogram;  Surgeon: Yamila Nj MD;  Location: [address] [place] [address] CATH LAB; Service: Cardiology   • CHOLECYSTECTOMY     • COLONOSCOPY     • GASTRECTOMY SLEEVE LAPAROSCOPIC     • KNEE ARTHROSCOPY W/ MENISCAL REPAIR Right    • NEUROPLASTY / TRANSPOSITION MEDIAN NERVE AT CARPAL TUNNEL BILATERAL     • DC ESOPHAGOGASTRODUODENOSCOPY TRANSORAL DIAGNOSTIC N/A 04/30/2018    Procedure: ESOPHAGOGASTRODUODENOSCOPY (EGD); Surgeon: Huyen Patterson MD;  Location: [facility];   Service: Gastroenterology     Social History     Socioeconomic History   • Marital status: /Civil Union     Spouse name: None   • Number of children: None   • Years of education: None   • Highest education level: None   Occupational History   • None   Tobacco Use   • Smoking status: Never   • Smokeless tobacco: Never   Vaping Use   • Vaping Use: Some days   • Substances: THC, CBD   Substance and Sexual Activity   • Alcohol use: Not Currently   • Drug use: Yes     Types: Marijuana     Comment: uses pill  (medical marijuania card)   • Sexual activity: Yes     Partners: Male   Other Topics Concern   • None   Social History Narrative   • None     Social Determinants of Health     Financial Resource Strain: Not on file   Food Insecurity: "Not on file   Transportation Needs: Not on file   Physical Activity: Inactive (9/14/2021)    Exercise Vital Sign    • Days of Exercise per Week: 0 days    • Minutes of Exercise per Session: 0 min   Stress: Stress Concern Present (12/5/2022)    [address]    • Feeling of Stress : To some extent   Social Connections: Not on file   Intimate Partner Violence: Not on file   Housing Stability: Not on file     Family History   Problem Relation Age of Onset   • Diabetes Mother    • Kidney disease Mother         end stage   • Diabetes Father    • Other Father         gangrene   • Hypertension Father    • Colon cancer Paternal Grandfather    • Lung cancer Paternal Grandfather    • Breast cancer Maternal Aunt    • Thyroid cancer Maternal Aunt    • Brain cancer Cousin    • Nephrolithiasis Family             PHYSICAL EXAM:    Vitals:    06/09/23 0918   BP: 120/68   BP Location: Left arm   Patient Position: Sitting   Cuff Size: Standard   Pulse: 82   SpO2: 98%   Weight: 68 kg (150 lb)   Height: 5' 5\" (1 651 m)     General Appearance:   Alert and oriented x 3   Cooperative, and in no respiratory distress   HEENT:   Normocephalic, atraumatic, anicteric      Neck:  Supple, symmetrical, trachea midline   Lungs:   Clear to auscultation bilaterally    Heart[de-identified]   Regular rate and rhythm   Abdomen:   Soft, non-tender, non-distended; normal bowel sounds; no masses, no organomegaly    Genitalia:   Deferred    Rectal:   Deferred    Extremities:  No cyanosis, clubbing or edema    Pulses:  2+ and symmetric all extremities    Skin:  Skin color, texture, turgor normal, no rashes or lesions    Lymph nodes:  No palpable cervical or supraclavicular lymphadenopathy        Lab Results:   Results from last 6 Months   Lab Units 06/01/23  1127   EOS PCT % 3   HEMATOCRIT % 38 0   HEMOGLOBIN g/dL 12 1   LYMPHS PCT % 45*   MONOS PCT % 8   NEUTROS PCT % 42*   PLATELETS Thousands/uL 241   WBC " Thousand/uL 3 41*     Results from last 6 Months   Lab Units 06/01/23  1127   ALK PHOS U/L 49   ALT U/L 24   AST U/L 23   BUN mg/dL 20   CALCIUM mg/dL 8 9   CHLORIDE mmol/L 107   CO2 mmol/L 29   CREATININE mg/dL 0 82   POTASSIUM mmol/L 4 0               Imaging Studies: I have personally reviewed pertinent imaging studies  EGD    Result Date: 4/5/2023  Impression: 1 cm hiatal hernia Normal sleeve gastrectomy anatomy RECOMMENDATION: Continue omeprazole Continue Linzess 290 mcg daily High-fiber diet Pathology Will refer to physical therapy for pelvic floor therapy   Laura Spencer III, MD       ASSESSMENT and PLAN:      1) IBS-C - Patient will discontinue Linzess once she receives IBSRELA 50 mg twice daily  Patient is going to see endocrinology for elevated PTH      Follow up in August

## 2023-06-09 NOTE — TELEPHONE ENCOUNTER
Spoke with patient  Advised her of the providers message  If a urine sample is needed per the provider or if the patient is symptomatic at the time of her appointment they will obtain a urine sample at the appointment  Patient verbalized understanding

## 2023-06-09 NOTE — TELEPHONE ENCOUNTER
Pt made appt [facility] 7/28 for f/u hx of kidney stones/flank pain on and off    (see encounter in May)    Does she need urine testing ptv?   Please advise  [phone]

## 2023-06-09 NOTE — TELEPHONE ENCOUNTER
----- Message from Bailee Urbina PA-C sent at 6/9/2023  9:25 AM EDT -----  I sent IBSRELA to [facility], thanks!

## 2023-06-09 NOTE — PROGRESS NOTES
Assessment/Plan:       Diagnoses and all orders for this visit:    Hyperparathyroidism [facility])  -     Ambulatory Referral to Endocrinology; Future                Subjective:      Patient ID: Vivienne Childers is a 47 y o  female  Healthcare maintenance of a 67-year-old  Formerly persistent asthma now occasional episodes  Gastric sleeve surgery about 8 years ago  Maximum weight was about 210  Currently down to about 150  Has reflux symptoms  Recent EGD continues to document some esophagitis  Also seen in April 2018  EGD in 2014 was normal   Continues to follow with GI and will be seen today        Back pain and neck pain and atypical chest pains have all been musculoskeletal     Last year some rashes noted  Complaint of finger pain getting somewhat worse  On inspection fingers are still osteoarthritic and not inflamed but due to the rashes it is not out of the question that we might be developing an inflammatory joint problem  Finally, an abnormal lab test of uncertain significance  Parathyroid hormone level was requested by the bariatrics physician apparently due to nephrolithiasis  This was high  Renal function is completely normal   Serum calcium level is normal   Nevertheless, not out of the question that we are dealing with a very early manifestation of parathyroid disease    Mammogram and colonoscopy are up-to-date  The following portions of the patient's history were reviewed and updated as appropriate:   She has a past medical history of Asthma, Back pain, Cut of finger (06/2022), Kidney stone, Obesity, and Postgastrectomy malabsorption  ,  does not have any pertinent problems on file  ,   has a past surgical history that includes Cholecystectomy; GASTRECTOMY SLEEVE LAPAROSCOPIC; Knee arthroscopy w/ meniscal repair (Right); Neuroplasty / transposition median nerve at carpal tunnel bilateral; pr esophagogastroduodenoscopy transoral diagnostic (N/A, 04/30/2018);  Colonoscopy; Cardiac catheterization (Left, 11/3/2022); and Cardiac catheterization (N/A, 11/3/2022)  ,  family history includes Brain cancer in her cousin; Breast cancer in her maternal aunt; Colon cancer in her paternal grandfather; Diabetes in her father and mother; Hypertension in her father; Kidney disease in her mother; Lung cancer in her paternal grandfather; Nephrolithiasis in her family; Other in her father; Thyroid cancer in her maternal aunt  ,   reports that she has never smoked  She has never used smokeless tobacco  She reports that she does not currently use alcohol  She reports current drug use  Drug: Marijuana  ,  is allergic to other, penicillins, pollen extract, shellfish-derived products - food allergy, prednisone, adhesive [medical tape], and keflex [cephalexin]     Current Outpatient Medications   Medication Sig Dispense Refill   • aspirin-acetaminophen-caffeine (EXCEDRIN MIGRAINE) 250-250-65 MG per tablet Take 1 tablet by mouth every 6 (six) hours as needed for headaches     • cyclobenzaprine (FLEXERIL) 10 mg tablet Take 1 tablet (10 mg total) by mouth 3 (three) times a day as needed for muscle spasms 45 tablet 1   • dicyclomine (BENTYL) 20 mg tablet Take 1 tablet (20 mg total) by mouth every 6 (six) hours as needed (abdominal cramping) 120 tablet 3   • famotidine (PEPCID) 40 MG tablet Take 1 tablet (40 mg total) by mouth 2 (two) times a day as needed for heartburn 60 tablet 3   • fluticasone (FLONASE) 50 mcg/act nasal spray 1 spray into each nostril daily     • linaCLOtide (Linzess) 145 MCG CAPS Take 1 capsule (145 mcg total) by mouth in the morning 90 capsule 2   • meloxicam (MOBIC) 15 mg tablet Take 1 tablet (15 mg total) by mouth daily as needed for moderate pain 30 tablet 3   • omeprazole (PriLOSEC) 20 mg delayed release capsule Take 20 mg by mouth daily One tablet twice daily 05/17/23     • aspirin (ECOTRIN LOW STRENGTH) 81 mg EC tablet Take 1 tablet (81 mg total) by mouth daily (Patient not taking: Reported on 4/18/2023) 90 tablet 3   • budesonide 9 mg TB24  (Patient not taking: Reported on 4/18/2023)     • buPROPion (Wellbutrin SR) 150 mg 12 hr tablet Take 1 tablet (150 mg total) by mouth 2 (two) times a day (Patient not taking: Reported on 5/17/2023) 180 tablet 1   • clotrimazole-betamethasone (LOTRISONE) 1-0 05 % cream APPLY CREAM TOPICALLY TO AFFECTED AREAS TWICE DAILY (Patient not taking: Reported on 4/18/2023)     • dicyclomine (BENTYL) 20 mg tablet Take 1 tablet (20 mg total) by mouth every 6 (six) hours as needed (abdominal cramping) (Patient not taking: Reported on 4/18/2023) 90 tablet 2   • docusate sodium (COLACE) 100 mg capsule Take 100 mg by mouth 2 (two) times a day   (Patient not taking: Reported on 4/18/2023)     • naltrexone (REVIA) 50 mg tablet Take by mouth half tab in am and half tab pm (Patient not taking: Reported on 5/17/2023) 30 tablet 1   • Norethin Ace-Eth Estrad-FE (MICROGESTIN FE 1 5/30 PO) Take by mouth (Patient not taking: Reported on 5/17/2023)     • ondansetron (ZOFRAN) 4 mg tablet Take 1 tablet (4 mg total) by mouth every 8 (eight) hours as needed for nausea or vomiting 20 tablet 0     No current facility-administered medications for this visit  Review of Systems   Musculoskeletal: Positive for arthralgias, back pain and neck pain  All other systems reviewed and are negative  Objective:  Vitals:    06/09/23 0802   BP: 116/70   Pulse: 79   SpO2: 98%      Physical Exam  Constitutional:       Appearance: She is well-developed  HENT:      Head: Normocephalic and atraumatic  Eyes:      General: No scleral icterus  Pupils: Pupils are equal, round, and reactive to light  Neck:      Thyroid: No thyromegaly  Trachea: No tracheal deviation  Cardiovascular:      Rate and Rhythm: Normal rate and regular rhythm  Heart sounds: Normal heart sounds  No murmur heard  No gallop  Pulmonary:      Effort: No respiratory distress  Breath sounds:  No wheezing or rales    Abdominal:      General: Abdomen is flat  Bowel sounds are normal       Palpations: Abdomen is soft  Tenderness: There is no abdominal tenderness  Musculoskeletal:         General: Deformity present  No tenderness  Normal range of motion  Cervical back: Normal range of motion and neck supple  Comments: Fairly minimal osteoarthrosis deformities of the small joints of the fingers  The medial third right DIP has a very early [place] node   Skin:     General: Skin is warm  Neurological:      Mental Status: She is alert and oriented to person, place, and time  Coordination: Coordination normal    Psychiatric:         Judgment: Judgment normal            There are no Patient Instructions on file for this visit

## 2023-06-09 NOTE — TELEPHONE ENCOUNTER
Pa started for IBSRELA 50 mg through covermymeds   (Key: [id number])  Awaiting insurance determination

## 2023-06-09 NOTE — TELEPHONE ENCOUNTER
Patient had recent urine testing    If she is symptomatic at the time of her visit we can determine need for repeat testing

## 2023-06-12 ENCOUNTER — TELEPHONE (OUTPATIENT)
Dept: ADMINISTRATIVE | Facility: OTHER | Age: 53
End: 2023-06-12

## 2023-06-12 DIAGNOSIS — K58.1 IRRITABLE BOWEL SYNDROME WITH CONSTIPATION: Primary | ICD-10-CM

## 2023-06-12 RX ORDER — LUBIPROSTONE 24 UG/1
24 CAPSULE ORAL 2 TIMES DAILY WITH MEALS
Qty: 60 CAPSULE | Refills: 3 | Status: SHIPPED | OUTPATIENT
Start: 2023-06-12

## 2023-06-12 NOTE — TELEPHONE ENCOUNTER
----- Message from Rachell Mina sent at 6/9/2023  8:06 AM EDT -----  Regarding: care gap request  06/09/23 8:06 AM    Hello, our patient attached above has had Pap Smear (HPV) aka Cervical Cancer Screening completed/performed  Please assist in updating the patient chart by pulling the Care Everywhere (CE) document  The date of service is 2023       Thank you,  Rachell [facility]

## 2023-06-12 NOTE — TELEPHONE ENCOUNTER
Called and spoke with patient  informed het that the [facility] was denied due to her not trialing Amitiza  She is willing to try the Amitiza

## 2023-06-13 NOTE — TELEPHONE ENCOUNTER
Upon review of the In Basket request we were able to locate, review, and update the patient chart as requested for Pap Smear (HPV) aka Cervical Cancer Screening  Any additional questions or concerns should be emailed to the Practice Liaisons via the appropriate education email address, please do not reply via In Basket      Thank you  Ella Starks MA

## 2023-06-13 NOTE — TELEPHONE ENCOUNTER
Amitiza 24 mcg was apprved until 6/13/2024      Called and notified pt Amitiza was approved  She voiced understanding

## 2023-06-13 NOTE — TELEPHONE ENCOUNTER
PA started for Amitiza through covermymeds  (Key: [id number]) - [id number]  Awaiting insurance determination

## 2023-07-27 NOTE — PROGRESS NOTES
7/28/2023      Chief Complaint   Patient presents with   • Follow-up     Assessment and Plan    1. Nephrolithiasis   2. Intermittent right flank pain  3. Microhematuria  - CT A/P from 3/14/23 - Left renal lower pole simple cyst.  No hydronephrosis. No stones noted. - UA micro from 6/1/23 showed 10-20 RBCs  - Urine dip today negative. - Obtain imaging. Listed mild contrast allergy. Received contrasted CT in March without adverse reaction. Allergy prep provided. - Recommend cystoscopy for complete work-up which she is agreeable to     History of Present Illness  Jarrod Corss is a 47 y.o. female here for follow up evaluation of new issues of microhematuria. She recently called in with complaints of flank pain in May of this year. Urine testing was obtained which was unremarkable for infection but revealed microscopic blood. Ct from March of this was negative for any stones. She has remote history of ureteral stone with spontaneous in April 2021. No prior history of  surgical manipulation. She reports ongoing intermittent mild right-sided flank pain. She denies any bothersome urinary symptoms currently. Denies any episodes of gross hematuria. She does report that over 10 years ago she believes she had a cystoscopy for similar issue which was negative per her report. She denies any family history of  malignancy. Denies any significant smoking history. Denies any history of chemical exposures. Review of Systems   Constitutional: Negative for chills and fever. Respiratory: Negative for shortness of breath. Cardiovascular: Negative for chest pain. Gastrointestinal: Negative for abdominal pain. Genitourinary: Positive for flank pain. Negative for difficulty urinating, dysuria, frequency, hematuria and urgency. Musculoskeletal: Positive for back pain. Neurological: Negative for dizziness.          Past Medical History  Past Medical History:   Diagnosis Date   • Asthma     seasonal   • Back pain    • Cut of finger 06/2022    cut fingers on left hand   • Kidney stone    • Obesity    • Postgastrectomy malabsorption        Past Social History  Past Surgical History:   Procedure Laterality Date   • CARDIAC CATHETERIZATION Left 11/3/2022    Procedure: Cardiac Left Heart Cath;  Surgeon: Ally Morillo MD;  Location: [facility]; Service: Cardiology   • CARDIAC CATHETERIZATION N/A 11/3/2022    Procedure: Cardiac Coronary Angiogram;  Surgeon: Ally Morillo MD;  Location: [address] [place] [address] CATH LAB; Service: Cardiology   • CHOLECYSTECTOMY     • COLONOSCOPY     • GASTRECTOMY SLEEVE LAPAROSCOPIC     • KNEE ARTHROSCOPY W/ MENISCAL REPAIR Right    • NEUROPLASTY / TRANSPOSITION MEDIAN NERVE AT CARPAL TUNNEL BILATERAL     • VT ESOPHAGOGASTRODUODENOSCOPY TRANSORAL DIAGNOSTIC N/A 04/30/2018    Procedure: ESOPHAGOGASTRODUODENOSCOPY (EGD); Surgeon: Edmond Mccullough MD;  Location: [facility];   Service: Gastroenterology     Social History     Tobacco Use   Smoking Status Never   • Passive exposure: Past   Smokeless Tobacco Never       Past Family History  Family History   Problem Relation Age of Onset   • Diabetes Mother    • Kidney disease Mother         end stage   • Diabetes Father    • Other Father         gangrene   • Hypertension Father    • Colon cancer Paternal Grandfather    • Lung cancer Paternal Grandfather    • Breast cancer Maternal Aunt    • Thyroid cancer Maternal Aunt    • Brain cancer Cousin    • Nephrolithiasis Family        Past Social history  Social History     Socioeconomic History   • Marital status: /Civil Union     Spouse name: Not on file   • Number of children: Not on file   • Years of education: Not on file   • Highest education level: Not on file   Occupational History   • Not on file   Tobacco Use   • Smoking status: Never     Passive exposure: Past   • Smokeless tobacco: Never   Vaping Use   • Vaping Use: Some days   • Substances: THC, CBD   Substance and Sexual Activity   • Alcohol use: Not Currently   • Drug use: Yes     Types: Marijuana     Comment: uses pill  (medical marijuania card)   • Sexual activity: Yes     Partners: Male   Other Topics Concern   • Not on file   Social History Narrative   • Not on file     Social Determinants of Health     Financial Resource Strain: Not on file   Food Insecurity: Not on file   Transportation Needs: Not on file   Physical Activity: Inactive (9/14/2021)    Exercise Vital Sign    • Days of Exercise per Week: 0 days    • Minutes of Exercise per Session: 0 min   Stress: Stress Concern Present (12/5/2022)    [facility]    • Feeling of Stress :  To some extent   Social Connections: Not on file   Intimate Partner Violence: Not on file   Housing Stability: Not on file       Current Medications  Current Outpatient Medications   Medication Sig Dispense Refill   • aspirin-acetaminophen-caffeine (EXCEDRIN MIGRAINE) 250-250-65 MG per tablet Take 1 tablet by mouth every 6 (six) hours as needed for headaches     • cyclobenzaprine (FLEXERIL) 10 mg tablet Take 1 tablet (10 mg total) by mouth 3 (three) times a day as needed for muscle spasms 45 tablet 1   • famotidine (PEPCID) 40 MG tablet Take 1 tablet (40 mg total) by mouth 2 (two) times a day as needed for heartburn 60 tablet 3   • fluticasone (FLONASE) 50 mcg/act nasal spray 1 spray into each nostril daily     • ketorolac (ACULAR) 0.5 % ophthalmic solution      • aspirin (ECOTRIN LOW STRENGTH) 81 mg EC tablet Take 1 tablet (81 mg total) by mouth daily (Patient not taking: Reported on 4/18/2023) 90 tablet 3   • budesonide 9 mg TB24  (Patient not taking: Reported on 4/18/2023)     • buPROPion (Wellbutrin SR) 150 mg 12 hr tablet Take 1 tablet (150 mg total) by mouth 2 (two) times a day (Patient not taking: Reported on 5/17/2023) 180 tablet 1   • clotrimazole-betamethasone (LOTRISONE) 1-0.05 % cream APPLY CREAM TOPICALLY TO AFFECTED AREAS TWICE DAILY (Patient not taking: Reported on 4/18/2023)     • dicyclomine (BENTYL) 20 mg tablet Take 1 tablet (20 mg total) by mouth every 6 (six) hours as needed (abdominal cramping) (Patient not taking: Reported on 7/28/2023) 120 tablet 3   • dicyclomine (BENTYL) 20 mg tablet Take 1 tablet (20 mg total) by mouth every 6 (six) hours as needed (abdominal cramping) (Patient not taking: Reported on 4/18/2023) 90 tablet 2   • docusate sodium (COLACE) 100 mg capsule Take 100 mg by mouth 2 (two) times a day   (Patient not taking: Reported on 4/18/2023)     • lubiprostone (AMITIZA) 24 mcg capsule Take 1 capsule (24 mcg total) by mouth 2 (two) times a day with meals (Patient not taking: Reported on 7/28/2023) 60 capsule 3   • meloxicam (MOBIC) 15 mg tablet Take 1 tablet (15 mg total) by mouth daily as needed for moderate pain (Patient not taking: Reported on 7/28/2023) 30 tablet 3   • naltrexone (REVIA) 50 mg tablet Take by mouth half tab in am and half tab pm (Patient not taking: Reported on 5/17/2023) 30 tablet 1   • Norethin Ace-Eth Estrad-FE (MICROGESTIN FE 1.5/30 PO) Take by mouth (Patient not taking: Reported on 5/17/2023)     • omeprazole (PriLOSEC) 20 mg delayed release capsule Take 20 mg by mouth daily One tablet twice daily 05/17/23 (Patient not taking: Reported on 7/28/2023)     • ondansetron (ZOFRAN) 4 mg tablet Take 1 tablet (4 mg total) by mouth every 8 (eight) hours as needed for nausea or vomiting (Patient not taking: Reported on 7/28/2023) 20 tablet 0   • Tenapanor HCl (Ibsrela) 50 MG TABS Take 50 mg by mouth 2 (two) times a day (Patient not taking: Reported on 7/28/2023) 60 tablet 3     No current facility-administered medications for this visit.        Allergies  Allergies   Allergen Reactions   • Other Hives   • Penicillins Other (See Comments)     Passed out   • Pollen Extract Sneezing and Nasal Congestion   • Shellfish-Derived Products - Food Allergy Hives   • Prednisone Hives   • Adhesive [Medical Tape] Rash   • Keflex [Cephalexin] Rash         The following portions of the patient's history were reviewed and updated as appropriate: allergies, current medications, past medical history, past social history, past surgical history and problem list.      Vitals  Vitals:    07/28/23 0834   Height: 5' 5" (1.651 m)           Physical Exam  Physical Exam  Constitutional:       Appearance: Normal appearance. HENT:      Head: Normocephalic and atraumatic. Right Ear: External ear normal.      Left Ear: External ear normal.      Nose: Nose normal.   Eyes:      General: No scleral icterus. Conjunctiva/sclera: Conjunctivae normal.   Cardiovascular:      Pulses: Normal pulses. Pulmonary:      Effort: Pulmonary effort is normal.   Musculoskeletal:         General: Normal range of motion. Neurological:      General: No focal deficit present. Mental Status: She is alert and oriented to person, place, and time. Psychiatric:         Mood and Affect: Mood normal.         Behavior: Behavior normal.         Thought Content:  Thought content normal.         Judgment: Judgment normal.           Results  Recent Results (from the past 1 hour(s))   POCT urine dip    Collection Time: 07/28/23  8:36 AM   Result Value Ref Range    LEUKOCYTE ESTERASE,UA -     NITRITE,UA -     SL AMB POCT UROBILINOGEN 0.2     POCT URINE PROTEIN -      PH,UA 5.0     BLOOD,UA -     SPECIFIC GRAVITY,UA 1.030     KETONES,UA -     BILIRUBIN,UA -     GLUCOSE, UA -      COLOR,UA Yellow     CLARITY,UA Clear    ]  No results found for: "PSA"  Lab Results   Component Value Date    GLUCOSE 76 12/15/2015    CALCIUM 8.9 06/01/2023     12/15/2015    K 4.0 06/01/2023    CO2 29 06/01/2023     06/01/2023    BUN 20 06/01/2023    CREATININE 0.82 06/01/2023     Lab Results   Component Value Date    WBC 3.41 (L) 06/01/2023    HGB 12.1 06/01/2023    HCT 38.0 06/01/2023    MCV 90 06/01/2023     06/01/2023           Orders  Orders Placed This Encounter   Procedures   • POCT Measure PVR   • POCT urine dip       Dereje Reno

## 2023-07-28 ENCOUNTER — OFFICE VISIT (OUTPATIENT)
Dept: UROLOGY | Facility: CLINIC | Age: 53
End: 2023-07-28

## 2023-07-28 VITALS
SYSTOLIC BLOOD PRESSURE: 104 MMHG | HEIGHT: 65 IN | BODY MASS INDEX: 24.99 KG/M2 | HEART RATE: 59 BPM | OXYGEN SATURATION: 98 % | WEIGHT: 150 LBS | DIASTOLIC BLOOD PRESSURE: 66 MMHG

## 2023-07-28 DIAGNOSIS — N20.0 NEPHROLITHIASIS: Primary | ICD-10-CM

## 2023-07-28 DIAGNOSIS — R31.29 MICROHEMATURIA: ICD-10-CM

## 2023-07-28 DIAGNOSIS — Z91.041 CONTRAST MEDIA ALLERGY: ICD-10-CM

## 2023-07-28 LAB
POST-VOID RESIDUAL VOLUME, ML POC: 8 ML
SL AMB  POCT GLUCOSE, UA: NORMAL
SL AMB LEUKOCYTE ESTERASE,UA: NORMAL
SL AMB POCT BILIRUBIN,UA: NORMAL
SL AMB POCT BLOOD,UA: NORMAL
SL AMB POCT CLARITY,UA: CLEAR
SL AMB POCT COLOR,UA: YELLOW
SL AMB POCT KETONES,UA: NORMAL
SL AMB POCT NITRITE,UA: NORMAL
SL AMB POCT PH,UA: 5
SL AMB POCT SPECIFIC GRAVITY,UA: 1.03
SL AMB POCT URINE PROTEIN: NORMAL
SL AMB POCT UROBILINOGEN: 0.2

## 2023-07-28 RX ORDER — KETOROLAC TROMETHAMINE 5 MG/ML
SOLUTION OPHTHALMIC
COMMUNITY
Start: 2023-07-12

## 2023-07-28 RX ORDER — DIPHENHYDRAMINE HCL 25 MG
TABLET ORAL
Qty: 1 TABLET | Refills: 0 | Status: SHIPPED | OUTPATIENT
Start: 2023-07-28

## 2023-07-28 NOTE — TELEPHONE ENCOUNTER
"Occupational Therapy   Treatment    Name: Tosin Cortez  MRN: [id number]  Admitting Diagnosis:  Frailty syndrome in geriatric patient       Recommendations:     Discharge Recommendations: home, home with home health, home health PT, home health OT  Discharge Equipment Recommendations:  none  Barriers to discharge:  None    Assessment:     Tosin Cortez is a 89 y.o. female with a medical diagnosis of Frailty syndrome in geriatric patient.  She presents with "don't feel good today". Performance deficits affecting function are weakness, impaired endurance, impaired self care skills, impaired functional mobility, gait instability, impaired balance, decreased lower extremity function, orthopedic precautions.     Rehab Prognosis:  Fair; patient would benefit from acute skilled OT services to address these deficits and reach maximum level of function.       Plan:     Patient to be seen 5 x/week to address the above listed problems via self-care/home management, therapeutic activities, therapeutic exercises, wheelchair management/training  Plan of Care Expires: 08/04/23  Plan of Care Reviewed with: patient    Subjective     Chief Complaint: weakness generalized, can't seem to "pick back up as quick this time"  Patient/Family Comments/goals: return home with family per her report, but family is still seeking nursing home placement per SB coordinator reports  Pain/Comfort:       Objective:     Communicated with: pt prior to session.  Patient found up in chair with  (no lines or drains) upon OT entry to room.    General Precautions: Standard, fall    Orthopedic Precautions:RLE anterior precautions  Braces: N/A  Respiratory Status: Nasal cannula, flow 2 L/min     Occupational Performance:     Bed Mobility:    Na with OT tdoay    Functional Mobility/Transfers:  See PT    Activities of Daily Living:  Na with OT today      [facility] 6 Click ADL:      Treatment & Education:  To improve endurance, strength, OT facilitated pt with:  GIN bravo " I do not see that the patient went to a [facility] ER  There is no documentation that the CT scan was done nor issue there for ER visit  If she went to [facility] I will not be able to see those results immediately    If she is a patient in the ER they will give her those results 10min with no RB's throughout, low level resist, R/F motions    To improve reach, AROM, FM/ and trunk rotation with core forward leaning engagement:   Clothespins vertical board BUE weighted with no wristweights  seated Reciprocal pulleys x 5 min in sh flexion and abduction          Patient left up in chair with  current local paper in hand for leisure activity, after performing handwashing with just SBA at sinkside    GOALS:   Multidisciplinary Problems       Occupational Therapy Goals          Problem: Occupational Therapy    Goal Priority Disciplines Outcome Interventions   Occupational Therapy Goal     OT, PT/OT Ongoing, Progressing    Description: STG: within 2 weeks  Pt will perform grooming with independence at sinkside from seated ONGOING/PROGRESSING  Pt will bathe with setup and mod I sinkside ONGOING/PROGRESSING  Pt will perform UE dressing with setup and independently from bedside ONGOING/PROGRESSING  Pt will perform LE dressing with setup and mod I from bedside ONGOING/PROGRESSING  Pt will sit EOB x 30 min with no assistance ONGOING/PROGRESSING  Pt will transfer bed/chair/bsc with mod I ONGOING/PROGRESSING  Pt will perform standing task x 3 min with no assistance ONGOING/PROGRESSING  Pt will tolerate 35 minutes of tx without fatigue MET      LT.Restore to max I with self care and mobility.                          Time Tracking:     OT Date of Treatment: 23  OT Start Time: 1000  OT Stop Time: 1030  OT Total Time (min): 30 min    Billable Minutes:Therapeutic Activity 15  Therapeutic Exercise 15    OT/PATTI: OT          2023

## 2023-08-17 ENCOUNTER — OFFICE VISIT (OUTPATIENT)
Age: 53
End: 2023-08-17
Payer: COMMERCIAL

## 2023-08-17 ENCOUNTER — APPOINTMENT (OUTPATIENT)
Age: 53
End: 2023-08-17
Payer: COMMERCIAL

## 2023-08-17 VITALS
OXYGEN SATURATION: 98 % | BODY MASS INDEX: 24.66 KG/M2 | RESPIRATION RATE: 16 BRPM | WEIGHT: 148 LBS | DIASTOLIC BLOOD PRESSURE: 68 MMHG | HEIGHT: 65 IN | SYSTOLIC BLOOD PRESSURE: 102 MMHG | TEMPERATURE: 97.8 F | HEART RATE: 86 BPM

## 2023-08-17 DIAGNOSIS — M25.542 ARTHRALGIA OF BOTH HANDS: ICD-10-CM

## 2023-08-17 DIAGNOSIS — M79.10 MYALGIA: ICD-10-CM

## 2023-08-17 DIAGNOSIS — M25.541 ARTHRALGIA OF BOTH HANDS: ICD-10-CM

## 2023-08-17 DIAGNOSIS — M25.542 ARTHRALGIA OF BOTH HANDS: Primary | ICD-10-CM

## 2023-08-17 DIAGNOSIS — M25.541 ARTHRALGIA OF BOTH HANDS: Primary | ICD-10-CM

## 2023-08-17 DIAGNOSIS — R31.29 MICROHEMATURIA: ICD-10-CM

## 2023-08-17 LAB
ANA SER QL IA: NEGATIVE
ANION GAP SERPL CALCULATED.3IONS-SCNC: 5 MMOL/L
B BURGDOR IGG+IGM SER QL IA: NEGATIVE
BASOPHILS # BLD AUTO: 0.03 THOUSANDS/ÂΜL (ref 0–0.1)
BASOPHILS NFR BLD AUTO: 1 % (ref 0–1)
BUN SERPL-MCNC: 15 MG/DL (ref 5–25)
CALCIUM SERPL-MCNC: 9.1 MG/DL (ref 8.3–10.1)
CHLORIDE SERPL-SCNC: 109 MMOL/L (ref 96–108)
CK SERPL-CCNC: 213 U/L (ref 26–192)
CO2 SERPL-SCNC: 28 MMOL/L (ref 21–32)
CREAT SERPL-MCNC: 0.72 MG/DL (ref 0.6–1.3)
EOSINOPHIL # BLD AUTO: 0.09 THOUSAND/ÂΜL (ref 0–0.61)
EOSINOPHIL NFR BLD AUTO: 3 % (ref 0–6)
ERYTHROCYTE [DISTWIDTH] IN BLOOD BY AUTOMATED COUNT: 12.1 % (ref 11.6–15.1)
ERYTHROCYTE [SEDIMENTATION RATE] IN BLOOD: 6 MM/HOUR (ref 0–29)
GFR SERPL CREATININE-BSD FRML MDRD: 96 ML/MIN/1.73SQ M
GLUCOSE P FAST SERPL-MCNC: 95 MG/DL (ref 65–99)
HCT VFR BLD AUTO: 36 % (ref 34.8–46.1)
HGB BLD-MCNC: 11.7 G/DL (ref 11.5–15.4)
IMM GRANULOCYTES # BLD AUTO: 0 THOUSAND/UL (ref 0–0.2)
IMM GRANULOCYTES NFR BLD AUTO: 0 % (ref 0–2)
LYMPHOCYTES # BLD AUTO: 1.32 THOUSANDS/ÂΜL (ref 0.6–4.47)
LYMPHOCYTES NFR BLD AUTO: 40 % (ref 14–44)
MCH RBC QN AUTO: 28.7 PG (ref 26.8–34.3)
MCHC RBC AUTO-ENTMCNC: 32.5 G/DL (ref 31.4–37.4)
MCV RBC AUTO: 88 FL (ref 82–98)
MONOCYTES # BLD AUTO: 0.26 THOUSAND/ÂΜL (ref 0.17–1.22)
MONOCYTES NFR BLD AUTO: 8 % (ref 4–12)
NEUTROPHILS # BLD AUTO: 1.61 THOUSANDS/ÂΜL (ref 1.85–7.62)
NEUTS SEG NFR BLD AUTO: 48 % (ref 43–75)
NRBC BLD AUTO-RTO: 0 /100 WBCS
PLATELET # BLD AUTO: 181 THOUSANDS/UL (ref 149–390)
PMV BLD AUTO: 10.4 FL (ref 8.9–12.7)
POTASSIUM SERPL-SCNC: 3.8 MMOL/L (ref 3.5–5.3)
RBC # BLD AUTO: 4.08 MILLION/UL (ref 3.81–5.12)
SODIUM SERPL-SCNC: 142 MMOL/L (ref 135–147)
WBC # BLD AUTO: 3.31 THOUSAND/UL (ref 4.31–10.16)

## 2023-08-17 PROCEDURE — 99213 OFFICE O/P EST LOW 20 MIN: CPT | Performed by: INTERNAL MEDICINE

## 2023-08-17 PROCEDURE — 86038 ANTINUCLEAR ANTIBODIES: CPT

## 2023-08-17 PROCEDURE — 85025 COMPLETE CBC W/AUTO DIFF WBC: CPT

## 2023-08-17 PROCEDURE — 82550 ASSAY OF CK (CPK): CPT

## 2023-08-17 PROCEDURE — 80048 BASIC METABOLIC PNL TOTAL CA: CPT

## 2023-08-17 PROCEDURE — 86618 LYME DISEASE ANTIBODY: CPT

## 2023-08-17 PROCEDURE — 82085 ASSAY OF ALDOLASE: CPT

## 2023-08-17 PROCEDURE — 36415 COLL VENOUS BLD VENIPUNCTURE: CPT

## 2023-08-17 PROCEDURE — 86430 RHEUMATOID FACTOR TEST QUAL: CPT

## 2023-08-17 PROCEDURE — 86200 CCP ANTIBODY: CPT

## 2023-08-17 PROCEDURE — 85652 RBC SED RATE AUTOMATED: CPT

## 2023-08-17 NOTE — PROGRESS NOTES
Assessment/Plan:       Diagnoses and all orders for this visit:    Arthralgia of both hands  -     RUBY Screen w/ Reflex to Titer/Pattern; Future  -     Cyclic citrul peptide antibody, IgG; Future  -     Lyme Disease Serology w/Reflex; Future  -     RF Screen w/ Reflex to Titer; Future  -     Sedimentation rate, automated; Future  -     CK; Future  -     Aldolase; Future  -     CBC and differential; Future  -     Basic metabolic panel; Future    Myalgia  -     RUBY Screen w/ Reflex to Titer/Pattern; Future  -     Cyclic citrul peptide antibody, IgG; Future  -     Lyme Disease Serology w/Reflex; Future  -     RF Screen w/ Reflex to Titer; Future  -     Sedimentation rate, automated; Future  -     CK; Future  -     Aldolase; Future  -     CBC and differential; Future  -     Basic metabolic panel; Future                Subjective:      Patient ID: Milissa Frankel is a 47 y.o. female. Arthralgia and aching pain of the small joints of both hands radiating up to both forearms for a couple of weeks. This is a new symptom. There are no inflammatory findings. There were no systemic symptoms    Chronic problems:   Formerly persistent asthma now occasional episodes  Gastric sleeve surgery about 8 years ago. Maximum weight was about 210. Currently down to about 150. Has reflux symptoms. Recent EGD continues to document some esophagitis. Also seen in April 2018. EGD in 2014 was normal.  Continues to follow with GI and will be seen today.       Back pain and neck pain and atypical chest pains have all been musculoskeletal.    Last year some rashes noted. Complaint of finger pain getting somewhat worse. On inspection fingers are still osteoarthritic and not inflamed but due to the rashes it is not out of the question that we might be developing an inflammatory joint problem. Finally, an abnormal lab test of uncertain significance.   Parathyroid hormone level was requested by the bariatrics physician apparently due to nephrolithiasis. This was high. Renal function is completely normal.  Serum calcium level is normal.  Nevertheless, not out of the question that we are dealing with a very early manifestation of parathyroid disease    Mammogram and colonoscopy are up-to-date. The following portions of the patient's history were reviewed and updated as appropriate:   She has a past medical history of Asthma, Back pain, Cut of finger (06/2022), Kidney stone, Obesity, and Postgastrectomy malabsorption. ,  does not have any pertinent problems on file. ,   has a past surgical history that includes Cholecystectomy; GASTRECTOMY SLEEVE LAPAROSCOPIC; Knee arthroscopy w/ meniscal repair (Right); Neuroplasty / transposition median nerve at carpal tunnel bilateral; pr esophagogastroduodenoscopy transoral diagnostic (N/A, 04/30/2018); Colonoscopy; Cardiac catheterization (Left, 11/3/2022); and Cardiac catheterization (N/A, 11/3/2022). ,  family history includes Brain cancer in her cousin; Breast cancer in her maternal aunt; Colon cancer in her paternal grandfather; Diabetes in her father and mother; Hypertension in her father; Kidney disease in her mother; Lung cancer in her paternal grandfather; Nephrolithiasis in her family; Other in her father; Thyroid cancer in her maternal aunt. ,   reports that she has never smoked. She has been exposed to tobacco smoke. She has never used smokeless tobacco. She reports that she does not currently use alcohol. She reports current drug use. Drug: Marijuana. ,  is allergic to other, penicillins, pollen extract, shellfish-derived products - food allergy, adhesive [medical tape], and keflex [cephalexin]. .  Current Outpatient Medications   Medication Sig Dispense Refill   • aspirin-acetaminophen-caffeine (EXCEDRIN MIGRAINE) 250-250-65 MG per tablet Take 1 tablet by mouth every 6 (six) hours as needed for headaches     • cyclobenzaprine (FLEXERIL) 10 mg tablet Take 1 tablet (10 mg total) by mouth 3 (three) times a day as needed for muscle spasms 45 tablet 1   • diphenhydrAMINE (BENADRYL) 25 mg tablet Take 1 tablet 1 hour prior to CT, must have  as medication can make you drowsy.  1 tablet 0   • famotidine (PEPCID) 40 MG tablet Take 1 tablet (40 mg total) by mouth 2 (two) times a day as needed for heartburn 60 tablet 3   • fluticasone (FLONASE) 50 mcg/act nasal spray 1 spray into each nostril daily     • ketorolac (ACULAR) 0.5 % ophthalmic solution      • aspirin (ECOTRIN LOW STRENGTH) 81 mg EC tablet Take 1 tablet (81 mg total) by mouth daily (Patient not taking: Reported on 4/18/2023) 90 tablet 3   • buPROPion (Wellbutrin SR) 150 mg 12 hr tablet Take 1 tablet (150 mg total) by mouth 2 (two) times a day (Patient not taking: Reported on 5/17/2023) 180 tablet 1   • dicyclomine (BENTYL) 20 mg tablet Take 1 tablet (20 mg total) by mouth every 6 (six) hours as needed (abdominal cramping) (Patient not taking: Reported on 7/28/2023) 120 tablet 3   • dicyclomine (BENTYL) 20 mg tablet Take 1 tablet (20 mg total) by mouth every 6 (six) hours as needed (abdominal cramping) (Patient not taking: Reported on 4/18/2023) 90 tablet 2   • lubiprostone (AMITIZA) 24 mcg capsule Take 1 capsule (24 mcg total) by mouth 2 (two) times a day with meals (Patient not taking: Reported on 7/28/2023) 60 capsule 3   • meloxicam (MOBIC) 15 mg tablet Take 1 tablet (15 mg total) by mouth daily as needed for moderate pain (Patient not taking: Reported on 7/28/2023) 30 tablet 3   • Norethin Ace-Eth Estrad-FE (MICROGESTIN FE 1.5/30 PO) Take by mouth (Patient not taking: Reported on 5/17/2023)     • omeprazole (PriLOSEC) 20 mg delayed release capsule Take 20 mg by mouth daily One tablet twice daily 05/17/23 (Patient not taking: Reported on 7/28/2023)     • ondansetron (ZOFRAN) 4 mg tablet Take 1 tablet (4 mg total) by mouth every 8 (eight) hours as needed for nausea or vomiting (Patient not taking: Reported on 7/28/2023) 20 tablet 0   • Tenapanor HCl (Ibsrela) 50 MG TABS Take 50 mg by mouth 2 (two) times a day (Patient not taking: Reported on 7/28/2023) 60 tablet 3     No current facility-administered medications for this visit. Review of Systems   Musculoskeletal: Positive for arthralgias. All other systems reviewed and are negative. Objective:  Vitals:    08/17/23 1105   BP: 102/68   Pulse: 86   Resp: 16   Temp: 97.8 °F (36.6 °C)   SpO2: 98%      Physical Exam  Constitutional:       Appearance: Normal appearance. Musculoskeletal:         General: No swelling, tenderness or deformity. Neurological:      General: No focal deficit present. Mental Status: She is alert. Patient Instructions   Arthralgias without inflammatory findings: Screen for inflammatory disease.

## 2023-08-18 ENCOUNTER — TELEPHONE (OUTPATIENT)
Age: 53
End: 2023-08-18

## 2023-08-18 DIAGNOSIS — M60.9 MYOSITIS OF MULTIPLE SITES, UNSPECIFIED MYOSITIS TYPE: Primary | ICD-10-CM

## 2023-08-18 LAB
ALDOLASE SERPL-CCNC: 3.9 U/L (ref 3.3–10.3)
CCP AB SER IA-ACNC: 1.2
RHEUMATOID FACT SER QL LA: NEGATIVE

## 2023-08-18 RX ORDER — PREDNISONE 10 MG/1
TABLET ORAL
Qty: 30 TABLET | Refills: 0 | Status: SHIPPED | OUTPATIENT
Start: 2023-08-18

## 2023-08-18 NOTE — TELEPHONE ENCOUNTER
Pt notified and stated she has been taken Ibuprofen 600 mg which is not helping. Would like a new prescription place for pain until she is able to go in to see the Rheumatologist. Pt stated she is up 3 to 4 times at nights because of pain.

## 2023-08-18 NOTE — TELEPHONE ENCOUNTER
----- Message from Dee Hayes MD sent at 8/18/2023 11:29 AM EDT -----  Please call the patient regarding her abnormal result. Lightly elevated "creatinine kinase "level indicating muscle inflammation. She should see a rheumatologist about this.   I placed a consult this had been noted in December 2018 but I actually did not see her until almost a year later and by the time she came into see me it had normalized

## 2023-08-21 NOTE — TELEPHONE ENCOUNTER
There is no test for fibromyalgia it is a "diagnosis of exclusion ".   Has she been to a rheumatologist  ?

## 2023-08-21 NOTE — TELEPHONE ENCOUNTER
Called patient.  And was notified and she can not take the prednisone and she would like to be tested for fibro myalgia as she has pain in hands, feet and all her bones no swelling and does not want to take med she does not need

## 2023-08-21 NOTE — TELEPHONE ENCOUNTER
Called patient.  And was notified stated she saw rheumatology in 2018 unable to find office visit she will be calling back with name stated it was in [place]

## 2023-08-31 ENCOUNTER — HOSPITAL ENCOUNTER (OUTPATIENT)
Dept: CT IMAGING | Facility: HOSPITAL | Age: 53
End: 2023-08-31
Payer: COMMERCIAL

## 2023-08-31 DIAGNOSIS — R31.29 MICROHEMATURIA: ICD-10-CM

## 2023-08-31 PROCEDURE — G1004 CDSM NDSC: HCPCS

## 2023-08-31 PROCEDURE — 74178 CT ABD&PLV WO CNTR FLWD CNTR: CPT

## 2023-08-31 RX ADMIN — IOHEXOL 100 ML: 350 INJECTION, SOLUTION INTRAVENOUS at 16:19

## 2023-09-25 RX ORDER — DIPHENHYDRAMINE HCL 25 MG/1
CAPSULE ORAL
COMMUNITY
Start: 2023-08-07

## 2023-09-25 RX ORDER — IBUPROFEN 600 MG/1
600 TABLET ORAL 3 TIMES DAILY PRN
COMMUNITY
Start: 2023-09-10

## 2023-09-28 ENCOUNTER — PROCEDURE VISIT (OUTPATIENT)
Dept: UROLOGY | Facility: MEDICAL CENTER | Age: 53
End: 2023-09-28
Payer: COMMERCIAL

## 2023-09-28 VITALS
WEIGHT: 156 LBS | HEART RATE: 84 BPM | DIASTOLIC BLOOD PRESSURE: 60 MMHG | BODY MASS INDEX: 25.99 KG/M2 | OXYGEN SATURATION: 98 % | HEIGHT: 65 IN | SYSTOLIC BLOOD PRESSURE: 120 MMHG

## 2023-09-28 DIAGNOSIS — R31.29 MICROSCOPIC HEMATURIA: Primary | ICD-10-CM

## 2023-09-28 LAB
SL AMB  POCT GLUCOSE, UA: ABNORMAL
SL AMB LEUKOCYTE ESTERASE,UA: ABNORMAL
SL AMB POCT BILIRUBIN,UA: ABNORMAL
SL AMB POCT BLOOD,UA: ABNORMAL
SL AMB POCT CLARITY,UA: CLEAR
SL AMB POCT COLOR,UA: YELLOW
SL AMB POCT KETONES,UA: ABNORMAL
SL AMB POCT NITRITE,UA: ABNORMAL
SL AMB POCT PH,UA: 6
SL AMB POCT SPECIFIC GRAVITY,UA: 1.01
SL AMB POCT URINE PROTEIN: ABNORMAL
SL AMB POCT UROBILINOGEN: 0.2

## 2023-09-28 PROCEDURE — 52000 CYSTOURETHROSCOPY: CPT | Performed by: UROLOGY

## 2023-09-28 PROCEDURE — 81003 URINALYSIS AUTO W/O SCOPE: CPT | Performed by: UROLOGY

## 2023-09-28 RX ORDER — SULFAMETHOXAZOLE AND TRIMETHOPRIM 800; 160 MG/1; MG/1
1 TABLET ORAL EVERY 12 HOURS SCHEDULED
Qty: 4 TABLET | Refills: 0 | Status: SHIPPED | OUTPATIENT
Start: 2023-09-28 | End: 2023-09-30

## 2023-09-28 NOTE — LETTER
September 28, 2023     Rock Thurman MD    Patient: Coreen Soulier   YOB: 1970   Date of Visit: 9/28/2023       Dear Dr. Lurene Bernheim: Thank you for referring Rosi Caceres to me for evaluation. Below are my notes for this consultation. If you have questions, please do not hesitate to call me. I look forward to following your patient along with you. Sincerely,        Froy Santizo MD        CC: No Recipients    Froy Santizo MD  9/28/2023  9:16 AM  Sign when Signing Visit     Cystoscopy     Date/Time 9/28/2023 9:00 AM     Performed by  Froy Santizo MD   Authorized by Froy Santizo MD     [place] Protocol:  Consent: Verbal consent obtained. Written consent obtained. Risks and benefits: risks, benefits and alternatives were discussed  Consent given by: patient  Time out: Immediately prior to procedure a "time out" was called to verify the correct patient, procedure, equipment, support staff and site/side marked as required. Patient understanding: patient states understanding of the procedure being performed  Patient consent: the patient's understanding of the procedure matches consent given  Procedure consent: procedure consent matches procedure scheduled  Required items: required blood products, implants, devices, and special equipment available  Patient identity confirmed: verbally with patient        Procedure Details:  Procedure type: cystoscopy    Patient tolerance: Patient tolerated the procedure well with no immediate complications    Additional Procedure Details: With the patient in dorsolithotomy position and after prepping the urethral meatus with Betadine flexible video cystourethroscopy revealed a normal bladder and urethra without intrinsic lesion stricture or extrinsic mass compression. There was minimal trabeculation. Both ureteral orifices were in normal position and configuration bilaterally with clear E flux bilaterally.   Retroflexion revealed a normal bladder neck. Cystoscopy was normal.  The patient tolerated the procedure well without difficulty. Microscopic hematuria work-up is normal and without pathology identified.   CT reviewed with patient as well

## 2023-09-28 NOTE — TELEPHONE ENCOUNTER
Normal skin bacteria only
Notified patient
Patient did a wound culture and she would like to know the results          [phone]
No

## 2023-09-28 NOTE — PROGRESS NOTES
Cystoscopy     Date/Time 9/28/2023 9:00 AM     Performed by  Chris De Guzman MD   Authorized by Chris De Guzman MD     Universal Protocol:  Consent: Verbal consent obtained. Written consent obtained. Risks and benefits: risks, benefits and alternatives were discussed  Consent given by: patient  Time out: Immediately prior to procedure a "time out" was called to verify the correct patient, procedure, equipment, support staff and site/side marked as required. Patient understanding: patient states understanding of the procedure being performed  Patient consent: the patient's understanding of the procedure matches consent given  Procedure consent: procedure consent matches procedure scheduled  Required items: required blood products, implants, devices, and special equipment available  Patient identity confirmed: verbally with patient        Procedure Details:  Procedure type: cystoscopy    Patient tolerance: Patient tolerated the procedure well with no immediate complications    Additional Procedure Details: With the patient in dorsolithotomy position and after prepping the urethral meatus with Betadine flexible video cystourethroscopy revealed a normal bladder and urethra without intrinsic lesion stricture or extrinsic mass compression. There was minimal trabeculation. Both ureteral orifices were in normal position and configuration bilaterally with clear E flux bilaterally. Retroflexion revealed a normal bladder neck. Cystoscopy was normal.  The patient tolerated the procedure well without difficulty. Microscopic hematuria work-up is normal and without pathology identified.   CT reviewed with patient as well

## 2023-10-04 ENCOUNTER — CONSULT (OUTPATIENT)
Age: 53
End: 2023-10-04
Payer: COMMERCIAL

## 2023-10-04 VITALS
BODY MASS INDEX: 25.46 KG/M2 | HEIGHT: 65 IN | SYSTOLIC BLOOD PRESSURE: 116 MMHG | WEIGHT: 152.8 LBS | DIASTOLIC BLOOD PRESSURE: 78 MMHG

## 2023-10-04 DIAGNOSIS — E21.3 HYPERPARATHYROIDISM (HCC): Primary | ICD-10-CM

## 2023-10-04 PROCEDURE — 99204 OFFICE O/P NEW MOD 45 MIN: CPT | Performed by: INTERNAL MEDICINE

## 2023-10-04 RX ORDER — CETIRIZINE HYDROCHLORIDE 10 MG/1
10 TABLET ORAL DAILY
COMMUNITY

## 2023-10-04 NOTE — PROGRESS NOTES
Chandra Bagley 47 y.o. female MRN: [id number]    Encounter: [id number]      Assessment/Plan     Assessment: This is a 47y.o.-year-old female referred for "hyperparathyroidism" evaluation. Pertinent information:    1-In 2021 following an evaluation for flank pain she was found to have stone in her left ureter. At that time her PTH was 75 with within normal range serum calcium level. Since that time she has had many kidney/ bladder imaging for pain and microhematuria which none showed any nephrolithiasis anymore. She has had two other PTH levels in past few months ; 102 in 03/23 with serum ca of 8.9 and most recent one 48 with 25 OHD of 26. She has h/o gastric sleeve surgery ( 2018) and has lost more than 100 pounds weight since then. She is being evaluated for diffuse arthralgia specially hand pain recently. She has just stopped taking OCP few months ago. She denies having h/o bone FX , nice teeth and unaware of any  [address] of similar issue. Hyperparathyroidsm has been reported after gastric sleeve surgery . Her available lab data are not very convincing for "primary hyperparathyroidism" and I prefer to repeat the data with some added elements. See plan    Plan:  PTH, vit D metabolites, urine calcium , serum ca/ alb  DXA scan    CC:   Increased PTH    History of Present Illness     HPI:  See assessment    Review of Systems   Constitutional: Negative for appetite change, diaphoresis and unexpected weight change. HENT: Negative for trouble swallowing and voice change. Eyes: Negative for visual disturbance. Respiratory: Negative for chest tightness and shortness of breath. Cardiovascular: Negative for chest pain and palpitations. Gastrointestinal: Negative for constipation, diarrhea, nausea and vomiting. Endocrine: Negative for cold intolerance and heat intolerance. Genitourinary: Negative for difficulty urinating. Musculoskeletal: Positive for arthralgias and myalgias.    Skin: Negative for rash.   Neurological: Negative for dizziness, tremors and weakness. Psychiatric/Behavioral: Negative for sleep disturbance. The patient is not nervous/anxious. Historical Information   Past Medical History:   Diagnosis Date   • Asthma     seasonal   • Back pain    • Cut of finger 06/2022    cut fingers on left hand   • Kidney stone    • Obesity    • Postgastrectomy malabsorption      Past Surgical History:   Procedure Laterality Date   • CARDIAC CATHETERIZATION Left 11/3/2022    Procedure: Cardiac Left Heart Cath;  Surgeon: Terrence Fonseca MD;  Location: [facility]; Service: Cardiology   • CARDIAC CATHETERIZATION N/A 11/3/2022    Procedure: Cardiac Coronary Angiogram;  Surgeon: Terrence Fonseca MD;  Location: [address] [place] [address] CATH LAB; Service: Cardiology   • CHOLECYSTECTOMY     • COLONOSCOPY     • GASTRECTOMY SLEEVE LAPAROSCOPIC     • KNEE ARTHROSCOPY W/ MENISCAL REPAIR Right    • NEUROPLASTY / TRANSPOSITION MEDIAN NERVE AT CARPAL TUNNEL BILATERAL     • ID ESOPHAGOGASTRODUODENOSCOPY TRANSORAL DIAGNOSTIC N/A 04/30/2018    Procedure: ESOPHAGOGASTRODUODENOSCOPY (EGD); Surgeon: Shannan Gonzalez MD;  Location: [facility];   Service: Gastroenterology     Social History   Social History     Substance and Sexual Activity   Alcohol Use Not Currently     Social History     Substance and Sexual Activity   Drug Use Yes   • Types: Marijuana    Comment: uses pill  (medical marijuania card)     Social History     Tobacco Use   Smoking Status Never   • Passive exposure: Past   Smokeless Tobacco Never     Family History:   Family History   Problem Relation Age of Onset   • Diabetes Mother    • Kidney disease Mother         end stage   • Diabetes Father    • Other Father         gangrene   • Hypertension Father    • Colon cancer Paternal Grandfather    • Lung cancer Paternal Grandfather    • Breast cancer Maternal Aunt    • Thyroid cancer Maternal Aunt    • Brain cancer Cousin    • Nephrolithiasis Family Meds/Allergies   Current Outpatient Medications   Medication Sig Dispense Refill   • aspirin-acetaminophen-caffeine (EXCEDRIN MIGRAINE) 250-250-65 MG per tablet Take 1 tablet by mouth every 6 (six) hours as needed for headaches     • cetirizine (ZyrTEC) 10 mg tablet Take 10 mg by mouth daily     • cyclobenzaprine (FLEXERIL) 10 mg tablet Take 1 tablet (10 mg total) by mouth 3 (three) times a day as needed for muscle spasms 45 tablet 1   • famotidine (PEPCID) 40 MG tablet Take 1 tablet (40 mg total) by mouth 2 (two) times a day as needed for heartburn 60 tablet 3   • fluticasone (FLONASE) 50 mcg/act nasal spray 1 spray into each nostril daily     • ibuprofen (MOTRIN) 600 mg tablet Take 600 mg by mouth 3 (three) times a day as needed Take with food     • ketorolac (ACULAR) 0.5 % ophthalmic solution      • aspirin (ECOTRIN LOW STRENGTH) 81 mg EC tablet Take 1 tablet (81 mg total) by mouth daily (Patient not taking: Reported on 10/4/2023) 90 tablet 3   • buPROPion (Wellbutrin SR) 150 mg 12 hr tablet Take 1 tablet (150 mg total) by mouth 2 (two) times a day (Patient not taking: Reported on 10/4/2023) 180 tablet 1   • dicyclomine (BENTYL) 20 mg tablet Take 1 tablet (20 mg total) by mouth every 6 (six) hours as needed (abdominal cramping) (Patient not taking: Reported on 10/4/2023) 120 tablet 3   • dicyclomine (BENTYL) 20 mg tablet Take 1 tablet (20 mg total) by mouth every 6 (six) hours as needed (abdominal cramping) (Patient not taking: Reported on 10/4/2023) 90 tablet 2   • Diphenhist 25 MG capsule TAKE 1 TABLET PRIOR TO CT, MUST HAVE  AS MEDICATION CAN MAKE YOU DROWSY. (Patient not taking: Reported on 10/4/2023)     • diphenhydrAMINE (BENADRYL) 25 mg tablet Take 1 tablet 1 hour prior to CT, must have  as medication can make you drowsy.  (Patient not taking: Reported on 10/4/2023) 1 tablet 0   • lubiprostone (AMITIZA) 24 mcg capsule Take 1 capsule (24 mcg total) by mouth 2 (two) times a day with meals (Patient not taking: Reported on 10/4/2023) 60 capsule 3   • meloxicam (MOBIC) 15 mg tablet Take 1 tablet (15 mg total) by mouth daily as needed for moderate pain (Patient not taking: Reported on 10/4/2023) 30 tablet 3   • Norethin Ace-Eth Estrad-FE (Manjula Shannon FE 1.5/30 PO) Take by mouth (Patient not taking: Reported on 10/4/2023)     • omeprazole (PriLOSEC) 20 mg delayed release capsule Take 20 mg by mouth daily One tablet twice daily 05/17/23 (Patient not taking: Reported on 10/4/2023)     • ondansetron (ZOFRAN) 4 mg tablet Take 1 tablet (4 mg total) by mouth every 8 (eight) hours as needed for nausea or vomiting (Patient not taking: Reported on 10/4/2023) 20 tablet 0   • predniSONE 10 mg tablet 4 DAILY FOR 3 DAYS,3 DAILY FOR 3 DAYS,2 DAILY FOR 3 DAYS, ONE DAILY FOR 3 DAYS (Patient not taking: Reported on 10/4/2023) 30 tablet 0   • Tenapanor HCl (Ibsrela) 50 MG TABS Take 50 mg by mouth 2 (two) times a day (Patient not taking: Reported on 10/4/2023) 60 tablet 3     No current facility-administered medications for this visit. Allergies   Allergen Reactions   • Other Hives   • Penicillins Other (See Comments)     Passed out   • Pollen Extract Sneezing and Nasal Congestion   • Shellfish-Derived Products - Food Allergy Hives   • Adhesive [Medical Tape] Rash   • Keflex [Cephalexin] Rash       Objective   Vitals: Blood pressure 116/78, height 5' 5" (1.651 m), weight 69.3 kg (152 lb 12.8 oz), last menstrual period 08/16/2021, not currently breastfeeding. Physical Exam  Vitals reviewed. Constitutional:       Appearance: Normal appearance. She is not ill-appearing. HENT:      Head: Normocephalic. Mouth/Throat:      Mouth: Mucous membranes are moist.   Eyes:      Extraocular Movements: Extraocular movements intact. Neck:      Thyroid: No thyromegaly. Vascular: No carotid bruit. Cardiovascular:      Rate and Rhythm: Normal rate and regular rhythm. Pulses: Normal pulses.       Heart sounds: Normal heart sounds. No murmur heard. Pulmonary:      Breath sounds: Normal breath sounds. No wheezing. Abdominal:      Palpations: Abdomen is soft. Tenderness: There is no abdominal tenderness. Musculoskeletal:      Right lower leg: No edema. Left lower leg: No edema. Lymphadenopathy:      Cervical: No cervical adenopathy. Skin:     Findings: No rash. Neurological:      General: No focal deficit present. Mental Status: She is oriented to person, place, and time. Cranial Nerves: No cranial nerve deficit. Psychiatric:         Mood and Affect: Mood normal.         Behavior: Behavior normal.         The history was obtained from the review of the chart, patient. Lab Results:   Lab Results   Component Value Date/Time    Potassium 3.8 08/17/2023 11:57 AM    Potassium 4.0 06/01/2023 11:27 AM    Potassium 4.0 03/14/2023 12:38 PM    Chloride 109 (H) 08/17/2023 11:57 AM    Chloride 107 06/01/2023 11:27 AM    Chloride 104 03/14/2023 12:38 PM    CO2 28 08/17/2023 11:57 AM    CO2 29 06/01/2023 11:27 AM    CO2 29 03/14/2023 12:38 PM    BUN 15 08/17/2023 11:57 AM    BUN 20 06/01/2023 11:27 AM    BUN 12 03/14/2023 12:38 PM    Creatinine 0.72 08/17/2023 11:57 AM    Creatinine 0.82 06/01/2023 11:27 AM    Creatinine 0.94 03/14/2023 12:38 PM    Glucose, Fasting 95 08/17/2023 11:57 AM    Glucose, Fasting 88 06/01/2023 11:27 AM    Glucose, Fasting 90 11/03/2022 07:49 AM    Calcium 9.1 08/17/2023 11:57 AM    Calcium 8.9 06/01/2023 11:27 AM    Calcium 9.3 03/14/2023 12:38 PM    eGFR 96 08/17/2023 11:57 AM    eGFR 82 06/01/2023 11:27 AM    eGFR 69 03/14/2023 12:38 PM    TSH 3RD GENERATON 2.224 06/01/2023 11:27 AM    .4 (H) 06/01/2023 11:27 AM    Vit D, 25-Hydroxy 35.3 06/01/2023 11:27 AM         Imaging Studies:         Results for orders placed during the hospital encounter of 12/12/19    DXA bone density spine hip and pelvis    Impression  1. The Z-scores in the lumbar spine and total hip lie within the expected range for age. 2. A daily intake of calcium of at least 1200 mg and vitamin D, 800-1000 IU, as well as weight bearing and muscle strengthening exercise, fall prevention and avoidance of tobacco and excessive alcohol intake as basic preventive measures are recommended. 3. Repeat DXA scan on the same equipment in 18-24 months as clinically indicated. The 10 year risk of hip fracture is less than 0.1%, with the 10 year risk of major osteoporotic fracture being 6.1%, as calculated by the [facility] fracture risk assessment tool (FRAX). The current NOF guidelines recommend treating patients with FRAX 10 year  risk score of >3% for hip fracture and >20% for major osteoporotic fracture. WHO CLASSIFICATION:  Normal (a T-score of -1.0 or higher)  Low bone mineral density (a T-score of less than -1.0 but higher than -2.5)  Osteoporosis (a T-score of -2.5 or less)  Severe osteoporosis (a T-score of -2.5 or less with a fragility fracture)                Workstation performed: [id number]            I have personally reviewed pertinent reports. Portions of the record may have been created with voice recognition software. Occasional wrong word or "sound a like" substitutions may have occurred due to the inherent limitations of voice recognition software. Read the chart carefully and recognize, using context, where substitutions have occurred.

## 2024-02-21 PROBLEM — Z00.00 HEALTH MAINTENANCE EXAMINATION: Status: RESOLVED | Noted: 2018-02-13 | Resolved: 2024-02-21

## 2024-06-12 ENCOUNTER — OFFICE VISIT (OUTPATIENT)
Age: 54
End: 2024-06-12
Payer: COMMERCIAL

## 2024-06-12 VITALS
WEIGHT: 173.6 LBS | HEART RATE: 71 BPM | BODY MASS INDEX: 28.92 KG/M2 | HEIGHT: 65 IN | OXYGEN SATURATION: 99 % | SYSTOLIC BLOOD PRESSURE: 130 MMHG | DIASTOLIC BLOOD PRESSURE: 70 MMHG | TEMPERATURE: 95.3 F

## 2024-06-12 DIAGNOSIS — Z13.6 ENCOUNTER FOR LIPID SCREENING FOR CARDIOVASCULAR DISEASE: ICD-10-CM

## 2024-06-12 DIAGNOSIS — G89.29 CHRONIC PAIN OF BOTH SHOULDERS: ICD-10-CM

## 2024-06-12 DIAGNOSIS — E78.5 DYSLIPIDEMIA: ICD-10-CM

## 2024-06-12 DIAGNOSIS — R73.9 ELEVATED SERUM GLUCOSE: ICD-10-CM

## 2024-06-12 DIAGNOSIS — M25.511 CHRONIC PAIN OF BOTH SHOULDERS: ICD-10-CM

## 2024-06-12 DIAGNOSIS — M25.512 CHRONIC PAIN OF BOTH SHOULDERS: ICD-10-CM

## 2024-06-12 DIAGNOSIS — E66.3 OVERWEIGHT: ICD-10-CM

## 2024-06-12 DIAGNOSIS — E55.9 VITAMIN D DEFICIENCY: ICD-10-CM

## 2024-06-12 DIAGNOSIS — Z98.84 S/P LAPAROSCOPIC SLEEVE GASTRECTOMY: ICD-10-CM

## 2024-06-12 DIAGNOSIS — J31.0 CHRONIC RHINITIS: ICD-10-CM

## 2024-06-12 DIAGNOSIS — Z13.220 ENCOUNTER FOR LIPID SCREENING FOR CARDIOVASCULAR DISEASE: ICD-10-CM

## 2024-06-12 DIAGNOSIS — K58.1 IRRITABLE BOWEL SYNDROME WITH CONSTIPATION: ICD-10-CM

## 2024-06-12 DIAGNOSIS — Z00.00 HEALTHCARE MAINTENANCE: Primary | ICD-10-CM

## 2024-06-12 DIAGNOSIS — Z12.31 ENCOUNTER FOR SCREENING MAMMOGRAM FOR MALIGNANT NEOPLASM OF BREAST: ICD-10-CM

## 2024-06-12 PROBLEM — R10.31 CHRONIC RIGHT LOWER QUADRANT PAIN: Status: RESOLVED | Noted: 2021-05-04 | Resolved: 2024-06-12

## 2024-06-12 PROBLEM — Z28.21 COVID-19 VACCINE DOSE DECLINED: Status: RESOLVED | Noted: 2021-09-14 | Resolved: 2024-06-12

## 2024-06-12 PROBLEM — R10.9 FLANK PAIN: Status: RESOLVED | Noted: 2021-07-12 | Resolved: 2024-06-12

## 2024-06-12 PROBLEM — R10.2 PELVIC PAIN: Status: RESOLVED | Noted: 2021-07-12 | Resolved: 2024-06-12

## 2024-06-12 PROBLEM — R10.9 ABDOMINAL PAIN: Status: RESOLVED | Noted: 2018-04-20 | Resolved: 2024-06-12

## 2024-06-12 PROBLEM — R21 RASH: Status: RESOLVED | Noted: 2022-06-30 | Resolved: 2024-06-12

## 2024-06-12 PROBLEM — H92.02 LEFT EAR PAIN: Status: RESOLVED | Noted: 2019-11-15 | Resolved: 2024-06-12

## 2024-06-12 PROBLEM — T63.461A WASP STING: Status: RESOLVED | Noted: 2021-09-14 | Resolved: 2024-06-12

## 2024-06-12 PROBLEM — R79.89 ELEVATED TSH: Status: RESOLVED | Noted: 2020-12-21 | Resolved: 2024-06-12

## 2024-06-12 PROBLEM — R11.0 NAUSEA: Status: RESOLVED | Noted: 2021-07-12 | Resolved: 2024-06-12

## 2024-06-12 PROBLEM — R07.89 OTHER CHEST PAIN: Status: RESOLVED | Noted: 2020-12-21 | Resolved: 2024-06-12

## 2024-06-12 PROBLEM — M79.10 MYALGIA: Status: RESOLVED | Noted: 2023-08-17 | Resolved: 2024-06-12

## 2024-06-12 PROBLEM — R58 ECCHYMOSIS: Status: RESOLVED | Noted: 2021-10-04 | Resolved: 2024-06-12

## 2024-06-12 PROBLEM — E66.9 OBESITY: Status: RESOLVED | Noted: 2018-06-08 | Resolved: 2024-06-12

## 2024-06-12 PROBLEM — R19.7 DIARRHEA: Status: RESOLVED | Noted: 2021-07-12 | Resolved: 2024-06-12

## 2024-06-12 PROCEDURE — 99214 OFFICE O/P EST MOD 30 MIN: CPT | Performed by: FAMILY MEDICINE

## 2024-06-12 PROCEDURE — 99396 PREV VISIT EST AGE 40-64: CPT | Performed by: FAMILY MEDICINE

## 2024-06-12 RX ORDER — CETIRIZINE HYDROCHLORIDE 10 MG/1
10 TABLET ORAL DAILY
Qty: 90 TABLET | Refills: 0 | Status: SHIPPED | OUTPATIENT
Start: 2024-06-12 | End: 2024-09-10

## 2024-06-12 RX ORDER — FLUTICASONE PROPIONATE 50 MCG
1 SPRAY, SUSPENSION (ML) NASAL DAILY
Qty: 15.8 ML | Refills: 6 | Status: SHIPPED | OUTPATIENT
Start: 2024-06-12

## 2024-06-12 NOTE — PROGRESS NOTES
[facility] PRIMARY CARE  Adult Annual Physical    Name: Roselia Carmona      YOB: 1970      MRN: [id number]  Encounter Provider: Vernon Longoria MD      Encounter Date: 06/12/24      ASSESSMENT & PLAN      Counseling  Alcohol/drug use: discussed moderation in alcohol intake, the recommendations for healthy alcohol use, and avoidance of illicit drug use.  Dental Health: discussed importance of regular tooth brushing, flossing, and dental visits.  Injury prevention: discussed safety/seat belts, safety helmets, smoke detectors, carbon dioxide detectors, and smoking near bedding or upholstery.  Sexual health: discussed sexually transmitted diseases, partner selection, use of condoms, avoidance of unintended pregnancy, and contraceptive alternatives.  Exercise: the importance of regular exercise/physical activity was discussed. Recommend exercise 3-5 times per week for at least 30 minutes.     Depression Screening and Follow-up Plan: Patient was screened for depression during today's encounter. They screened negative with a PHQ-2 score of 0.        Assessment & Plan    Healthcare Maintenance  Health maintenance completed today  - Medical history reviewed, including existing medical conditions, medications, and surgeries.   - Labs discussed to evaluate cholesterol, blood sugar, kidney function, liver function, and other important markers of health.  - BMI evaluated and discussed.  - Lifestyle and health counseling completed including diet, exercise habits, smoking status, alcohol consumption.   - Bone & Heart health reviewed  - Cancer screenings discussed: Mammogram/Pap smear/CT lung/colonoscopy.   - Immunizations and preventive care screenings were discussed with patient today. Appropriate education was printed on patient's after visit summary.  - Skin examination: Discussed importance of sunscreen and other preventative measures for skin cancer.  - Mental health and wellbeing evaluated and  discussed.  - Family history obtained to identify any of hereditary health risks.  Lab orders in place as discussed  Start/continue preventative measures as discussed/advised  Complete preventative orders in place as recommended.   Refer to screenings problem list  Return in 6-8 weeks after completion of labs.    Screenings  Cervical cancer screening 02/02/2023  Mammogram 06/27/2023   Colonoscopy 06/30/2021 06/30/2021  Cologuard/FIT Not on file   DEXA scan 12/12/2019  Scheduled for mammogram already.    Chronic pain of both shoulders/lumbar radiculopathy/disc degeneration lumbar spine  Follows orthopedic  Intermittent use of ibuprofen, Flexeril.  Discussed specially with history of gastric sleeve should not be using NSAIDs.  Patient reports taking it only once before receiving any injections for joints.  Continue care with specialist    Chronic rhinitis  Currently using Flonase as needed, saline nasal spray as needed and Zyrtec as needed.  Uses Zyrtec more regularly doing Flonase and saline nasal spray.  Discussed using Flonase and nasal saline spray a week before start of seasonal allergies and Zyrtec as needed as discussed.  Referral placed for Zyrtec and Flonase.    Weight Management/overweight/s/p laparoscopic sleeve gastrectomy  Wt Readings from Last 3 Encounters:   06/12/24 78.7 kg (173 lb 9.6 oz)   10/04/23 69.3 kg (152 lb 12.8 oz)   09/28/23 70.8 kg (156 lb)   Initial weight (06/12/24): 173 lbs, Body mass index is 28.89 kg/m².  Goal weight: 165  TWL: - lbs  S/p laparoscopic sleeve gastrectomy 2015.  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  Work on low-carb diet, limit snacking, exercise regularly 200-300 minutes/week as discussed  Return in 6 to 8 weeks after completion of labs for continued care        DIAGNOSIS & ORDERS   1. Healthcare maintenance  -     cetirizine (ZyrTEC) 10 mg tablet; Take 1 tablet (10 mg total) by mouth daily  -     CBC and differential;  Future; Expected date: 06/12/2024  -     Comprehensive metabolic panel; Future; Expected date: 06/12/2024  -     Lipid Panel with Direct LDL reflex; Future; Expected date: 06/12/2024  -     Vitamin D 25 hydroxy; Future; Expected date: 06/12/2024  -     TSH + Free T4; Future  -     Hemoglobin A1C; Future  2. Overweight  3. Vitamin D deficiency  4. Encounter for lipid screening for cardiovascular disease  -     Lipid Panel with Direct LDL reflex; Future; Expected date: 06/12/2024  5. Encounter for screening mammogram for malignant neoplasm of breast  6. Elevated serum glucose  -     Hemoglobin A1C; Future  7. S/P laparoscopic sleeve gastrectomy  8. Irritable bowel syndrome with constipation  9. Dyslipidemia  -     Lipid Panel with Direct LDL reflex; Future; Expected date: 06/12/2024  -     TSH + Free T4; Future  10. Chronic pain of both shoulders  11. Chronic rhinitis  -     fluticasone (FLONASE) 50 mcg/act nasal spray; 1 spray into each nostril daily    FOLLOW-UP PLANS   Return in about 6 weeks (around 7/24/2024) for after completion of labs.    Current Medication List:     Current Outpatient Medications:     aspirin-acetaminophen-caffeine (EXCEDRIN MIGRAINE) 250-250-65 MG per tablet, Take 1 tablet by mouth every 6 (six) hours as needed for headaches, Disp: , Rfl:     cetirizine (ZyrTEC) 10 mg tablet, Take 1 tablet (10 mg total) by mouth daily, Disp: 90 tablet, Rfl: 0    cyclobenzaprine (FLEXERIL) 10 mg tablet, Take 1 tablet (10 mg total) by mouth 3 (three) times a day as needed for muscle spasms, Disp: 45 tablet, Rfl: 1    famotidine (PEPCID) 40 MG tablet, Take 1 tablet (40 mg total) by mouth 2 (two) times a day as needed for heartburn, Disp: 60 tablet, Rfl: 3    fluticasone (FLONASE) 50 mcg/act nasal spray, 1 spray into each nostril daily, Disp: 15.8 mL, Rfl: 6    ibuprofen (MOTRIN) 600 mg tablet, Take 600 mg by mouth 3 (three) times a day as needed Take with food, Disp: , Rfl:     Subjective   History of Present  Illness     Roselia Carmona is a 54 y.o. who is here for annual physical exam.    Roselia Carmona reports living with  and 3 kids and two grandkids and two pets.   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    Gastric sleeve 2015.   Menopause 2021.         Concerns today: no    Diet and Physical Activity  Diet: well balanced diet  Exercise: no formal exercise  Do you struggle with your weight? yes    General Health  Sleep: gets 4-6 hours of sleep on average   Hearing: normal - bilateral  Vision: wears glasses  Dental: regular dental visits and brushes teeth twice daily    Mental Health  PHQ-2/9 Depression Screening    Little interest or pleasure in doing things: 0 - not at all  Feeling down, depressed, or hopeless: 0 - not at all  PHQ-2 Score: 0  PHQ-2 Interpretation: Negative depression screen       Anxiety: no  History of SI/SH: no  Significant past trauma that has impacted patient's mental health: no    /GYN Health  Menstrual cycles: stopped at 51, 2021  Sexually Active: Yes   single partner, contraception - none  Urinary symptoms: none    Smoking/Alcohol Use:  Smoking Cig: no  Vaping: no  Recreational drugs: yes, medical marijuana   Alcohol consumption: yes, occasionally, almost rarely.     Review of Systems   Constitutional:  Negative for chills, fever and unexpected weight change.   HENT:  Negative for congestion, rhinorrhea and sore throat.    Eyes:  Negative for visual disturbance.   Respiratory:  Negative for chest tightness, shortness of breath and wheezing.    Cardiovascular:  Negative for chest pain.   Gastrointestinal:  Negative for abdominal pain, constipation, diarrhea, nausea and vomiting.   Endocrine: Negative for polyuria.   Genitourinary:  Negative for dysuria and hematuria.   Skin:  Negative for rash.   Neurological:  Negative for dizziness, weakness, light-headedness and headaches.   Psychiatric/Behavioral:  Negative for confusion.        Past medical history:   Past Medical History:    Diagnosis Date    Asthma     seasonal    Back pain     Cut of finger 06/2022    cut fingers on left hand    Kidney stone     Obesity     Postgastrectomy malabsorption        Allergies:   Allergies   Allergen Reactions    Other Hives    Penicillins Other (See Comments)     Passed out    Pollen Extract Sneezing and Nasal Congestion    Shellfish-Derived Products - Food Allergy Hives    Adhesive [Medical Tape] Rash    Keflex [Cephalexin] Rash       Immunizations:   Immunization History   Administered Date(s) Administered    COVID-19 PFIZER VACCINE 0.3 ML IM 10/26/2021, 11/19/2021    Influenza, seasonal, injectable 1970    Pneumococcal Polysaccharide PPV23 1970    Tdap 1970, 11/04/2019       Surgical history:   Past Surgical History:   Procedure Laterality Date    CARDIAC CATHETERIZATION Left 11/3/2022    Procedure: Cardiac Left Heart Cath;  Surgeon: Henri Zhu MD;  Location: [facility];  Service: Cardiology    CARDIAC CATHETERIZATION N/A 11/3/2022    Procedure: Cardiac Coronary Angiogram;  Surgeon: Henri Zhu MD;  Location: [facility];  Service: Cardiology    CHOLECYSTECTOMY      COLONOSCOPY      GASTRECTOMY SLEEVE LAPAROSCOPIC      KNEE ARTHROSCOPY W/ MENISCAL REPAIR Right     NEUROPLASTY / TRANSPOSITION MEDIAN NERVE AT CARPAL TUNNEL BILATERAL      MN ESOPHAGOGASTRODUODENOSCOPY TRANSORAL DIAGNOSTIC N/A 04/30/2018    Procedure: ESOPHAGOGASTRODUODENOSCOPY (EGD);  Surgeon: Ajay Hassan III, MD;  Location: [facility];  Service: Gastroenterology       Family history:  Family History   Problem Relation Age of Onset    Diabetes Mother     Kidney disease Mother         end stage    Diabetes Father     Other Father         gangrene    Hypertension Father     Colon cancer Paternal Grandfather     Lung cancer Paternal Grandfather     Breast cancer Maternal Aunt     Thyroid cancer Maternal Aunt     Brain cancer Cousin     Nephrolithiasis Family         Social history:    Social History     Tobacco Use    Smoking status: Never     Passive exposure: Past    Smokeless tobacco: Never   Vaping Use    Vaping status: Never Used   Substance and Sexual Activity    Alcohol use: Not Currently    Drug use: Yes     Types: Marijuana     Comment: uses pill  (medical marijuania card)    Sexual activity: Yes     Partners: Male          Objective:       Vitals:    06/12/24 0840   BP: 130/70   Pulse: 71   Temp: (!) 95.3 °F (35.2 °C)   SpO2: 99%       Physical Exam  Vitals reviewed.   Constitutional:       General: She is not in acute distress.     Appearance: Normal appearance. She is not ill-appearing, toxic-appearing or diaphoretic.   HENT:      Head: Normocephalic and atraumatic.      Right Ear: External ear normal.      Left Ear: External ear normal.      Nose: Nose normal.      Mouth/Throat:      Mouth: Mucous membranes are moist.   Eyes:      General: No scleral icterus.        Right eye: No discharge.         Left eye: No discharge.      Extraocular Movements: Extraocular movements intact.      Conjunctiva/sclera: Conjunctivae normal.   Cardiovascular:      Rate and Rhythm: Normal rate and regular rhythm.      Pulses: Normal pulses.      Heart sounds: Normal heart sounds.   Pulmonary:      Effort: Pulmonary effort is normal. No respiratory distress.      Breath sounds: Normal breath sounds.   Abdominal:      Palpations: Abdomen is soft.      Tenderness: There is no abdominal tenderness.   Musculoskeletal:         General: No swelling. Normal range of motion.      Cervical back: Normal range of motion.   Skin:     General: Skin is warm and dry.   Neurological:      General: No focal deficit present.      Mental Status: She is alert and oriented to person, place, and time.   Psychiatric:         Mood and Affect: Mood normal.         Behavior: Behavior normal.         Thought Content: Thought content normal.              Vernon Longoria MD  Family Medicine Physician   [facility] PRIMARY CARE  [place]     Administrative Statements

## 2024-06-12 NOTE — PATIENT INSTRUCTIONS
Recommendations   Magnesium L-threonate or glycinate 500 mg at bedtime.  Incorporate as part of your sleep routine.  Turmeric 500 mg 3 times daily for osteoarthritic pain management.         Together as a team our goal is to practice preventative care, avoid chronic diseases, and/or avoid further progression of current chronic diseases.   Here are my recommendations as we discussed during our office visit:    Exercise:  150 minutes of moderate intensity workout for overall general health  200-300 minutes of moderate intensity workout for weight loss.   The first 30 minutes of exercising, the body will take energy from what we ate that day. Then after that 30-minute addy, the body will take energy from the stored fats.  Therefore, it will be more beneficial to do longer sessions and less frequently in a week to help with our weight loss journey.  I would recommend 60-minute sessions 4 times a week   Strength training 2 times a week for good bone health    Nutritional intake (diet):  Remember, it is not about finding a diet to lose weight quickly, rather adjusting your lifestyle for healthy living long-term.   When we build good habits, it does not become difficult to maintain it.  Focus on a low-carb diet.  The best diet is Mediterranean diet, which is a low-carb diet.  There are good carbs and bad carbs, minimize the bad carbs.    Bad carbs: Refined carbohydrates or simple carbohydrates that have been processed and stripped of their natural fiber and nutrients.          These carbohydrates can lead to rapid spikes in blood sugar levels and are often associated with low nutritional value. The big 4: Bread, Rice, Pasta, Potatoes  Refined grains-white bread, white rice, pasta made from refined flour.  Sugary foods and beverages: Candy, pastries, sugary cereals, soda, and other sugary drinks.  Processed snacks: Chips, cookies, sugary granola bars  Sweetened breakfast cereals: Cereals with added sugars.  Sweets and  desserts: Cakes, ice cream, pies  Good carbs: These are referred to complex carbohydrates that are unprocessed or minimally processed, providing more nutrients.  Here examples of good carbs:  Whole grains: Brown rice, quinoa, oats, whole-wheat products   Legumes: Lentils, chickpeas, black beans, kidney beans  Starchy vegetables: Sweet potatoes, butternut squash  Whole fruits: Berries, apples, oranges, bananas  Vegetables: Broccoli, spinach, kale, [place] sprouts  Nuts and seeds: Almonds, walnuts, Francesco seeds, flaxseeds.    Focus on eating whole foods.  What are whole foods?  Whole Foods refer to natural, unprocessed, or minimally processed foods that are as close to the original form as possible.  These foods are in their natural state and have undergone little to no refined or alteration.  Whole Foods are valued for their nutrient density, providing essential vitamins, minerals, fiber, and other beneficial compounds.  Examples of whole foods include:  Fruits and vegetables without added preservatives or processing.    Whole grains-unrefined grains like brown rice, quinoa, and whole-wheat, which retain their brain, germ, and endosperm.  Legumes-beans, lentils, and peas in their national unprocessed date.  Nuts and seeds-on roasted, unsalted nuts and seeds without added oils or seasonings.  Meat and fish-fresh, unprocessed meats and fish without additives or preservatives.  Dairy-unprocessed dairy products such as milk, yogurt, and cheese without added sugars or artificial ingredients.  Eggs-eggs in their natural form without additives.    Protein requirement  Calculate your protein requirement in grams by multiplying your weight in kilograms by the recommended protein intake per kilogram.  This gives you an estimate of your daily protein requirement.  Age 15-18: 0.9 grams of protein per kilogram of body weight from male gender, 0.9 g of protein per kilogram of body weight for female gender.  Age 19+: 0.8 g of  protein per kilogram of body weight for both male and female gender.  If you are physically active or trying to build muscle: 1.2-2.2 g/kg  Example: if you weigh 70 kg, to calculate your protein intake per day multiply 70 by 0.8 = 56 grams per day  To convert your weight to kilograms from pounds: Take your weight in pounds and divided by 2.2046 to get your weight to kilograms.    Sodium/salt  Compare sodium in foods like soup, bread, frozen and packaged meals, then choose the one with lower numbers.  Most Americans should limit their sodium intake to less than 2,300 mg/day.  All -Americans, people with diabetes, high blood pressure, kidney disease, should limit their sodium intake to 1,500 mg/day.    Cooking oil  Avoid the following oil for cooking: Canola, corn, vegetable, sunflower, peanut, sesame, grapeseed, flaxseed.  Even though it states it is heart healthy, however, they are significantly processed and refined.   Would recommend instead the following cooking oil: Olive oil, coconut oil, avocado oil, ghee, butter.    Intermittent fasting:   There are several benefits of intermittent fasting including weight loss, improving insulin sensitivity, improving cardiovascular health by reducing risk factors like blood pressure, cholesterol levels, and triglycerides. It also promotes brain health, longevity, reduction in inflammatory markers, improves metabolic health, and some studies even suggest intermittent fasting to be protective against certain types of cancers.     The goal of intermittent fasting is to shutdown the insulin hormone, as we discussed, which is a hormone that negatively affects our health when it is around in high levels chronically.     Start intermittent fasting for 14 hours initially, then increase to 16 hours, with a goal to reach 18 hours.  During fasting - may drink water without any additives such as sweeteners, black coffee, tea without any additives including milk.   Eat nutritious  meals 2 times a day  Avoid snacking in between meals.  If you end up snacking, snack on fruits/vegetables.  Initially it might be difficult, however, over time you will notice a positive change in your energy, mood, and weight.

## 2024-07-18 ENCOUNTER — OFFICE VISIT (OUTPATIENT)
Age: 54
End: 2024-07-18
Payer: COMMERCIAL

## 2024-07-18 VITALS
SYSTOLIC BLOOD PRESSURE: 114 MMHG | RESPIRATION RATE: 18 BRPM | HEIGHT: 65 IN | BODY MASS INDEX: 29.09 KG/M2 | DIASTOLIC BLOOD PRESSURE: 72 MMHG | HEART RATE: 68 BPM | TEMPERATURE: 97.2 F | WEIGHT: 174.6 LBS | OXYGEN SATURATION: 98 %

## 2024-07-18 DIAGNOSIS — S80.12XA HEMATOMA OF LEFT LOWER LEG: Primary | ICD-10-CM

## 2024-07-18 PROCEDURE — 99213 OFFICE O/P EST LOW 20 MIN: CPT | Performed by: INTERNAL MEDICINE

## 2024-07-18 RX ORDER — ESTRADIOL/NORETHINDRONE ACETATE TRANSDERMAL SYSTEM .05; .14 MG/D; MG/D
1 PATCH, EXTENDED RELEASE TRANSDERMAL 2 TIMES WEEKLY
COMMUNITY
Start: 2024-02-29 | End: 2024-07-18

## 2024-07-18 NOTE — PROGRESS NOTES
"  Assessment & Plan  Hematoma of left lower leg    Improving per patient report. She was reassured. No concern for DVT. Should fully resolve over the next couple weeks.       History of Present Illness     History of Present Illness  The patient presents for evaluation of a hematoma on the left anterior shin.    The patient sustained an injury to her leg approximately 2 weeks ago when her marble table broke. She attempted to assist her  with a 300-pound table, which resulted in a hematoma. Despite the injury, she did not experience any immediate pain. She has no history of thromboembolic events.     Review of Systems   Skin:  Positive for wound (left leg).     Objective     /72 (BP Location: Left arm, Patient Position: Sitting, Cuff Size: Standard)   Pulse 68   Temp (!) 97.2 °F (36.2 °C) (Tympanic)   Resp 18   Ht 5' 5\" (1.651 m)   Wt 79.2 kg (174 lb 9.6 oz) Comment: with shoes on  LMP 08/16/2021   SpO2 98%   BMI 29.05 kg/m²     Physical Exam  Skin:     Findings: Bruising (slight induration left anterior leg) present.       Gerson Fry, DO   "

## 2024-08-18 ENCOUNTER — HOSPITAL ENCOUNTER (EMERGENCY)
Facility: HOSPITAL | Age: 54
Discharge: HOME | End: 2024-08-18
Attending: EMERGENCY MEDICINE
Payer: COMMERCIAL

## 2024-08-18 VITALS
TEMPERATURE: 98.3 F | HEART RATE: 73 BPM | OXYGEN SATURATION: 99 % | WEIGHT: 177.25 LBS | BODY MASS INDEX: 29.5 KG/M2 | SYSTOLIC BLOOD PRESSURE: 109 MMHG | RESPIRATION RATE: 18 BRPM | DIASTOLIC BLOOD PRESSURE: 63 MMHG

## 2024-08-18 DIAGNOSIS — S61.011A LACERATION OF RIGHT THUMB: Primary | ICD-10-CM

## 2024-08-18 PROCEDURE — 99283 EMERGENCY DEPT VISIT LOW MDM: CPT | Performed by: EMERGENCY MEDICINE

## 2024-08-18 PROCEDURE — 99282 EMERGENCY DEPT VISIT SF MDM: CPT

## 2024-08-19 ENCOUNTER — APPOINTMENT (OUTPATIENT)
Age: 54
End: 2024-08-19
Payer: COMMERCIAL

## 2024-08-19 DIAGNOSIS — Z13.6 ENCOUNTER FOR LIPID SCREENING FOR CARDIOVASCULAR DISEASE: ICD-10-CM

## 2024-08-19 DIAGNOSIS — R73.9 ELEVATED SERUM GLUCOSE: ICD-10-CM

## 2024-08-19 DIAGNOSIS — Z13.220 ENCOUNTER FOR LIPID SCREENING FOR CARDIOVASCULAR DISEASE: ICD-10-CM

## 2024-08-19 DIAGNOSIS — E78.5 DYSLIPIDEMIA: ICD-10-CM

## 2024-08-19 DIAGNOSIS — Z00.00 HEALTHCARE MAINTENANCE: ICD-10-CM

## 2024-08-19 LAB
25(OH)D3 SERPL-MCNC: 33.7 NG/ML (ref 30–100)
ALBUMIN SERPL BCG-MCNC: 4.1 G/DL (ref 3.5–5)
ALP SERPL-CCNC: 50 U/L (ref 34–104)
ALT SERPL W P-5'-P-CCNC: 10 U/L (ref 7–52)
ANION GAP SERPL CALCULATED.3IONS-SCNC: 4 MMOL/L (ref 4–13)
AST SERPL W P-5'-P-CCNC: 16 U/L (ref 13–39)
BASOPHILS # BLD AUTO: 0.02 THOUSANDS/ÂΜL (ref 0–0.1)
BASOPHILS NFR BLD AUTO: 1 % (ref 0–1)
BILIRUB SERPL-MCNC: 1.39 MG/DL (ref 0.2–1)
BUN SERPL-MCNC: 18 MG/DL (ref 5–25)
CALCIUM SERPL-MCNC: 9 MG/DL (ref 8.4–10.2)
CHLORIDE SERPL-SCNC: 106 MMOL/L (ref 96–108)
CHOLEST SERPL-MCNC: 169 MG/DL
CO2 SERPL-SCNC: 30 MMOL/L (ref 21–32)
CREAT SERPL-MCNC: 0.71 MG/DL (ref 0.6–1.3)
EOSINOPHIL # BLD AUTO: 0.08 THOUSAND/ÂΜL (ref 0–0.61)
EOSINOPHIL NFR BLD AUTO: 2 % (ref 0–6)
ERYTHROCYTE [DISTWIDTH] IN BLOOD BY AUTOMATED COUNT: 12.5 % (ref 11.6–15.1)
EST. AVERAGE GLUCOSE BLD GHB EST-MCNC: 123 MG/DL
GFR SERPL CREATININE-BSD FRML MDRD: 97 ML/MIN/1.73SQ M
GLUCOSE P FAST SERPL-MCNC: 84 MG/DL (ref 65–99)
HBA1C MFR BLD: 5.9 %
HCT VFR BLD AUTO: 37.5 % (ref 34.8–46.1)
HDLC SERPL-MCNC: 56 MG/DL
HGB BLD-MCNC: 12.1 G/DL (ref 11.5–15.4)
IMM GRANULOCYTES # BLD AUTO: 0.01 THOUSAND/UL (ref 0–0.2)
IMM GRANULOCYTES NFR BLD AUTO: 0 % (ref 0–2)
LDLC SERPL CALC-MCNC: 98 MG/DL (ref 0–100)
LYMPHOCYTES # BLD AUTO: 1.34 THOUSANDS/ÂΜL (ref 0.6–4.47)
LYMPHOCYTES NFR BLD AUTO: 40 % (ref 14–44)
MCH RBC QN AUTO: 28.6 PG (ref 26.8–34.3)
MCHC RBC AUTO-ENTMCNC: 32.3 G/DL (ref 31.4–37.4)
MCV RBC AUTO: 89 FL (ref 82–98)
MONOCYTES # BLD AUTO: 0.32 THOUSAND/ÂΜL (ref 0.17–1.22)
MONOCYTES NFR BLD AUTO: 10 % (ref 4–12)
NEUTROPHILS # BLD AUTO: 1.6 THOUSANDS/ÂΜL (ref 1.85–7.62)
NEUTS SEG NFR BLD AUTO: 47 % (ref 43–75)
NRBC BLD AUTO-RTO: 0 /100 WBCS
PLATELET # BLD AUTO: 186 THOUSANDS/UL (ref 149–390)
PMV BLD AUTO: 10.8 FL (ref 8.9–12.7)
POTASSIUM SERPL-SCNC: 3.9 MMOL/L (ref 3.5–5.3)
PROT SERPL-MCNC: 6.8 G/DL (ref 6.4–8.4)
RBC # BLD AUTO: 4.23 MILLION/UL (ref 3.81–5.12)
SODIUM SERPL-SCNC: 140 MMOL/L (ref 135–147)
T4 FREE SERPL-MCNC: 0.74 NG/DL (ref 0.61–1.12)
TRIGL SERPL-MCNC: 77 MG/DL
TSH SERPL DL<=0.05 MIU/L-ACNC: 2.04 UIU/ML (ref 0.45–4.5)
WBC # BLD AUTO: 3.37 THOUSAND/UL (ref 4.31–10.16)

## 2024-08-19 PROCEDURE — 85025 COMPLETE CBC W/AUTO DIFF WBC: CPT

## 2024-08-19 PROCEDURE — 80061 LIPID PANEL: CPT

## 2024-08-19 PROCEDURE — 82306 VITAMIN D 25 HYDROXY: CPT

## 2024-08-19 PROCEDURE — 84439 ASSAY OF FREE THYROXINE: CPT

## 2024-08-19 PROCEDURE — 80053 COMPREHEN METABOLIC PANEL: CPT

## 2024-08-19 PROCEDURE — 36415 COLL VENOUS BLD VENIPUNCTURE: CPT

## 2024-08-19 PROCEDURE — 83036 HEMOGLOBIN GLYCOSYLATED A1C: CPT

## 2024-08-19 PROCEDURE — 84443 ASSAY THYROID STIM HORMONE: CPT

## 2024-08-21 ENCOUNTER — OFFICE VISIT (OUTPATIENT)
Age: 54
End: 2024-08-21
Payer: COMMERCIAL

## 2024-08-21 ENCOUNTER — APPOINTMENT (OUTPATIENT)
Age: 54
End: 2024-08-21
Payer: COMMERCIAL

## 2024-08-21 VITALS
BODY MASS INDEX: 27.76 KG/M2 | SYSTOLIC BLOOD PRESSURE: 108 MMHG | HEART RATE: 60 BPM | HEIGHT: 65 IN | OXYGEN SATURATION: 98 % | TEMPERATURE: 97.2 F | DIASTOLIC BLOOD PRESSURE: 60 MMHG | WEIGHT: 166.6 LBS | RESPIRATION RATE: 16 BRPM

## 2024-08-21 DIAGNOSIS — E78.5 DYSLIPIDEMIA: ICD-10-CM

## 2024-08-21 DIAGNOSIS — R73.03 PREDIABETES: ICD-10-CM

## 2024-08-21 DIAGNOSIS — E55.9 VITAMIN D INSUFFICIENCY: ICD-10-CM

## 2024-08-21 DIAGNOSIS — Z13.6 SCREENING FOR CARDIOVASCULAR CONDITION: ICD-10-CM

## 2024-08-21 DIAGNOSIS — Z76.89 ENCOUNTER FOR WEIGHT MANAGEMENT: Primary | ICD-10-CM

## 2024-08-21 PROCEDURE — 82172 ASSAY OF APOLIPOPROTEIN: CPT

## 2024-08-21 PROCEDURE — 36415 COLL VENOUS BLD VENIPUNCTURE: CPT

## 2024-08-21 PROCEDURE — 83695 ASSAY OF LIPOPROTEIN(A): CPT

## 2024-08-21 PROCEDURE — 99214 OFFICE O/P EST MOD 30 MIN: CPT | Performed by: FAMILY MEDICINE

## 2024-08-21 NOTE — PROGRESS NOTES
[facility] PRIMARY CARE  Ambulatory visit     Name: Roselia Carmona   YOB: 1970   MRN: [id number]  Encounter Provider: Vernon Longoria MD    Encounter Date: 8/21/2024    ASSESSMENT & PLAN    Assessment & Plan   Prediabetes  Weight Management  Overweight/s/p laparoscopic sleeve gastrectomy  Lab Results   Component Value Date    HGBA1C 5.9 (H) 08/19/2024     Wt Readings from Last 3 Encounters:   08/21/24 75.6 kg (166 lb 9.6 oz)   08/18/24 80.4 kg (177 lb 4 oz)   07/18/24 79.2 kg (174 lb 9.6 oz)   Initial weight (06/12/24): 173 lbs, Body mass index is 28.89 kg/m².  Goal weight: 165  TWL: - lbs  S/p laparoscopic sleeve gastrectomy 2015.  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  Work on low-carb diet, limit snacking, exercise regularly 200-300 minutes/week as discussed    Dyslipidemia  Currently not on statin.   Triglyceride 77, HDL 48, LDL 98.  Reviewed & discussed labs including lipid panel   Triglycerides/HDL ratio: 1.37. Goal <2, 08/19/2024   Discussed importance of nutritional intake, exercising regularly.  Discussed and recommended CT calcium scoring versus ApoB blood work  Advised improving nutritional intake and building good habits as discussed  Goal of <70 LDL, <100 triglycerides, and >60 HDL  Monitor lipid panel 6 to 12 months  Follow up with Apo-B and lipoprotein A              DIAGNOSIS & ORDERS   1. Encounter for weight management  2. Prediabetes  3. Dyslipidemia  -     Lipoprotein A (LPA); Future  -     Apolipoprotein B; Future  4. Vitamin D insufficiency  -     Vitamin D 25 hydroxy; Future; Expected date: 10/05/2024  -     Cholecalciferol (Vitamin D3) 1.25 MG (93230 UT) TABS; 2 times a week for 6 weeks    FOLLOW-UP PLANS   Return in about 6 months (around 2/21/2025) for Routine follow up.    Current Medication List:     Current Outpatient Medications:   •  cetirizine (ZyrTEC) 10 mg tablet, Take 1 tablet (10 mg total) by mouth daily,  "Disp: 90 tablet, Rfl: 0  •  Cholecalciferol (Vitamin D3) 1.25 MG (85337 UT) TABS, 2 times a week for 6 weeks, Disp: 12 tablet, Rfl: 0  •  cyclobenzaprine (FLEXERIL) 10 mg tablet, Take 1 tablet (10 mg total) by mouth 3 (three) times a day as needed for muscle spasms, Disp: 45 tablet, Rfl: 1  •  famotidine (PEPCID) 40 MG tablet, Take 1 tablet (40 mg total) by mouth 2 (two) times a day as needed for heartburn, Disp: 60 tablet, Rfl: 3  •  fluticasone (FLONASE) 50 mcg/act nasal spray, 1 spray into each nostril daily, Disp: 15.8 mL, Rfl: 6  •  ibuprofen (MOTRIN) 600 mg tablet, Take 600 mg by mouth 3 (three) times a day as needed Take with food, Disp: , Rfl:   Subjective   History of Present Illness       Roselia Carmona is here for an office visit.   HPI    Review of Systems    Objective:     /60 (BP Location: Right arm, Patient Position: Sitting, Cuff Size: Large)   Pulse 60   Temp (!) 97.2 °F (36.2 °C) (Tympanic)   Resp 16   Ht 5' 5\" (1.651 m)   Wt 75.6 kg (166 lb 9.6 oz)   LMP 08/16/2021   SpO2 98%   BMI 27.72 kg/m²      Physical Exam  Vitals reviewed.   Constitutional:       General: She is not in acute distress.     Appearance: Normal appearance. She is not ill-appearing, toxic-appearing or diaphoretic.   HENT:      Head: Normocephalic and atraumatic.      Right Ear: External ear normal.      Left Ear: External ear normal.      Nose: No congestion or rhinorrhea.   Eyes:      General: No scleral icterus.        Right eye: No discharge.         Left eye: No discharge.      Conjunctiva/sclera: Conjunctivae normal.   Cardiovascular:      Rate and Rhythm: Normal rate.   Pulmonary:      Effort: Pulmonary effort is normal. No respiratory distress.   Neurological:      General: No focal deficit present.      Mental Status: She is alert and oriented to person, place, and time.   Psychiatric:         Mood and Affect: Mood normal.         Behavior: Behavior normal.         Thought Content: Thought content normal. "           Vernon Longoria MD  Family Medicine Physician   [facility] PRIMARY C[facility] [place]        Administrative Statements

## 2024-08-21 NOTE — PATIENT INSTRUCTIONS
Recommendations for bone and heart health  Take vitamin D3 5,000 units-vitamin K2 100-200 mcg combination pill daily with food to maintain high-normal levels of vitamin D (You can get them from vitamin shops or on Amazon).      Why is Vitamin D important?  Adequate vitamin D levels reduces the risk of fractures and osteoporosis.  Vitamin D is involved in modulating the immune system, enhancing the body's defense against infections and supporting overall immune function.  Adequate vitamin D levels are associated with better muscle strength and function.  Deficiency may contribute to muscle weakness and an increase risk of falls in older adults.  Multiple research studies have associated low vitamin D with increased risk of autoimmune disease, cancers including breast cancer, and multiple sclerosis.  Some research also link vitamin D deficiency to increase risk of cardiovascular disease. Vitamin D also plays a role in synthesis of various hormones, including insulin.  It may play a role in insulin sensitivity and glucose metabolism. Adequate levels of vitamin D may also plays a role in supporting mental wellbeing.  In conclusion, lets bring your vitamin D levels up to >65 to prevent many diseases and conditions!     Why is vitamin K2 important?  Vitamin K2 helps ensure that the calcium we obtain through diet/supplements is directed to the bones for proper mineralization, contributing to bone strength and density.  Vitamin K2 also helps to activate proteins that prevent calcium from depositing in the arterial walls and reducing the risk of arterial calcification. Arterial calcification leads to heart and vascular disease. Especially since we are optimizing your vitamin D levels, one of the functions for vitamin D is to absorb calcium from the gastrointestinal track. This will increase calcium levels in the body. Supplementing vitamin K2 will ensure in removing the calcium from the blood vessels. This will reduce  calcification of the vessels, reducing risk of heart and vascular disease.  Let's get your calcium where it belongs, into the bones and out of the vessels with vitamin K2!

## 2024-08-22 LAB
APO B SERPL-MCNC: 85 MG/DL
LPA SERPL-SCNC: <9 NMOL/L

## 2024-08-23 NOTE — ED PROVIDER NOTES
History  Chief Complaint   Patient presents with    Finger Laceration     Pt with R thumb lac with slicer. Pt UTD on tetanus      R thumb lac/avulsion with a slicer just prior to arrival. Unable to stop bleeding. No motor or sensory dysfunction. Not on thinners. No other injuries. Tetanus UTD.         Prior to Admission Medications   Prescriptions Last Dose Informant Patient Reported? Taking?   cetirizine (ZyrTEC) 10 mg tablet  Self No No   Sig: Take 1 tablet (10 mg total) by mouth daily   cyclobenzaprine (FLEXERIL) 10 mg tablet  Self No No   Sig: Take 1 tablet (10 mg total) by mouth 3 (three) times a day as needed for muscle spasms   famotidine (PEPCID) 40 MG tablet  Self No No   Sig: Take 1 tablet (40 mg total) by mouth 2 (two) times a day as needed for heartburn   fluticasone (FLONASE) 50 mcg/act nasal spray  Self No No   Si spray into each nostril daily   ibuprofen (MOTRIN) 600 mg tablet  Self Yes No   Sig: Take 600 mg by mouth 3 (three) times a day as needed Take with food      Facility-Administered Medications: None       Past Medical History:   Diagnosis Date    Asthma     seasonal    Back pain     Cut of finger 2022    cut fingers on left hand    Kidney stone     Obesity     Postgastrectomy malabsorption        Past Surgical History:   Procedure Laterality Date    CARDIAC CATHETERIZATION Left 11/3/2022    Procedure: Cardiac Left Heart Cath;  Surgeon: Henri Zhu MD;  [facility]: [facility];  Service: Cardiology    CARDIAC CATHETERIZATION N/A 11/3/2022    Procedure: Cardiac Coronary Angiogram;  Surgeon: Henri Zhu MD;  [facility]: [facility];  Service: Cardiology    CHOLECYSTECTOMY      COLONOSCOPY      GASTRECTOMY SLEEVE LAPAROSCOPIC      KNEE ARTHROSCOPY W/ MENISCAL REPAIR Right     NEUROPLASTY / TRANSPOSITION MEDIAN NERVE AT CARPAL TUNNEL BILATERAL      VA ESOPHAGOGASTRODUODENOSCOPY TRANSORAL DIAGNOSTIC N/A 2018    Procedure: ESOPHAGOGASTRODUODENOSCOPY (EGD);   Surgeon: Ajay Hassan III, MD;  Location: [facility];  Service: Gastroenterology       Family History   Problem Relation Age of Onset    Diabetes Mother     Kidney disease Mother         end stage    Diabetes Father     Other Father         gangrene    Hypertension Father     Colon cancer Paternal Grandfather     Lung cancer Paternal Grandfather     Breast cancer Maternal Aunt     Thyroid cancer Maternal Aunt     Brain cancer Cousin     Nephrolithiasis Family      I have reviewed and agree with the history as documented.    E-Cigarette/Vaping    E-Cigarette Use Never User     Comments vape (medical marijuana card)      E-Cigarette/Vaping Substances    Nicotine No     THC Yes     CBD Yes     Flavoring No     Other No     Unknown No      Social History     Tobacco Use    Smoking status: Never     Passive exposure: Past    Smokeless tobacco: Never   Vaping Use    Vaping status: Never Used   Substance Use Topics    Alcohol use: Not Currently    Drug use: Yes     Types: Marijuana     Comment: uses pill  (medical marijuania card)       Review of Systems   Skin:  Positive for wound.       Physical Exam  Physical Exam  Vitals and nursing note reviewed.   Constitutional:       General: She is not in acute distress.     Appearance: She is well-developed. She is not diaphoretic.   HENT:      Head: Normocephalic and atraumatic.   Eyes:      General:         Right eye: No discharge.         Left eye: No discharge.      Extraocular Movements: EOM normal.      Conjunctiva/sclera: Conjunctivae normal.      Pupils: Pupils are equal, round, and reactive to light.   Neck:      Vascular: No JVD.   Pulmonary:      Breath sounds: No stridor.   Musculoskeletal:         General: Signs of injury present. No tenderness, deformity or edema. Normal range of motion.      Cervical back: Normal range of motion and neck supple.      Comments: Avulsion tip of thumb. Actively bleeding. Normal flexion/extension and perfusion. No exposed bone.     Skin:     General: Skin is warm and dry.      Capillary Refill: Capillary refill takes less than 2 seconds.   Neurological:      Mental Status: She is alert and oriented to person, place, and time.      Cranial Nerves: No cranial nerve deficit.      Sensory: No sensory deficit.      Motor: No abnormal muscle tone.      Coordination: Coordination normal.         Vital Signs  ED Triage Vitals [08/18/24 1912]   Temperature Pulse Respirations Blood Pressure SpO2   98.3 °F (36.8 °C) 73 18 109/63 99 %      Temp Source Heart Rate Source Patient Position - Orthostatic VS BP Location FiO2 (%)   Temporal Monitor Sitting Left arm --      Pain Score       --           Vitals:    08/18/24 1912   BP: 109/63   Pulse: 73   Patient Position - Orthostatic VS: Sitting         Visual Acuity      ED Medications  Medications - No data to display    Diagnostic Studies  Results Reviewed       None                   No orders to display              Procedures  Procedures         ED Course                                               Medical Decision Making  Problems Addressed:  Laceration of right thumb: acute illness or injury     Details: Avulsion. Unable to be sutured. Pressure dressing applied by me with cessation of bleeding. Wound care instructions given verbally by me to pt.                  Disposition  Final diagnoses:   Laceration of right thumb     Time reflects when diagnosis was documented in both MDM as applicable and the Disposition within this note       Time User Action Codes Description Comment    8/18/2024  7:25 PM Erik Cedeño Add [S61.011A] Laceration of right thumb           ED Disposition       ED Disposition   Discharge    Condition   Stable    Date/Time   Sun Aug 18, 2024  7:25 PM    Comment   Roselia Carmona discharge to home/self care.                   Follow-up Information    None         Discharge Medication List as of 8/18/2024  8:06 PM        CONTINUE these medications which have NOT CHANGED    Details    cetirizine (ZyrTEC) 10 mg tablet Take 1 tablet (10 mg total) by mouth daily, Starting Wed 6/12/2024, Until Tue 9/10/2024, Normal      cyclobenzaprine (FLEXERIL) 10 mg tablet Take 1 tablet (10 mg total) by mouth 3 (three) times a day as needed for muscle spasms, Starting Wed 5/17/2023, Normal      famotidine (PEPCID) 40 MG tablet Take 1 tablet (40 mg total) by mouth 2 (two) times a day as needed for heartburn, Starting Mon 12/12/2022, Normal      fluticasone (FLONASE) 50 mcg/act nasal spray 1 spray into each nostril daily, Starting Wed 6/12/2024, Normal      ibuprofen (MOTRIN) 600 mg tablet Take 600 mg by mouth 3 (three) times a day as needed Take with food, Starting Sun 9/10/2023, Historical Med             No discharge procedures on file.    PDMP Review       None            ED Provider  Electronically Signed by             Erik Cedeño MD  08/22/24 7939

## 2024-09-12 ENCOUNTER — OFFICE VISIT (OUTPATIENT)
Age: 54
End: 2024-09-12
Payer: COMMERCIAL

## 2024-09-12 VITALS
HEIGHT: 65 IN | SYSTOLIC BLOOD PRESSURE: 100 MMHG | WEIGHT: 177.2 LBS | TEMPERATURE: 94.6 F | OXYGEN SATURATION: 99 % | DIASTOLIC BLOOD PRESSURE: 60 MMHG | BODY MASS INDEX: 29.52 KG/M2 | HEART RATE: 67 BPM
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
## 2024-09-12 DIAGNOSIS — L60.8 TOENAIL DEFORMITY: ICD-10-CM

## 2024-09-12 DIAGNOSIS — D17.0 LIPOMA OF HEAD: ICD-10-CM

## 2024-09-12 DIAGNOSIS — Z00.00 HEALTHCARE MAINTENANCE: ICD-10-CM

## 2024-09-12 PROCEDURE — 99214 OFFICE O/P EST MOD 30 MIN: CPT

## 2024-09-12 RX ORDER — TRIAMCINOLONE ACETONIDE 1 MG/G
CREAM TOPICAL 2 TIMES DAILY
Qty: 15 G | Refills: 0 | Status: SHIPPED | OUTPATIENT
Start: 2024-09-12 | End: 2024-09-19

## 2024-09-12 RX ORDER — CETIRIZINE HYDROCHLORIDE 10 MG/1
10 TABLET ORAL DAILY
Qty: 90 TABLET | Refills: 0 | Status: SHIPPED | OUTPATIENT
Start: 2024-09-12 | End: 2024-12-11

## 2024-09-12 NOTE — PROGRESS NOTES
Ambulatory Visit  Name: Roselia Carmona      : 1970      MRN: [id number]  Encounter Provider: Yovany Richards PA-C  Encounter Date: 2024   Encounter department: [facility] [place]    Assessment & Plan  Bug bite, initial encounter  Has been taking zyrtec nightly for itching and chronic allergies which helps. Can continue.   Apply steroid cream to area as needed for itching.   F/u if new or worsening sx occur. Reviewed signs of infection with patient.  Orders:    triamcinolone (KENALOG) 0.1 % cream; Apply topically 2 (two) times a day for 7 days    Healthcare maintenance  Continue zytrec for chronic seasonal rhinitis. Needs refill  Orders:    cetirizine (ZyrTEC) 10 mg tablet; Take 1 tablet (10 mg total) by mouth daily    Lipoma of head  Continue to monitor, right scalp. Stable size. Slightly painful with deep palpation. Freely movable, overlying skin wnl.        Toenail deformity    Orders:    Ambulatory Referral to Podiatry; Future       History of Present Illness     Patient presents after a bug bite . Has an itchy, slightly red bump on the right chest wall. Slightly improving, taking zytrec daily which helps with itching to help sleep.   Itching still a lot during the day. No sob, cp, fever, tongue or lip swelling.             Review of Systems   Constitutional:  Negative for activity change, diaphoresis, fatigue and fever.   HENT: Negative.  Negative for congestion and ear pain.    Eyes: Negative.    Respiratory:  Negative for cough, chest tightness and shortness of breath.    Cardiovascular: Negative.    Gastrointestinal: Negative.    Endocrine: Negative for polydipsia, polyphagia and polyuria.   Genitourinary:  Negative for dysuria, flank pain, frequency, hematuria and urgency.   Musculoskeletal:  Negative for back pain, joint swelling, neck pain and neck stiffness.   Skin:  Positive for color change and rash. Negative for pallor and wound.   Neurological: Negative.   "Negative for dizziness, weakness, light-headedness and headaches.   Hematological:  Negative for adenopathy.   Psychiatric/Behavioral: Negative.  Negative for confusion and sleep disturbance. The patient is not nervous/anxious.            Objective     /60   Pulse 67   Temp (!) 94.6 °F (34.8 °C)   Ht 5' 5\" (1.651 m)   Wt 80.4 kg (177 lb 3.2 oz)   LMP 08/16/2021   SpO2 99%   BMI 29.49 kg/m²     Physical Exam  Vitals and nursing note reviewed.   Constitutional:       General: She is not in acute distress.     Appearance: She is normal weight. She is not ill-appearing, toxic-appearing or diaphoretic.   HENT:      Head: Normocephalic and atraumatic.      Right Ear: External ear normal.      Left Ear: External ear normal.      Nose: Nose normal.   Eyes:      General: No scleral icterus.        Right eye: No discharge.         Left eye: No discharge.      Extraocular Movements: Extraocular movements intact.      Conjunctiva/sclera: Conjunctivae normal.   Cardiovascular:      Rate and Rhythm: Normal rate and regular rhythm.      Heart sounds: No murmur heard.  Pulmonary:      Effort: Pulmonary effort is normal. No respiratory distress.      Breath sounds: Normal breath sounds. No wheezing or rales.   Musculoskeletal:      Cervical back: Normal range of motion and neck supple.      Right lower leg: No edema.      Left lower leg: No edema.   Skin:     Findings: Rash present.             Comments: 3mm papule with surrounding raised hive-like erythema and overlying excoriations from scratching.   Neurological:      Mental Status: She is alert. Mental status is at baseline.   Psychiatric:         Mood and Affect: Mood normal.         Behavior: Behavior normal.         Thought Content: Thought content normal.         Judgment: Judgment normal.         "

## 2024-10-02 DIAGNOSIS — Z00.6 ENCOUNTER FOR EXAMINATION FOR NORMAL COMPARISON OR CONTROL IN CLINICAL RESEARCH PROGRAM: ICD-10-CM

## 2024-10-08 ENCOUNTER — APPOINTMENT (OUTPATIENT)
Dept: LAB | Facility: CLINIC | Age: 54
End: 2024-10-08

## 2024-10-08 ENCOUNTER — OFFICE VISIT (OUTPATIENT)
Dept: PODIATRY | Facility: CLINIC | Age: 54
End: 2024-10-08
Payer: COMMERCIAL

## 2024-10-08 VITALS — HEIGHT: 65 IN | WEIGHT: 175 LBS | BODY MASS INDEX: 29.16 KG/M2

## 2024-10-08 DIAGNOSIS — L60.8 TOENAIL DEFORMITY: ICD-10-CM

## 2024-10-08 DIAGNOSIS — Z00.6 ENCOUNTER FOR EXAMINATION FOR NORMAL COMPARISON OR CONTROL IN CLINICAL RESEARCH PROGRAM: ICD-10-CM

## 2024-10-08 DIAGNOSIS — E55.9 VITAMIN D INSUFFICIENCY: ICD-10-CM

## 2024-10-08 DIAGNOSIS — L60.0 INGROWN TOENAIL: ICD-10-CM

## 2024-10-08 DIAGNOSIS — B35.1 ONYCHOMYCOSIS: Primary | ICD-10-CM

## 2024-10-08 DIAGNOSIS — M89.8X7 EXOSTOSIS OF BONE OF FOOT: ICD-10-CM

## 2024-10-08 PROCEDURE — 36415 COLL VENOUS BLD VENIPUNCTURE: CPT

## 2024-10-08 PROCEDURE — 99202 OFFICE O/P NEW SF 15 MIN: CPT | Performed by: PODIATRIST

## 2024-10-08 RX ORDER — CICLOPIROX 80 MG/ML
SOLUTION TOPICAL DAILY
Qty: 6 ML | Refills: 4 | Status: SHIPPED | OUTPATIENT
Start: 2024-10-08 | End: 2025-06-05

## 2024-10-08 NOTE — PROGRESS NOTES
Ambulatory Visit  Name: Roselia Carmona      : 1970      MRN: [id number]  Encounter Provider: Errol Goel DPM  Encounter Date: 10/8/2024   Encounter department: [facility] [place]    Assessment & Plan  Toenail deformity    Orders:    Ambulatory Referral to Podiatry    Onychomycosis    Orders:    ciclopirox (PENLAC) 8 % solution; Apply topically daily  Patient was explained the two forms of treatment for mycotic nails.  The topical (Penlac, OTC antifungal) needs to be applied twice a day for then next 6 to 9 months if done once a day it will double the time.  The other form is oral medication (Lamisil/terbinafine) is 1 pill a day for 3 months then finished.  They were told that with any of the these treatments it will take care of the fungal it will not necessary change the  thickness, coloration, and shape.   Patient is not a candidate for the oral medication because of the penicillin allergy.  Ingrown toenail       Discussed the permanent nail avulsion for the lateral sides of both big toes.  Briefly discussed the postop care with the patient that she would need to take that day off from her job afterwards performed.  Exostosis of bone of foot       Dispensed quarter inch felt center cut out pads and placed them on each foot over the exostosis.  Briefly discussed surgery with the patient and once the ingrown nails are taking care of will refer her to another podiatrist for surgical consult    History of Present Illness     Roselia Carmona is a 54 y.o. female who presents with chief complaint of painful thick nails on both big toes and, painful ingrown nails on both big toes and a bone spur present on both first MPJs.    History obtained from : patient  Review of Systems  Medical History Reviewed by provider this encounter:       Current Outpatient Medications on File Prior to Visit   Medication Sig Dispense Refill    cetirizine (ZyrTEC) 10 mg tablet Take 1 tablet (10 mg total) by mouth  "daily 90 tablet 0    cyclobenzaprine (FLEXERIL) 10 mg tablet Take 1 tablet (10 mg total) by mouth 3 (three) times a day as needed for muscle spasms 45 tablet 1    famotidine (PEPCID) 40 MG tablet Take 1 tablet (40 mg total) by mouth 2 (two) times a day as needed for heartburn 60 tablet 3    fluticasone (FLONASE) 50 mcg/act nasal spray 1 spray into each nostril daily 15.8 mL 6    ibuprofen (MOTRIN) 600 mg tablet Take 600 mg by mouth 3 (three) times a day as needed Take with food      Cholecalciferol (Vitamin D3) 1.25 MG (66014 UT) TABS 2 times a week for 6 weeks 12 tablet 0    triamcinolone (KENALOG) 0.1 % cream Apply topically 2 (two) times a day for 7 days 15 g 0     No current facility-administered medications on file prior to visit.      Social History     Tobacco Use    Smoking status: Never     Passive exposure: Past    Smokeless tobacco: Never   Vaping Use    Vaping status: Never Used   Substance and Sexual Activity    Alcohol use: Not Currently    Drug use: Yes     Types: Marijuana     Comment: uses pill  (medical marijuania card)    Sexual activity: Yes     Partners: Male         Objective     Ht 5' 5\" (1.651 m)   Wt 79.4 kg (175 lb)   LMP 08/16/2021   BMI 29.12 kg/m²     Physical Exam  Vascular status is 2/4 DP PT negative digital hair normal distal cooling immediate capillary refill bilaterally.  Capillary refill is approximately 2 to 3 seconds.    Derm nails are brittle elongated hypertrophic yellow-white discoloration with subungual debris x 2.  There is an increased thickness and the nails are approximately 2 mm.  The lateral borders of both hallux nails are incurvated with proud flesh, hypertrophic tissue, and pain upon palpation.  There is no signs of any infection.  There is a lateral bump present on both hallux nails secondary to constant pressure.  There is a small amount of erythema present on the first MPJ.    Ortho exostosis are felt on the dorsal medial aspect of the first metatarsal " bilaterally.  There is slight limitation of dorsiflexion with range of motion of the first MPJ.    Neuro is intact and equal bilaterally light touch and 0.5 monofilament test.    Administrative Statements   I have spent a total time of 18 minutes in caring for this patient on the day of the visit/encounter including Risks and benefits of tx options, Instructions for management, Patient and family education, Importance of tx compliance, Counseling / Coordination of care, Documenting in the medical record, Reviewing / ordering tests, medicine, procedures  , and Obtaining or reviewing history  .

## 2024-10-20 LAB
APOB+LDLR+PCSK9 GENE MUT ANL BLD/T: NOT DETECTED
BRCA1+BRCA2 DEL+DUP + FULL MUT ANL BLD/T: NOT DETECTED
MLH1+MSH2+MSH6+PMS2 GN DEL+DUP+FUL M: NOT DETECTED

## 2024-10-22 ENCOUNTER — OFFICE VISIT (OUTPATIENT)
Dept: PODIATRY | Facility: CLINIC | Age: 54
End: 2024-10-22
Payer: COMMERCIAL

## 2024-10-22 VITALS
WEIGHT: 175 LBS | DIASTOLIC BLOOD PRESSURE: 63 MMHG | BODY MASS INDEX: 29.12 KG/M2 | SYSTOLIC BLOOD PRESSURE: 92 MMHG | HEART RATE: 80 BPM

## 2024-10-22 DIAGNOSIS — L60.0 INGROWN TOENAIL: Primary | ICD-10-CM

## 2024-10-22 DIAGNOSIS — M79.674 PAIN IN RIGHT TOE(S): ICD-10-CM

## 2024-10-22 PROCEDURE — 11750 EXCISION NAIL&NAIL MATRIX: CPT | Performed by: PODIATRIST

## 2024-10-22 PROCEDURE — RECHECK: Performed by: PODIATRIST

## 2024-10-28 ENCOUNTER — APPOINTMENT (OUTPATIENT)
Age: 54
End: 2024-10-28
Payer: COMMERCIAL

## 2024-10-28 ENCOUNTER — PATIENT MESSAGE (OUTPATIENT)
Age: 54
End: 2024-10-28

## 2024-10-28 ENCOUNTER — OFFICE VISIT (OUTPATIENT)
Age: 54
End: 2024-10-28
Payer: COMMERCIAL

## 2024-10-28 VITALS
BODY MASS INDEX: 29.16 KG/M2 | HEIGHT: 65 IN | RESPIRATION RATE: 16 BRPM | TEMPERATURE: 96.8 F | WEIGHT: 175 LBS | SYSTOLIC BLOOD PRESSURE: 110 MMHG | OXYGEN SATURATION: 98 % | HEART RATE: 75 BPM | DIASTOLIC BLOOD PRESSURE: 60 MMHG

## 2024-10-28 DIAGNOSIS — Z13.6 ENCOUNTER FOR LIPID SCREENING FOR CARDIOVASCULAR DISEASE: ICD-10-CM

## 2024-10-28 DIAGNOSIS — R10.9 FLANK PAIN, ACUTE: ICD-10-CM

## 2024-10-28 DIAGNOSIS — E78.5 DYSLIPIDEMIA: ICD-10-CM

## 2024-10-28 DIAGNOSIS — Z13.220 ENCOUNTER FOR LIPID SCREENING FOR CARDIOVASCULAR DISEASE: ICD-10-CM

## 2024-10-28 DIAGNOSIS — R10.9 FLANK PAIN, ACUTE: Primary | ICD-10-CM

## 2024-10-28 DIAGNOSIS — N20.0 NEPHROLITHIASIS: ICD-10-CM

## 2024-10-28 DIAGNOSIS — E55.9 VITAMIN D INSUFFICIENCY: ICD-10-CM

## 2024-10-28 LAB
BILIRUB UR QL STRIP: NEGATIVE
CLARITY UR: CLEAR
COLOR UR: NORMAL
GLUCOSE UR STRIP-MCNC: NEGATIVE MG/DL
HGB UR QL STRIP.AUTO: NEGATIVE
KETONES UR STRIP-MCNC: NEGATIVE MG/DL
LEUKOCYTE ESTERASE UR QL STRIP: NEGATIVE
NITRITE UR QL STRIP: NEGATIVE
PH UR STRIP.AUTO: 6.5 [PH]
PROT UR STRIP-MCNC: NEGATIVE MG/DL
SP GR UR STRIP.AUTO: 1.01 (ref 1–1.03)
UROBILINOGEN UR STRIP-ACNC: <2 MG/DL

## 2024-10-28 PROCEDURE — 81003 URINALYSIS AUTO W/O SCOPE: CPT

## 2024-10-28 PROCEDURE — 99214 OFFICE O/P EST MOD 30 MIN: CPT | Performed by: FAMILY MEDICINE

## 2024-10-28 RX ORDER — NAPROXEN 500 MG/1
500 TABLET ORAL 2 TIMES DAILY WITH MEALS
Qty: 30 TABLET | Refills: 0 | Status: SHIPPED | OUTPATIENT
Start: 2024-10-28

## 2024-10-28 NOTE — PROGRESS NOTES
[facility]  Ambulatory Office Visit     PATIENT INFORMATION   Name: Roselia Carmona   YOB: 1970   MRN: [id number]  Encounter Provider: Vernon Longoria MD    Encounter Date: 10/28/2024    ASSESSMENT & PLAN     Assessment & Plan  Flank pain, acute  Right side.    History of nephrolithiasis, patient reports having more symptoms as previous kidney stone.  Has been drinking plenty of water fluids.  Concerned about urinary tract infection due to sudden inability to urinate.  About 3 days ago.  No systemic symptoms.  Provided naproxen for pain management as discussed otherwise recommended Tylenol.  Follow-up with urinalysis, continue increased water hydration  And if no improvement and rather worsening of pain follow-up with renal stones.  Provided printout for kidney stone diet.  Return parameters discussed, ED parameters discussed.  History of chronic lower back pain, lumbar radiculopathy.  Provided core strengthening exercises to improve core strength and decrease pressure on the lower back.  Orders:    UA w Reflex to Microscopic w Reflex to Culture; Future    naproxen (Naprosyn) 500 mg tablet; Take 1 tablet (500 mg total) by mouth 2 (two) times a day with meals    CT renal stone study abdomen pelvis wo contrast; Future    Nephrolithiasis  Refer to acute flank pain  Orders:    naproxen (Naprosyn) 500 mg tablet; Take 1 tablet (500 mg total) by mouth 2 (two) times a day with meals    CT renal stone study abdomen pelvis wo contrast; Future    Vitamin D insufficiency         Encounter for lipid screening for cardiovascular disease    Orders:    Lipid Panel with Direct LDL reflex; Future    Dyslipidemia  Reviewed apolipoprotein B.  Follow-up with blood cholesterol levels prior to next appointment.  Orders:    Lipid Panel with Direct LDL reflex; Future    Apolipoprotein B; Future               FOLLOW UP   Return if symptoms worsen or fail to improve.    CURRENT MEDICATIONS     Current Outpatient  Medications:     cetirizine (ZyrTEC) 10 mg tablet, Take 1 tablet (10 mg total) by mouth daily, Disp: 90 tablet, Rfl: 0    ciclopirox (PENLAC) 8 % solution, Apply topically daily, Disp: 6 mL, Rfl: 4    cyclobenzaprine (FLEXERIL) 10 mg tablet, Take 1 tablet (10 mg total) by mouth 3 (three) times a day as needed for muscle spasms, Disp: 45 tablet, Rfl: 1    famotidine (PEPCID) 40 MG tablet, Take 1 tablet (40 mg total) by mouth 2 (two) times a day as needed for heartburn, Disp: 60 tablet, Rfl: 3    fluticasone (FLONASE) 50 mcg/act nasal spray, 1 spray into each nostril daily, Disp: 15.8 mL, Rfl: 6    ibuprofen (MOTRIN) 600 mg tablet, Take 600 mg by mouth 3 (three) times a day as needed Take with food, Disp: , Rfl:     naproxen (Naprosyn) 500 mg tablet, Take 1 tablet (500 mg total) by mouth 2 (two) times a day with meals, Disp: 30 tablet, Rfl: 0    Cholecalciferol (Vitamin D3) 1.25 MG (92594 UT) TABS, 2 times a week for 6 weeks, Disp: 12 tablet, Rfl: 0      CHIEF COMPLIANT     Chief Complaint   Patient presents with    Other     Right flank pain,frequency of urine.            HISTORY OF PRESENT ILLNESS    History of Present Illness     History obtained from : patient  HPI  Review of Systems    PAST MEDICAL & SURGICAL HISTORY     Past Medical History:   Diagnosis Date    Asthma     seasonal    Back pain     Cut of finger 06/2022    cut fingers on left hand    Kidney stone     Obesity     Postgastrectomy malabsorption      Past Surgical History:   Procedure Laterality Date    CARDIAC CATHETERIZATION Left 11/3/2022    Procedure: Cardiac Left Heart Cath;  Surgeon: Henri Zhu MD;  Location: [facility];  Service: Cardiology    CARDIAC CATHETERIZATION N/A 11/3/2022    Procedure: Cardiac Coronary Angiogram;  Surgeon: Henri Zhu MD;  Location: [facility];  Service: Cardiology    CHOLECYSTECTOMY      COLONOSCOPY      GASTRECTOMY SLEEVE LAPAROSCOPIC      KNEE ARTHROSCOPY W/ MENISCAL REPAIR Right   "   NEUROPLASTY / TRANSPOSITION MEDIAN NERVE AT CARPAL TUNNEL BILATERAL      IA ESOPHAGOGASTRODUODENOSCOPY TRANSORAL DIAGNOSTIC N/A 04/30/2018    Procedure: ESOPHAGOGASTRODUODENOSCOPY (EGD);  Surgeon: Ajay Hassan III, MD;  Location: [facility];  Service: Gastroenterology       OBJECTIVES      /60 (BP Location: Left arm, Patient Position: Sitting, Cuff Size: Standard)   Pulse 75   Temp (!) 96.8 °F (36 °C) (Tympanic)   Resp 16   Ht 5' 5\" (1.651 m)   Wt 79.4 kg (175 lb)   LMP 08/16/2021   SpO2 98%   BMI 29.12 kg/m²    Physical Exam  Vitals reviewed.   Constitutional:       General: She is not in acute distress.     Appearance: Normal appearance. She is not ill-appearing, toxic-appearing or diaphoretic.   HENT:      Head: Normocephalic and atraumatic.      Right Ear: External ear normal.      Left Ear: External ear normal.      Nose: No congestion or rhinorrhea.   Eyes:      General: No scleral icterus.        Right eye: No discharge.         Left eye: No discharge.      Conjunctiva/sclera: Conjunctivae normal.   Cardiovascular:      Rate and Rhythm: Normal rate.   Pulmonary:      Effort: Pulmonary effort is normal. No respiratory distress.   Abdominal:      Tenderness: There is no right CVA tenderness or left CVA tenderness.   Musculoskeletal:         General: Tenderness (right lower back) present.   Neurological:      General: No focal deficit present.      Mental Status: She is alert and oriented to person, place, and time.   Psychiatric:         Mood and Affect: Mood normal.         Behavior: Behavior normal.         Thought Content: Thought content normal.           Vernon Longoria MD  Family Medicine Physician   [facility] [place]      Administrative Statements       "

## 2024-10-28 NOTE — ASSESSMENT & PLAN NOTE
Refer to acute flank pain  Orders:    naproxen (Naprosyn) 500 mg tablet; Take 1 tablet (500 mg total) by mouth 2 (two) times a day with meals    CT renal stone study abdomen pelvis wo contrast; Future

## 2024-11-04 ENCOUNTER — TELEPHONE (OUTPATIENT)
Age: 54
End: 2024-11-04

## 2024-11-04 NOTE — TELEPHONE ENCOUNTER
Caller: Roselia Carmona    Doctor and/or Office: Dr. Goel/Pieter    #: [phone]    Escalation: Care/Sent picture of her toe on Saturday as she was not feeling well--does it look normal to ? Please call back and advise. Thanks

## 2024-11-06 ENCOUNTER — TELEPHONE (OUTPATIENT)
Age: 54
End: 2024-11-06

## 2024-11-06 ENCOUNTER — TELEPHONE (OUTPATIENT)
Dept: PODIATRY | Facility: CLINIC | Age: 54
End: 2024-11-06

## 2024-11-06 NOTE — TELEPHONE ENCOUNTER
Spoke with patient; offered her a sooner appointment but she states she will keep her 11/12 scheduled appointment.

## 2024-11-06 NOTE — TELEPHONE ENCOUNTER
Caller: Roselia Carmona    Doctor: SIOMARA CanalesM    Reason for call: Roselia is still having stabbing pain and burning underneath where the redness is.  She is very worried about getting an infection.  Last night, she sent another picture through My Chart.    Call back#: [phone]

## 2024-11-12 ENCOUNTER — OFFICE VISIT (OUTPATIENT)
Dept: PODIATRY | Facility: CLINIC | Age: 54
End: 2024-11-12
Payer: COMMERCIAL

## 2024-11-12 VITALS
DIASTOLIC BLOOD PRESSURE: 66 MMHG | HEIGHT: 65 IN | WEIGHT: 175 LBS | HEART RATE: 88 BPM | SYSTOLIC BLOOD PRESSURE: 99 MMHG | BODY MASS INDEX: 29.16 KG/M2

## 2024-11-12 DIAGNOSIS — L60.0 INGROWN TOENAIL: Primary | ICD-10-CM

## 2024-11-12 DIAGNOSIS — M79.674 PAIN IN RIGHT TOE(S): ICD-10-CM

## 2024-11-12 PROCEDURE — 99212 OFFICE O/P EST SF 10 MIN: CPT | Performed by: PODIATRIST

## 2024-11-12 NOTE — PROGRESS NOTES
Ambulatory Visit  Name: Roselia Carmona      : 1970      MRN: [id number]  Encounter Provider: Errol Goel DPM  Encounter Date: 2024   Encounter department: [facility] [place]    Assessment & Plan  Ingrown toenail       Remove the fibrotic tissue that was present from the medial aspect of the right hallux nail.  Applied triple antibiotic and a Band-Aid to the area instructed her to do the same for tomorrow.  Starting on Thursday she can use just a dry Band-Aid during the day and can remove it at night when she is in bed  Pain in right toe(s)         Return in about 2 weeks (around 2024).     History of Present Illness     Roselia Carmona is a 54 y.o. female who presents for follow-up of a permanent nail avulsion done approximately 2 weeks ago.  Patient relates that she has had some pain and drainage since the procedure.    History obtained from : patient  Review of Systems  Medical History Reviewed by provider this encounter:       Current Outpatient Medications on File Prior to Visit   Medication Sig Dispense Refill    cetirizine (ZyrTEC) 10 mg tablet Take 1 tablet (10 mg total) by mouth daily 90 tablet 0    ciclopirox (PENLAC) 8 % solution Apply topically daily 6 mL 4    cyclobenzaprine (FLEXERIL) 10 mg tablet Take 1 tablet (10 mg total) by mouth 3 (three) times a day as needed for muscle spasms 45 tablet 1    famotidine (PEPCID) 40 MG tablet Take 1 tablet (40 mg total) by mouth 2 (two) times a day as needed for heartburn 60 tablet 3    fluticasone (FLONASE) 50 mcg/act nasal spray 1 spray into each nostril daily 15.8 mL 6    ibuprofen (MOTRIN) 600 mg tablet Take 600 mg by mouth 3 (three) times a day as needed Take with food      naproxen (Naprosyn) 500 mg tablet Take 1 tablet (500 mg total) by mouth 2 (two) times a day with meals 30 tablet 0    Cholecalciferol (Vitamin D3) 1.25 MG (55970 UT) TABS 2 times a week for 6 weeks 12 tablet 0     No current facility-administered  "medications on file prior to visit.      Social History     Tobacco Use    Smoking status: Never     Passive exposure: Past    Smokeless tobacco: Never   Vaping Use    Vaping status: Never Used   Substance and Sexual Activity    Alcohol use: Not Currently    Drug use: Yes     Types: Marijuana     Comment: uses pill  (medical [employer] card)    Sexual activity: Yes     Partners: Male         Objective     BP 99/66   Pulse 88   Ht 5' 5\" (1.651 m)   Wt 79.4 kg (175 lb)   LMP 08/16/2021   BMI 29.12 kg/m²     Physical Exam  Vascular status is unchanged from 2 weeks ago.    Derm the right hallux nail has an area of fibrotic tissue along the medial border in the area where the phenol procedure was performed.  Small amount of erythema is along the medial border no signs of any infection.  There is pain with palpation of the area there was good bleeding tissue beneath the fibrotic tissue no signs of any infection.  Administrative Statements   I have spent a total time of 15 minutes in caring for this patient on the day of the visit/encounter including Risks and benefits of tx options, Instructions for management, Patient and family education, Importance of tx compliance, Counseling / Coordination of care, and Documenting in the medical record.  "

## 2024-11-26 ENCOUNTER — OFFICE VISIT (OUTPATIENT)
Dept: PODIATRY | Facility: CLINIC | Age: 54
End: 2024-11-26
Payer: COMMERCIAL

## 2024-11-26 VITALS
WEIGHT: 175 LBS | SYSTOLIC BLOOD PRESSURE: 99 MMHG | BODY MASS INDEX: 29.12 KG/M2 | HEART RATE: 77 BPM | DIASTOLIC BLOOD PRESSURE: 64 MMHG

## 2024-11-26 DIAGNOSIS — M20.42 HAMMER TOE OF LEFT FOOT: ICD-10-CM

## 2024-11-26 DIAGNOSIS — L60.0 INGROWN TOENAIL: Primary | ICD-10-CM

## 2024-11-26 DIAGNOSIS — M79.675 PAIN IN TOE OF LEFT FOOT: ICD-10-CM

## 2024-11-26 PROCEDURE — 99212 OFFICE O/P EST SF 10 MIN: CPT | Performed by: PODIATRIST

## 2024-11-26 NOTE — PROGRESS NOTES
Name: Roselia Carmona      : 1970      MRN: [id number]  Encounter Provider: Errol Goel DPM  Encounter Date: 2024   Encounter department: [facility] [place]  :  Assessment & Plan  Ingrown toenail       A formal timeout including patient identification, laterality and existing allergies [facility] was conducted. I recommend a partial temporary nail avulsion and informed consent obtained.     After cleansing skin with alcohol sprayed etoh chloride on the medial border of the left hallux nail.  I carefully did a partially avulsion of the offending nail border with a small straight nail nipper and flushed with sterile saline. I dressed the site with bacitracin ointment and a DSD to be left intact x 24 hours. The patient may then remove the dressing, shower, and redress with antibiotic ointment and a band-aid daily.    Discussed the permanent procedure for the future with the patient for the ingrown nail.  Hammer toe of left foot       Dispensed a size medium left toe crest for the patient to wear left toe crest for the patient to wear to help reduce the hammertoe deformities which are present.  Pain in toe of left foot         Return if symptoms worsen or fail to improve.     History of Present Illness     HPI  Roselia Carmona is a 54 y.o. female who presents for follow-up of her ingrown nail on her left foot, pain in the ingrown nail on her right foot and for hammertoe deformities on her left extremity.  She is pleased with the outcome of her left ingrown nail she has been able to wear normal shoes and has no complaints with it.  History obtained from: patient    Review of Systems  Medical History Reviewed by provider this encounter:     .  Current Outpatient Medications on File Prior to Visit   Medication Sig Dispense Refill    cetirizine (ZyrTEC) 10 mg tablet Take 1 tablet (10 mg total) by mouth daily 90 tablet 0    cyclobenzaprine (FLEXERIL) 10 mg tablet Take 1 tablet (10 mg  total) by mouth 3 (three) times a day as needed for muscle spasms 45 tablet 1    famotidine (PEPCID) 40 MG tablet Take 1 tablet (40 mg total) by mouth 2 (two) times a day as needed for heartburn 60 tablet 3    fluticasone (FLONASE) 50 mcg/act nasal spray 1 spray into each nostril daily 15.8 mL 6    ibuprofen (MOTRIN) 600 mg tablet Take 600 mg by mouth 3 (three) times a day as needed Take with food      naproxen (Naprosyn) 500 mg tablet Take 1 tablet (500 mg total) by mouth 2 (two) times a day with meals 30 tablet 0    Cholecalciferol (Vitamin D3) 1.25 MG (07704 UT) TABS 2 times a week for 6 weeks (Patient not taking: Reported on 11/26/2024) 12 tablet 0    ciclopirox (PENLAC) 8 % solution Apply topically daily (Patient not taking: Reported on 11/26/2024) 6 mL 4     No current facility-administered medications on file prior to visit.      Social History     Tobacco Use    Smoking status: Never     Passive exposure: Past    Smokeless tobacco: Never   Vaping Use    Vaping status: Never Used   Substance and Sexual Activity    Alcohol use: Not Currently    Drug use: Yes     Types: Marijuana     Comment: uses pill  (medical marijuania card)    Sexual activity: Yes     Partners: Male        Objective   BP 99/64 (BP Location: Left arm, Patient Position: Sitting, Cuff Size: Standard)   Pulse 77   Wt 79.4 kg (175 lb)   LMP 08/16/2021   BMI 29.12 kg/m²      Physical Exam  Vascular status is unchanged from her visit on 10/8/2024.    Derm dry eschar is present along the medial border of the right hallux no signs of any erythema pain upon palpation or signs of infection.  No drainage is seen either.  The left hallux nail is incurvated along the medial border on the distal margin with hypertrophic tissue and pain upon palpation no erythema is noted small amount of blanching is present on the distal aspect of the nail.    Ortho hammertoe deformities are present digits 2 through 5 on the left foot    Administrative Statements   I  have spent a total time of 15 minutes in caring for this patient on the day of the visit/encounter including Risks and benefits of tx options, Instructions for management, Patient and family education, Importance of tx compliance, Risk factor reductions, Counseling / Coordination of care, and Documenting in the medical record.

## 2025-01-24 ENCOUNTER — APPOINTMENT (OUTPATIENT)
Age: 55
End: 2025-01-24
Payer: COMMERCIAL

## 2025-01-24 DIAGNOSIS — Z13.6 ENCOUNTER FOR LIPID SCREENING FOR CARDIOVASCULAR DISEASE: ICD-10-CM

## 2025-01-24 DIAGNOSIS — Z13.220 ENCOUNTER FOR LIPID SCREENING FOR CARDIOVASCULAR DISEASE: ICD-10-CM

## 2025-01-24 DIAGNOSIS — E78.5 DYSLIPIDEMIA: ICD-10-CM

## 2025-01-24 LAB
25(OH)D3 SERPL-MCNC: 24.2 NG/ML (ref 30–100)
CHOLEST SERPL-MCNC: 184 MG/DL (ref ?–200)
HDLC SERPL-MCNC: 55 MG/DL
LDLC SERPL CALC-MCNC: 116 MG/DL (ref 0–100)
TRIGL SERPL-MCNC: 63 MG/DL (ref ?–150)

## 2025-01-24 PROCEDURE — 82172 ASSAY OF APOLIPOPROTEIN: CPT

## 2025-01-24 PROCEDURE — 80061 LIPID PANEL: CPT

## 2025-01-25 LAB — APO B SERPL-MCNC: 99 MG/DL

## 2025-01-27 ENCOUNTER — RESULTS FOLLOW-UP (OUTPATIENT)
Age: 55
End: 2025-01-27

## 2025-01-28 ENCOUNTER — RESULTS FOLLOW-UP (OUTPATIENT)
Age: 55
End: 2025-01-28

## 2025-01-28 DIAGNOSIS — E55.9 VITAMIN D DEFICIENCY: Primary | ICD-10-CM

## 2025-01-28 DIAGNOSIS — E55.9 VITAMIN D INSUFFICIENCY: ICD-10-CM

## 2025-02-03 DIAGNOSIS — Z00.00 HEALTHCARE MAINTENANCE: ICD-10-CM

## 2025-02-04 RX ORDER — CETIRIZINE HYDROCHLORIDE 10 MG/1
10 TABLET ORAL DAILY
Qty: 90 TABLET | Refills: 1 | Status: SHIPPED | OUTPATIENT
Start: 2025-02-04

## 2025-02-19 ENCOUNTER — OFFICE VISIT (OUTPATIENT)
Age: 55
End: 2025-02-19
Payer: COMMERCIAL

## 2025-02-19 VITALS
RESPIRATION RATE: 14 BRPM | SYSTOLIC BLOOD PRESSURE: 110 MMHG | TEMPERATURE: 96.6 F | HEIGHT: 65 IN | HEART RATE: 76 BPM | WEIGHT: 176 LBS | BODY MASS INDEX: 29.32 KG/M2 | OXYGEN SATURATION: 99 % | DIASTOLIC BLOOD PRESSURE: 76 MMHG

## 2025-02-19 DIAGNOSIS — E55.9 VITAMIN D DEFICIENCY: ICD-10-CM

## 2025-02-19 DIAGNOSIS — E78.5 DYSLIPIDEMIA: ICD-10-CM

## 2025-02-19 DIAGNOSIS — Z13.6 ENCOUNTER FOR LIPID SCREENING FOR CARDIOVASCULAR DISEASE: ICD-10-CM

## 2025-02-19 DIAGNOSIS — R73.03 PREDIABETES: ICD-10-CM

## 2025-02-19 DIAGNOSIS — E66.3 OVERWEIGHT: Primary | ICD-10-CM

## 2025-02-19 DIAGNOSIS — E55.9 VITAMIN D INSUFFICIENCY: ICD-10-CM

## 2025-02-19 DIAGNOSIS — Z13.220 ENCOUNTER FOR LIPID SCREENING FOR CARDIOVASCULAR DISEASE: ICD-10-CM

## 2025-02-19 PROCEDURE — 99214 OFFICE O/P EST MOD 30 MIN: CPT | Performed by: FAMILY MEDICINE

## 2025-02-19 NOTE — PROGRESS NOTES
[facility]  Ambulatory Office Visit     PATIENT INFORMATION   Name: Roselia Carmona   YOB: 1970   MRN: [id number]  Encounter Provider: Vernon Longoria MD    Encounter Date: 2/19/2025    ASSESSMENT & PLAN     Assessment & Plan  Overweight  Wt Readings from Last 3 Encounters:   02/19/25 79.8 kg (176 lb)   11/26/24 79.4 kg (175 lb)   11/12/24 79.4 kg (175 lb)     Initial weight (06/12/24): 173 lbs, Body mass index is 28.89 kg/m².   S/p laparoscopic sleeve gastrectomy 2015. 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   Goal weight: 165   TWL: +3 lbs  Currently working on lifestyle improvement only.   Reports doing fasting the three days prior to labs.   Assessment: Worsening weight/BMI.  Needs to work on nutritional intake, incorporating more movement throughout the day, and building consistency with a regular exercise routine  Med management:None. Continue working on lifestyle only.   Recommended focusing on building good habits over time by setting and being consistent with realistic goals.   Improve nutritional intake (recommended Mediterranean diet, low-carb diet)  Calorie control (creating a modest calorie deficit of 500-1000 -calorie/day)  Monitoring portion size and frequency of intake, limiting snacking as discussed  Aiming for Whole Foods and healthy fats  Limiting added sugars and processed foods as discussed  Exercising and move body throughout the day and regularly as discussed  Prioritize sleep and mental health  Return in 6 months for annual physical.     Orders:  •  Comprehensive metabolic panel  •  CBC and differential    Dyslipidemia  01/24/2025 Reviewed & discussed labs today.   8/21/2024 lipoprotein a <9.0  Currently not prescribed any meds.   Assessment: Apolipoprotein B 99, .  HDL 55 and triglycerides 63.Room for improvement.  Med changes: None. Continue to focus on lifestyle improvement only.    Advised improving  nutritional intake and exercising regularly, really focusing on building good habits as discussed.  Goal of <60 Apolipoprotein B & LDL, <100 triglycerides, and >60 HDL  Monitor lipid panel in 6 months   Orders:  •  TSH, 3rd generation with Free T4 reflex  •  Lipid Panel with Direct LDL reflex  •  Apolipoprotein B    Vitamin D deficiency  Sent high-dose vitamin D supplements 1/2025       Vitamin D insufficiency    Orders:  •  Vitamin D 25 hydroxy    Encounter for lipid screening for cardiovascular disease    Orders:  •  Lipid Panel with Direct LDL reflex  •  Apolipoprotein B    Prediabetes    Orders:  •  Hemoglobin A1C         HEALTH MAINTENANCE     Immunization History   Administered Date(s) Administered   • COVID-19 PFIZER VACCINE 0.3 ML IM 10/26/2021, 11/19/2021   • Influenza, seasonal, injectable 1970   • Pneumococcal Polysaccharide PPV23 1970   • Tdap 1970, 11/04/2019     Pap smear:02/02/2023  Mammogram:07/01/2024   Colonoscopy:06/30/2021 06/30/2021  Cologuard:Not on file   DEXA scan:12/12/2019      FOLLOW UP   Return in about 6 months (around 8/19/2025) for routine follow up.    CURRENT MEDICATIONS     Current Outpatient Medications:   •  cetirizine (ZyrTEC) 10 mg tablet, Take 1 tablet by mouth once daily, Disp: 90 tablet, Rfl: 1  •  Cholecalciferol (Vitamin D3) 1.25 MG (13864 UT) TABS, Take 2 times a week, Disp: 8 tablet, Rfl: 2  •  cyclobenzaprine (FLEXERIL) 10 mg tablet, Take 1 tablet (10 mg total) by mouth 3 (three) times a day as needed for muscle spasms, Disp: 45 tablet, Rfl: 1  •  famotidine (PEPCID) 40 MG tablet, Take 1 tablet (40 mg total) by mouth 2 (two) times a day as needed for heartburn, Disp: 60 tablet, Rfl: 3  •  fluticasone (FLONASE) 50 mcg/act nasal spray, 1 spray into each nostril daily, Disp: 15.8 mL, Rfl: 6  •  ibuprofen (MOTRIN) 600 mg tablet, Take 600 mg by mouth 3 (three) times a day as needed Take with food, Disp: , Rfl:   •  ciclopirox (PENLAC) 8 % solution, Apply  "topically daily (Patient not taking: Reported on 11/26/2024), Disp: 6 mL, Rfl: 4      CHIEF COMPLIANT     Chief Complaint   Patient presents with   • Follow-up     Six months        HISTORY OF PRESENT ILLNESS    History of Present Illness     History obtained from : patient and patient's Significant Other  HPI  Review of Systems    PAST MEDICAL & SURGICAL HISTORY     Past Medical History:   Diagnosis Date   • Asthma     seasonal   • Back pain    • Cut of finger 06/2022    cut fingers on left hand   • Kidney stone    • Obesity    • Postgastrectomy malabsorption      Past Surgical History:   Procedure Laterality Date   • CARDIAC CATHETERIZATION Left 11/3/2022    Procedure: Cardiac Left Heart Cath;  Surgeon: Henri Zhu MD;  Location: [facility];  Service: Cardiology   • CARDIAC CATHETERIZATION N/A 11/3/2022    Procedure: Cardiac Coronary Angiogram;  Surgeon: Henri Zhu MD;  Location: [facility];  Service: Cardiology   • CHOLECYSTECTOMY     • COLONOSCOPY     • GASTRECTOMY SLEEVE LAPAROSCOPIC     • KNEE ARTHROSCOPY W/ MENISCAL REPAIR Right    • NEUROPLASTY / TRANSPOSITION MEDIAN NERVE AT CARPAL TUNNEL BILATERAL     • FL ESOPHAGOGASTRODUODENOSCOPY TRANSORAL DIAGNOSTIC N/A 04/30/2018    Procedure: ESOPHAGOGASTRODUODENOSCOPY (EGD);  Surgeon: Ajay Hassan III, MD;  Location: [facility];  Service: Gastroenterology       OBJECTIVES      /76 (BP Location: Left arm, Patient Position: Sitting, Cuff Size: Large)   Pulse 76   Temp (!) 96.6 °F (35.9 °C) (Tympanic)   Resp 14   Ht 5' 5\" (1.651 m)   Wt 79.8 kg (176 lb)   LMP 08/16/2021   SpO2 99%   BMI 29.29 kg/m²    Physical Exam  Vitals reviewed.   Constitutional:       General: She is not in acute distress.     Appearance: Normal appearance. She is not ill-appearing, toxic-appearing or diaphoretic.   HENT:      Head: Normocephalic and atraumatic.      Right Ear: External ear normal.      Left Ear: External ear normal.      Nose: No " congestion or rhinorrhea.   Eyes:      General: No scleral icterus.        Right eye: No discharge.         Left eye: No discharge.      Conjunctiva/sclera: Conjunctivae normal.   Cardiovascular:      Rate and Rhythm: Normal rate.   Pulmonary:      Effort: Pulmonary effort is normal. No respiratory distress.   Neurological:      General: No focal deficit present.      Mental Status: She is alert and oriented to person, place, and time.   Psychiatric:         Mood and Affect: Mood normal.         Behavior: Behavior normal.         Thought Content: Thought content normal.           Vernon Longoria MD  Family Medicine Physician   [facility] [place]      Administrative Statements     Medications have been reviewed by provider in current encounter

## 2025-02-19 NOTE — ASSESSMENT & PLAN NOTE
01/24/2025 Reviewed & discussed labs today.   8/21/2024 lipoprotein a <9.0  Currently not prescribed any meds.   Assessment: Apolipoprotein B 99, .  HDL 55 and triglycerides 63.Room for improvement.  Med changes: None. Continue to focus on lifestyle improvement only.    Advised improving nutritional intake and exercising regularly, really focusing on building good habits as discussed.  Goal of <60 Apolipoprotein B & LDL, <100 triglycerides, and >60 HDL  Monitor lipid panel in 6 months   Orders:  •  TSH, 3rd generation with Free T4 reflex  •  Lipid Panel with Direct LDL reflex  •  Apolipoprotein B

## 2025-02-19 NOTE — ASSESSMENT & PLAN NOTE
Wt Readings from Last 3 Encounters:   02/19/25 79.8 kg (176 lb)   11/26/24 79.4 kg (175 lb)   11/12/24 79.4 kg (175 lb)     Initial weight (06/12/24): 173 lbs, Body mass index is 28.89 kg/m².   S/p laparoscopic sleeve gastrectomy 2015. 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   Goal weight: 165   TWL: +3 lbs  Currently working on lifestyle improvement only.   Reports doing fasting the three days prior to labs.   Assessment: Worsening weight/BMI.  Needs to work on nutritional intake, incorporating more movement throughout the day, and building consistency with a regular exercise routine  Med management:None. Continue working on lifestyle only.   Recommended focusing on building good habits over time by setting and being consistent with realistic goals.   Improve nutritional intake (recommended Mediterranean diet, low-carb diet)  Calorie control (creating a modest calorie deficit of 500-1000 -calorie/day)  Monitoring portion size and frequency of intake, limiting snacking as discussed  Aiming for Whole Foods and healthy fats  Limiting added sugars and processed foods as discussed  Exercising and move body throughout the day and regularly as discussed  Prioritize sleep and mental health  Return in 6 months for annual physical.     Orders:  •  Comprehensive metabolic panel  •  CBC and differential

## 2025-03-13 NOTE — TELEPHONE ENCOUNTER
Pt presents to ED with CC cold symptoms and one emesis episode today  Pt requesting STD testing, denies symptoms at this time  Pt requesting preg test   Reason for Disposition   [1] CLOSE CONTACT COVID-19 EXPOSURE within last 14 days AND [2] NO symptoms    Answer Assessment - Initial Assessment Questions  1  Were you within 6 feet or less, for up to 15 minutes or more with a person that has a confirmed COVID-19 test?     Yes      2  What was the date of your exposure? Tuesday 3/9    3  Are you experiencing any symptoms attributed to the virus?  (Assess for SOB, cough, fever, difficulty breathing)     Says she just has a runny nose but has had this since before exposure date  No fever and no other symptoms at this time  4  HIGH RISK: Do you have any history heart or lung conditions, weakened immune system, diabetes, Asthma, CHF, HIV, COPD, Chemo, renal failure, sickle cell, etc?    Protocols used: CORONAVIRUS (COVID-19) EXPOSURE-ADULT-AH    Pt stated test sites are too far but is able to go to [facility] on Monday for swab  Will follow up with her office on Monday

## 2025-04-24 RX ORDER — CHOLECALCIFEROL (VITAMIN D3) 1250 MCG
CAPSULE ORAL
Qty: 8 CAPSULE | Refills: 0 | OUTPATIENT
Start: 2025-04-24

## 2025-08-01 ENCOUNTER — OFFICE VISIT (OUTPATIENT)
Dept: CARDIOLOGY CLINIC | Facility: CLINIC | Age: 55
End: 2025-08-01
Payer: COMMERCIAL

## 2025-08-01 VITALS
HEART RATE: 67 BPM | OXYGEN SATURATION: 98 % | WEIGHT: 176 LBS | SYSTOLIC BLOOD PRESSURE: 110 MMHG | BODY MASS INDEX: 29.32 KG/M2 | DIASTOLIC BLOOD PRESSURE: 70 MMHG | RESPIRATION RATE: 16 BRPM | HEIGHT: 65 IN

## 2025-08-01 DIAGNOSIS — I25.10 MILD CAD: Primary | ICD-10-CM

## 2025-08-01 DIAGNOSIS — R07.89 ATYPICAL CHEST PAIN: ICD-10-CM

## 2025-08-01 DIAGNOSIS — E78.5 DYSLIPIDEMIA: ICD-10-CM

## 2025-08-01 PROCEDURE — 93000 ELECTROCARDIOGRAM COMPLETE: CPT | Performed by: INTERNAL MEDICINE

## 2025-08-01 PROCEDURE — 99214 OFFICE O/P EST MOD 30 MIN: CPT | Performed by: INTERNAL MEDICINE

## 2025-08-01 RX ORDER — ASPIRIN 81 MG/1
81 TABLET, CHEWABLE ORAL DAILY
Qty: 90 TABLET | Refills: 3 | Status: SHIPPED | OUTPATIENT
Start: 2025-08-01

## 2025-08-01 RX ORDER — NITROGLYCERIN 0.4 MG/1
0.4 TABLET SUBLINGUAL
Qty: 10 TABLET | Refills: 0 | Status: SHIPPED | OUTPATIENT
Start: 2025-08-01

## 2025-08-01 RX ORDER — ATORVASTATIN CALCIUM 20 MG/1
20 TABLET, FILM COATED ORAL DAILY
Qty: 90 TABLET | Refills: 3 | Status: SHIPPED | OUTPATIENT
Start: 2025-08-01

## 2025-08-07 ENCOUNTER — APPOINTMENT (OUTPATIENT)
Dept: LAB | Facility: HOSPITAL | Age: 55
End: 2025-08-07
Payer: COMMERCIAL

## 2025-08-07 LAB
25(OH)D3 SERPL-MCNC: 54.3 NG/ML (ref 30–100)
ALBUMIN SERPL BCG-MCNC: 4.3 G/DL (ref 3.5–5)
ALP SERPL-CCNC: 53 U/L (ref 34–104)
ALT SERPL W P-5'-P-CCNC: 11 U/L (ref 7–52)
ANION GAP SERPL CALCULATED.3IONS-SCNC: 5 MMOL/L (ref 4–13)
AST SERPL W P-5'-P-CCNC: 18 U/L (ref 13–39)
BASOPHILS # BLD AUTO: 0.02 THOUSANDS/ÂΜL (ref 0–0.1)
BASOPHILS NFR BLD AUTO: 1 % (ref 0–1)
BILIRUB SERPL-MCNC: 1.15 MG/DL (ref 0.2–1)
BUN SERPL-MCNC: 13 MG/DL (ref 5–25)
CALCIUM SERPL-MCNC: 9.3 MG/DL (ref 8.4–10.2)
CHLORIDE SERPL-SCNC: 106 MMOL/L (ref 96–108)
CHOLEST SERPL-MCNC: 158 MG/DL (ref ?–200)
CO2 SERPL-SCNC: 29 MMOL/L (ref 21–32)
CREAT SERPL-MCNC: 0.78 MG/DL (ref 0.6–1.3)
EOSINOPHIL # BLD AUTO: 0.07 THOUSAND/ÂΜL (ref 0–0.61)
EOSINOPHIL NFR BLD AUTO: 2 % (ref 0–6)
ERYTHROCYTE [DISTWIDTH] IN BLOOD BY AUTOMATED COUNT: 12.8 % (ref 11.6–15.1)
EST. AVERAGE GLUCOSE BLD GHB EST-MCNC: 117 MG/DL
GFR SERPL CREATININE-BSD FRML MDRD: 86 ML/MIN/1.73SQ M
GLUCOSE P FAST SERPL-MCNC: 98 MG/DL (ref 65–99)
HBA1C MFR BLD: 5.7 %
HCT VFR BLD AUTO: 37 % (ref 34.8–46.1)
HDLC SERPL-MCNC: 56 MG/DL
HGB BLD-MCNC: 12.3 G/DL (ref 11.5–15.4)
IMM GRANULOCYTES # BLD AUTO: 0 THOUSAND/UL (ref 0–0.2)
IMM GRANULOCYTES NFR BLD AUTO: 0 % (ref 0–2)
LDLC SERPL CALC-MCNC: 85 MG/DL (ref 0–100)
LYMPHOCYTES # BLD AUTO: 1.18 THOUSANDS/ÂΜL (ref 0.6–4.47)
LYMPHOCYTES NFR BLD AUTO: 39 % (ref 14–44)
MCH RBC QN AUTO: 28.3 PG (ref 26.8–34.3)
MCHC RBC AUTO-ENTMCNC: 33.2 G/DL (ref 31.4–37.4)
MCV RBC AUTO: 85 FL (ref 82–98)
MONOCYTES # BLD AUTO: 0.23 THOUSAND/ÂΜL (ref 0.17–1.22)
MONOCYTES NFR BLD AUTO: 8 % (ref 4–12)
NEUTROPHILS # BLD AUTO: 1.54 THOUSANDS/ÂΜL (ref 1.85–7.62)
NEUTS SEG NFR BLD AUTO: 50 % (ref 43–75)
NRBC BLD AUTO-RTO: 0 /100 WBCS
PLATELET # BLD AUTO: 179 THOUSANDS/UL (ref 149–390)
PMV BLD AUTO: 10 FL (ref 8.9–12.7)
POTASSIUM SERPL-SCNC: 3.9 MMOL/L (ref 3.5–5.3)
PROT SERPL-MCNC: 7.2 G/DL (ref 6.4–8.4)
RBC # BLD AUTO: 4.35 MILLION/UL (ref 3.81–5.12)
SODIUM SERPL-SCNC: 140 MMOL/L (ref 135–147)
TRIGL SERPL-MCNC: 87 MG/DL (ref ?–150)
TSH SERPL DL<=0.05 MIU/L-ACNC: 2.54 UIU/ML (ref 0.45–4.5)
WBC # BLD AUTO: 3.04 THOUSAND/UL (ref 4.31–10.16)

## 2025-08-08 LAB — APO B SERPL-MCNC: 76 MG/DL

## (undated) DEVICE — TR BAND RADIAL ARTERY COMPRESSION DEVICE: Brand: TR BAND

## (undated) DEVICE — RADIFOCUS OPTITORQUE ANGIOGRAPHIC CATHETER: Brand: OPTITORQUE

## (undated) DEVICE — DGW .035 FC J3MM 260CM TEF: Brand: EMERALD

## (undated) DEVICE — GLIDESHEATH SLENDER STAINLESS STEEL KIT: Brand: GLIDESHEATH SLENDER